# Patient Record
Sex: MALE | Race: WHITE | NOT HISPANIC OR LATINO | Employment: OTHER | URBAN - METROPOLITAN AREA
[De-identification: names, ages, dates, MRNs, and addresses within clinical notes are randomized per-mention and may not be internally consistent; named-entity substitution may affect disease eponyms.]

---

## 2023-03-18 ENCOUNTER — HOSPITAL ENCOUNTER (INPATIENT)
Facility: HOSPITAL | Age: 78
LOS: 9 days | Discharge: HOME HEALTH | DRG: 698 | End: 2023-03-27
Attending: EMERGENCY MEDICINE | Admitting: STUDENT IN AN ORGANIZED HEALTH CARE EDUCATION/TRAINING PROGRAM
Payer: OTHER GOVERNMENT

## 2023-03-18 ENCOUNTER — APPOINTMENT (OUTPATIENT)
Dept: GENERAL RADIOLOGY | Facility: HOSPITAL | Age: 78
DRG: 698 | End: 2023-03-18
Payer: OTHER GOVERNMENT

## 2023-03-18 DIAGNOSIS — Z78.9 DECREASED ACTIVITIES OF DAILY LIVING (ADL): ICD-10-CM

## 2023-03-18 DIAGNOSIS — A41.9 SEPSIS WITH ACUTE RENAL FAILURE AND SEPTIC SHOCK, DUE TO UNSPECIFIED ORGANISM, UNSPECIFIED ACUTE RENAL FAILURE TYPE: Primary | ICD-10-CM

## 2023-03-18 DIAGNOSIS — N39.0 ACUTE URINARY TRACT INFECTION: ICD-10-CM

## 2023-03-18 DIAGNOSIS — N17.9 SEPSIS WITH ACUTE RENAL FAILURE AND SEPTIC SHOCK, DUE TO UNSPECIFIED ORGANISM, UNSPECIFIED ACUTE RENAL FAILURE TYPE: Primary | ICD-10-CM

## 2023-03-18 DIAGNOSIS — R65.21 SEPSIS WITH ACUTE RENAL FAILURE AND SEPTIC SHOCK, DUE TO UNSPECIFIED ORGANISM, UNSPECIFIED ACUTE RENAL FAILURE TYPE: Primary | ICD-10-CM

## 2023-03-18 DIAGNOSIS — R26.2 DIFFICULTY WALKING: ICD-10-CM

## 2023-03-18 LAB
ALBUMIN SERPL-MCNC: 3.2 G/DL (ref 3.5–5.2)
ALBUMIN/GLOB SERPL: 0.7 G/DL
ALP SERPL-CCNC: 183 U/L (ref 39–117)
ALT SERPL W P-5'-P-CCNC: 59 U/L (ref 1–41)
ANION GAP SERPL CALCULATED.3IONS-SCNC: 17.9 MMOL/L (ref 5–15)
APTT PPP: 45.9 SECONDS (ref 24.2–34.2)
ARTERIAL PATENCY WRIST A: ABNORMAL
AST SERPL-CCNC: 99 U/L (ref 1–40)
BACTERIA UR QL AUTO: ABNORMAL /HPF
BASE EXCESS BLDA CALC-SCNC: -7.3 MMOL/L (ref -2–2)
BASOPHILS # BLD AUTO: 0.03 10*3/MM3 (ref 0–0.2)
BASOPHILS NFR BLD AUTO: 0.2 % (ref 0–1.5)
BDY SITE: ABNORMAL
BILIRUB SERPL-MCNC: 0.5 MG/DL (ref 0–1.2)
BILIRUB UR QL STRIP: NEGATIVE
BUN SERPL-MCNC: 55 MG/DL (ref 8–23)
BUN/CREAT SERPL: 16.4 (ref 7–25)
CA-I BLDA-SCNC: 1.05 MMOL/L (ref 1.13–1.32)
CA-I BLDA-SCNC: 1.09 MMOL/L (ref 1.13–1.32)
CALCIUM SPEC-SCNC: 9 MG/DL (ref 8.6–10.5)
CHLORIDE BLDA-SCNC: 102 MMOL/L (ref 98–106)
CHLORIDE SERPL-SCNC: 92 MMOL/L (ref 98–107)
CLARITY UR: ABNORMAL
CO2 SERPL-SCNC: 19.1 MMOL/L (ref 22–29)
COHGB MFR BLD: 0.6 % (ref 0–1.5)
COLOR UR: YELLOW
CREAT SERPL-MCNC: 3.36 MG/DL (ref 0.76–1.27)
CRP SERPL-MCNC: 32.31 MG/DL (ref 0–0.5)
D-LACTATE SERPL-SCNC: 1.8 MMOL/L (ref 0.5–2)
D-LACTATE SERPL-SCNC: 3.8 MMOL/L (ref 0.5–2)
DEPRECATED RDW RBC AUTO: 50 FL (ref 37–54)
EGFRCR SERPLBLD CKD-EPI 2021: 18 ML/MIN/1.73
EOSINOPHIL # BLD AUTO: 0 10*3/MM3 (ref 0–0.4)
EOSINOPHIL NFR BLD AUTO: 0 % (ref 0.3–6.2)
ERYTHROCYTE [DISTWIDTH] IN BLOOD BY AUTOMATED COUNT: 15.6 % (ref 12.3–15.4)
FHHB: 5.9 % (ref 0–5)
GAS FLOW AIRWAY: 2 LPM
GLOBULIN UR ELPH-MCNC: 4.6 GM/DL
GLUCOSE BLDA-MCNC: 186 MG/DL (ref 70–99)
GLUCOSE BLDC GLUCOMTR-MCNC: 185 MG/DL (ref 70–99)
GLUCOSE BLDC GLUCOMTR-MCNC: 211 MG/DL (ref 70–99)
GLUCOSE SERPL-MCNC: 200 MG/DL (ref 65–99)
GLUCOSE UR STRIP-MCNC: ABNORMAL MG/DL
HCO3 BLDA-SCNC: 16.7 MMOL/L (ref 22–26)
HCT VFR BLD AUTO: 32.2 % (ref 37.5–51)
HGB BLD-MCNC: 10.7 G/DL (ref 13–17.7)
HGB BLDA-MCNC: 11 G/DL (ref 13.8–16.4)
HGB UR QL STRIP.AUTO: ABNORMAL
HOLD SPECIMEN: NORMAL
HOLD SPECIMEN: NORMAL
HYALINE CASTS UR QL AUTO: ABNORMAL /LPF
IMM GRANULOCYTES # BLD AUTO: 0.09 10*3/MM3 (ref 0–0.05)
IMM GRANULOCYTES NFR BLD AUTO: 0.7 % (ref 0–0.5)
INHALED O2 CONCENTRATION: 28 %
INR PPP: 1.2 (ref 0.86–1.15)
KETONES UR QL STRIP: NEGATIVE
LACTATE BLDA-SCNC: 2.26 MMOL/L (ref 0.5–2)
LEUKOCYTE ESTERASE UR QL STRIP.AUTO: ABNORMAL
LYMPHOCYTES # BLD AUTO: 0.4 10*3/MM3 (ref 0.7–3.1)
LYMPHOCYTES NFR BLD AUTO: 2.9 % (ref 19.6–45.3)
MAGNESIUM SERPL-MCNC: 1.6 MG/DL (ref 1.6–2.4)
MCH RBC QN AUTO: 29.2 PG (ref 26.6–33)
MCHC RBC AUTO-ENTMCNC: 33.2 G/DL (ref 31.5–35.7)
MCV RBC AUTO: 87.7 FL (ref 79–97)
METHGB BLD QL: 0.3 % (ref 0–1.5)
MODALITY: ABNORMAL
MONOCYTES # BLD AUTO: 1.18 10*3/MM3 (ref 0.1–0.9)
MONOCYTES NFR BLD AUTO: 8.7 % (ref 5–12)
MRSA DNA SPEC QL NAA+PROBE: NORMAL
NEUTROPHILS NFR BLD AUTO: 11.91 10*3/MM3 (ref 1.7–7)
NEUTROPHILS NFR BLD AUTO: 87.5 % (ref 42.7–76)
NITRITE UR QL STRIP: NEGATIVE
NOTE: ABNORMAL
NRBC BLD AUTO-RTO: 0 /100 WBC (ref 0–0.2)
NT-PROBNP SERPL-MCNC: ABNORMAL PG/ML (ref 0–1800)
OXYHGB MFR BLDV: 93.2 % (ref 94–99)
PCO2 BLDA: 29.2 MM HG (ref 35–45)
PH BLDA: 7.38 PH UNITS (ref 7.35–7.45)
PH UR STRIP.AUTO: 5.5 [PH] (ref 5–8)
PHOSPHATE SERPL-MCNC: 3.8 MG/DL (ref 2.5–4.5)
PLATELET # BLD AUTO: 198 10*3/MM3 (ref 140–450)
PMV BLD AUTO: 10.5 FL (ref 6–12)
PO2 BLD: 281 MM[HG] (ref 0–500)
PO2 BLDA: 78.8 MM HG (ref 80–100)
POTASSIUM BLDA-SCNC: 4.51 MMOL/L (ref 3.5–5)
POTASSIUM SERPL-SCNC: 4.9 MMOL/L (ref 3.5–5.2)
PROT SERPL-MCNC: 7.8 G/DL (ref 6–8.5)
PROT UR QL STRIP: ABNORMAL
PROTHROMBIN TIME: 15.2 SECONDS (ref 11.8–14.9)
RBC # BLD AUTO: 3.67 10*6/MM3 (ref 4.14–5.8)
RBC # UR STRIP: ABNORMAL /HPF
REF LAB TEST METHOD: ABNORMAL
SAO2 % BLDCOA: 94 % (ref 95–99)
SODIUM BLDA-SCNC: 127.5 MMOL/L (ref 136–146)
SODIUM SERPL-SCNC: 129 MMOL/L (ref 136–145)
SP GR UR STRIP: 1.01 (ref 1–1.03)
SQUAMOUS #/AREA URNS HPF: ABNORMAL /HPF
UROBILINOGEN UR QL STRIP: ABNORMAL
WBC # UR STRIP: ABNORMAL /HPF
WBC NRBC COR # BLD: 13.61 10*3/MM3 (ref 3.4–10.8)
WHOLE BLOOD HOLD COAG: NORMAL
WHOLE BLOOD HOLD SPECIMEN: NORMAL

## 2023-03-18 PROCEDURE — 87641 MR-STAPH DNA AMP PROBE: CPT | Performed by: STUDENT IN AN ORGANIZED HEALTH CARE EDUCATION/TRAINING PROGRAM

## 2023-03-18 PROCEDURE — 82962 GLUCOSE BLOOD TEST: CPT

## 2023-03-18 PROCEDURE — 99285 EMERGENCY DEPT VISIT HI MDM: CPT

## 2023-03-18 PROCEDURE — 83050 HGB METHEMOGLOBIN QUAN: CPT | Performed by: EMERGENCY MEDICINE

## 2023-03-18 PROCEDURE — 25010000002 VANCOMYCIN 5 G RECONSTITUTED SOLUTION: Performed by: STUDENT IN AN ORGANIZED HEALTH CARE EDUCATION/TRAINING PROGRAM

## 2023-03-18 PROCEDURE — 85730 THROMBOPLASTIN TIME PARTIAL: CPT | Performed by: EMERGENCY MEDICINE

## 2023-03-18 PROCEDURE — 25010000002 CEFTRIAXONE PER 250 MG: Performed by: EMERGENCY MEDICINE

## 2023-03-18 PROCEDURE — 82330 ASSAY OF CALCIUM: CPT | Performed by: EMERGENCY MEDICINE

## 2023-03-18 PROCEDURE — 87150 DNA/RNA AMPLIFIED PROBE: CPT | Performed by: EMERGENCY MEDICINE

## 2023-03-18 PROCEDURE — 82805 BLOOD GASES W/O2 SATURATION: CPT | Performed by: EMERGENCY MEDICINE

## 2023-03-18 PROCEDURE — 84100 ASSAY OF PHOSPHORUS: CPT | Performed by: EMERGENCY MEDICINE

## 2023-03-18 PROCEDURE — 83605 ASSAY OF LACTIC ACID: CPT | Performed by: EMERGENCY MEDICINE

## 2023-03-18 PROCEDURE — 87088 URINE BACTERIA CULTURE: CPT | Performed by: EMERGENCY MEDICINE

## 2023-03-18 PROCEDURE — 82375 ASSAY CARBOXYHB QUANT: CPT | Performed by: EMERGENCY MEDICINE

## 2023-03-18 PROCEDURE — 87086 URINE CULTURE/COLONY COUNT: CPT | Performed by: EMERGENCY MEDICINE

## 2023-03-18 PROCEDURE — 93010 ELECTROCARDIOGRAM REPORT: CPT | Performed by: INTERNAL MEDICINE

## 2023-03-18 PROCEDURE — 93005 ELECTROCARDIOGRAM TRACING: CPT | Performed by: EMERGENCY MEDICINE

## 2023-03-18 PROCEDURE — 87186 SC STD MICRODIL/AGAR DIL: CPT | Performed by: EMERGENCY MEDICINE

## 2023-03-18 PROCEDURE — 80053 COMPREHEN METABOLIC PANEL: CPT | Performed by: EMERGENCY MEDICINE

## 2023-03-18 PROCEDURE — 88312 SPECIAL STAINS GROUP 1: CPT | Performed by: EMERGENCY MEDICINE

## 2023-03-18 PROCEDURE — 99223 1ST HOSP IP/OBS HIGH 75: CPT | Performed by: STUDENT IN AN ORGANIZED HEALTH CARE EDUCATION/TRAINING PROGRAM

## 2023-03-18 PROCEDURE — 85610 PROTHROMBIN TIME: CPT | Performed by: EMERGENCY MEDICINE

## 2023-03-18 PROCEDURE — 83735 ASSAY OF MAGNESIUM: CPT | Performed by: EMERGENCY MEDICINE

## 2023-03-18 PROCEDURE — 94799 UNLISTED PULMONARY SVC/PX: CPT

## 2023-03-18 PROCEDURE — 83880 ASSAY OF NATRIURETIC PEPTIDE: CPT | Performed by: EMERGENCY MEDICINE

## 2023-03-18 PROCEDURE — 71045 X-RAY EXAM CHEST 1 VIEW: CPT

## 2023-03-18 PROCEDURE — 25010000002 HEPARIN (PORCINE) PER 1000 UNITS: Performed by: STUDENT IN AN ORGANIZED HEALTH CARE EDUCATION/TRAINING PROGRAM

## 2023-03-18 PROCEDURE — 36415 COLL VENOUS BLD VENIPUNCTURE: CPT

## 2023-03-18 PROCEDURE — 86140 C-REACTIVE PROTEIN: CPT | Performed by: EMERGENCY MEDICINE

## 2023-03-18 PROCEDURE — 93005 ELECTROCARDIOGRAM TRACING: CPT

## 2023-03-18 PROCEDURE — 63710000001 INSULIN REGULAR HUMAN PER 5 UNITS: Performed by: STUDENT IN AN ORGANIZED HEALTH CARE EDUCATION/TRAINING PROGRAM

## 2023-03-18 PROCEDURE — 85025 COMPLETE CBC W/AUTO DIFF WBC: CPT | Performed by: EMERGENCY MEDICINE

## 2023-03-18 PROCEDURE — 36600 WITHDRAWAL OF ARTERIAL BLOOD: CPT | Performed by: EMERGENCY MEDICINE

## 2023-03-18 PROCEDURE — 81001 URINALYSIS AUTO W/SCOPE: CPT | Performed by: EMERGENCY MEDICINE

## 2023-03-18 PROCEDURE — 51702 INSERT TEMP BLADDER CATH: CPT

## 2023-03-18 RX ORDER — OMEPRAZOLE 20 MG/1
20 CAPSULE, DELAYED RELEASE ORAL DAILY
COMMUNITY

## 2023-03-18 RX ORDER — NOREPINEPHRINE BIT/0.9 % NACL 8 MG/250ML
.02-.3 INFUSION BOTTLE (ML) INTRAVENOUS
Status: DISCONTINUED | OUTPATIENT
Start: 2023-03-18 | End: 2023-03-20

## 2023-03-18 RX ORDER — GLIPIZIDE 10 MG/1
20 TABLET ORAL
COMMUNITY

## 2023-03-18 RX ORDER — NICOTINE POLACRILEX 4 MG
15 LOZENGE BUCCAL
Status: DISCONTINUED | OUTPATIENT
Start: 2023-03-18 | End: 2023-03-27 | Stop reason: HOSPADM

## 2023-03-18 RX ORDER — ACETAMINOPHEN 325 MG/1
650 TABLET ORAL EVERY 6 HOURS PRN
Status: DISCONTINUED | OUTPATIENT
Start: 2023-03-18 | End: 2023-03-27 | Stop reason: HOSPADM

## 2023-03-18 RX ORDER — ALUMINA, MAGNESIA, AND SIMETHICONE 2400; 2400; 240 MG/30ML; MG/30ML; MG/30ML
15 SUSPENSION ORAL EVERY 6 HOURS PRN
Status: DISCONTINUED | OUTPATIENT
Start: 2023-03-18 | End: 2023-03-27 | Stop reason: HOSPADM

## 2023-03-18 RX ORDER — HEPARIN SODIUM 5000 [USP'U]/ML
5000 INJECTION, SOLUTION INTRAVENOUS; SUBCUTANEOUS EVERY 12 HOURS SCHEDULED
Status: DISCONTINUED | OUTPATIENT
Start: 2023-03-18 | End: 2023-03-19

## 2023-03-18 RX ORDER — DOCUSATE SODIUM 250 MG
250 CAPSULE ORAL 2 TIMES DAILY
COMMUNITY

## 2023-03-18 RX ORDER — TAMSULOSIN HYDROCHLORIDE 0.4 MG/1
1 CAPSULE ORAL NIGHTLY
COMMUNITY

## 2023-03-18 RX ORDER — OMEGA-3S/DHA/EPA/FISH OIL/D3 300MG-1000
400 CAPSULE ORAL DAILY
COMMUNITY

## 2023-03-18 RX ORDER — SODIUM CHLORIDE 9 MG/ML
40 INJECTION, SOLUTION INTRAVENOUS AS NEEDED
Status: DISCONTINUED | OUTPATIENT
Start: 2023-03-18 | End: 2023-03-27 | Stop reason: HOSPADM

## 2023-03-18 RX ORDER — ATORVASTATIN CALCIUM 40 MG/1
40 TABLET, FILM COATED ORAL DAILY
COMMUNITY

## 2023-03-18 RX ORDER — SODIUM CHLORIDE, SODIUM LACTATE, POTASSIUM CHLORIDE, CALCIUM CHLORIDE 600; 310; 30; 20 MG/100ML; MG/100ML; MG/100ML; MG/100ML
100 INJECTION, SOLUTION INTRAVENOUS CONTINUOUS
Status: DISCONTINUED | OUTPATIENT
Start: 2023-03-18 | End: 2023-03-21

## 2023-03-18 RX ORDER — SODIUM CHLORIDE 0.9 % (FLUSH) 0.9 %
10 SYRINGE (ML) INJECTION AS NEEDED
Status: DISCONTINUED | OUTPATIENT
Start: 2023-03-18 | End: 2023-03-27 | Stop reason: HOSPADM

## 2023-03-18 RX ORDER — FINASTERIDE 5 MG/1
5 TABLET, FILM COATED ORAL DAILY
COMMUNITY

## 2023-03-18 RX ORDER — GABAPENTIN 800 MG/1
800 TABLET ORAL 3 TIMES DAILY
COMMUNITY
End: 2023-03-27 | Stop reason: HOSPADM

## 2023-03-18 RX ORDER — CEFTRIAXONE SODIUM 1 G/50ML
1 INJECTION, SOLUTION INTRAVENOUS EVERY 24 HOURS
Status: DISCONTINUED | OUTPATIENT
Start: 2023-03-19 | End: 2023-03-18

## 2023-03-18 RX ORDER — CHOLECALCIFEROL (VITAMIN D3) 125 MCG
5 CAPSULE ORAL NIGHTLY PRN
Status: DISCONTINUED | OUTPATIENT
Start: 2023-03-18 | End: 2023-03-27 | Stop reason: HOSPADM

## 2023-03-18 RX ORDER — METOPROLOL TARTRATE 50 MG/1
125 TABLET, FILM COATED ORAL 2 TIMES DAILY
COMMUNITY
End: 2023-03-27 | Stop reason: HOSPADM

## 2023-03-18 RX ORDER — SODIUM CHLORIDE 0.9 % (FLUSH) 0.9 %
10 SYRINGE (ML) INJECTION EVERY 12 HOURS SCHEDULED
Status: DISCONTINUED | OUTPATIENT
Start: 2023-03-18 | End: 2023-03-27 | Stop reason: HOSPADM

## 2023-03-18 RX ORDER — CEFTRIAXONE SODIUM 1 G/50ML
1 INJECTION, SOLUTION INTRAVENOUS EVERY 24 HOURS
Status: DISCONTINUED | OUTPATIENT
Start: 2023-03-19 | End: 2023-03-19

## 2023-03-18 RX ORDER — CEFTRIAXONE SODIUM 1 G/50ML
1 INJECTION, SOLUTION INTRAVENOUS ONCE
Status: COMPLETED | OUTPATIENT
Start: 2023-03-18 | End: 2023-03-18

## 2023-03-18 RX ORDER — ASPIRIN 81 MG/1
81 TABLET, CHEWABLE ORAL DAILY
COMMUNITY

## 2023-03-18 RX ORDER — AMLODIPINE BESYLATE 10 MG/1
10 TABLET ORAL DAILY
COMMUNITY
End: 2023-03-27 | Stop reason: HOSPADM

## 2023-03-18 RX ORDER — DEXTROSE MONOHYDRATE 25 G/50ML
25 INJECTION, SOLUTION INTRAVENOUS
Status: DISCONTINUED | OUTPATIENT
Start: 2023-03-18 | End: 2023-03-27 | Stop reason: HOSPADM

## 2023-03-18 RX ADMIN — VANCOMYCIN HYDROCHLORIDE 1500 MG: 5 INJECTION, POWDER, LYOPHILIZED, FOR SOLUTION INTRAVENOUS at 21:40

## 2023-03-18 RX ADMIN — Medication 10 ML: at 21:56

## 2023-03-18 RX ADMIN — INSULIN HUMAN 3 UNITS: 100 INJECTION, SOLUTION PARENTERAL at 23:03

## 2023-03-18 RX ADMIN — SODIUM CHLORIDE 2136 ML: 9 INJECTION, SOLUTION INTRAVENOUS at 17:31

## 2023-03-18 RX ADMIN — SODIUM CHLORIDE 1000 ML: 9 INJECTION, SOLUTION INTRAVENOUS at 16:46

## 2023-03-18 RX ADMIN — SODIUM CHLORIDE, POTASSIUM CHLORIDE, SODIUM LACTATE AND CALCIUM CHLORIDE 100 ML/HR: 600; 310; 30; 20 INJECTION, SOLUTION INTRAVENOUS at 21:40

## 2023-03-18 RX ADMIN — HEPARIN SODIUM 5000 UNITS: 5000 INJECTION INTRAVENOUS; SUBCUTANEOUS at 21:40

## 2023-03-18 RX ADMIN — Medication 0.02 MCG/KG/MIN: at 18:20

## 2023-03-18 RX ADMIN — CEFTRIAXONE SODIUM 1 G: 1 INJECTION, SOLUTION INTRAVENOUS at 18:20

## 2023-03-18 NOTE — H&P
" [facility]  Date: 3/18/2023   Patient Name: Darnell Geller  : 1945  MRN: [id number]  Primary Care Physician:  Provider, No Known  Date of admission: 3/18/2023    Subjective   Subjective     Chief Complaint: Altered mental status    HPI:    Darnell Geller is a 77 y.o. male past medical history of BPH, type 2 diabetes, peripheral neuropathy, spinal stenosis, CAD status post PCI in , hyperlipidemia, hypertension who presents to the ER due to altered mental status.  Patient is significantly lethargic and confused unable to provide history.  History obtained by discussion with his wife Madison on the phone at [phone].  It appears patient is a relatively independent male who does not have many complaints at baseline but over the last few days has been progressively more weak and intermittently lethargic.  In that time patient also has been straight cathing himself for known urinary retention.  He was just recently diagnosed with urinary retention a month ago when he was told his urine \"backs up into his kidneys\".  They did not mention he had any kidney stones or any history of kidney stones.  He initially had a Muñiz catheter that was removed and most recently in the last week he has been straight catheterizing himself.  In that time he has been slowly progressively having decreasing urine output as well as more weakness and change in mental status.  This morning his wife states that he was barely able to ambulate and was not making sense and also very tired at which point she decided to call the ambulance to bring him in.  Upon arrival here patient was noted to be hypotensive and febrile at 101.2 with a BP of 76/50.  Lab work was revealing of a leukocytosis of 13,000, elevated BNP of 16,000, CRP of 32, lactate of 3.8, INR 1.2, PTT of 45.9, urinalysis showing pyuria, proteinuria and hematuria.  ABG was done and showed adequate compensation with a CO2 of 29.  Chest x-ray " revealed cardiomegaly and possible atypical infection or low-grade pulmonary interstitial edema.  Patient was initially given 2100 mL of fluid in the ER and started on Rocephin for suspected sepsis.  Initially he had improvement in his lethargy but however had a decline in mental status and blood pressure again at which point Levophed was initiated.  While in the ER patient also had 3 very loose bowel movements.  Hospitalist service was then contacted for admission.     Personal History     Past Medical History:  History reviewed. No pertinent past medical history.  History obtained by discussion with wife: Type 2 diabetes with peripheral neuropathy, spinal stenosis, CAD, urinary retention, hyperlipidemia, hypertension, BPH    Past Surgical History:  History reviewed. No pertinent surgical history.  Surgical history obtained by discussion with wife, no surgery, PCI in 2004    Family History:   Reviewed and noncontributory except as mentioned in HPI    Social History:   Social Determinants of Health     Tobacco Use: Not on file   Alcohol Use: Not on file   Financial Resource Strain: Not on file   Food Insecurity: Not on file   Transportation Needs: Not on file   Physical Activity: Not on file   Stress: Not on file   Social Connections: Not on file   Intimate Partner Violence: Not on file   Depression: Not on file   Housing Stability: Not on file         Home Medications:       Allergies:  No Known Allergies    Review of Systems   Unable to obtain due to patient's altered mental status    Objective   Objective     Vitals:   Temp:  [99.8 °F (37.7 °C)-102.1 °F (38.9 °C)] 102.1 °F (38.9 °C)  Heart Rate:  [] 103  Resp:  [18-22] 19  BP: (76-92)/(41-52) 85/50  Flow (L/min):  [2-4] 2    Physical Exam    Constitutional: Awake, alert, no acute distress   Eyes: Pupils equal, sclerae anicteric, no conjunctival injection   HENT: NCAT, mucous membranes dry   Neck: Supple, no thyromegaly, no lymphadenopathy, trachea  midline   Respiratory: Clear to auscultation bilaterally, nonlabored respirations    Cardiovascular: RRR, no murmurs, rubs, or gallops, palpable pedal pulses bilaterally   Gastrointestinal: Positive bowel sounds, soft, nontender, nondistended   Musculoskeletal: No bilateral ankle edema, no clubbing or cyanosis to extremities   Psychiatric: Appropriate affect, cooperative   Neurologic: Oriented x 1 (Person), strength symmetric in all extremities, Cranial Nerves grossly intact to confrontation, speech clear   Skin: No rashes     Result Review    Result Review:  I have personally reviewed the results from the time of this admission to 3/18/2023 19:13 EDT and agree with these findings:  [x]  Laboratory  [x]  Microbiology  [x]  Radiology  [x]  EKG/Telemetry    [x]  Cardiology/Vascular   []  Pathology  [x]  Old records  []  Other:      Assessment & Plan   Assessment / Plan     Assessment/Plan:   • Septic shock likely secondary to UTI versus GI source  • Acute renal failure, possibly obstructive in nature versus hypovolemia due to above  • Acute metabolic encephalopathy likely multifactorial due to causes above  • Chronic urinary retention, intermittent catheterization at home  • Leukocytosis likely due to UTI versus GI source  • Acute diarrhea  • Lactic acidosis likely due to sepsis  • Coagulopathy likely due to sepsis  • Type 2 diabetes with peripheral neuropathy  • Hyperlipidemia  • Coronary artery disease  • Chronic lumbar spinal stenosis    Plan  - Admit to ICU  - Suspect most likely sepsis source is urine given pyuria however we will get a CT scan of the abdomen to rule out alternative pathology as per wife patient has been having issues passing bowel movements over the last few weeks  - We will continue broad-spectrum antibiotics with vancomycin and Rocephin for now, MRSA nares to rule out colonization at which point we could potentially de-escalate.  Follow-up urine and blood cultures  - Check C. difficile given  diarrhea in the ER  - Renal failure potentially related to sepsis however has had retention with what sounds like bilateral hydronephrosis in the past, CT scan of the abdomen to further rule out any nephrolithiasis, Muñiz catheter placed in the ER as well  - Continue vasopressors titrated to keep MAP greater than 65.  Intensivist consulted and recommendations will be appreciated  - Mental status slowly improving with improvement in blood pressure, holding all home antihypertensives for now, continue neurochecks in the ICU  - Continue daily labs  - Coagulopathy seems most likely related to sepsis as per wife he is not on any blood thinners  - Insulin sliding scale and Accu-Cheks ordered for type 2 diabetes, can keep n.p.o. for now given his still lethargic  - Clarify home meds and resume when appropriate        Discussed with ER Physician and Nurse.  Discussed with his wife Madison who is his POA at [phone].      DVT Prophylaxis: Heparin    CODE STATUS:    Code Status (Patient has no pulse and is not breathing): CPR (Attempt to Resuscitate)  Medical Interventions (Patient has pulse or is breathing): Full Support      Admission Status:  I believe this patient meets inpatient status.    Electronically signed by Vincenzo Crandall MD, 03/18/23, 7:05 PM EDT.

## 2023-03-18 NOTE — ED PROVIDER NOTES
Time: 4:53 PM EDT  Date of encounter:  3/18/2023  Independent Historian/Clinical History and Information was obtained by:   Family  Chief Complaint: Altered Mental Status     History is limited by: N/A    History of Present Illness:  Patient is a 77 y.o. year old male who presents to the emergency department for evaluation of Altered Mental status that started this week. Pt recently had his soler catheter removed and has since been self cathetering. PT has had a diminished response and low blood pressure today. Pt describes his current condition as feeling like he was floating. Pt denies any LOC or fevers.     HPI    Patient Care Team  Primary Care Provider: Provider, No Known    Past Medical History:     No Known Allergies  Past Medical History:   Diagnosis Date   • Arthritis    • Benign prostatic hyperplasia    • Coronary artery disease    • Hyperlipidemia    • Hypertension      Past Surgical History:   Procedure Laterality Date   • SINUS SURGERY       History reviewed. No pertinent family history.    Home Medications:  Prior to Admission medications    Not on File        Social History:   Social History     Tobacco Use   • Smoking status: Every Day     Packs/day: 1.50     Years: 40.00     Pack years: 60.00     Types: Cigarettes   • Smokeless tobacco: Never   Vaping Use   • Vaping Use: Never used   Substance Use Topics   • Alcohol use: Not Currently   • Drug use: Never         Review of Systems:  Review of Systems   Constitutional: Negative for chills and fever.   HENT: Negative for sore throat.    Eyes: Negative for photophobia.   Respiratory: Negative for shortness of breath.    Cardiovascular: Negative for chest pain.   Gastrointestinal: Negative for abdominal pain, diarrhea, nausea and vomiting.   Genitourinary: Negative for dysuria.   Musculoskeletal: Negative for neck pain.   Skin: Negative for wound.   Neurological: Negative for headaches.        Mental status change   Hematological:        Low blood  "pressure    All other systems reviewed and are negative.       Physical Exam:  /75   Pulse 111   Temp 98.1 °F (36.7 °C) (Oral)   Resp 18   Ht 182.9 cm (72\")   Wt 75.6 kg (166 lb 10.7 oz)   SpO2 (!) 89%   BMI 22.60 kg/m²     Physical Exam  Vitals and nursing note reviewed.   Constitutional:       General: He is not in acute distress.  HENT:      Head: Normocephalic and atraumatic.   Eyes:      Extraocular Movements: Extraocular movements intact.   Cardiovascular:      Rate and Rhythm: Normal rate and regular rhythm.   Pulmonary:      Effort: Pulmonary effort is normal. No respiratory distress.      Breath sounds: Normal breath sounds.   Abdominal:      General: Abdomen is flat.      Palpations: Abdomen is soft.      Tenderness: There is no abdominal tenderness.   Musculoskeletal:         General: Normal range of motion.      Cervical back: Normal range of motion and neck supple.   Skin:     General: Skin is warm and dry.      Capillary Refill: Capillary refill takes less than 2 seconds.   Neurological:      Mental Status: He is alert and oriented to person, place, and time. Mental status is at baseline.                  Procedures:  Procedures      Medical Decision Making:      Comorbidities that affect care:    Coronary Artery Disease, Hypertension    External Notes reviewed:    None      The following orders were placed and all results were independently analyzed by me:  Orders Placed This Encounter   Procedures   • Blood Culture - Blood,   • Blood Culture - Blood,   • Urine Culture - Urine,   • Clostridioides difficile Toxin - Stool, Per Rectum   • Enteric Bacterial Panel - Stool, Per Rectum   • Clostridioides difficile Toxin, PCR - Stool, Per Rectum   • MRSA Screen, PCR (Inpatient) - Swab, Nares   • Blood Culture ID, PCR - Blood,   • XR Chest 1 View   • CT Abdomen Pelvis Without Contrast   • Sesser Draw   • Comprehensive Metabolic Panel   • Protime-INR   • aPTT   • Magnesium   • Phosphorus   • " Calcium, Ionized   • Lactic Acid, Plasma   • C-reactive Protein   • Urinalysis With Culture If Indicated - Urine, Catheter   • CBC Auto Differential   • ABG with Co-Ox and Electrolytes   • BNP   • STAT Lactic Acid, Reflex   • Urinalysis, Microscopic Only - Urine, Clean Catch   • Basic Metabolic Panel   • Magnesium   • Vancomycin, Random   • CBC Auto Differential   • TSH   • Lactic Acid, Plasma   • Procalcitonin   • Basic Metabolic Panel   • Magnesium   • Phosphorus   • CBC Auto Differential   • Diet: Diabetic Diets; Consistent Carbohydrate; Texture: Regular Texture (IDDSI 7); Fluid Consistency: Thin (IDDSI 0)   • Undress & Gown   • Cardiac Monitoring   • Continuous Pulse Oximetry   • Measure Blood Pressure   • Vital Signs   • Insert Indwelling Urinary Catheter   • Assess Need for Indwelling Urinary Catheter - Follow Removal Protocol   • Urinary Catheter Care   • Vital Signs   • Intake & Output   • Weigh Patient   • Oral Care   • Saline Lock & Maintain IV Access   • Up in Chair   • Code Status and Medical Interventions:   • Inpatient Hospitalist Consult   • Inpatient Pulmonology Consult   • Inpatient Cardiology Consult   • OT Consult: Eval & Treat   • PT Consult: Eval & Treat Functional Mobility Below Baseline   • Oxygen Therapy- Nasal Cannula; Titrate for SPO2: 90% - 95%   • POC Glucose Once   • POC Glucose Once   • POC Glucose Once   • POC Glucose Once   • POC Glucose Once   • POC Glucose Once   • POC Glucose 4x Daily AC & at Bedtime   • POC Glucose Once   • POC Glucose Once   • POC Glucose Once   • POC Glucose Once   • POC Glucose Once   • POC Glucose Once   • ECG 12 Lead Other; Sepsis Evaluation   • ECG 12 Lead Rhythm Change   • Adult Transthoracic Echo Complete W/ Cont if Necessary Per Protocol   • Insert Large Bore Peripheral IV   • Insert 2nd Large Bore Peripheral IV   • Insert Peripheral IV   • Inpatient Admission   • Aspiration Precautions   • CBC & Differential   • Green Top (Gel)   • Lavender Top   • Gold  Top - SST   • Light Blue Top   • CBC & Differential   • CBC & Differential       Medications Given in the Emergency Department:  Medications   sodium chloride 0.9 % flush 10 mL (has no administration in time range)   sodium chloride 0.9 % flush 10 mL (has no administration in time range)   sodium chloride 0.9 % flush 10 mL (10 mL Intravenous Given 3/20/23 0847)   sodium chloride 0.9 % infusion 40 mL (has no administration in time range)   acetaminophen (TYLENOL) tablet 650 mg (650 mg Oral Given 3/20/23 0431)   aluminum-magnesium hydroxide-simethicone (MAALOX MAX) 400-400-40 MG/5ML suspension 15 mL (has no administration in time range)   melatonin tablet 5 mg (has no administration in time range)   lactated ringers infusion (100 mL/hr Intravenous New Bag 3/19/23 1819)   dextrose (GLUTOSE) oral gel 15 g (has no administration in time range)   dextrose (D50W) (25 g/50 mL) IV injection 25 g (has no administration in time range)   glucagon (GLUCAGEN) injection 1 mg (has no administration in time range)   cefTRIAXone (ROCEPHIN) IVPB 2 g (2 g Intravenous New Bag 3/20/23 1200)   insulin regular (humuLIN R,novoLIN R) injection 2-7 Units (3 Units Subcutaneous Given 3/20/23 1159)   pantoprazole (PROTONIX) EC tablet 40 mg (40 mg Oral Given 3/20/23 0501)   atorvastatin (LIPITOR) tablet 40 mg (40 mg Oral Given 3/20/23 0847)   aspirin chewable tablet 81 mg (81 mg Oral Given 3/20/23 0847)   metoprolol tartrate (LOPRESSOR) tablet 100 mg (has no administration in time range)   adenosine (ADENOCARD) injection 12 mg ( Intravenous Return to Cabinet 3/20/23 1104)   apixaban (ELIQUIS) tablet 5 mg (5 mg Oral Given 3/20/23 1200)   sodium chloride 0.9 % bolus 1,000 mL (0 mL Intravenous Stopped 3/18/23 1729)   sodium chloride 0.9 % bolus 2,136 mL (0 mL Intravenous Stopped 3/18/23 1939)   cefTRIAXone (ROCEPHIN) IVPB 1 g (0 g Intravenous Stopped 3/18/23 1939)   vancomycin 1500 mg/250 mL 0.9% NS IVPB ([facility]) (1,500 mg Intravenous Given 3/18/23  2140)   metoprolol tartrate (LOPRESSOR) injection 5 mg (5 mg Intravenous Given 3/19/23 0148)   calcium gluconate 1g/50ml 0.675% NaCl IV SOLN (1 g Intravenous New Bag 3/19/23 0311)   metoprolol tartrate (LOPRESSOR) injection 5 mg (5 mg Intravenous Given 3/19/23 0323)   metoprolol tartrate (LOPRESSOR) 5 MG/5ML injection  - ADS Override Pull (5 mg  Given 3/19/23 0309)   metoprolol tartrate (LOPRESSOR) tablet 25 mg (25 mg Oral Given 3/19/23 0323)   magnesium sulfate 2g/50 mL (PREMIX) infusion (2 g Intravenous New Bag 3/20/23 0846)   metoprolol tartrate (LOPRESSOR) tablet 75 mg (75 mg Oral Given 3/20/23 0939)   adenosine (ADENOCARD) injection 6 mg (6 mg Intravenous Given 3/20/23 1001)        ED Course:         Labs:    Lab Results (last 24 hours)     Procedure Component Value Units Date/Time    POC Glucose Once [[id number]]  (Abnormal) Collected: 03/19/23 1615    Specimen: Blood Updated: 03/19/23 1617     Glucose 202 mg/dL      Comment: Serial Number: [id number]:  [id number]       POC Glucose Once [[id number]]  (Abnormal) Collected: 03/19/23 1950    Specimen: Blood Updated: 03/19/23 1951     Glucose 232 mg/dL      Comment: Serial Number: [id number]:  [id number]       CBC & Differential [[id number]]  (Abnormal) Collected: 03/20/23 0239    Specimen: Blood Updated: 03/20/23 0247    Narrative:      The following orders were created for panel order CBC & Differential.  Procedure                               Abnormality         Status                     ---------                               -----------         ------                     CBC Auto Differential[[id number]]        Abnormal            Final result                 Please view results for these tests on the individual orders.    Basic Metabolic Panel [[id number]]  (Abnormal) Collected: 03/20/23 0239    Specimen: Blood Updated: 03/20/23 0318     Glucose 191 mg/dL      BUN 54 mg/dL      Creatinine 2.73 mg/dL      Sodium 130 mmol/L      Potassium 4.5 mmol/L       Chloride 100 mmol/L      CO2 18.6 mmol/L      Calcium 8.3 mg/dL      BUN/Creatinine Ratio 19.8     Anion Gap 11.4 mmol/L      eGFR 23.1 mL/min/1.73     Narrative:      GFR Normal >60  Chronic Kidney Disease <60  Kidney Failure <15    The GFR formula is only valid for adults with stable renal function between ages 18 and 70.    Magnesium [[id number]]  (Abnormal) Collected: 03/20/23 0239    Specimen: Blood Updated: 03/20/23 0318     Magnesium 1.5 mg/dL     Phosphorus [[id number]]  (Normal) Collected: 03/20/23 0239    Specimen: Blood Updated: 03/20/23 0318     Phosphorus 3.1 mg/dL     CBC Auto Differential [[id number]]  (Abnormal) Collected: 03/20/23 0239    Specimen: Blood Updated: 03/20/23 0247     WBC 11.00 10*3/mm3      RBC 3.25 10*6/mm3      Hemoglobin 9.6 g/dL      Hematocrit 28.0 %      MCV 86.2 fL      MCH 29.5 pg      MCHC 34.3 g/dL      RDW 15.7 %      RDW-SD 49.5 fl      MPV 10.4 fL      Platelets 137 10*3/mm3      Neutrophil % 84.6 %      Lymphocyte % 3.5 %      Monocyte % 10.8 %      Eosinophil % 0.4 %      Basophil % 0.2 %      Immature Grans % 0.5 %      Neutrophils, Absolute 9.31 10*3/mm3      Lymphocytes, Absolute 0.38 10*3/mm3      Monocytes, Absolute 1.19 10*3/mm3      Eosinophils, Absolute 0.04 10*3/mm3      Basophils, Absolute 0.02 10*3/mm3      Immature Grans, Absolute 0.06 10*3/mm3      nRBC 0.0 /100 WBC     POC Glucose Once [[id number]]  (Abnormal) Collected: 03/20/23 0751    Specimen: Blood Updated: 03/20/23 0753     Glucose 143 mg/dL      Comment: Serial Number: [id number]:  [id number]       POC Glucose Once [[id number]]  (Abnormal) Collected: 03/20/23 1156    Specimen: Blood Updated: 03/20/23 1158     Glucose 247 mg/dL      Comment: Serial Number: [id number]:  [id number]              Imaging:    Adult Transthoracic Echo Complete W/ Cont if Necessary Per Protocol    Result Date: 3/20/2023  •  Technically difficult study. •  Left ventricular ejection fraction appears to be 51 - 55%.  •  Left ventricular diastolic function is consistent with (grade I) impaired relaxation and age. •  No significant valvular disease.         Differential Diagnosis and Discussion:    Altered Mental Status: Based on the patient's signs and symptoms, differential diagnosis includes but is not limited to meningitis, stroke, sepsis, subarachnoid hemorrhage, intracranial bleeding, encephalitis, and metabolic encephalopathy.    All labs were reviewed and interpreted by me.    MDM     Critical Care Note: Total Critical Care time of 45 minutes. Total critical care time documented does not include time spent on separately billed procedures for services of nurses or physician assistants. I personally saw and examined the patient. I have reviewed all diagnostic interpretations and treatment plans as written. I was present for the key portions of any procedures performed and the inclusive time noted in any critical care statement. Critical care time includes patient management by me, time spent at the patients bedside,  time to review lab and imaging results, discussing patient care, documentation in the medical record, and time spent with family or caregiver.    Patient Care Considerations:  Sepsis criteria was met in the emergency department and the Sepsis protocol (including antibiotic administration) was initiated.      SIRS criteria considered:   1.  Temperature > 100.4 or <98.6    2.  Heart Rate > 90    3.  Respiratory Rate > 22    4.  WBC > 12K or <4K.             Severe Sepsis:     Respiratory: Mechanical Ventilation or Bipap  Hypotension: SBP > 90 or MAP < 65  Renal: Creatinine > 2  Metabolic: Lactic Acid > 2  Hematologic: Platelets < 100K or INR > 1.5  Hepatic: BILI  >  2  CNS: Sudden AMS     Septic Shock:     Severe Sepsis + Persistent hypotension or Lactic Acid > 4     Normal saline bolus, Antibiotics, and final disposition was based on these definitions.        Sepsis was recognized at 1720    Antibiotics were  ordered.       30 cc/kg bolus was indicated.       Patient's BP improved with Levophed      Consultants/Shared Management Plan:    Hospitalist: I have discussed the case with Dr. Crandall who agrees to accept the patient for admission.    Social Determinants of Health:    Patient is independent, reliable, and has access to care.       Disposition and Care Coordination:    Admit:   Through independent evaluation of the patient's history, physical, and imperical data, the patient meets criteria for observation/admission to the hospital.        Final diagnoses:   Sepsis with acute renal failure and septic shock, due to unspecified organism, unspecified acute renal failure type (HCC)   Acute urinary tract infection        ED Disposition     ED Disposition   Decision to Admit    Condition   --    Comment   Level of Care: Critical Care [6]   Diagnosis: Sepsis with acute renal failure and septic shock, due to unspecified organism, unspecified acute renal failure type (HCC) [[id number]]   Admitting Physician: CHAPARRITA CRANDALL [[id number]]   Attending Physician: CHAPARRITA CRANDALL [[id number]]   Certification: I Certify That Inpatient Hospital Services Are Medically Necessary For Greater Than 2 Midnights               This medical record created using voice recognition software.             Ketan Johnston Jr.  03/18/23 1718       Ernie Moss DO  03/20/23 5121

## 2023-03-19 ENCOUNTER — APPOINTMENT (OUTPATIENT)
Dept: CARDIOLOGY | Facility: HOSPITAL | Age: 78
DRG: 698 | End: 2023-03-19
Payer: OTHER GOVERNMENT

## 2023-03-19 LAB
027 TOXIN: NORMAL
ANION GAP SERPL CALCULATED.3IONS-SCNC: 15.6 MMOL/L (ref 5–15)
BACTERIA BLD CULT: ABNORMAL
BASOPHILS # BLD AUTO: 0.04 10*3/MM3 (ref 0–0.2)
BASOPHILS NFR BLD AUTO: 0.3 % (ref 0–1.5)
BUN SERPL-MCNC: 58 MG/DL (ref 8–23)
BUN/CREAT SERPL: 19.9 (ref 7–25)
C COLI+JEJ+UPSA DNA STL QL NAA+NON-PROBE: NOT DETECTED
C DIFF TOX GENS STL QL NAA+PROBE: NEGATIVE
CALCIUM SPEC-SCNC: 8.8 MG/DL (ref 8.6–10.5)
CHLORIDE SERPL-SCNC: 99 MMOL/L (ref 98–107)
CO2 SERPL-SCNC: 16.4 MMOL/L (ref 22–29)
CREAT SERPL-MCNC: 2.91 MG/DL (ref 0.76–1.27)
D-LACTATE SERPL-SCNC: 1.4 MMOL/L (ref 0.5–2)
DEPRECATED RDW RBC AUTO: 52.3 FL (ref 37–54)
EC STX1+STX2 GENES STL QL NAA+NON-PROBE: NOT DETECTED
EGFRCR SERPLBLD CKD-EPI 2021: 21.4 ML/MIN/1.73
EOSINOPHIL # BLD AUTO: 0.17 10*3/MM3 (ref 0–0.4)
EOSINOPHIL NFR BLD AUTO: 1.1 % (ref 0.3–6.2)
ERYTHROCYTE [DISTWIDTH] IN BLOOD BY AUTOMATED COUNT: 16 % (ref 12.3–15.4)
GLUCOSE BLDC GLUCOMTR-MCNC: 109 MG/DL (ref 70–99)
GLUCOSE BLDC GLUCOMTR-MCNC: 137 MG/DL (ref 70–99)
GLUCOSE BLDC GLUCOMTR-MCNC: 143 MG/DL (ref 70–99)
GLUCOSE BLDC GLUCOMTR-MCNC: 156 MG/DL (ref 70–99)
GLUCOSE BLDC GLUCOMTR-MCNC: 202 MG/DL (ref 70–99)
GLUCOSE BLDC GLUCOMTR-MCNC: 232 MG/DL (ref 70–99)
GLUCOSE BLDC GLUCOMTR-MCNC: 80 MG/DL (ref 70–99)
GLUCOSE BLDC GLUCOMTR-MCNC: 94 MG/DL (ref 70–99)
GLUCOSE SERPL-MCNC: 119 MG/DL (ref 65–99)
HCT VFR BLD AUTO: 34.9 % (ref 37.5–51)
HGB BLD-MCNC: 11.3 G/DL (ref 13–17.7)
IMM GRANULOCYTES # BLD AUTO: 0.09 10*3/MM3 (ref 0–0.05)
IMM GRANULOCYTES NFR BLD AUTO: 0.6 % (ref 0–0.5)
LYMPHOCYTES # BLD AUTO: 0.57 10*3/MM3 (ref 0.7–3.1)
LYMPHOCYTES NFR BLD AUTO: 3.6 % (ref 19.6–45.3)
MAGNESIUM SERPL-MCNC: 1.7 MG/DL (ref 1.6–2.4)
MCH RBC QN AUTO: 28.9 PG (ref 26.6–33)
MCHC RBC AUTO-ENTMCNC: 32.4 G/DL (ref 31.5–35.7)
MCV RBC AUTO: 89.3 FL (ref 79–97)
MONOCYTES # BLD AUTO: 0.92 10*3/MM3 (ref 0.1–0.9)
MONOCYTES NFR BLD AUTO: 5.8 % (ref 5–12)
NEUTROPHILS NFR BLD AUTO: 13.99 10*3/MM3 (ref 1.7–7)
NEUTROPHILS NFR BLD AUTO: 88.6 % (ref 42.7–76)
NRBC BLD AUTO-RTO: 0 /100 WBC (ref 0–0.2)
PLATELET # BLD AUTO: 211 10*3/MM3 (ref 140–450)
PMV BLD AUTO: 10.7 FL (ref 6–12)
POTASSIUM SERPL-SCNC: 4.7 MMOL/L (ref 3.5–5.2)
PROCALCITONIN SERPL-MCNC: 9.48 NG/ML (ref 0–0.25)
QT INTERVAL: 343 MS
RBC # BLD AUTO: 3.91 10*6/MM3 (ref 4.14–5.8)
S ENT+BONG DNA STL QL NAA+NON-PROBE: NOT DETECTED
SHIGELLA SP+EIEC IPAH ST NAA+NON-PROBE: NOT DETECTED
SODIUM SERPL-SCNC: 131 MMOL/L (ref 136–145)
TSH SERPL DL<=0.05 MIU/L-ACNC: 0.94 UIU/ML (ref 0.27–4.2)
VANCOMYCIN SERPL-MCNC: 19.8 MCG/ML (ref 5–40)
WBC NRBC COR # BLD: 15.78 10*3/MM3 (ref 3.4–10.8)

## 2023-03-19 PROCEDURE — 0 CEFTRIAXONE PER 250 MG: Performed by: INTERNAL MEDICINE

## 2023-03-19 PROCEDURE — 94799 UNLISTED PULMONARY SVC/PX: CPT

## 2023-03-19 PROCEDURE — 84443 ASSAY THYROID STIM HORMONE: CPT | Performed by: STUDENT IN AN ORGANIZED HEALTH CARE EDUCATION/TRAINING PROGRAM

## 2023-03-19 PROCEDURE — 84145 PROCALCITONIN (PCT): CPT | Performed by: NURSE PRACTITIONER

## 2023-03-19 PROCEDURE — 83735 ASSAY OF MAGNESIUM: CPT | Performed by: STUDENT IN AN ORGANIZED HEALTH CARE EDUCATION/TRAINING PROGRAM

## 2023-03-19 PROCEDURE — 80048 BASIC METABOLIC PNL TOTAL CA: CPT | Performed by: STUDENT IN AN ORGANIZED HEALTH CARE EDUCATION/TRAINING PROGRAM

## 2023-03-19 PROCEDURE — 25010000002 VANCOMYCIN 5 G RECONSTITUTED SOLUTION: Performed by: INTERNAL MEDICINE

## 2023-03-19 PROCEDURE — 99291 CRITICAL CARE FIRST HOUR: CPT | Performed by: INTERNAL MEDICINE

## 2023-03-19 PROCEDURE — 82962 GLUCOSE BLOOD TEST: CPT

## 2023-03-19 PROCEDURE — 93306 TTE W/DOPPLER COMPLETE: CPT

## 2023-03-19 PROCEDURE — 93306 TTE W/DOPPLER COMPLETE: CPT | Performed by: INTERNAL MEDICINE

## 2023-03-19 PROCEDURE — 25010000002 HEPARIN (PORCINE) PER 1000 UNITS: Performed by: INTERNAL MEDICINE

## 2023-03-19 PROCEDURE — 63710000001 INSULIN REGULAR HUMAN PER 5 UNITS: Performed by: INTERNAL MEDICINE

## 2023-03-19 PROCEDURE — 85025 COMPLETE CBC W/AUTO DIFF WBC: CPT | Performed by: STUDENT IN AN ORGANIZED HEALTH CARE EDUCATION/TRAINING PROGRAM

## 2023-03-19 PROCEDURE — 87493 C DIFF AMPLIFIED PROBE: CPT | Performed by: EMERGENCY MEDICINE

## 2023-03-19 PROCEDURE — 87505 NFCT AGENT DETECTION GI: CPT | Performed by: STUDENT IN AN ORGANIZED HEALTH CARE EDUCATION/TRAINING PROGRAM

## 2023-03-19 PROCEDURE — 25010000002 CALCIUM GLUCONATE-NACL 1-0.675 GM/50ML-% SOLUTION: Performed by: STUDENT IN AN ORGANIZED HEALTH CARE EDUCATION/TRAINING PROGRAM

## 2023-03-19 PROCEDURE — 93010 ELECTROCARDIOGRAM REPORT: CPT | Performed by: INTERNAL MEDICINE

## 2023-03-19 PROCEDURE — 80202 ASSAY OF VANCOMYCIN: CPT | Performed by: STUDENT IN AN ORGANIZED HEALTH CARE EDUCATION/TRAINING PROGRAM

## 2023-03-19 PROCEDURE — 93005 ELECTROCARDIOGRAM TRACING: CPT | Performed by: INTERNAL MEDICINE

## 2023-03-19 PROCEDURE — 83605 ASSAY OF LACTIC ACID: CPT | Performed by: NURSE PRACTITIONER

## 2023-03-19 PROCEDURE — 25010000002 HEPARIN (PORCINE) PER 1000 UNITS: Performed by: STUDENT IN AN ORGANIZED HEALTH CARE EDUCATION/TRAINING PROGRAM

## 2023-03-19 RX ORDER — CEFTRIAXONE SODIUM 2 G/50ML
2 INJECTION, SOLUTION INTRAVENOUS EVERY 24 HOURS
Status: DISCONTINUED | OUTPATIENT
Start: 2023-03-19 | End: 2023-03-22

## 2023-03-19 RX ORDER — CALCIUM GLUCONATE 20 MG/ML
1 INJECTION, SOLUTION INTRAVENOUS ONCE
Status: COMPLETED | OUTPATIENT
Start: 2023-03-19 | End: 2023-03-19

## 2023-03-19 RX ORDER — HEPARIN SODIUM 5000 [USP'U]/ML
5000 INJECTION, SOLUTION INTRAVENOUS; SUBCUTANEOUS EVERY 8 HOURS SCHEDULED
Status: DISCONTINUED | OUTPATIENT
Start: 2023-03-19 | End: 2023-03-20

## 2023-03-19 RX ORDER — ATORVASTATIN CALCIUM 40 MG/1
40 TABLET, FILM COATED ORAL DAILY
Status: DISCONTINUED | OUTPATIENT
Start: 2023-03-19 | End: 2023-03-27 | Stop reason: HOSPADM

## 2023-03-19 RX ORDER — ASPIRIN 81 MG/1
81 TABLET, CHEWABLE ORAL DAILY
Status: DISCONTINUED | OUTPATIENT
Start: 2023-03-20 | End: 2023-03-27 | Stop reason: HOSPADM

## 2023-03-19 RX ORDER — METOPROLOL TARTRATE 5 MG/5ML
INJECTION INTRAVENOUS
Status: COMPLETED
Start: 2023-03-19 | End: 2023-03-19

## 2023-03-19 RX ORDER — PANTOPRAZOLE SODIUM 40 MG/1
40 TABLET, DELAYED RELEASE ORAL
Status: DISCONTINUED | OUTPATIENT
Start: 2023-03-19 | End: 2023-03-27 | Stop reason: HOSPADM

## 2023-03-19 RX ADMIN — Medication 10 ML: at 20:39

## 2023-03-19 RX ADMIN — METOPROLOL TARTRATE 25 MG: 25 TABLET, FILM COATED ORAL at 20:39

## 2023-03-19 RX ADMIN — PANTOPRAZOLE SODIUM 40 MG: 40 TABLET, DELAYED RELEASE ORAL at 10:45

## 2023-03-19 RX ADMIN — METOPROLOL TARTRATE 5 MG: 1 INJECTION, SOLUTION INTRAVENOUS at 03:23

## 2023-03-19 RX ADMIN — METOPROLOL TARTRATE 5 MG: 1 INJECTION, SOLUTION INTRAVENOUS at 01:48

## 2023-03-19 RX ADMIN — HEPARIN SODIUM 5000 UNITS: 5000 INJECTION INTRAVENOUS; SUBCUTANEOUS at 08:49

## 2023-03-19 RX ADMIN — INSULIN HUMAN 3 UNITS: 100 INJECTION, SOLUTION PARENTERAL at 17:27

## 2023-03-19 RX ADMIN — CEFTRIAXONE SODIUM 2 G: 2 INJECTION, SOLUTION INTRAVENOUS at 11:19

## 2023-03-19 RX ADMIN — VANCOMYCIN HYDROCHLORIDE 750 MG: 5 INJECTION, POWDER, LYOPHILIZED, FOR SOLUTION INTRAVENOUS at 08:49

## 2023-03-19 RX ADMIN — SODIUM CHLORIDE, POTASSIUM CHLORIDE, SODIUM LACTATE AND CALCIUM CHLORIDE 100 ML/HR: 600; 310; 30; 20 INJECTION, SOLUTION INTRAVENOUS at 08:49

## 2023-03-19 RX ADMIN — ATORVASTATIN CALCIUM 40 MG: 40 TABLET, FILM COATED ORAL at 10:45

## 2023-03-19 RX ADMIN — METOPROLOL TARTRATE 25 MG: 25 TABLET, FILM COATED ORAL at 13:00

## 2023-03-19 RX ADMIN — METOPROLOL TARTRATE 25 MG: 25 TABLET, FILM COATED ORAL at 03:23

## 2023-03-19 RX ADMIN — SODIUM CHLORIDE, POTASSIUM CHLORIDE, SODIUM LACTATE AND CALCIUM CHLORIDE 100 ML/HR: 600; 310; 30; 20 INJECTION, SOLUTION INTRAVENOUS at 18:19

## 2023-03-19 RX ADMIN — METOPROLOL TARTRATE 5 MG: 1 INJECTION, SOLUTION INTRAVENOUS at 03:09

## 2023-03-19 RX ADMIN — CALCIUM GLUCONATE 1 G: 20 INJECTION, SOLUTION INTRAVENOUS at 03:11

## 2023-03-19 RX ADMIN — HEPARIN SODIUM 5000 UNITS: 5000 INJECTION, SOLUTION INTRAVENOUS; SUBCUTANEOUS at 21:06

## 2023-03-19 RX ADMIN — HEPARIN SODIUM 5000 UNITS: 5000 INJECTION, SOLUTION INTRAVENOUS; SUBCUTANEOUS at 16:16

## 2023-03-19 NOTE — PROGRESS NOTES
"[facility] Clinical Pharmacy Services: Vancomycin Pharmacokinetic Initial Consult Note    Darnell Geller is a 77 y.o. male who is on day 2 of pharmacy to dose vancomycin for Bacteremia and Sepsis.    Consult Information  Consulting Provider: Raz  Planned Duration of Therapy: 7 days  Was Patient Receiving Prior to Admission/Consult?: No  Loading Dose Ordered or Given: 1500 mg on 3/18 at 2100  PK/PD Target: Dose by Levels  Other Antimicrobials: Ceftriaxone    Imaging Reviewed?: No    Microbiology Data  MRSA PCR performed: No; Result: Not ordered due to excluded indication or presence of suspected abscess    Vitals/Labs  Ht: 182.9 cm (72\"); Wt: 75.6 kg (166 lb 10.7 oz)  Temp (24hrs), Av.1 °F (37.8 °C), Min:98.4 °F (36.9 °C), Max:102.1 °F (38.9 °C)   Estimated Creatinine Clearance: 22.4 mL/min (A) (by C-G formula based on SCr of 2.91 mg/dL (H)).       Results from last 7 days   Lab Units 23  0248 23  1642   VANCOMYCIN RM mcg/mL 19.80  --    CREATININE mg/dL 2.91* 3.36*   WBC 10*3/mm3 15.78* 13.61*     Assessment/Plan:  Vancomycin level reported as 19.8 this AM  Crcl~22ml/min.  Will provide vancomycin 750 mg once one now and order follow up vancomycin level and BMP tomorrow AM.    Pharmacy will follow patient's kidney function and will adjust doses and obtain levels as necessary. Thank you for involving pharmacy in this patient's care. Please contact pharmacy with any questions or concerns.                           Estiven James  Clinical Pharmacist  "

## 2023-03-19 NOTE — PLAN OF CARE
Goal Outcome Evaluation:  Plan of Care Reviewed With: patient        Progress: no change     See previous note. No other acute events overnight.    Patient safety maintained, bed in low position, wheels locked call light within reach. Hourly rounding.     SRINIVASAN RN/

## 2023-03-19 NOTE — NURSING NOTE
Patient anxious and restless this morning, tachycardic with Hrs in the 160s, hypertensive with BP 160s-180s systolic, and diastolic greater than 100 mm hg tremors and jitters observed. Provider notified, provider at bedside shortly after. Orders received, see MAR for interventions.    SRINIVASAN RN/

## 2023-03-19 NOTE — CONSULTS
Pulmonary / Critical Care Consult Note      Patient Name: Darnell Geller  : 1945  MRN: [id number]  Primary Care Physician:  Provider, No Known  Referring Physician: Jordan Melara MD  Date of admission: 3/18/2023    Subjective   Subjective     Reason for Consult/ Chief Complaint: Altered mental status    HPI:  Darnell Geller is a 77 y.o. male with a history of diabetes, CAD, hypertension, hyperlipidemia, BPH, urinary retention recently ordered to do home self cath per record.  Patient presented to the ED for increasing weakness and altered mental status.  Upon arrival patient was hypotensive and febrile BP 76/50, temp 102.  Laboratory findings showed urinalysis turbid, TNTC WBC and 4+ bacteria, creatinine 3.36, sodium 129, CO2 19, lactic acid 3.8, WBC 13.6.  He was given IV fluid resuscitation per sepsis guidelines, intermittently required Levophed infusion for hypotension, initiated on broad-spectrum antibiotics and transferred to ICU.  Our service was consulted for critical care management.    This morning during rounds, patient is awake, no acute distress on room air, Levophed is on standby, he does answer questions appropriately but appears to have some intermittent confusion.    Review of Systems  Constitutional symptoms:  Denied complaints   Ear, nose, throat: Denied complaints  Cardiovascular:  Denied complaints  Respiratory: Denied complaints  Gastrointestinal: Denied complaints  Musculoskeletal: Denied complaints  Genitourinary: Denied complaints  Allergy / Immunology: Denied complaints  Hematologic: Denied complaints  Neurologic: Denied complaints  Skin: Denied complaints  Endocrine: Denied complaints  Psychiatric: Denied complaints      Personal History     Past Medical History:   Diagnosis Date   • Arthritis    • Benign prostatic hyperplasia    • Coronary artery disease    • Hyperlipidemia    • Hypertension        Past Surgical History:   Procedure Laterality Date   • SINUS SURGERY         Family  History: family history is not on file. Otherwise pertinent FHx was reviewed and not pertinent to current issue.    Social History:  reports that he has been smoking cigarettes. He has a 60.00 pack-year smoking history. He has never used smokeless tobacco. He reports that he does not currently use alcohol. He reports that he does not use drugs.    Home Medications:  amLODIPine, aspirin, atorvastatin, cholecalciferol, docusate sodium, empagliflozin, finasteride, gabapentin, glipizide, metFORMIN, metoprolol tartrate, omeprazole, and tamsulosin    Allergies:  No Known Allergies    Objective    Objective     Vitals:   Temp:  [98.4 °F (36.9 °C)-102.1 °F (38.9 °C)] 99.4 °F (37.4 °C)  Heart Rate:  [] 99  Resp:  [15-22] 17  BP: ()/() 97/54  Flow (L/min):  [2-4] 2    Physical Exam:    Vital Signs Reviewed   General: Elderly, male alert, no acute distress  HEENT:  PERRL, EOMI.  OP, nares clear  Neck:  Supple, no JVD, no thyromegaly  Chest:  good aeration, clear to auscultation bilaterally, tympanic to percussion bilaterally, no work of breathing noted  CV: RRR, no MGR, pulses 2+, equal.  Abd:  Soft, NT, ND, + BS, no HSM  EXT:  no clubbing, no cyanosis, no edema  Neuro:  A&Ox2, CN grossly intact, no focal deficits.  Skin: No rashes or lesions noted        Result Review    Result Review:  I have personally reviewed the results from the time of this admission to 3/19/2023 10:34 EDT and agree with these findings:  [x]  Laboratory  [x]  Microbiology  [x]  Radiology  [x]  EKG/Telemetry   []  Cardiology/Vascular   []  Pathology  []  Old records  []  Other:  Most notable findings include:       Lab 03/19/23  0248 03/18/23  1754 03/18/23  1642   WBC 15.78*  --  13.61*   HEMOGLOBIN 11.3*  --  10.7*   HEMATOCRIT 34.9*  --  32.2*   PLATELETS 211  --  198   SODIUM 131*  --  129*   SODIUM, ARTERIAL  --  127.5*  --    POTASSIUM 4.7  --  4.9   CHLORIDE 99  --  92*   CO2 16.4*  --  19.1*   BUN 58*  --  55*   CREATININE 2.91*   --  3.36*   GLUCOSE 119*  --  200*   GLUCOSE, ARTERIAL  --  186*  --    CALCIUM 8.8  --  9.0   PHOSPHORUS  --   --  3.8   TOTAL PROTEIN  --   --  7.8   ALBUMIN  --   --  3.2*   GLOBULIN  --   --  4.6         Assessment & Plan   Assessment / Plan     Active Hospital Problems:  Active Hospital Problems    Diagnosis    • **Sepsis with acute renal failure and septic shock, due to unspecified organism, unspecified acute renal failure type (HCC)          Impression:  Septic shock  Klebsiella bacteremia  Urinary tract infection  History of urinary retention/BPH  NIKITA ?  Baseline  Anion gap metabolic acidosis  Lactic acidosis  Leukocytosis  Transaminitis    Plan:  -Monitor blood pressure, use Levophed if needed to maintain MAP greater than 65  -Hold antihypertensives  -Blood cultures show gram-negative bacilli, bio fire with Klebsiella oxytoca, urine culture in process  -Continue Rocephin  -Check procalcitonin  -MRSA PCR negative, can DC vancomycin  -Patient received IV fluid resuscitation per sepsis guidelines  -Repeat lactic acid until clear  -Trend renal function, monitor replace electrolytes as necessary  -Continue lactated Ringer at 100 cc/h  -Check CT abdomen pelvis without contrast to evaluate for kidney stone/obstruction  -May need urology consultation  -SSI to optimize glucose control  -Can advance diet as tolerated    DVT prophylaxis: Subcu heparin  Medical DVT prophylaxis orders are present.     Code Status and Medical Interventions:   Ordered at: 03/18/23 1907     Code Status (Patient has no pulse and is not breathing):    CPR (Attempt to Resuscitate)     Medical Interventions (Patient has pulse or is breathing):    Full Support        Electronically signed by CHARLENE Maher, 03/19/23, 10:34 AM EDT.      The patient is critically ill in the ICU with septic shock, urinary tract infection, possible BPH with obstruction, renal failure, possibly acute, altered mental status. Multidisciplinary bedside  critical care rounds were performed with nursing staff, respiratory therapy, pharmacy, nutritional services, social work. I have personally reviewed the chart, labs and any pertinent imaging available.  I have spent 44 minutes of critical care time, excluding procedures, in the care of this patient. I - Dr Haas, spent 30 mins of critical care time, and Jewels CHANG spent 14 mins of critical care time according to split shared critical care billing guidelines.     Electronically signed by Bennie Haas MD, 03/19/23, 12:03 PM EDT.

## 2023-03-19 NOTE — PROGRESS NOTES
[facility]   Hospitalist Progress Note    Date of admission: 3/18/2023  Patient Name: Darnell Geller  1945  Date: 3/19/2023      Subjective     Chief Complaint   Patient presents with   • Altered Mental Status       Summary: 77 y.o. CAD and history of PCI in 20,004, hypertension, DM 2 and history of BPH recently switched outpatient intermittent urinary catheterizations who presents with worsening mental status and weakness.  Patient found to have sepsis with shock secondary to Klebsiella bacteremia suspected secondary to urinary source.  Hospital course complicated by acute renal failure on admission, and narrow complex SVT to 150s    Interval Followup: Family at bedside, patient mentation starting to improve overnight, still with some confusion but family notes doing much better than when he came in.  Patient's heart rate up to the 150s  Narrow complex SVT, no history of paroxysmal A-fib or cardiac arrhythmias noted although he does take a high dose of metoprolol as an outpatient.  Denies any fever or chills currently.  No chest pain.      Objective     Vitals:   Temp:  [98.4 °F (36.9 °C)-102.1 °F (38.9 °C)] 99.4 °F (37.4 °C)  Heart Rate:  [] 99  Resp:  [15-22] 17  BP: ()/() 97/54  Flow (L/min):  [2-4] 2    Physical Exam  Awake conversant appears older than stated age  Elevated rate regular rhythm, appears narrow complex on the monitor no lower extremity edema  Mild rhonchi otherwise no acute wheezing or crackles appreciated, on room air  Abdomen soft nontender nondistended  Skin warm and dry  Positive Muñiz catheter with yellow urine output    Result Review:  Vital signs, labs and recent relevant imaging reviewed.      [START ON 3/20/2023] aspirin, 81 mg, Oral, Daily  atorvastatin, 40 mg, Oral, Daily  cefTRIAXone, 2 g, Intravenous, Q24H  heparin (porcine), 5,000 Units, Subcutaneous, Q8H  insulin regular, 2-7 Units, Subcutaneous, TID AC  metoprolol tartrate, 5 mg, Intravenous,  Once  metoprolol tartrate, 25 mg, Oral, Q12H  pantoprazole, 40 mg, Oral, Q AM  sodium chloride, 10 mL, Intravenous, Q12H        •  acetaminophen  •  aluminum-magnesium hydroxide-simethicone  •  dextrose  •  dextrose  •  glucagon (human recombinant)  •  melatonin  •  sodium chloride  •  sodium chloride  •  sodium chloride      Assessment / Plan     Assessment/Plan:  Septic shock secondary to Klebsiella oxytoca bacteremia suspect from urinary source from straight catheterizations  Acute renal failure suspect secondary to septic shock with hypovolemia  (no prior baseline creatinine available for comparison)    Acute lactic and metabolic encephalopathy  Paroxysmal narrow complex SVT, no prior history  CAD with prior PCI in 2004  BPH with retention requiring outpatient self-catheterizations  Diarrhea, C. difficile negative  Metabolic acidosis question secondary to diarrhea  DM2 with peripheral neuropathy  Hyperlipidemia    -Increase ceftriaxone to 2 g daily given bacteremia and sepsis with shock.  Vancomycin discontinued after initial dose given results of cultures thus far.  Follow-up sensitivities  -CT abdomen pelvis to evaluate for underlying stone or abnormality although suspect patient developed UTI from recent prolonged indwelling catheter and had only recently switched to intermittent straight cath  - Check echo given elevated BNP and cardiac abnormalities, cardiology consult, had to hold patient's home metoprolol and in setting of sepsis requiring Levophed initially, only recently have weaned off and blood pressure still soft, giving lower dose of metoprolol with holding parameters, continue to hold home amlodipine, further changes based on continued response to treatment and cardiology evaluation  -IV fluids and supportive care, monitor I's and O's, continue indwelling catheter,  placed on admission, hold home Flomax and finasteride given lower blood pressures also appears that these were inadequate in  controlling his symptoms outpatient he still had a straight cath at least 3 times a day.  Follows with urology outpatient.   - Continue aspirin and statin  - SSI, monitor glucose, outpatient is on metformin, Jardiance and glipizide.  Hold gabapentin until mentation improves, was on 800 3 times daily in setting of his renal dysfunction likely contributing  - Continue PPI  - Check a.m. CBC, BMP, magnesium, phosphorus  -Discussed with pulm critical, cards       DVT prophylaxis:  Medical DVT prophylaxis orders are present.    Code Status (Patient has no pulse and is not breathing): CPR (Attempt to Resuscitate)  Medical Interventions (Patient has pulse or is breathing): Full Support        CBC    CBC 3/18/23 3/19/23   WBC 13.61 (A) 15.78 (A)   RBC 3.67 (A) 3.91 (A)   Hemoglobin 10.7 (A) 11.3 (A)   Hematocrit 32.2 (A) 34.9 (A)   MCV 87.7 89.3   MCH 29.2 28.9   MCHC 33.2 32.4   RDW 15.6 (A) 16.0 (A)   Platelets 198 211   (A) Abnormal value              CMP    CMP 3/18/23 3/18/23 3/19/23    1642 1754    Glucose 200 (A) 186 (A) 119 (A)   BUN 55 (A)  58 (A)   Creatinine 3.36 (A)  2.91 (A)   eGFR 18.0 (A)  21.4 (A)   Sodium 129 (A) 127.5 (A) 131 (A)   Potassium 4.9  4.7   Chloride 92 (A)  99   Calcium 9.0  8.8   Total Protein 7.8     Albumin 3.2 (A)     Globulin 4.6     Total Bilirubin 0.5     Alkaline Phosphatase 183 (A)     AST (SGOT) 99 (A)     ALT (SGPT) 59 (A)     Albumin/Globulin Ratio 0.7     BUN/Creatinine Ratio 16.4  19.9   Anion Gap 17.9 (A)  15.6 (A)   (A) Abnormal value            Patient is critically ill due to septic shock, bacteremia, arf, svt.  I have spent 35 minutes of critical care time reviewing documentation, pertinent labs, imaging studies, examining the patient, modifying care plan, and discussing patient's condition and care plan with the patient, nursing and consulting mds and family.

## 2023-03-19 NOTE — PLAN OF CARE
Patient remains off levophed all shift. Intermittent confusion but can easily be reoriented. All needs met at this time.    AE, Rn

## 2023-03-19 NOTE — ED NOTES
Nursing report ED to floor  Darnell Geller  77 y.o.  male    HPI :   Chief Complaint   Patient presents with    Altered Mental Status       Admitting doctor:   Vincenzo Crandall MD    Admitting diagnosis:   The primary encounter diagnosis was Sepsis with acute renal failure and septic shock, due to unspecified organism, unspecified acute renal failure type (HCC). A diagnosis of Acute urinary tract infection was also pertinent to this visit.    Code status:   Current Code Status       Date Active Code Status Order ID Comments User Context       3/18/2023 1904 CPR (Attempt to Resuscitate) [id number]  Vincenzo Crandall MD ED        Question Answer    Code Status (Patient has no pulse and is not breathing) CPR (Attempt to Resuscitate)    Medical Interventions (Patient has pulse or is breathing) Full Support                    Allergies:   Patient has no known allergies.    Isolation:   No active isolations    Intake and Output  No intake or output data in the 24 hours ending 03/18/23 2042    Weight:       03/18/23  1641   Weight: 71.2 kg (156 lb 15.5 oz)       Most recent vitals:   Vitals:    03/18/23 2000 03/18/23 2000 03/18/23 2015 03/18/23 2030   BP: 100/61  92/62    Pulse: 95  91 90   Resp: 18  18    Temp:  100 °F (37.8 °C)     TempSrc:  Rectal     SpO2: 93%  94% 96%   Weight:       Height:           Active LDAs/IV Access:   Lines, Drains & Airways       Active LDAs       Name Placement date Placement time Site Days    Peripheral IV 03/18/23 1640 Left Antecubital 03/18/23  1640  Antecubital  less than 1    Peripheral IV 03/18/23 1641 Right Antecubital 03/18/23  1641  Antecubital  less than 1    Urethral Catheter Straight-tip 16 Fr. 03/18/23  1720  -- less than 1                    Labs (abnormal labs have a star):   Labs Reviewed   COMPREHENSIVE METABOLIC PANEL - Abnormal; Notable for the following components:       Result Value    Glucose 200 (*)     BUN 55 (*)     Creatinine 3.36 (*)     Sodium 129 (*)     Chloride 92 (*)     CO2  19.1 (*)     Albumin 3.2 (*)     ALT (SGPT) 59 (*)     AST (SGOT) 99 (*)     Alkaline Phosphatase 183 (*)     Anion Gap 17.9 (*)     eGFR 18.0 (*)     All other components within normal limits    Narrative:     GFR Normal >60  Chronic Kidney Disease <60  Kidney Failure <15    The GFR formula is only valid for adults with stable renal function between ages 18 and 70.   PROTIME-INR - Abnormal; Notable for the following components:    Protime 15.2 (*)     INR 1.20 (*)     All other components within normal limits    Narrative:     Suggested Therapeutic Ranges For Oral Anticoagulant Therapy:  Level of Therapy                      INR Target Range  Standard Dose                            2.0-3.0  High Dose                                2.5-3.5  Patients not receiving anticoagulant  Therapy Normal Range                     0.86-1.15   APTT - Abnormal; Notable for the following components:    PTT 45.9 (*)     All other components within normal limits   CALCIUM, IONIZED - Abnormal; Notable for the following components:    Ionized Calcium, Arterial 1.09 (*)     All other components within normal limits   LACTIC ACID, PLASMA - Abnormal; Notable for the following components:    Lactate 3.8 (*)     All other components within normal limits   C-REACTIVE PROTEIN - Abnormal; Notable for the following components:    C-Reactive Protein 32.31 (*)     All other components within normal limits   URINALYSIS W/ CULTURE IF INDICATED - Abnormal; Notable for the following components:    Appearance, UA Turbid (*)     Glucose, UA >=1000 mg/dL (3+) (*)     Blood, UA Moderate (2+) (*)     Protein,  mg/dL (2+) (*)     Leuk Esterase, UA Large (3+) (*)     All other components within normal limits    Narrative:     In absence of clinical symptoms, the presence of pyuria, bacteria, and/or nitrites on the urinalysis result does not correlate with infection.   CBC WITH AUTO DIFFERENTIAL - Abnormal; Notable for the following components:    WBC  13.61 (*)     RBC 3.67 (*)     Hemoglobin 10.7 (*)     Hematocrit 32.2 (*)     RDW 15.6 (*)     Neutrophil % 87.5 (*)     Lymphocyte % 2.9 (*)     Eosinophil % 0.0 (*)     Immature Grans % 0.7 (*)     Neutrophils, Absolute 11.91 (*)     Lymphocytes, Absolute 0.40 (*)     Monocytes, Absolute 1.18 (*)     Immature Grans, Absolute 0.09 (*)     All other components within normal limits   ABG WITH COOX AND ELECTROLYTES - Abnormal; Notable for the following components:    pCO2, Arterial 29.2 (*)     pO2, Arterial 78.8 (*)     HCO3, Arterial 16.7 (*)     Base Excess, Arterial -7.3 (*)     O2 Saturation, Arterial 94.0 (*)     Hemoglobin, Blood Gas 11.0 (*)     Oxyhemoglobin 93.2 (*)     FHHB 5.9 (*)     Sodium, Arterial 127.5 (*)     Ionized Calcium, Arterial 1.05 (*)     Glucose, Arterial 186 (*)     Lactate, Arterial 2.26 (*)     All other components within normal limits   BNP (IN-HOUSE) - Abnormal; Notable for the following components:    proBNP 16,480.0 (*)     All other components within normal limits    Narrative:     Among patients with dyspnea, NT-proBNP is highly sensitive for the detection of acute congestive heart failure. In addition NT-proBNP of <300 pg/ml effectively rules out acute congestive heart failure with 99% negative predictive value.    Results may be falsely decreased if patient taking Biotin.     URINALYSIS, MICROSCOPIC ONLY - Abnormal; Notable for the following components:    RBC, UA 6-12 (*)     WBC, UA Too Numerous to Count (*)     Bacteria, UA 4+ (*)     All other components within normal limits   POCT GLUCOSE FINGERSTICK - Abnormal; Notable for the following components:    Glucose 185 (*)     All other components within normal limits   MAGNESIUM - Normal   PHOSPHORUS - Normal   LACTIC ACID, REFLEX - Normal   BLOOD CULTURE   BLOOD CULTURE   URINE CULTURE   CLOSTRIDIOIDES DIFFICILE TOXIN    Narrative:     The following orders were created for panel order Clostridioides difficile Toxin - Stool,  Per Rectum.  Procedure                               Abnormality         Status                     ---------                               -----------         ------                     Clostridioides difficile...[[id number]]                                                   Please view results for these tests on the individual orders.   ENTERIC BACTERIAL PANEL (REA)   CLOSTRIDIOIDES DIFFICILE TOXIN, PCR   RAINBOW DRAW    Narrative:     The following orders were created for panel order [place] Draw.  Procedure                               Abnormality         Status                     ---------                               -----------         ------                     Green Top (Gel)[[id number]]                                  Final result               Lavender Top[[id number]]                                     Final result               Gold Top - SST[[id number]]                                   Final result               Light Blue Top[[id number]]                                   Final result                 Please view results for these tests on the individual orders.   CBC AND DIFFERENTIAL    Narrative:     The following orders were created for panel order CBC & Differential.  Procedure                               Abnormality         Status                     ---------                               -----------         ------                     CBC Auto Differential[[id number]]        Abnormal            Final result                 Please view results for these tests on the individual orders.   GREEN TOP   LAVENDER TOP   GOLD TOP - SST   LIGHT BLUE TOP       EKG:   ECG 12 Lead Other; Sepsis Evaluation   Preliminary Result   HEART RATE= 105  bpm   RR Interval= 568  ms   MT Interval=   ms   P Horizontal Axis=   deg   P Front Axis=   deg   QRSD Interval= 84  ms   QT Interval= 343  ms   QRS Axis= 6  deg   T Wave Axis= 37  deg   - ABNORMAL ECG -   Atrial fibrillation   Low voltage, extremity leads    Electronically Signed By:    Date and Time of Study: 2023-03-18 16:48:32          Meds given in ED:   Medications   sodium chloride 0.9 % flush 10 mL (has no administration in time range)   norepinephrine (LEVOPHED) 8 mg in 250 mL NS infusion (premix) (0.07 mcg/kg/min × 71.2 kg Intravenous Rate/Dose Change 3/18/23 1950)   sodium chloride 0.9 % bolus 1,000 mL (0 mL Intravenous Stopped 3/18/23 1729)   sodium chloride 0.9 % bolus 2,136 mL (0 mL Intravenous Stopped 3/18/23 1939)   cefTRIAXone (ROCEPHIN) IVPB 1 g (0 g Intravenous Stopped 3/18/23 1939)       Imaging results:  XR Chest 1 View    Result Date: 3/18/2023    1. Prominent heart size. 2. Patchy increased interstitial densities which could be due to an atypical infection or low-grade pulmonary interstitial edema.       PRASANNA MEDRANO MD       Electronically Signed and Approved By: PRASANNA MEDRANO MD on 3/18/2023 at 17:08              Ambulatory status:   Assistance required    Social issues:   Social History     Socioeconomic History    Marital status:    Tobacco Use    Smoking status: Every Day     Packs/day: 1.50     Years: 40.00     Pack years: 60.00     Types: Cigarettes    Smokeless tobacco: Never   Vaping Use    Vaping Use: Never used   Substance and Sexual Activity    Alcohol use: Not Currently    Drug use: Never    Sexual activity: Defer       NIH Stroke Scale:         Kathleen Florez RN  03/18/23 20:42 EDT

## 2023-03-20 ENCOUNTER — APPOINTMENT (OUTPATIENT)
Dept: CT IMAGING | Facility: HOSPITAL | Age: 78
DRG: 698 | End: 2023-03-20
Payer: OTHER GOVERNMENT

## 2023-03-20 LAB
ANION GAP SERPL CALCULATED.3IONS-SCNC: 11.4 MMOL/L (ref 5–15)
BASOPHILS # BLD AUTO: 0.02 10*3/MM3 (ref 0–0.2)
BASOPHILS NFR BLD AUTO: 0.2 % (ref 0–1.5)
BH CV ECHO MEAS - AO ROOT DIAM: 3.5 CM
BH CV ECHO MEAS - EDV(MOD-SP2): 46 ML
BH CV ECHO MEAS - EDV(MOD-SP4): 51 ML
BH CV ECHO MEAS - EF(MOD-BP): 51.8 %
BH CV ECHO MEAS - ESV(MOD-SP2): 21 ML
BH CV ECHO MEAS - ESV(MOD-SP4): 25 ML
BH CV ECHO MEAS - IVSD: 1.2 CM
BH CV ECHO MEAS - LA DIMENSION: 3.9 CM
BH CV ECHO MEAS - LAT PEAK E' VEL: 12.5 CM/SEC
BH CV ECHO MEAS - LVIDD: 4.6 CM
BH CV ECHO MEAS - LVIDS: 3.5 CM
BH CV ECHO MEAS - LVOT DIAM: 2 CM
BH CV ECHO MEAS - LVPWD: 1.1 CM
BH CV ECHO MEAS - MED PEAK E' VEL: 3.59 CM/SEC
BH CV ECHO MEAS - MV A MAX VEL: 75 CM/SEC
BH CV ECHO MEAS - MV DEC TIME: 157 MSEC
BH CV ECHO MEAS - MV E MAX VEL: 125 CM/SEC
BH CV ECHO MEAS - MV E/A: 1.7
BH CV ECHO MEAS - RVDD: 2.7 CM
BH CV ECHO MEAS - TAPSE (>1.6): 1.46 CM
BH CV ECHO MEASUREMENTS AVERAGE E/E' RATIO: 15.54
BUN SERPL-MCNC: 54 MG/DL (ref 8–23)
BUN/CREAT SERPL: 19.8 (ref 7–25)
CALCIUM SPEC-SCNC: 8.3 MG/DL (ref 8.6–10.5)
CHLORIDE SERPL-SCNC: 100 MMOL/L (ref 98–107)
CO2 SERPL-SCNC: 18.6 MMOL/L (ref 22–29)
CREAT SERPL-MCNC: 2.73 MG/DL (ref 0.76–1.27)
DEPRECATED RDW RBC AUTO: 49.5 FL (ref 37–54)
EGFRCR SERPLBLD CKD-EPI 2021: 23.1 ML/MIN/1.73
EOSINOPHIL # BLD AUTO: 0.04 10*3/MM3 (ref 0–0.4)
EOSINOPHIL NFR BLD AUTO: 0.4 % (ref 0.3–6.2)
ERYTHROCYTE [DISTWIDTH] IN BLOOD BY AUTOMATED COUNT: 15.7 % (ref 12.3–15.4)
GLUCOSE BLDC GLUCOMTR-MCNC: 143 MG/DL (ref 70–99)
GLUCOSE BLDC GLUCOMTR-MCNC: 218 MG/DL (ref 70–99)
GLUCOSE BLDC GLUCOMTR-MCNC: 247 MG/DL (ref 70–99)
GLUCOSE BLDC GLUCOMTR-MCNC: 269 MG/DL (ref 70–99)
GLUCOSE SERPL-MCNC: 191 MG/DL (ref 65–99)
HCT VFR BLD AUTO: 28 % (ref 37.5–51)
HGB BLD-MCNC: 9.6 G/DL (ref 13–17.7)
IMM GRANULOCYTES # BLD AUTO: 0.06 10*3/MM3 (ref 0–0.05)
IMM GRANULOCYTES NFR BLD AUTO: 0.5 % (ref 0–0.5)
IVRT: 58 MSEC
LEFT ATRIUM VOLUME INDEX: 19.8 ML/M2
LYMPHOCYTES # BLD AUTO: 0.38 10*3/MM3 (ref 0.7–3.1)
LYMPHOCYTES NFR BLD AUTO: 3.5 % (ref 19.6–45.3)
MAGNESIUM SERPL-MCNC: 1.5 MG/DL (ref 1.6–2.4)
MAXIMAL PREDICTED HEART RATE: 142 BPM
MCH RBC QN AUTO: 29.5 PG (ref 26.6–33)
MCHC RBC AUTO-ENTMCNC: 34.3 G/DL (ref 31.5–35.7)
MCV RBC AUTO: 86.2 FL (ref 79–97)
MONOCYTES # BLD AUTO: 1.19 10*3/MM3 (ref 0.1–0.9)
MONOCYTES NFR BLD AUTO: 10.8 % (ref 5–12)
NEUTROPHILS NFR BLD AUTO: 84.6 % (ref 42.7–76)
NEUTROPHILS NFR BLD AUTO: 9.31 10*3/MM3 (ref 1.7–7)
NRBC BLD AUTO-RTO: 0 /100 WBC (ref 0–0.2)
PHOSPHATE SERPL-MCNC: 3.1 MG/DL (ref 2.5–4.5)
PLATELET # BLD AUTO: 137 10*3/MM3 (ref 140–450)
PMV BLD AUTO: 10.4 FL (ref 6–12)
POTASSIUM SERPL-SCNC: 4.5 MMOL/L (ref 3.5–5.2)
RBC # BLD AUTO: 3.25 10*6/MM3 (ref 4.14–5.8)
SODIUM SERPL-SCNC: 130 MMOL/L (ref 136–145)
STRESS TARGET HR: 121 BPM
WBC NRBC COR # BLD: 11 10*3/MM3 (ref 3.4–10.8)

## 2023-03-20 PROCEDURE — 80048 BASIC METABOLIC PNL TOTAL CA: CPT | Performed by: INTERNAL MEDICINE

## 2023-03-20 PROCEDURE — 83735 ASSAY OF MAGNESIUM: CPT | Performed by: INTERNAL MEDICINE

## 2023-03-20 PROCEDURE — 0 CEFTRIAXONE PER 250 MG: Performed by: INTERNAL MEDICINE

## 2023-03-20 PROCEDURE — 93005 ELECTROCARDIOGRAM TRACING: CPT | Performed by: STUDENT IN AN ORGANIZED HEALTH CARE EDUCATION/TRAINING PROGRAM

## 2023-03-20 PROCEDURE — 25010000002 ADENOSINE PER 6 MG

## 2023-03-20 PROCEDURE — 99222 1ST HOSP IP/OBS MODERATE 55: CPT | Performed by: INTERNAL MEDICINE

## 2023-03-20 PROCEDURE — 99291 CRITICAL CARE FIRST HOUR: CPT | Performed by: INTERNAL MEDICINE

## 2023-03-20 PROCEDURE — 63710000001 INSULIN REGULAR HUMAN PER 5 UNITS: Performed by: INTERNAL MEDICINE

## 2023-03-20 PROCEDURE — 85025 COMPLETE CBC W/AUTO DIFF WBC: CPT | Performed by: INTERNAL MEDICINE

## 2023-03-20 PROCEDURE — 82962 GLUCOSE BLOOD TEST: CPT

## 2023-03-20 PROCEDURE — 93010 ELECTROCARDIOGRAM REPORT: CPT | Performed by: INTERNAL MEDICINE

## 2023-03-20 PROCEDURE — 94799 UNLISTED PULMONARY SVC/PX: CPT

## 2023-03-20 PROCEDURE — 74176 CT ABD & PELVIS W/O CONTRAST: CPT

## 2023-03-20 PROCEDURE — 70450 CT HEAD/BRAIN W/O DYE: CPT

## 2023-03-20 PROCEDURE — 84100 ASSAY OF PHOSPHORUS: CPT | Performed by: INTERNAL MEDICINE

## 2023-03-20 PROCEDURE — 94761 N-INVAS EAR/PLS OXIMETRY MLT: CPT

## 2023-03-20 PROCEDURE — 25010000002 HEPARIN (PORCINE) PER 1000 UNITS: Performed by: INTERNAL MEDICINE

## 2023-03-20 PROCEDURE — 25010000002 MAGNESIUM SULFATE 2 GM/50ML SOLUTION: Performed by: INTERNAL MEDICINE

## 2023-03-20 RX ORDER — ADENOSINE 3 MG/ML
6 INJECTION, SOLUTION INTRAVENOUS ONCE
Status: COMPLETED | OUTPATIENT
Start: 2023-03-20 | End: 2023-03-20

## 2023-03-20 RX ORDER — METOPROLOL SUCCINATE 25 MG/1
100 TABLET, EXTENDED RELEASE ORAL EVERY 12 HOURS SCHEDULED
Status: DISCONTINUED | OUTPATIENT
Start: 2023-03-20 | End: 2023-03-20

## 2023-03-20 RX ORDER — METOPROLOL TARTRATE 50 MG/1
100 TABLET, FILM COATED ORAL EVERY 12 HOURS SCHEDULED
Status: DISCONTINUED | OUTPATIENT
Start: 2023-03-20 | End: 2023-03-22

## 2023-03-20 RX ORDER — MAGNESIUM SULFATE HEPTAHYDRATE 40 MG/ML
2 INJECTION, SOLUTION INTRAVENOUS ONCE
Status: COMPLETED | OUTPATIENT
Start: 2023-03-20 | End: 2023-03-20

## 2023-03-20 RX ORDER — ADENOSINE 3 MG/ML
12 INJECTION INTRAVENOUS ONCE
Status: DISCONTINUED | OUTPATIENT
Start: 2023-03-20 | End: 2023-03-27 | Stop reason: HOSPADM

## 2023-03-20 RX ORDER — ADENOSINE 3 MG/ML
INJECTION, SOLUTION INTRAVENOUS
Status: COMPLETED
Start: 2023-03-20 | End: 2023-03-20

## 2023-03-20 RX ORDER — HYDROXYZINE PAMOATE 25 MG/1
25 CAPSULE ORAL 3 TIMES DAILY PRN
Status: DISCONTINUED | OUTPATIENT
Start: 2023-03-20 | End: 2023-03-27 | Stop reason: HOSPADM

## 2023-03-20 RX ORDER — METOPROLOL SUCCINATE 25 MG/1
25 TABLET, EXTENDED RELEASE ORAL EVERY 12 HOURS SCHEDULED
Status: DISCONTINUED | OUTPATIENT
Start: 2023-03-20 | End: 2023-03-20

## 2023-03-20 RX ORDER — NICOTINE 21 MG/24HR
1 PATCH, TRANSDERMAL 24 HOURS TRANSDERMAL
Status: DISCONTINUED | OUTPATIENT
Start: 2023-03-20 | End: 2023-03-27 | Stop reason: HOSPADM

## 2023-03-20 RX ADMIN — HEPARIN SODIUM 5000 UNITS: 5000 INJECTION, SOLUTION INTRAVENOUS; SUBCUTANEOUS at 05:01

## 2023-03-20 RX ADMIN — Medication 5 MG: at 20:20

## 2023-03-20 RX ADMIN — NICOTINE 1 PATCH: 21 PATCH, EXTENDED RELEASE TRANSDERMAL at 17:44

## 2023-03-20 RX ADMIN — ACETAMINOPHEN 650 MG: 325 TABLET ORAL at 20:20

## 2023-03-20 RX ADMIN — ATORVASTATIN CALCIUM 40 MG: 40 TABLET, FILM COATED ORAL at 08:47

## 2023-03-20 RX ADMIN — METOPROLOL TARTRATE 75 MG: 25 TABLET, FILM COATED ORAL at 09:39

## 2023-03-20 RX ADMIN — Medication 10 ML: at 08:47

## 2023-03-20 RX ADMIN — MAGNESIUM SULFATE HEPTAHYDRATE 2 G: 2 INJECTION, SOLUTION INTRAVENOUS at 08:46

## 2023-03-20 RX ADMIN — ADENOSINE 6 MG: 3 INJECTION INTRAVENOUS at 10:01

## 2023-03-20 RX ADMIN — SODIUM CHLORIDE, POTASSIUM CHLORIDE, SODIUM LACTATE AND CALCIUM CHLORIDE 100 ML/HR: 600; 310; 30; 20 INJECTION, SOLUTION INTRAVENOUS at 14:53

## 2023-03-20 RX ADMIN — METOPROLOL SUCCINATE 25 MG: 25 TABLET, EXTENDED RELEASE ORAL at 08:47

## 2023-03-20 RX ADMIN — PANTOPRAZOLE SODIUM 40 MG: 40 TABLET, DELAYED RELEASE ORAL at 05:01

## 2023-03-20 RX ADMIN — APIXABAN 5 MG: 5 TABLET, FILM COATED ORAL at 12:00

## 2023-03-20 RX ADMIN — ACETAMINOPHEN 650 MG: 325 TABLET ORAL at 04:31

## 2023-03-20 RX ADMIN — CEFTRIAXONE SODIUM 2 G: 2 INJECTION, SOLUTION INTRAVENOUS at 12:00

## 2023-03-20 RX ADMIN — ASPIRIN 81 MG 81 MG: 81 TABLET ORAL at 08:47

## 2023-03-20 RX ADMIN — INSULIN HUMAN 3 UNITS: 100 INJECTION, SOLUTION PARENTERAL at 11:59

## 2023-03-20 RX ADMIN — Medication 10 ML: at 20:35

## 2023-03-20 RX ADMIN — INSULIN HUMAN 3 UNITS: 100 INJECTION, SOLUTION PARENTERAL at 17:24

## 2023-03-20 RX ADMIN — METOPROLOL TARTRATE 100 MG: 50 TABLET, FILM COATED ORAL at 20:20

## 2023-03-20 RX ADMIN — APIXABAN 5 MG: 5 TABLET, FILM COATED ORAL at 20:20

## 2023-03-20 RX ADMIN — ADENOSINE 6 MG: 3 INJECTION, SOLUTION INTRAVENOUS at 10:01

## 2023-03-20 RX ADMIN — SODIUM CHLORIDE, POTASSIUM CHLORIDE, SODIUM LACTATE AND CALCIUM CHLORIDE 100 ML/HR: 600; 310; 30; 20 INJECTION, SOLUTION INTRAVENOUS at 23:56

## 2023-03-20 NOTE — PROGRESS NOTES
[facility]   Hospitalist Progress Note    Date of admission: 3/18/2023  Patient Name: Darnell Geller  1945  Date: 3/20/2023      Subjective     Chief Complaint   Patient presents with   • Altered Mental Status       Summary: 77 y.o. CAD and history of PCI in 20,004, hypertension, DM 2 and history of BPH recently switched outpatient intermittent urinary catheterizations who presents with worsening mental status and weakness.  Patient found to have sepsis with shock secondary to Klebsiella bacteremia suspected secondary to urinary source.  Hospital course complicated by acute renal failure on admission, and narrow complex SVT to 150s-cardiology assisted with evaluation, after adenosine appears to be A-fib/a flutter with 2-1 conduction.    Interval Followup: Family cardiology at bedside, discussed plan of care for treatment with adenosine to confirm patient's rhythm.  Patient having some confusion but mentation overall doing much better.  He is more focused on how standing at home.  Denies any acute palpitations or chest pain or pressure.        Objective     Vitals:   Temp:  [98.4 °F (36.9 °C)-100.1 °F (37.8 °C)] 99 °F (37.2 °C)  Heart Rate:  [] 161  Resp:  [17-24] 18  BP: ()/(52-93) 148/93  Flow (L/min):  [2] 2    Physical Exam  Awake conversant appears older than stated age  Elevated to 160s narrow complex on bedside monitor, no lower extremity pitting edema  Mild rhonchi, no acute wheezing, symmetric chest rise on room air.  Very mild conversational dyspnea  Abdomen soft nontender nondistended  Skin warm and dry  AOx3, answering basic questions appropriately, does seem to have some mild confusion but is able to redirect easily  Positive Muñiz catheter    Result Review:  Vital signs, labs and recent relevant imaging reviewed.      adenosine, 12 mg, Intravenous, Once  apixaban, 5 mg, Oral, Q12H  aspirin, 81 mg, Oral, Daily  atorvastatin, 40 mg, Oral, Daily  cefTRIAXone, 2 g, Intravenous,  Q24H  insulin regular, 2-7 Units, Subcutaneous, TID AC  metoprolol tartrate, 100 mg, Oral, Q12H  pantoprazole, 40 mg, Oral, Q AM  sodium chloride, 10 mL, Intravenous, Q12H        •  acetaminophen  •  aluminum-magnesium hydroxide-simethicone  •  dextrose  •  dextrose  •  glucagon (human recombinant)  •  melatonin  •  sodium chloride  •  sodium chloride  •  sodium chloride      Assessment / Plan     Assessment/Plan:  Septic shock secondary to Klebsiella oxytoca bacteremia suspect from urinary source from straight catheterizations  Acute renal failure suspect secondary to septic shock with hypovolemia  (no prior baseline creatinine available for comparison)    Acute lactic and metabolic encephalopathy  New onset A-fib/a flutter with 2 1 conduction  CAD with prior PCI in 2004  BPH with retention requiring outpatient self-catheterizations  Diarrhea, C. difficile negative  Metabolic acidosis question secondary to diarrhea  DM2 with peripheral neuropathy  Hyperlipidemia    -3/18 ceftriaxone for bacteremia suspect secondary to urinary source given some more initial cultures, urine sensitivities essentially pansensitive apart from ampicillin, blood culture sensitivities not yet resulted but suspect will be similar does appear to be responding to this, continue for now  - Adenosine administration for evaluation of rhythm today as per cardiology revealing a flutter with 2-1 conduction    *Have discussed with family, no prior GI bleed history, initiated on Eliquis 5 mg twice daily  - Increasing/titrating metoprolol, had issues initially with septic shock, blood pressure now doing much better monitor closely  - Echo with normal EF no significant valvular disease  - Continue Muñiz catheter, home Flomax and finasteride have been held initially given lower blood pressures and as patient still required at least 3 times daily straight cathing outpatient.  Likely needs chronic catheter at this point versus continuing intermittent  straight catheterizations as outpatient.  - CT abdomen pelvis to evaluate for possible renal stone and sepsis pending at this time  -Stool culture/C. difficile negative, monitor for any further diarrhea doing better currently  - Continue aspirin and statin  - SSI, monitor glucose, outpatient is on metformin, Jardiance and glipizide.  Hold gabapentin until mentation improves, was on 800 3 times daily in setting of his renal dysfunction likely contributing  - Continue PPI  - Check a.m. CBC, BMP, magnesium, phosphorus  -Discussed with pulm critical, cards      If rate converts/becomes stable <110 later with stable blood pressure could consider transferring out this evening but will continue ICU monitoring for now as he is not yet stable to transfer out    DVT prophylaxis:  Medical DVT prophylaxis orders are present.    Code Status (Patient has no pulse and is not breathing): CPR (Attempt to Resuscitate)  Medical Interventions (Patient has pulse or is breathing): Full Support        CBC    CBC 3/18/23 3/19/23 3/20/23   WBC 13.61 (A) 15.78 (A) 11.00 (A)   RBC 3.67 (A) 3.91 (A) 3.25 (A)   Hemoglobin 10.7 (A) 11.3 (A) 9.6 (A)   Hematocrit 32.2 (A) 34.9 (A) 28.0 (A)   MCV 87.7 89.3 86.2   MCH 29.2 28.9 29.5   MCHC 33.2 32.4 34.3   RDW 15.6 (A) 16.0 (A) 15.7 (A)   Platelets 198 211 137 (A)   (A) Abnormal value              CMP    CMP 3/18/23 3/18/23 3/19/23 3/20/23    1642 1754     Glucose 200 (A) 186 (A) 119 (A) 191 (A)   BUN 55 (A)  58 (A) 54 (A)   Creatinine 3.36 (A)  2.91 (A) 2.73 (A)   eGFR 18.0 (A)  21.4 (A) 23.1 (A)   Sodium 129 (A) 127.5 (A) 131 (A) 130 (A)   Potassium 4.9  4.7 4.5   Chloride 92 (A)  99 100   Calcium 9.0  8.8 8.3 (A)   Total Protein 7.8      Albumin 3.2 (A)      Globulin 4.6      Total Bilirubin 0.5      Alkaline Phosphatase 183 (A)      AST (SGOT) 99 (A)      ALT (SGPT) 59 (A)      Albumin/Globulin Ratio 0.7      BUN/Creatinine Ratio 16.4  19.9 19.8   Anion Gap 17.9 (A)  15.6 (A) 11.4   (A) Abnormal  value            Patient is critically ill due to a flutter/A-fib with RVR requiring adenosine, septic shock with bacteremia, acute renal failure.  I have spent 33 minutes of critical care time reviewing documentation, pertinent labs, imaging studies, examining the patient, modifying care plan, and discussing patient's condition and care plan with the patient, nursing and cardiology at bedside and pulm ameenat

## 2023-03-20 NOTE — PLAN OF CARE
Goal Outcome Evaluation:  Plan of Care Reviewed With: patient        Progress: improving   Pt alert VSS CM SR sats 94% on 2L NC

## 2023-03-20 NOTE — SIGNIFICANT NOTE
03/20/23 1015   Coping/Psychosocial   Observed Emotional State calm;cooperative;happy   Verbalized Emotional State hopefulness   Trust Relationship/Rapport empathic listening provided   Family/Support Persons spouse;son   Involvement in Care interacting with patient   Additional Documentation Spiritual Care (Group)   Spiritual Care   Use of Spiritual Resources prayer   Spiritual Care Source  initiative   Spiritual Care Follow-Up follow-up, none required as presently assessed   Response to Spiritual Care engaged in conversation;receptive of support;thanks expressed   Spiritual Care Interventions supportive conversation provided;prayer support provided   Spiritual Care Visit Type initial   Receptivity to Spiritual Care visit welcomed

## 2023-03-20 NOTE — PROGRESS NOTES
Pulmonary / Critical Care Progress Note      Patient Name: Darnell Geller  : 1945  MRN: [id number]  Primary Care Physician:  Provider, No Known  Referring Physician: Jordan Melara MD  Date of admission: 3/18/2023    Subjective   Subjective   Patient critically ill with sepsis shock, Klebsiella UTI, bacteremia BPH    Over the past 24 hours, patient remained in ICU, off Levophed since yesterday morning, did have SVT required IV Lopressor, resume home beta-blocker p.o., did have good response.    No acute events overnight    This morning  Patient awake, alert on 2 L nasal cannula  Tmax 100.1  Still with some tachycardia, BP stable 117/71  Tolerating breakfast  Still with reduced kidney function    Review of Systems  Constitutional symptoms:  Denied complaints   Ear, nose, throat: Denied complaints  Cardiovascular:  Denied complaints  Respiratory: Denied complaints  Gastrointestinal: Denied complaints  Musculoskeletal: Denied complaints  Genitourinary: Denied complaints  Allergy / Immunology: Denied complaints  Hematologic: Denied complaints  Neurologic: Denied complaints  Skin: Denied complaints  Endocrine: Denied complaints  Psychiatric: Denied complaints      Personal History     Past Medical History:   Diagnosis Date   • Arthritis    • Benign prostatic hyperplasia    • Coronary artery disease    • Hyperlipidemia    • Hypertension        Past Surgical History:   Procedure Laterality Date   • SINUS SURGERY         Family History: family history is not on file. Otherwise pertinent FHx was reviewed and not pertinent to current issue.    Social History:  reports that he has been smoking cigarettes. He has a 60.00 pack-year smoking history. He has never used smokeless tobacco. He reports that he does not currently use alcohol. He reports that he does not use drugs.    Home Medications:  amLODIPine, aspirin, atorvastatin, cholecalciferol, docusate sodium, empagliflozin, finasteride, gabapentin, glipizide, metFORMIN,  metoprolol tartrate, omeprazole, and tamsulosin    Allergies:  No Known Allergies    Objective    Objective     Vitals:   Temp:  [98.4 °F (36.9 °C)-100.1 °F (37.8 °C)] 99 °F (37.2 °C)  Heart Rate:  [] 116  Resp:  [17-24] 18  BP: ()/() 117/71  Flow (L/min):  [2] 2    Physical Exam:  Vital Signs Reviewed   General: Elderly, male alert, no acute distress  HEENT:  PERRL, EOMI.  OP, nares clear  Neck:  Supple, no JVD, no thyromegaly  Chest:  good aeration, clear to auscultation bilaterally, tympanic to percussion bilaterally, no work of breathing noted  CV: RRR, no MGR, pulses 2+, equal.  Abd:  Soft, NT, ND, + BS, no HSM  EXT:  no clubbing, no cyanosis, no edema  Neuro:  A&Ox2, CN grossly intact, no focal deficits.  Skin: No rashes or lesions noted          Result Review    Result Review:  I have personally reviewed the results from the time of this admission to 3/20/2023 09:37 EDT and agree with these findings:  [x]  Laboratory  [x]  Microbiology  [x]  Radiology  [x]  EKG/Telemetry   []  Cardiology/Vascular   []  Pathology  []  Old records  []  Other:  Most notable findings include:       Lab 03/20/23  0239 03/19/23  0248 03/18/23  1754 03/18/23  1642   WBC 11.00* 15.78*  --  13.61*   HEMOGLOBIN 9.6* 11.3*  --  10.7*   HEMATOCRIT 28.0* 34.9*  --  32.2*   PLATELETS 137* 211  --  198   SODIUM 130* 131*  --  129*   SODIUM, ARTERIAL  --   --  127.5*  --    POTASSIUM 4.5 4.7  --  4.9   CHLORIDE 100 99  --  92*   CO2 18.6* 16.4*  --  19.1*   BUN 54* 58*  --  55*   CREATININE 2.73* 2.91*  --  3.36*   GLUCOSE 191* 119*  --  200*   GLUCOSE, ARTERIAL  --   --  186*  --    CALCIUM 8.3* 8.8  --  9.0   PHOSPHORUS 3.1  --   --  3.8   TOTAL PROTEIN  --   --   --  7.8   ALBUMIN  --   --   --  3.2*   GLOBULIN  --   --   --  4.6         Assessment & Plan   Assessment / Plan     Active Hospital Problems:  Active Hospital Problems    Diagnosis    • **Sepsis with acute renal failure and septic shock, due to unspecified  organism, unspecified acute renal failure type (HCC)          Impression:  Septic shock  Klebsiella bacteremia  Urinary tract infection  History of urinary retention/BPH  NIKITA ?  Baseline  Anion gap metabolic acidosis  Lactic acidosis  Leukocytosis  Transaminitis    Plan:    -Blood cultures show gram-negative bacilli, bio fire with Klebsiella oxytoca, urine culture in process  -Continue Rocephin  -We will increase metoprolol to near home dose 100 mg twice daily  (Patient takes 125 twice daily)  -Blood pressure improved, weaned off vasopressors now  -Patient received IV fluid resuscitation per sepsis guidelines  -Lactic acid cleared  -Trend renal function, monitor replace electrolytes as necessary  -Replace IV magnesium  -Continue lactated Ringer at 100 cc/h  -Check CT abdomen pelvis without contrast to evaluate for kidney stone/obstruction  -May need urology consultation  -SSI to optimize glucose control  -Can advance diet as tolerated      DVT prophylaxis: Subcu heparin  Medical DVT prophylaxis orders are present.     Code Status and Medical Interventions:   Ordered at: 03/18/23 2240     Code Status (Patient has no pulse and is not breathing):    CPR (Attempt to Resuscitate)     Medical Interventions (Patient has pulse or is breathing):    Full Support        Electronically signed by CHARLENE Maher, 03/19/23, 10:34 AM EDT.      The patient is critically ill in the ICU with septic shock, urinary tract infection, possible BPH with obstruction, renal failure, possibly acute, altered mental status. Multidisciplinary bedside critical care rounds were performed with nursing staff, respiratory therapy, pharmacy, nutritional services, social work. I have personally reviewed the chart, labs and any pertinent imaging available.  I have spent 44 minutes of critical care time, excluding procedures, in the care of this patient. I - Dr Haas, spent 30 mins of critical care time, and Jewels CHANG spent 14 mins of  critical care time according to [facility] shared critical care billing guidelines.     Electronically signed by Bennie Haas MD, 03/20/23, 10:17 AM EDT.

## 2023-03-20 NOTE — CONSULTS
[facility]   Cardiology Consult Note    Patient Name: Darnell Geller  : 1945  MRN: [id number]  Primary Care Physician:  Provider, No Known  Referring Physician: Vincenzo Crandall MD  Date of admission: 3/18/2023    Subjective   Subjective     Reason for Consult/ Chief Complaint: Urosepsis    HPI:  Darnell Geller is a 77 y.o. male with past medical history significant for coronary artery disease, diabetes, hypertension and hyperlipidemia who presented with urosepsis and acute renal failure.  Due to the urosepsis they held his metoprolol while he was on Levophed.  He has had narrow complex tachycardia into the 150 to 160 bpm range.  He notes no real palpitations but does note shortness of breath.    Review of Systems   All systems were reviewed and negative except for: Shortness of breath    Personal History     Past Medical History:   Diagnosis Date   • Arthritis    • Benign prostatic hyperplasia    • Coronary artery disease    • Hyperlipidemia    • Hypertension        Past medical history reviewed  Family History: family history is not on file. Otherwise pertinent FHx was reviewed and not pertinent to current issue.    Social History:  reports that he has been smoking cigarettes. He has a 60.00 pack-year smoking history. He has never used smokeless tobacco. He reports that he does not currently use alcohol. He reports that he does not use drugs.    Home Medications:  amLODIPine, aspirin, atorvastatin, cholecalciferol, docusate sodium, empagliflozin, finasteride, gabapentin, glipizide, metFORMIN, metoprolol tartrate, omeprazole, and tamsulosin    Allergies:  No Known Allergies    Objective    Objective     Vitals:   Temp:  [98.4 °F (36.9 °C)-100.1 °F (37.8 °C)] 99 °F (37.2 °C)  Heart Rate:  [] 161  Resp:  [17-24] 18  BP: ()/() 148/93  Flow (L/min):  [2] 2      Physical Exam:   Constitutional: Awake, alert, No acute distress    Eyes: PERRLA, sclerae anicteric, no conjunctival injection   HENT: NCAT,  mucous membranes moist   Neck: Supple, no thyromegaly, no lymphadenopathy, trachea midline   Respiratory: Clear to auscultation bilaterally, nonlabored respirations    Cardiovascular: RRR, no murmurs, rubs, or gallops, palpable pedal pulses bilaterally   Gastrointestinal: Positive bowel sounds, soft, nontender, nondistended   Musculoskeletal: No bilateral ankle edema, no clubbing or cyanosis to extremities   Psychiatric: Appropriate affect, cooperative   Neurologic: Oriented x 3, strength symmetric in all extremities, Cranial Nerves grossly intact to confrontation, speech clear   Skin: No rashes     Result Review    Result Review:  I have personally reviewed the results from the time of this admission to 3/20/2023 10:11 EDT and agree with these findings:  [x]  Laboratory  []  Microbiology  [x]  Radiology  [x]  EKG/Telemetry   [x]  Cardiology/Vascular   []  Pathology  [x]  Old records  []  Other:  Most notable findings include:     CMP    CMP 3/18/23 3/18/23 3/19/23 3/20/23    1642 1754     Glucose 200 (A) 186 (A) 119 (A) 191 (A)   BUN 55 (A)  58 (A) 54 (A)   Creatinine 3.36 (A)  2.91 (A) 2.73 (A)   eGFR 18.0 (A)  21.4 (A) 23.1 (A)   Sodium 129 (A) 127.5 (A) 131 (A) 130 (A)   Potassium 4.9  4.7 4.5   Chloride 92 (A)  99 100   Calcium 9.0  8.8 8.3 (A)   Total Protein 7.8      Albumin 3.2 (A)      Globulin 4.6      Total Bilirubin 0.5      Alkaline Phosphatase 183 (A)      AST (SGOT) 99 (A)      ALT (SGPT) 59 (A)      Albumin/Globulin Ratio 0.7      BUN/Creatinine Ratio 16.4  19.9 19.8   Anion Gap 17.9 (A)  15.6 (A) 11.4   (A) Abnormal value             CBC    CBC 3/18/23 3/19/23 3/20/23   WBC 13.61 (A) 15.78 (A) 11.00 (A)   RBC 3.67 (A) 3.91 (A) 3.25 (A)   Hemoglobin 10.7 (A) 11.3 (A) 9.6 (A)   Hematocrit 32.2 (A) 34.9 (A) 28.0 (A)   MCV 87.7 89.3 86.2   MCH 29.2 28.9 29.5   MCHC 33.2 32.4 34.3   RDW 15.6 (A) 16.0 (A) 15.7 (A)   Platelets 198 211 137 (A)   (A) Abnormal value             No results found for:  TROPONINT      Assessment & Plan   Assessment / Plan     Brief Patient Summary:  Darnell Geller is a 77 y.o. male who presented with urosepsis.  He required Levophed and they held his metoprolol.  Looking at his telemetry he has been a atrial fibrillation/flutter since he arrived.  He notes no real palpitations but does note shortness of breath.  Adenosine confirmed atrial flutter.    Active Hospital Problems:  Active Hospital Problems    Diagnosis    • **Sepsis with acute renal failure and septic shock, due to unspecified organism, unspecified acute renal failure type (HCC)        Assessment:  1.  Atrial flutter/fib at least since the patient was seen in the emergency department.  2.  Urosepsis  3.  Coronary artery disease  4.  Hypertension  5.  Hyperlipidemia.    Plan:   1.  Gave patient adenosine which confirmed atrial flutter with 2-1 conduction.  He has been in atrial fibrillation/flutter since he came to the emergency department.  He feels no palpitations but does note some possible shortness of breath.  2.  Patient was just restarted on metoprolol.  When he came in his heart rate was under much better control.  We will watch it to see if it improves.  We cannot just cardiovert him at this time and if needed would have to be a KOREY/DC cardioversion.  3.  We will stop patient's heparin and start Eliquis 5 mg p.o. twice daily due to age less than 80 and weight greater than 60 kg.  4.  Patient has acute on chronic renal failure.  5.  Continue the aspirin and Lipitor with history of coronary artery disease  6.  We will try just rate control with the metoprolol and continue to titrate up as needed.  7.  Reviewed patient's echocardiogram which showed what appears to be normal ejection fraction and no significant valvular disease    Electronically signed by Hayden Long MD, 03/20/23, 10:11 AM EDT.

## 2023-03-21 ENCOUNTER — APPOINTMENT (OUTPATIENT)
Dept: GENERAL RADIOLOGY | Facility: HOSPITAL | Age: 78
DRG: 698 | End: 2023-03-21
Payer: OTHER GOVERNMENT

## 2023-03-21 LAB
ANION GAP SERPL CALCULATED.3IONS-SCNC: 11.8 MMOL/L (ref 5–15)
BACTERIA SPEC AEROBE CULT: ABNORMAL
BASOPHILS # BLD AUTO: 0.02 10*3/MM3 (ref 0–0.2)
BASOPHILS NFR BLD AUTO: 0.2 % (ref 0–1.5)
BUN SERPL-MCNC: 50 MG/DL (ref 8–23)
BUN/CREAT SERPL: 18.8 (ref 7–25)
CALCIUM SPEC-SCNC: 8.7 MG/DL (ref 8.6–10.5)
CHLORIDE SERPL-SCNC: 100 MMOL/L (ref 98–107)
CO2 SERPL-SCNC: 21.2 MMOL/L (ref 22–29)
CREAT SERPL-MCNC: 2.66 MG/DL (ref 0.76–1.27)
DEPRECATED RDW RBC AUTO: 51.8 FL (ref 37–54)
EGFRCR SERPLBLD CKD-EPI 2021: 23.8 ML/MIN/1.73
EOSINOPHIL # BLD AUTO: 0.11 10*3/MM3 (ref 0–0.4)
EOSINOPHIL NFR BLD AUTO: 1 % (ref 0.3–6.2)
ERYTHROCYTE [DISTWIDTH] IN BLOOD BY AUTOMATED COUNT: 15.9 % (ref 12.3–15.4)
GLUCOSE BLDC GLUCOMTR-MCNC: 184 MG/DL (ref 70–99)
GLUCOSE BLDC GLUCOMTR-MCNC: 200 MG/DL (ref 70–99)
GLUCOSE BLDC GLUCOMTR-MCNC: 204 MG/DL (ref 70–99)
GLUCOSE BLDC GLUCOMTR-MCNC: 226 MG/DL (ref 70–99)
GLUCOSE SERPL-MCNC: 194 MG/DL (ref 65–99)
GRAM STN SPEC: ABNORMAL
HCT VFR BLD AUTO: 31.6 % (ref 37.5–51)
HGB BLD-MCNC: 10.2 G/DL (ref 13–17.7)
IMM GRANULOCYTES # BLD AUTO: 0.08 10*3/MM3 (ref 0–0.05)
IMM GRANULOCYTES NFR BLD AUTO: 0.7 % (ref 0–0.5)
ISOLATED FROM: ABNORMAL
ISOLATED FROM: ABNORMAL
LYMPHOCYTES # BLD AUTO: 0.62 10*3/MM3 (ref 0.7–3.1)
LYMPHOCYTES NFR BLD AUTO: 5.7 % (ref 19.6–45.3)
MAGNESIUM SERPL-MCNC: 1.7 MG/DL (ref 1.6–2.4)
MCH RBC QN AUTO: 28.4 PG (ref 26.6–33)
MCHC RBC AUTO-ENTMCNC: 32.3 G/DL (ref 31.5–35.7)
MCV RBC AUTO: 88 FL (ref 79–97)
MONOCYTES # BLD AUTO: 1.15 10*3/MM3 (ref 0.1–0.9)
MONOCYTES NFR BLD AUTO: 10.6 % (ref 5–12)
NEUTROPHILS NFR BLD AUTO: 8.83 10*3/MM3 (ref 1.7–7)
NEUTROPHILS NFR BLD AUTO: 81.8 % (ref 42.7–76)
NRBC BLD AUTO-RTO: 0 /100 WBC (ref 0–0.2)
NT-PROBNP SERPL-MCNC: ABNORMAL PG/ML (ref 0–1800)
PHOSPHATE SERPL-MCNC: 3.2 MG/DL (ref 2.5–4.5)
PLATELET # BLD AUTO: 126 10*3/MM3 (ref 140–450)
PMV BLD AUTO: 11 FL (ref 6–12)
POTASSIUM SERPL-SCNC: 4 MMOL/L (ref 3.5–5.2)
QT INTERVAL: 333 MS
RBC # BLD AUTO: 3.59 10*6/MM3 (ref 4.14–5.8)
SODIUM SERPL-SCNC: 133 MMOL/L (ref 136–145)
WBC NRBC COR # BLD: 10.81 10*3/MM3 (ref 3.4–10.8)

## 2023-03-21 PROCEDURE — 94799 UNLISTED PULMONARY SVC/PX: CPT

## 2023-03-21 PROCEDURE — 82962 GLUCOSE BLOOD TEST: CPT

## 2023-03-21 PROCEDURE — 85025 COMPLETE CBC W/AUTO DIFF WBC: CPT | Performed by: INTERNAL MEDICINE

## 2023-03-21 PROCEDURE — 25010000002 FUROSEMIDE PER 20 MG: Performed by: NURSE PRACTITIONER

## 2023-03-21 PROCEDURE — 0 CEFTRIAXONE PER 250 MG: Performed by: INTERNAL MEDICINE

## 2023-03-21 PROCEDURE — 83735 ASSAY OF MAGNESIUM: CPT | Performed by: INTERNAL MEDICINE

## 2023-03-21 PROCEDURE — 94760 N-INVAS EAR/PLS OXIMETRY 1: CPT

## 2023-03-21 PROCEDURE — 71045 X-RAY EXAM CHEST 1 VIEW: CPT

## 2023-03-21 PROCEDURE — 99291 CRITICAL CARE FIRST HOUR: CPT | Performed by: INTERNAL MEDICINE

## 2023-03-21 PROCEDURE — 83880 ASSAY OF NATRIURETIC PEPTIDE: CPT | Performed by: NURSE PRACTITIONER

## 2023-03-21 PROCEDURE — 80048 BASIC METABOLIC PNL TOTAL CA: CPT | Performed by: INTERNAL MEDICINE

## 2023-03-21 PROCEDURE — 84100 ASSAY OF PHOSPHORUS: CPT | Performed by: INTERNAL MEDICINE

## 2023-03-21 PROCEDURE — 63710000001 INSULIN REGULAR HUMAN PER 5 UNITS: Performed by: INTERNAL MEDICINE

## 2023-03-21 PROCEDURE — 25010000002 MAGNESIUM SULFATE 2 GM/50ML SOLUTION: Performed by: NURSE PRACTITIONER

## 2023-03-21 PROCEDURE — 97161 PT EVAL LOW COMPLEX 20 MIN: CPT

## 2023-03-21 RX ORDER — IPRATROPIUM BROMIDE AND ALBUTEROL SULFATE 2.5; .5 MG/3ML; MG/3ML
3 SOLUTION RESPIRATORY (INHALATION) EVERY 4 HOURS PRN
Status: DISCONTINUED | OUTPATIENT
Start: 2023-03-21 | End: 2023-03-27 | Stop reason: HOSPADM

## 2023-03-21 RX ORDER — MAGNESIUM SULFATE HEPTAHYDRATE 40 MG/ML
2 INJECTION, SOLUTION INTRAVENOUS ONCE
Status: COMPLETED | OUTPATIENT
Start: 2023-03-21 | End: 2023-03-21

## 2023-03-21 RX ORDER — FUROSEMIDE 10 MG/ML
40 INJECTION INTRAMUSCULAR; INTRAVENOUS EVERY 12 HOURS
Status: DISCONTINUED | OUTPATIENT
Start: 2023-03-21 | End: 2023-03-27 | Stop reason: HOSPADM

## 2023-03-21 RX ADMIN — ACETAMINOPHEN 650 MG: 325 TABLET ORAL at 23:15

## 2023-03-21 RX ADMIN — METOPROLOL TARTRATE 5 MG: 1 INJECTION, SOLUTION INTRAVENOUS at 03:50

## 2023-03-21 RX ADMIN — IPRATROPIUM BROMIDE AND ALBUTEROL SULFATE 3 ML: 2.5; .5 SOLUTION RESPIRATORY (INHALATION) at 03:52

## 2023-03-21 RX ADMIN — INSULIN HUMAN 3 UNITS: 100 INJECTION, SOLUTION PARENTERAL at 08:02

## 2023-03-21 RX ADMIN — ATORVASTATIN CALCIUM 40 MG: 40 TABLET, FILM COATED ORAL at 08:02

## 2023-03-21 RX ADMIN — METOPROLOL TARTRATE 100 MG: 50 TABLET, FILM COATED ORAL at 21:13

## 2023-03-21 RX ADMIN — METOPROLOL TARTRATE 100 MG: 50 TABLET, FILM COATED ORAL at 08:01

## 2023-03-21 RX ADMIN — CEFTRIAXONE SODIUM 2 G: 2 INJECTION, SOLUTION INTRAVENOUS at 11:48

## 2023-03-21 RX ADMIN — APIXABAN 5 MG: 5 TABLET, FILM COATED ORAL at 21:13

## 2023-03-21 RX ADMIN — Medication 5 MG: at 21:18

## 2023-03-21 RX ADMIN — HYDROXYZINE PAMOATE 25 MG: 25 CAPSULE ORAL at 03:57

## 2023-03-21 RX ADMIN — ASPIRIN 81 MG 81 MG: 81 TABLET ORAL at 08:02

## 2023-03-21 RX ADMIN — Medication 10 ML: at 08:06

## 2023-03-21 RX ADMIN — ACETAMINOPHEN 650 MG: 325 TABLET ORAL at 17:15

## 2023-03-21 RX ADMIN — NICOTINE 1 PATCH: 21 PATCH, EXTENDED RELEASE TRANSDERMAL at 08:02

## 2023-03-21 RX ADMIN — INSULIN HUMAN 3 UNITS: 100 INJECTION, SOLUTION PARENTERAL at 17:08

## 2023-03-21 RX ADMIN — APIXABAN 5 MG: 5 TABLET, FILM COATED ORAL at 08:02

## 2023-03-21 RX ADMIN — INSULIN HUMAN 3 UNITS: 100 INJECTION, SOLUTION PARENTERAL at 11:49

## 2023-03-21 RX ADMIN — PANTOPRAZOLE SODIUM 40 MG: 40 TABLET, DELAYED RELEASE ORAL at 06:32

## 2023-03-21 RX ADMIN — Medication 10 ML: at 21:13

## 2023-03-21 RX ADMIN — ACETAMINOPHEN 650 MG: 325 TABLET ORAL at 03:57

## 2023-03-21 RX ADMIN — MAGNESIUM SULFATE HEPTAHYDRATE 2 G: 40 INJECTION, SOLUTION INTRAVENOUS at 08:01

## 2023-03-21 RX ADMIN — FUROSEMIDE 40 MG: 10 INJECTION, SOLUTION INTRAMUSCULAR; INTRAVENOUS at 21:13

## 2023-03-21 RX ADMIN — FUROSEMIDE 40 MG: 10 INJECTION, SOLUTION INTRAMUSCULAR; INTRAVENOUS at 09:53

## 2023-03-21 NOTE — PROGRESS NOTES
[facility]   Hospitalist Progress Note    Date of admission: 3/18/2023  Patient Name: Darnell Geller  1945  Date: 3/21/2023      Subjective     Chief Complaint   Patient presents with   • Altered Mental Status       Summary: 77 y.o. CAD and history of PCI in 20,004, hypertension, DM 2 and history of BPH recently switched outpatient intermittent urinary catheterizations who presents with worsening mental status and weakness.  Patient found to have sepsis with shock secondary to Klebsiella bacteremia suspected secondary to urinary source.  Hospital course complicated by acute renal failure on admission, and narrow complex SVT to 150s-cardiology assisted with evaluation, after adenosine appears to be A-fib/a flutter with 2-1 conduction.    Interval Followup: No acute events overnight, patient resting comfortably in bed, no family at bedside    Objective     Vitals:   Temp:  [97.9 °F (36.6 °C)-98.9 °F (37.2 °C)] 97.9 °F (36.6 °C)  Heart Rate:  [] 95  Resp:  [18-26] 26  BP: ()/() 123/86  Flow (L/min):  [3-6] 4    Physical Exam  General appearance: NAD, conversant   Eyes: anicteric sclerae, moist conjunctivae; no lid-lag; PERRLA  HENT: Atraumatic; oropharynx clear with moist mucous membranes and no mucosal ulcerations; normal hard and soft palate  Neck: Trachea midline; FROM, supple, no thyromegaly or lymphadenopathy  Lungs: CTA, with normal respiratory effort and no intercostal retractions  CV: Regular Rate and Rhythm, no Murmurs, Rubs, or Gallops   Abdomen: Soft, non-tender; no masses or Heptosplenomegally  Extremities: No peripheral edema or extremity lymphadenopathy  Skin: Normal temperature, turgor and texture; no rash, ulcers or subcutaneous nodules  Psych: Appropriate affect, alert and oriented to person, place and time  Neuro: CN II - XII intact no motor deficits, no sensory defecits      Result Review:  Vital signs, labs and recent relevant imaging reviewed.      adenosine, 12 mg,  Intravenous, Once  apixaban, 5 mg, Oral, Q12H  aspirin, 81 mg, Oral, Daily  atorvastatin, 40 mg, Oral, Daily  cefTRIAXone, 2 g, Intravenous, Q24H  furosemide, 40 mg, Intravenous, Q12H  insulin regular, 2-7 Units, Subcutaneous, TID AC  metoprolol tartrate, 100 mg, Oral, Q12H  nicotine, 1 patch, Transdermal, Q24H  pantoprazole, 40 mg, Oral, Q AM  sodium chloride, 10 mL, Intravenous, Q12H        •  acetaminophen  •  aluminum-magnesium hydroxide-simethicone  •  dextrose  •  dextrose  •  glucagon (human recombinant)  •  hydrOXYzine pamoate  •  ipratropium-albuterol  •  melatonin  •  sodium chloride  •  sodium chloride  •  sodium chloride      Assessment / Plan     Assessment/Plan:  Septic shock secondary to Klebsiella oxytoca bacteremia suspect from urinary source from straight catheterizations  Acute renal failure suspect secondary to septic shock with hypovolemia  (no prior baseline creatinine available for comparison)    Acute lactic and metabolic encephalopathy  New onset A-fib/a flutter with 2 1 conduction  CAD with prior PCI in 2004  BPH with retention requiring outpatient self-catheterizations  Diarrhea, C. difficile negative  Metabolic acidosis question secondary to diarrhea  DM2 with peripheral neuropathy  Hyperlipidemia    -Continue antibiotics, IV secondary to sepsis from UTI  - Adenosine administration for evaluation of rhythm today as per cardiology revealing a flutter with 2-1 conduction    *Have discussed with family, no prior GI bleed history, initiated on Eliquis 5 mg twice daily  - Increasing/titrating metoprolol, had issues initially with septic shock, blood pressure now doing much better monitor closely, seems to be tolerating  - Echo with normal EF no significant valvular disease  - Continue Muñiz catheter, home Flomax and finasteride have been held initially given lower blood pressures and as patient still required at least 3 times daily straight cathing outpatient.  Likely needs chronic catheter at  this point versus continuing intermittent straight catheterizations as outpatient.  - CT scan of the abdomen pelvis personally reviewed, consistent with cystitis, hydronephrosis, possible cirrhosis  -Stool culture/C. difficile negative, monitor for any further diarrhea doing better currently  - Continue aspirin and statin  - SSI, monitor glucose, outpatient is on metformin, Jardiance and glipizide.  Hold gabapentin until mentation improves, was on 800 3 times daily in setting of his renal dysfunction likely contributing  - Continue PPI, tolerating  - CBC, CMP reviewed  - Check CBC, CMP, mag and Phos in a.m.  -Discussed management plan with pulmonology     If rate converts/becomes stable <110 later with stable blood pressure could consider transferring out this evening but will continue ICU monitoring for now as he is not yet stable to transfer out    DVT prophylaxis:  Medical DVT prophylaxis orders are present.    Code Status (Patient has no pulse and is not breathing): CPR (Attempt to Resuscitate)  Medical Interventions (Patient has pulse or is breathing): Full Support        CBC    CBC 3/19/23 3/20/23 3/21/23   WBC 15.78 (A) 11.00 (A) 10.81 (A)   RBC 3.91 (A) 3.25 (A) 3.59 (A)   Hemoglobin 11.3 (A) 9.6 (A) 10.2 (A)   Hematocrit 34.9 (A) 28.0 (A) 31.6 (A)   MCV 89.3 86.2 88.0   MCH 28.9 29.5 28.4   MCHC 32.4 34.3 32.3   RDW 16.0 (A) 15.7 (A) 15.9 (A)   Platelets 211 137 (A) 126 (A)   (A) Abnormal value              CMP    CMP 3/19/23 3/20/23 3/21/23   Glucose 119 (A) 191 (A) 194 (A)   BUN 58 (A) 54 (A) 50 (A)   Creatinine 2.91 (A) 2.73 (A) 2.66 (A)   eGFR 21.4 (A) 23.1 (A) 23.8 (A)   Sodium 131 (A) 130 (A) 133 (A)   Potassium 4.7 4.5 4.0   Chloride 99 100 100   Calcium 8.8 8.3 (A) 8.7   BUN/Creatinine Ratio 19.9 19.8 18.8   Anion Gap 15.6 (A) 11.4 11.8   (A) Abnormal value            Pt is critically ill in ICU with tachyarrhythmia, sepsis.  I have spent 32 minutes of critical care time reviewing previous  documentation, reviewing all pertinent telemetry, labs, and imaging studies, examining the patient, modifying the care plan and discussing the patient´s condition and care plan with the consultants, available family and during rounds. This does not include any procedures performed.

## 2023-03-21 NOTE — PROGRESS NOTES
Pulmonary / Critical Care Progress Note      Patient Name: Darnell Geller  : 1945  MRN: [id number]  Primary Care Physician:  Provider, No Known  Referring Physician: Konrad Morales MD  Date of admission: 3/18/2023    Subjective   Subjective   Patient critically ill with sepsis shock, Klebsiella UTI, bacteremia BPH, A-fib    Over the past 24 hours, patient remained in ICU, off vasopressors, receiving antibiotics for Klebsiella UTI/bacteremia, had atrial flutter with RVR, cardiology consulted received adenosine x1, initiated on Eliquis and home dose beta-blocker.  CT scan abdomen pelvis done    Overnight patient had increasing confusion, CT head done negative for acute abnormality, increasing O2 demand    This morning  Patient is awake, does appear to be delirious  On 6 L nasal cannula, O2 sat 94%  Eating breakfast  Afebrile overnight  Heart rate controlled, A-fib  Muñiz catheter to bedside drainage      Review of Systems  Constitutional symptoms:  Denied complaints   Ear, nose, throat: Denied complaints  Cardiovascular:  Denied complaints  Respiratory: Denied complaints  Gastrointestinal: Denied complaints  Musculoskeletal: Denied complaints  Genitourinary: Denied complaints  Allergy / Immunology: Denied complaints  Hematologic: Denied complaints  Neurologic: Denied complaints  Skin: Denied complaints  Endocrine: Denied complaints  Psychiatric: Denied complaints      Personal History     Past Medical History:   Diagnosis Date   • Arthritis    • Benign prostatic hyperplasia    • Coronary artery disease    • Hyperlipidemia    • Hypertension        Past Surgical History:   Procedure Laterality Date   • SINUS SURGERY         Family History: family history is not on file. Otherwise pertinent FHx was reviewed and not pertinent to current issue.    Social History:  reports that he has been smoking cigarettes. He has a 60.00 pack-year smoking history. He has never used smokeless tobacco. He reports that he does not  currently use alcohol. He reports that he does not use drugs.    Home Medications:  amLODIPine, aspirin, atorvastatin, cholecalciferol, docusate sodium, empagliflozin, finasteride, gabapentin, glipizide, metFORMIN, metoprolol tartrate, omeprazole, and tamsulosin    Allergies:  No Known Allergies    Objective    Objective     Vitals:   Temp:  [97.9 °F (36.6 °C)-98.9 °F (37.2 °C)] 98.9 °F (37.2 °C)  Heart Rate:  [] 98  Resp:  [18-25] 23  BP: ()/() 164/84  Flow (L/min):  [3-6] 6    Physical Exam:    Vital Signs Reviewed   General: Elderly, male alert, no acute distress  HEENT:  PERRL, EOMI.  OP, nares clear  Neck:  Supple, no JVD, no thyromegaly  Chest:  good aeration, clear to auscultation bilaterally, tympanic to percussion bilaterally, no work of breathing noted  CV: RRR, no MGR, pulses 2+, equal.  Abd:  Soft, NT, ND, + BS, no HSM  EXT:  no clubbing, no cyanosis, no edema  Neuro:  A&Ox2, patient is alert to self and place, does appear to be slightly confused about current events.  CN grossly intact, no focal deficits.  Skin: No rashes or lesions noted      Result Review    Result Review:  I have personally reviewed the results from the time of this admission to 3/21/2023 09:24 EDT and agree with these findings:  [x]  Laboratory  [x]  Microbiology  [x]  Radiology  [x]  EKG/Telemetry   []  Cardiology/Vascular   []  Pathology  []  Old records  []  Other:  Most notable findings include:       Lab 03/21/23  0234 03/20/23  0239 03/19/23  0248 03/18/23  1754 03/18/23  1642   WBC 10.81* 11.00* 15.78*  --  13.61*   HEMOGLOBIN 10.2* 9.6* 11.3*  --  10.7*   HEMATOCRIT 31.6* 28.0* 34.9*  --  32.2*   PLATELETS 126* 137* 211  --  198   SODIUM 133* 130* 131*  --  129*   SODIUM, ARTERIAL  --   --   --  127.5*  --    POTASSIUM 4.0 4.5 4.7  --  4.9   CHLORIDE 100 100 99  --  92*   CO2 21.2* 18.6* 16.4*  --  19.1*   BUN 50* 54* 58*  --  55*   CREATININE 2.66* 2.73* 2.91*  --  3.36*   GLUCOSE 194* 191* 119*  --  200*    GLUCOSE, ARTERIAL  --   --   --  186*  --    CALCIUM 8.7 8.3* 8.8  --  9.0   PHOSPHORUS 3.2 3.1  --   --  3.8   TOTAL PROTEIN  --   --   --   --  7.8   ALBUMIN  --   --   --   --  3.2*   GLOBULIN  --   --   --   --  4.6         Assessment & Plan   Assessment / Plan     Active Hospital Problems:  Active Hospital Problems    Diagnosis    • **Sepsis with acute renal failure and septic shock, due to unspecified organism, unspecified acute renal failure type (HCC)          Impression:  Septic shock  Klebsiella bacteremia  Urinary tract infection  History of urinary retention/BPH  Chronic bladder outlet obstruction  NIKITA ?  Baseline, likely CKD  Anion gap metabolic acidosis  Lactic acidosis  Leukocytosis  Transaminitis  Atrial flutter, with RVR        Plan:    -Blood cultures and urine culture with Klebsiella oxytoca,  Continue Rocephin for total 14 days  -CT scan reviewed, patient likely has chronic outlet obstruction, consider urology consultation  -Continue Muñiz catheter for now  -Continue metoprolol, Eliquis, aspirin  -proBNP elevated, increased since admission  -Chest x-ray reviewed increased pulmonary vascular congestion  -Continue supplemental oxygen, nasal cannula, titrate O2  -We will order Lasix 40 mg IV twice daily  -Trend renal function, monitor replace electrolytes as necessary  -Replace IV mag  -Unsure patient's baseline renal function, ?  2.3  -Patient does not recall seeing an nephrology, consider nephrology consultation  -SSI to optimize glucose control  -Can advance diet as tolerated      DVT prophylaxis: Subcu heparin  Medical DVT prophylaxis orders are present.     Code Status and Medical Interventions:   Ordered at: 03/18/23 6460     Code Status (Patient has no pulse and is not breathing):    CPR (Attempt to Resuscitate)     Medical Interventions (Patient has pulse or is breathing):    Full Support        Electronically signed by CHARLENE Maher, 03/19/23, 10:34 AM EDT.      The patient is  critically ill in the ICU with septic shock, urinary tract infection, possible BPH with obstruction, renal failure, possibly acute, altered mental status. Multidisciplinary bedside critical care rounds were performed with nursing staff, respiratory therapy, pharmacy, nutritional services, social work. I have personally reviewed the chart, labs and any pertinent imaging available.  I have spent 31 minutes of critical care time, excluding procedures, in the care of this patient. I - Dr Haas, spent 17 mins of critical care time, and Jewels CHANG spent 14 mins of critical care time according to split shared critical care billing guidelines.     Electronically signed by Bennie Haas MD, 03/21/23, 10:21 AM EDT.

## 2023-03-21 NOTE — THERAPY EVALUATION
Acute Care - Physical Therapy Initial Evaluation   [place]     Patient Name: Darnell Geller  : 1945  MRN: [id number]  Today's Date: 3/21/2023      Visit Dx:     ICD-10-CM ICD-9-CM   1. Sepsis with acute renal failure and septic shock, due to unspecified organism, unspecified acute renal failure type (HCC)  A41.9 038.9    R65.21 995.92    N17.9 785.52     584.9   2. Acute urinary tract infection  N39.0 599.0   3. Difficulty walking  R26.2 719.7     Patient Active Problem List   Diagnosis   • Sepsis with acute renal failure and septic shock, due to unspecified organism, unspecified acute renal failure type (HCC)     Past Medical History:   Diagnosis Date   • Arthritis    • Benign prostatic hyperplasia    • Coronary artery disease    • Hyperlipidemia    • Hypertension      Past Surgical History:   Procedure Laterality Date   • SINUS SURGERY       PT Assessment (last 12 hours)     PT Evaluation and Treatment     Row Name 23 1500          Physical Therapy Time and Intention    Subjective Information complains of;fatigue;pain;weakness  -CS     Document Type evaluation  -CS     Mode of Treatment individual therapy;physical therapy  -CS     Patient Effort fair  -CS     Symptoms Noted During/After Treatment fatigue;increased pain  -CS     Row Name 23 1500          General Information    Patient Profile Reviewed yes  -CS     Patient Observations alert;cooperative;agree to therapy  -CS     Prior Level of Function independent:;gait;transfer;bed mobility;min assist:;ADL's  -CS     Equipment Currently Used at Home walker, rolling;cane, straight;commode, 3-in-1;bath bench  -CS     Existing Precautions/Restrictions fall;oxygen therapy device and L/min  Patient currently on 4 L of supplemental oxygen  -CS     Limitations/Impairments safety/cognitive  -CS     Barriers to Rehab none identified  -CS     Row Name 23 1500          Living Environment    Current Living Arrangements home  -CS     Home Accessibility  stairs to enter home;stairs within home  -CS     People in Home spouse;child(alicia), adult  -CS     Primary Care Provided by self  -CS     Row Name 03/21/23 1500          Home Main Entrance    Number of Stairs, Main Entrance two  entrance from the garage with 2 grab bars  -     Row Name 03/21/23 1500          Stairs Within Home, Primary    Stairs, Within Home, Primary Patient has a stair lift within the home he utilizes.  -     Row Name 03/21/23 1500          Pain    Pain Intervention(s) Nursing Notified  -CS     Additional Documentation Pain Scale: FACES Pre/Post-Treatment (Group)  -     Row Name 03/21/23 1500          Pain Scale: FACES Pre/Post-Treatment    Pain: FACES Scale, Pretreatment 4-->hurts little more  -CS     Posttreatment Pain Rating 6-->hurts even more  -CS     Pain Location generalized  -CS     Pain Location - back  -CS     Pre/Posttreatment Pain Comment Family reports patient has spinal stenosis  -     Row Name 03/21/23 1500          Cognition    Orientation Status (Cognition) oriented to;person;place  -     Row Name 03/21/23 1500          Range of Motion Comprehensive    General Range of Motion bilateral lower extremity ROM WFL  with AAROM only  -     Row Name 03/21/23 1500          Strength Comprehensive (MMT)    General Manual Muscle Testing (MMT) Assessment lower extremity strength deficits identified  -CS     Comment, General Manual Muscle Testing (MMT) Assessment Bilateral lower extremities assessed at 3 -/5  -     Row Name 03/21/23 1500          Bed Mobility    Bed Mobility bed mobility (all) activities  -     All Activities, Maynard (Bed Mobility) verbal cues;minimum assist (75% patient effort);moderate assist (50% patient effort)  -CS     Bed Mobility, Safety Issues decreased use of arms for pushing/pulling;decreased use of legs for bridging/pushing  -CS     Assistive Device (Bed Mobility) bed rails;head of bed elevated;draw sheet  -CS     Comment, (Bed Mobility) Patient  unable to come to a full sitting position at edge of bed due to increased shortness of breath and pain and requesting to lay back in bed.  -     Row Name 03/21/23 1500          Transfers    Comment, (Transfers) No further transfers or ambulation completed this date.  -     Row Name 03/21/23 1500          Safety Issues, Functional Mobility    Impairments Affecting Function (Mobility) balance;cognition;endurance/activity tolerance;strength;shortness of breath;pain  -     Row Name 03/21/23 1500          Balance    Balance Assessment sitting static balance  -CS     Static Sitting Balance standby assist;contact guard  -     Position, Sitting Balance unsupported;long sitting  -CS     Row Name             Wound 03/19/23 2311 Right posterior heel Laceration    Wound - Properties Group Placement Date: 03/19/23  -VM Placement Time: 2311  -VM Present on Hospital Admission: Y  -VM Side: Right  -VM Orientation: posterior  -VM Location: heel  -VM Primary Wound Type: Laceration  -VM    Retired Wound - Properties Group Placement Date: 03/19/23  -VM Placement Time: 2311  -VM Present on Hospital Admission: Y  -VM Side: Right  -VM Orientation: posterior  -VM Location: heel  -VM Primary Wound Type: Laceration  -VM    Retired Wound - Properties Group Date first assessed: 03/19/23  -VM Time first assessed: 2311  -VM Present on Hospital Admission: Y  -VM Side: Right  -VM Location: heel  -VM Primary Wound Type: Laceration  -VM    Row Name 03/21/23 1500          Plan of Care Review    Plan of Care Reviewed With patient  -CS     Progress no change  -     Outcome Evaluation Pt presents with limitations that impede their ability to safely and independently transfer and ambulate. The skills of a therapist will be required to safely and effectively implement the following treatment plan to restore maximal level of function.  -     Row Name 03/21/23 1500          Therapy Assessment/Plan (PT)    Rehab Potential (PT) good, to achieve  stated therapy goals  -CS     Criteria for Skilled Interventions Met (PT) yes;skilled treatment is necessary  -CS     Therapy Frequency (PT) daily  -CS     Predicted Duration of Therapy Intervention (PT) 10 days  -CS     Problem List (PT) problems related to;balance;mobility;strength;cognition;pain;other (see comments)  Endurance  -CS     Activity Limitations Related to Problem List (PT) unable to ambulate safely;unable to transfer safely  -CS     Row Name 03/21/23 1500          PT Evaluation Complexity    History, PT Evaluation Complexity 1-2 personal factors and/or comorbidities  -CS     Examination of Body Systems (PT Eval Complexity) total of 4 or more elements  -CS     Clinical Presentation (PT Evaluation Complexity) stable  -CS     Clinical Decision Making (PT Evaluation Complexity) low complexity  -CS     Overall Complexity (PT Evaluation Complexity) low complexity  -CS     Row Name 03/21/23 1500          Therapy Plan Review/Discharge Plan (PT)    Therapy Plan Review (PT) evaluation/treatment results reviewed;patient  -CS     Row Name 03/21/23 1500          Physical Therapy Goals    Bed Mobility Goal Selection (PT) bed mobility, PT goal 1  -CS     Transfer Goal Selection (PT) transfer, PT goal 1  -CS     Gait Training Goal Selection (PT) gait training, PT goal 1  -CS     Row Name 03/21/23 1500          Bed Mobility Goal 1 (PT)    Activity/Assistive Device (Bed Mobility Goal 1, PT) bed mobility activities, all  -CS     Giles Level/Cues Needed (Bed Mobility Goal 1, PT) standby assist  -CS     Time Frame (Bed Mobility Goal 1, PT) long term goal (LTG);10 days  -CS     Row Name 03/21/23 1500          Transfer Goal 1 (PT)    Activity/Assistive Device (Transfer Goal 1, PT) sit-to-stand/stand-to-sit;bed-to-chair/chair-to-bed;walker, rolling  -CS     Giles Level/Cues Needed (Transfer Goal 1, PT) contact guard required  -CS     Time Frame (Transfer Goal 1, PT) long term goal (LTG);10 days  -     Row  Name 03/21/23 1500          Gait Training Goal 1 (PT)    Activity/Assistive Device (Gait Training Goal 1, PT) gait (walking locomotion);assistive device use;walker, rolling  -CS     Gaines Level (Gait Training Goal 1, PT) contact guard required  -CS     Distance (Gait Training Goal 1, PT) 100  -CS     Time Frame (Gait Training Goal 1, PT) long term goal (LTG);10 days  -CS           User Key  (r) = Recorded By, (t) = Taken By, (c) = Cosigned By    Initials Name Provider Type    VM Gretta Romero, RN Registered Nurse    Luzmaria Cohen PT Physical Therapist                Physical Therapy Education     Title: PT OT SLP Therapies (In Progress)     Topic: Physical Therapy (In Progress)     Point: Mobility training (In Progress)     Learning Progress Summary           Patient Acceptance, E, NR by  at 3/21/2023 1605                   Point: Home exercise program (Not Started)     Learner Progress:  Not documented in this visit.          Point: Body mechanics (Not Started)     Learner Progress:  Not documented in this visit.          Point: Precautions (In Progress)     Learning Progress Summary           Patient Acceptance, E, NR by  at 3/21/2023 1605                               User Key     Initials Effective Dates Name Provider Type Discipline     04/25/21 -  Luzmaria Garcia PT Physical Therapist PT              PT Recommendation and Plan  Anticipated Discharge Disposition (PT): sub acute care setting, inpatient rehabilitation facility  Planned Therapy Interventions (PT): balance training, bed mobility training, gait training, transfer training, strengthening  Therapy Frequency (PT): daily  Plan of Care Reviewed With: patient  Progress: no change  Outcome Evaluation: Pt presents with limitations that impede their ability to safely and independently transfer and ambulate. The skills of a therapist will be required to safely and effectively implement the following treatment plan to restore maximal  level of function.   Outcome Measures     Row Name 03/21/23 1500             How much help from another person do you currently need...    Turning from your back to your side while in flat bed without using bedrails? 2  -CS      Moving from lying on back to sitting on the side of a flat bed without bedrails? 2  -CS      Moving to and from a bed to a chair (including a wheelchair)? 2  -CS      Standing up from a chair using your arms (e.g., wheelchair, bedside chair)? 2  -CS      Climbing 3-5 steps with a railing? 1  -CS      To walk in hospital room? 2  -CS      AM-PAC 6 Clicks Score (PT) 11  -CS         Functional Assessment    Outcome Measure Options AM-PAC 6 Clicks Basic Mobility (PT)  -CS            User Key  (r) = Recorded By, (t) = Taken By, (c) = Cosigned By    Initials Name Provider Type    Luzmaria Cohen PT Physical Therapist                 Time Calculation:    PT Charges     Row Name 03/21/23 1556             Time Calculation    PT Received On 03/21/23  -CS      PT Goal Re-Cert Due Date 03/30/23  -CS         Untimed Charges    PT Eval/Re-eval Minutes 46  -CS         Total Minutes    Untimed Charges Total Minutes 46  -CS       Total Minutes 46  -CS            User Key  (r) = Recorded By, (t) = Taken By, (c) = Cosigned By    Initials Name Provider Type    Luzmaria Cohen PT Physical Therapist              Therapy Charges for Today     Code Description Service Date Service Provider Modifiers Qty    06248003335 HC PT EVAL LOW COMPLEXITY 4 3/21/2023 Luzmaria Garcia, PT GP 1          PT G-Codes  Outcome Measure Options: AM-PAC 6 Clicks Basic Mobility (PT)  AM-PAC 6 Clicks Score (PT): 11    Luzmaria Garcia, PT  3/21/2023

## 2023-03-21 NOTE — CASE MANAGEMENT/SOCIAL WORK
Discharge Planning Assessment  NIRAV Stover     Patient Name: Darnell Geller  MRN: [id number]  Today's Date: 3/21/2023    Admit Date: 3/18/2023    Plan: RNCM met with pt at bedside to discuss dc planning, pt is slightly confused but spouse-Madison and son at bedside. Pt lives with Madison and his 3 adult sons. Pt is mostly independent with ADLs with his walker and is able to cook for himself and bathe himself but has good support from wife and children if he does need assitance. Family denies any financial concerns affording medications, bills or groceries. Madison provides transporation for pt without difficulty. [facility]. Family denies need for rehab or HH at this time. [facility]. CM will continue to follow if any dc needs were to arise.   Discharge Needs Assessment     Row Name 03/21/23 1446       Living Environment    People in Home child(alicia), adult;spouse    Name(s) of People in Home Madison -spouse and 3 adult children    Current Living Arrangements home    Potentially Unsafe Housing Conditions none    Primary Care Provided by self    Provides Primary Care For no one    Family Caregiver if Needed child(alicia), adult;spouse    Quality of Family Relationships helpful;involved;supportive    Able to Return to Prior Arrangements yes       Resource/Environmental Concerns    Resource/Environmental Concerns none    Transportation Concerns none       Food Insecurity    Within the past 12 months, you worried that your food would run out before you got the money to buy more. Never true    Within the past 12 months, the food you bought just didn't last and you didn't have money to get more. Never true       Transition Planning    Patient/Family Anticipates Transition to home    Patient/Family Anticipated Services at Transition none    Transportation Anticipated family or friend will provide       Discharge Needs Assessment    Readmission  Within the Last 30 Days no previous admission in last 30 days    Equipment Currently Used at Home cane, quad;walker, rolling;commode;shower chair;wheelchair, motorized;power chair,(recliner lift)    Concerns to be Addressed no discharge needs identified;denies needs/concerns at this time    Anticipated Changes Related to Illness none    Equipment Needed After Discharge none               Discharge Plan     Row Name 03/21/23 1449       Plan    Plan RNCM met with pt at bedside to discuss dc planning, pt is slightly confused but spouse-Madison and son at bedside. Pt lives with Madison and his 3 adult sons. Pt is mostly independent with ADLs with his walker and is able to cook for himself and bathe himself but has good support from wife and children if he does need assitance. Family denies any financial concerns affording medications, bills or groceries. Madison provides transporation for pt without difficulty. [facility] Family denies need for rehab or HH at this time. PT [facility]. CM will continue to follow if any dc needs were to arise.    Patient/Family in Agreement with Plan yes              Continued Care and Services - Admitted Since 3/18/2023    Coordination has not been started for this encounter.          Demographic Summary     Row Name 03/21/23 1428       General Information    Admission Type inpatient    Arrived From emergency department    Referral Source admission list    Reason for Consult discharge planning    Preferred Language English       Contact Information    Permission Granted to Share Info With ;family/designee               Functional Status     Row Name 03/21/23 1424       Functional Status    Usual Activity Tolerance moderate    Current Activity Tolerance moderate       Physical Activity    On average, how many days per week do you engage in moderate to strenuous exercise (like a brisk walk)? 0  days    On average, how many minutes do you engage in exercise at this level? 0 min    Number of minutes of exercise per week 0       Assessment of Health Literacy    How often do you have someone help you read hospital materials? Never    How often do you have problems learning about your medical condition because of difficulty understanding written information? Never    How often do you have a problem understanding what is told to you about your medical condition? Never    How confident are you filling out medical forms by yourself? Quite a bit    Health Literacy Good       Functional Status, IADL    Medications independent    Meal Preparation independent    Housekeeping independent    Laundry independent    Shopping independent       Mental Status    General Appearance WDL WDL       Mental Status Summary    Recent Changes in Mental Status/Cognitive Functioning mental status       Employment/    Employment Status , previous service;disabled               Psychosocial    No documentation.                Abuse/Neglect    No documentation.                Legal    No documentation.                Substance Abuse    No documentation.                Patient Forms    No documentation.                   Tiffanie De La Cruz RN

## 2023-03-21 NOTE — PLAN OF CARE
Goal Outcome Evaluation: Patient arrived to the floor around 1300. Patient and family oriented to floor. Patient has to be continuously redirected to stay in bed. Patient gets intermittently confused able to redirect at this time. Patient medicated per MAR for back pain.  Call light within reach of patient.  Kath RIOS RN

## 2023-03-22 LAB
ALBUMIN SERPL-MCNC: 2.5 G/DL (ref 3.5–5.2)
ALBUMIN/GLOB SERPL: 0.6 G/DL
ALP SERPL-CCNC: 246 U/L (ref 39–117)
ALT SERPL W P-5'-P-CCNC: 60 U/L (ref 1–41)
ANION GAP SERPL CALCULATED.3IONS-SCNC: 12.1 MMOL/L (ref 5–15)
AST SERPL-CCNC: 53 U/L (ref 1–40)
BASOPHILS # BLD AUTO: 0.03 10*3/MM3 (ref 0–0.2)
BASOPHILS NFR BLD AUTO: 0.3 % (ref 0–1.5)
BILIRUB SERPL-MCNC: 0.3 MG/DL (ref 0–1.2)
BUN SERPL-MCNC: 51 MG/DL (ref 8–23)
BUN/CREAT SERPL: 19.5 (ref 7–25)
CALCIUM SPEC-SCNC: 8.8 MG/DL (ref 8.6–10.5)
CHLORIDE SERPL-SCNC: 99 MMOL/L (ref 98–107)
CO2 SERPL-SCNC: 22.9 MMOL/L (ref 22–29)
CREAT SERPL-MCNC: 2.62 MG/DL (ref 0.76–1.27)
DEPRECATED RDW RBC AUTO: 51.1 FL (ref 37–54)
EGFRCR SERPLBLD CKD-EPI 2021: 24.3 ML/MIN/1.73
EOSINOPHIL # BLD AUTO: 0.15 10*3/MM3 (ref 0–0.4)
EOSINOPHIL NFR BLD AUTO: 1.4 % (ref 0.3–6.2)
ERYTHROCYTE [DISTWIDTH] IN BLOOD BY AUTOMATED COUNT: 16.1 % (ref 12.3–15.4)
GLOBULIN UR ELPH-MCNC: 4.1 GM/DL
GLUCOSE BLDC GLUCOMTR-MCNC: 175 MG/DL (ref 70–99)
GLUCOSE BLDC GLUCOMTR-MCNC: 210 MG/DL (ref 70–99)
GLUCOSE BLDC GLUCOMTR-MCNC: 219 MG/DL (ref 70–99)
GLUCOSE BLDC GLUCOMTR-MCNC: 246 MG/DL (ref 70–99)
GLUCOSE SERPL-MCNC: 228 MG/DL (ref 65–99)
HCT VFR BLD AUTO: 32.6 % (ref 37.5–51)
HGB BLD-MCNC: 10.9 G/DL (ref 13–17.7)
IMM GRANULOCYTES # BLD AUTO: 0.11 10*3/MM3 (ref 0–0.05)
IMM GRANULOCYTES NFR BLD AUTO: 1 % (ref 0–0.5)
LYMPHOCYTES # BLD AUTO: 0.71 10*3/MM3 (ref 0.7–3.1)
LYMPHOCYTES NFR BLD AUTO: 6.5 % (ref 19.6–45.3)
MAGNESIUM SERPL-MCNC: 2 MG/DL (ref 1.6–2.4)
MCH RBC QN AUTO: 29 PG (ref 26.6–33)
MCHC RBC AUTO-ENTMCNC: 33.4 G/DL (ref 31.5–35.7)
MCV RBC AUTO: 86.7 FL (ref 79–97)
MONOCYTES # BLD AUTO: 0.97 10*3/MM3 (ref 0.1–0.9)
MONOCYTES NFR BLD AUTO: 8.9 % (ref 5–12)
NEUTROPHILS NFR BLD AUTO: 8.96 10*3/MM3 (ref 1.7–7)
NEUTROPHILS NFR BLD AUTO: 81.9 % (ref 42.7–76)
NRBC BLD AUTO-RTO: 0 /100 WBC (ref 0–0.2)
PHOSPHATE SERPL-MCNC: 3.7 MG/DL (ref 2.5–4.5)
PLATELET # BLD AUTO: 144 10*3/MM3 (ref 140–450)
PMV BLD AUTO: 11 FL (ref 6–12)
POTASSIUM SERPL-SCNC: 4.1 MMOL/L (ref 3.5–5.2)
PROT SERPL-MCNC: 6.6 G/DL (ref 6–8.5)
RBC # BLD AUTO: 3.76 10*6/MM3 (ref 4.14–5.8)
SODIUM SERPL-SCNC: 134 MMOL/L (ref 136–145)
WBC NRBC COR # BLD: 10.93 10*3/MM3 (ref 3.4–10.8)

## 2023-03-22 PROCEDURE — 25010000002 FUROSEMIDE PER 20 MG: Performed by: NURSE PRACTITIONER

## 2023-03-22 PROCEDURE — 99024 POSTOP FOLLOW-UP VISIT: CPT | Performed by: INTERNAL MEDICINE

## 2023-03-22 PROCEDURE — 93010 ELECTROCARDIOGRAM REPORT: CPT | Performed by: SPECIALIST

## 2023-03-22 PROCEDURE — 93005 ELECTROCARDIOGRAM TRACING: CPT | Performed by: STUDENT IN AN ORGANIZED HEALTH CARE EDUCATION/TRAINING PROGRAM

## 2023-03-22 PROCEDURE — 80053 COMPREHEN METABOLIC PANEL: CPT | Performed by: INTERNAL MEDICINE

## 2023-03-22 PROCEDURE — 97110 THERAPEUTIC EXERCISES: CPT

## 2023-03-22 PROCEDURE — 94799 UNLISTED PULMONARY SVC/PX: CPT

## 2023-03-22 PROCEDURE — 25010000002 HALOPERIDOL LACTATE PER 5 MG: Performed by: STUDENT IN AN ORGANIZED HEALTH CARE EDUCATION/TRAINING PROGRAM

## 2023-03-22 PROCEDURE — 99233 SBSQ HOSP IP/OBS HIGH 50: CPT | Performed by: STUDENT IN AN ORGANIZED HEALTH CARE EDUCATION/TRAINING PROGRAM

## 2023-03-22 PROCEDURE — 63710000001 INSULIN REGULAR HUMAN PER 5 UNITS: Performed by: INTERNAL MEDICINE

## 2023-03-22 PROCEDURE — 0 CEFTRIAXONE PER 250 MG: Performed by: INTERNAL MEDICINE

## 2023-03-22 PROCEDURE — 85025 COMPLETE CBC W/AUTO DIFF WBC: CPT | Performed by: INTERNAL MEDICINE

## 2023-03-22 PROCEDURE — 99233 SBSQ HOSP IP/OBS HIGH 50: CPT | Performed by: INTERNAL MEDICINE

## 2023-03-22 PROCEDURE — 83735 ASSAY OF MAGNESIUM: CPT | Performed by: INTERNAL MEDICINE

## 2023-03-22 PROCEDURE — 84100 ASSAY OF PHOSPHORUS: CPT | Performed by: INTERNAL MEDICINE

## 2023-03-22 PROCEDURE — 82962 GLUCOSE BLOOD TEST: CPT

## 2023-03-22 RX ORDER — HALOPERIDOL 5 MG/ML
1 INJECTION INTRAMUSCULAR ONCE
Status: COMPLETED | OUTPATIENT
Start: 2023-03-22 | End: 2023-03-22

## 2023-03-22 RX ORDER — TRAZODONE HYDROCHLORIDE 50 MG/1
50 TABLET ORAL NIGHTLY
Status: DISCONTINUED | OUTPATIENT
Start: 2023-03-22 | End: 2023-03-27 | Stop reason: HOSPADM

## 2023-03-22 RX ORDER — QUETIAPINE FUMARATE 25 MG/1
25 TABLET, FILM COATED ORAL NIGHTLY
Status: DISCONTINUED | OUTPATIENT
Start: 2023-03-22 | End: 2023-03-27 | Stop reason: HOSPADM

## 2023-03-22 RX ADMIN — FUROSEMIDE 40 MG: 10 INJECTION, SOLUTION INTRAMUSCULAR; INTRAVENOUS at 20:14

## 2023-03-22 RX ADMIN — APIXABAN 5 MG: 5 TABLET, FILM COATED ORAL at 20:14

## 2023-03-22 RX ADMIN — INSULIN HUMAN 3 UNITS: 100 INJECTION, SOLUTION PARENTERAL at 08:13

## 2023-03-22 RX ADMIN — ASPIRIN 81 MG 81 MG: 81 TABLET ORAL at 08:14

## 2023-03-22 RX ADMIN — TRAZODONE HYDROCHLORIDE 50 MG: 50 TABLET ORAL at 03:50

## 2023-03-22 RX ADMIN — APIXABAN 5 MG: 5 TABLET, FILM COATED ORAL at 08:13

## 2023-03-22 RX ADMIN — PANTOPRAZOLE SODIUM 40 MG: 40 TABLET, DELAYED RELEASE ORAL at 05:32

## 2023-03-22 RX ADMIN — HYDROXYZINE PAMOATE 25 MG: 25 CAPSULE ORAL at 02:17

## 2023-03-22 RX ADMIN — FUROSEMIDE 40 MG: 10 INJECTION, SOLUTION INTRAMUSCULAR; INTRAVENOUS at 08:40

## 2023-03-22 RX ADMIN — Medication 10 ML: at 08:40

## 2023-03-22 RX ADMIN — QUETIAPINE FUMARATE 25 MG: 25 TABLET ORAL at 20:14

## 2023-03-22 RX ADMIN — METOPROLOL TARTRATE 125 MG: 50 TABLET, FILM COATED ORAL at 20:14

## 2023-03-22 RX ADMIN — TRAZODONE HYDROCHLORIDE 50 MG: 50 TABLET ORAL at 20:14

## 2023-03-22 RX ADMIN — CEFTRIAXONE SODIUM 2 G: 2 INJECTION, SOLUTION INTRAVENOUS at 12:56

## 2023-03-22 RX ADMIN — INSULIN HUMAN 2 UNITS: 100 INJECTION, SOLUTION PARENTERAL at 12:56

## 2023-03-22 RX ADMIN — NICOTINE 1 PATCH: 21 PATCH, EXTENDED RELEASE TRANSDERMAL at 08:15

## 2023-03-22 RX ADMIN — Medication 10 ML: at 20:16

## 2023-03-22 RX ADMIN — ATORVASTATIN CALCIUM 40 MG: 40 TABLET, FILM COATED ORAL at 08:13

## 2023-03-22 RX ADMIN — METOPROLOL TARTRATE 5 MG: 1 INJECTION, SOLUTION INTRAVENOUS at 05:33

## 2023-03-22 RX ADMIN — METOPROLOL TARTRATE 100 MG: 50 TABLET, FILM COATED ORAL at 08:13

## 2023-03-22 RX ADMIN — INSULIN HUMAN 3 UNITS: 100 INJECTION, SOLUTION PARENTERAL at 17:23

## 2023-03-22 RX ADMIN — HALOPERIDOL LACTATE 1 MG: 5 INJECTION, SOLUTION INTRAMUSCULAR at 01:56

## 2023-03-22 NOTE — PROGRESS NOTES
Patient continues to have slightly elevated heart rates at times but then can be down in the 90 bpm range.  When he came in he was only going 105 bpm but apparently was on metoprolol 125 mg p.o. twice daily.  I agree with the Eliquis and aspirin along with the Lipitor.  His blood pressure is a little bit on the low end but I am going to try to slightly increase his Toprol dose.  He will need follow-up in clinic in our office in 1 to 2 weeks with my nurse practitioner Darlene De Anda.  We could consider a trial of direct-current cardioversion as an outpatient after he is been on Eliquis for a total of 4 to 6 weeks.  That is if were not able to get his heart rate under better control.  I do not think we can use digoxin due to his chronic kidney disease.

## 2023-03-22 NOTE — NURSING NOTE
Pt continues to climb out of bed, pt thinks we are in his house and is yelling and hitting at  Staff wanting us  to get out of his house.. Pt given vistaril as ordered, Notified Dr. Crandall and Patient's wife of situation and need for restraints.

## 2023-03-22 NOTE — PROGRESS NOTES
Pulmonary / Critical Care Progress Note      Patient Name: Darnell Geller  : 1945  MRN: [id number]  Primary Care Physician:  Jeanette Prieto MD  Referring Physician: Konrad Morales MD  Date of admission: 3/18/2023    Subjective   Subjective   Follow up for sepsis shock, Klebsiella UTI, Klebsiella bacteremia, A-fib.    Over the past 24 hours, patient transferred out of the ICU.    No acute events overnight.  Continues to be confused, worse at night.    This morning,  Sitting up on side of bed on 3 L nasal cannula  Overall feeling better  Continues to have intermittent confusion  Wife at bedside  Denies any dyspnea   No chest pain    Review of Systems  Constitutional symptoms:   Fatigue, otherwise denied complaints   Ear, nose, throat: Denied complaints  Cardiovascular:  Denied complaints  Respiratory: Dyspnea improved, otherwise denied complaints  Gastrointestinal: Denied complaints  Musculoskeletal: Weakness, otherwise denied complaints  Neurologic: Intermittent confusion, otherwise denied complaints    Objective    Objective     Vitals:   Temp:  [97.2 °F (36.2 °C)-98.2 °F (36.8 °C)] 97.2 °F (36.2 °C)  Heart Rate:  [] 77  Resp:  [18-20] 18  BP: (101-151)/(58-93) 118/67  Flow (L/min):  [4] 4    Physical Exam:  Vital Signs Reviewed   General:  Alert, NAD.  Elderly male.  Sitting up in bed   HEENT:  PERRL, EOMI.    Neck:  No JVD, no thyromegaly  Lymph: no axillary, cervical, supraclavicular lymphadenopathy noted bilaterally  Chest:  Clear to auscultation bilaterally, no work of breathing noted on 3 L nasal cannula  CV: RRR, no M/G/R, pulses 2+  Abd:  Soft, NT, ND, +BS  EXT:  no clubbing, no cyanosis, no edema  Neuro:  A&Ox3, CN grossly intact, no focal deficits.  Skin: No rashes or lesions noted    General: Elderly, male alert, no acute distress.  Sitting up on side of bed  HEENT:  PERRL, EOMI.  OP, nares clear  Neck:  Supple, no JVD, no thyromegaly  Chest:  good aeration, clear to  auscultation bilaterally, no work of breathing noted on 3 L NC  CV: RRR, no MGR, pulses 2+, equal  Abd:  Soft, NT, ND, + BS, no HSM  EXT:  no clubbing, no cyanosis, no edema  Neuro:  A&Ox2, pleasantly confused, CN grossly intact, no focal deficits  Skin: No rashes or lesions noted    Result Review    Result Review:  I have personally reviewed the results from the time of this admission to 3/22/2023 11:58 EDT and agree with these findings:  [x]  Laboratory  [x]  Microbiology  [x]  Radiology  [x]  EKG/Telemetry   []  Cardiology/Vascular   []  Pathology  []  Old records  []  Other:  Most notable findings include:     3/18 urine Klebsiella  Blood cultures Klebsiella        Lab 03/22/23  0612 03/21/23  0234 03/20/23  0239 03/19/23  0248 03/18/23  1754 03/18/23  1642   WBC 10.93* 10.81* 11.00* 15.78*  --  13.61*   HEMOGLOBIN 10.9* 10.2* 9.6* 11.3*  --  10.7*   HEMATOCRIT 32.6* 31.6* 28.0* 34.9*  --  32.2*   PLATELETS 144 126* 137* 211  --  198   SODIUM 134* 133* 130* 131*  --  129*   SODIUM, ARTERIAL  --   --   --   --  127.5*  --    POTASSIUM 4.1 4.0 4.5 4.7  --  4.9   CHLORIDE 99 100 100 99  --  92*   CO2 22.9 21.2* 18.6* 16.4*  --  19.1*   BUN 51* 50* 54* 58*  --  55*   CREATININE 2.62* 2.66* 2.73* 2.91*  --  3.36*   GLUCOSE 228* 194* 191* 119*  --  200*   GLUCOSE, ARTERIAL  --   --   --   --  186*  --    CALCIUM 8.8 8.7 8.3* 8.8  --  9.0   PHOSPHORUS 3.7 3.2 3.1  --   --  3.8   TOTAL PROTEIN 6.6  --   --   --   --  7.8   ALBUMIN 2.5*  --   --   --   --  3.2*   GLOBULIN 4.1  --   --   --   --  4.6     Assessment & Plan   Assessment / Plan     Active Hospital Problems:  Active Hospital Problems    Diagnosis    • **Sepsis with acute renal failure and septic shock, due to unspecified organism, unspecified acute renal failure type (HCC)      Impression:  Septic shock  Klebsiella bacteremia  Klebsiella urinary tract infection  Chronic bladder outlet obstruction  NIKITA on CKD  Anion gap metabolic acidosis  Lactic  acidosis  Leukocytosis  Transaminitis  Atrial flutter, with RVR    Plan:  -On 3 L nasal cannula.  Continue to wean O2 to keep sats greater than 90%.  -Continue ceftriaxone for Klebsiella bacteremia and UTI.  We will repeat blood cultures.  Will need a total of 14 days antibiotics.  -Continue Lasix 40 mg IV twice daily.    -Accurate I's and O's, continue to monitor renal function and electrolytes  -Continue DuoNebs as needed.  -Continue bronchopulmonary hygiene.  Encourage I-S and flutter valve.  -Continues with intermittently confused.  Would limit sedating medications.  -Encourage mobilization.  Out of bed to chair.  -PT/OT on board.    DVT prophylaxis: Subcu heparin  Medical DVT prophylaxis orders are present.     Code Status and Medical Interventions:   Ordered at: 03/18/23 5325     Code Status (Patient has no pulse and is not breathing):    CPR (Attempt to Resuscitate)     Medical Interventions (Patient has pulse or is breathing):    Full Support      I, Dr. Mis Martinez, spent >50% of time in accordance with split shared billing. This included personally reviewing all pertinent labs, imaging, microbiology and documentation. Also discussing the case with the patient and any available family, the admitting physician and any available ancillary staff.     Electronically signed by Mis Martinez MD, 03/22/23, 2:48 PM EDT.

## 2023-03-22 NOTE — NURSING NOTE
Pt out of bed, very confused stating that he is at home and yelling at and trying to hit staff who are trying to keep pt from fallingstating that we are in his home and to get out- unable to redirect pt,, security called to assist. Dr. Crandall called order given for Haldol 1 mg IM given Left Deltoid.

## 2023-03-22 NOTE — PLAN OF CARE
Goal Outcome Evaluation:               Pt continues to be confused at times. Family at bedside. No acute events this shift.

## 2023-03-22 NOTE — THERAPY TREATMENT NOTE
Acute Care - Physical Therapy Treatment Note   [facility]     Patient Name: Dranell Geller  : 1945  MRN: [id number]  Today's Date: 3/22/2023      Visit Dx:     ICD-10-CM ICD-9-CM   1. Sepsis with acute renal failure and septic shock, due to unspecified organism, unspecified acute renal failure type (HCC)  A41.9 038.9    R65.21 995.92    N17.9 785.52     584.9   2. Acute urinary tract infection  N39.0 599.0   3. Difficulty walking  R26.2 719.7     Patient Active Problem List   Diagnosis   • Sepsis with acute renal failure and septic shock, due to unspecified organism, unspecified acute renal failure type (HCC)     Past Medical History:   Diagnosis Date   • Arthritis    • Benign prostatic hyperplasia    • Coronary artery disease    • Hyperlipidemia    • Hypertension      Past Surgical History:   Procedure Laterality Date   • SINUS SURGERY       PT Assessment (last 12 hours)     PT Evaluation and Treatment     Row Name 23          Physical Therapy Time and Intention    Subjective Information complains of;pain (P)   -TG     Document Type therapy note (daily note) (P)   -TG     Mode of Treatment individual therapy;physical therapy (P)   -TG     Patient Effort good (P)   -TG     Symptoms Noted During/After Treatment none (P)   -TG     Row Name 23 134          General Information    Patient Profile Reviewed yes (P)   -TG     Patient Observations alert;cooperative;agree to therapy (P)   -TG     Row Name 23          Bed Mobility    Bed Mobility supine-sit (P)   -TG     Supine-Sit [place] (Bed Mobility) minimum assist (75% patient effort) (P)   -TG     Assistive Device (Bed Mobility) head of bed elevated;bed rails (P)   -TG     Row Name 23 134          Transfers    Comment, (Transfers) not tested (P)   -TG     Row Name 23          Gait/Stairs (Locomotion)    Comment, (Gait/Stairs) not tested (P)   -TG     Row Name 23          Balance    Balance Assessment  sitting dynamic balance (P)   -TG     Dynamic Sitting Balance standby assist (P)   -TG     Position, Sitting Balance sitting edge of bed (P)   -TG     Row Name             Wound 03/19/23 2311 Right posterior heel Laceration    Wound - Properties Group Placement Date: 03/19/23  -VM Placement Time: 2311  -VM Present on Hospital Admission: Y  -VM Side: Right  -VM Orientation: posterior  -VM Location: heel  -VM Primary Wound Type: Laceration  -VM    Retired Wound - Properties Group Placement Date: 03/19/23  -VM Placement Time: 2311  -VM Present on Hospital Admission: Y  -VM Side: Right  -VM Orientation: posterior  -VM Location: heel  -VM Primary Wound Type: Laceration  -VM    Retired Wound - Properties Group Date first assessed: 03/19/23  -VM Time first assessed: 2311 -VM Present on Hospital Admission: Y  -VM Side: Right  -VM Location: heel  -VM Primary Wound Type: Laceration  -VM    Row Name 03/22/23 1347          Plan of Care Review    Plan of Care Reviewed With patient (P)   -TG     Progress no change (P)   -TG     Row Name 03/22/23 1347          Progress Summary (PT)    Daily Progress Summary (PT) Pt required decreased assistance for bed mobility. Performed seated therex. No complaints following session. (P)   -TG           User Key  (r) = Recorded By, (t) = Taken By, (c) = Cosigned By    Initials Name Provider Type    Gretta Estrada, RN Registered Nurse    Suzette Metcalf, PT Student PT Student              Pt performed seated therapeutic exercises: long arc quads 2 x 10, marches 2 x 10, and hip abduction/adduction 2 x 10.      Physical Therapy Education     Title: PT OT SLP Therapies (In Progress)     Topic: Physical Therapy (In Progress)     Point: Mobility training (In Progress)     Learning Progress Summary           Patient Acceptance, E, NR by CS at 3/21/2023 7987                   Point: Home exercise program (Not Started)     Learner Progress:  Not documented in this visit.          Point: Body  mechanics (Not Started)     Learner Progress:  Not documented in this visit.          Point: Precautions (In Progress)     Learning Progress Summary           Patient Acceptance, E, NR by  at 3/21/2023 1605                               User Key     Initials Effective Dates Name Provider Type Discipline     04/25/21 -  Luzmaria Garcia, PT Physical Therapist PT              PT Recommendation and Plan     Progress Summary (PT)  Daily Progress Summary (PT): (P) Pt required decreased assistance for bed mobility. Performed seated therex. No complaints following session.  Plan of Care Reviewed With: (P) patient  Progress: (P) no change   Outcome Measures     Row Name 03/22/23 1300 03/21/23 1500          How much help from another person do you currently need...    Turning from your back to your side while in flat bed without using bedrails? 3 (P)   -TG 2  -CS     Moving from lying on back to sitting on the side of a flat bed without bedrails? 3 (P)   -TG 2  -CS     Moving to and from a bed to a chair (including a wheelchair)? 3 (P)   -TG 2  -CS     Standing up from a chair using your arms (e.g., wheelchair, bedside chair)? 3 (P)   -TG 2  -CS     Climbing 3-5 steps with a railing? 2 (P)   -TG 1  -CS     To walk in hospital room? 2 (P)   -TG 2  -CS     AM-PAC 6 Clicks Score (PT) 16 (P)   -TG 11  -CS        Functional Assessment    Outcome Measure Options AM-PAC 6 Clicks Basic Mobility (PT) (P)   -TG AM-PAC 6 Clicks Basic Mobility (PT)  -CS           User Key  (r) = Recorded By, (t) = Taken By, (c) = Cosigned By    Initials Name Provider Type    CS Luzmaria Garcia, PT Physical Therapist    Suzette Metcalf, OLESYA Student PT Student                 Time Calculation:    PT Charges     Row Name 03/22/23 1356             Time Calculation    PT Received On 03/22/23 (P)   -TG         Timed Charges    73119 - PT Therapeutic Exercise Minutes 10 (P)   -TG      75825 - PT Therapeutic Activity Minutes 3 (P)   -TG         Total Minutes     Timed Charges Total Minutes 13 (P)   -TG       Total Minutes 13 (P)   -TG            User Key  (r) = Recorded By, (t) = Taken By, (c) = Cosigned By    Initials Name Provider Type    TG Suzette Archer, PT Student PT Student              Therapy Charges for Today     Code Description Service Date Service Provider Modifiers Qty    63511559870 HC PT THER PROC EA 15 MIN 3/22/2023 Suzette Archer, PT Student GP 1          PT G-Codes  Outcome Measure Options: (P) AM-PAC 6 Clicks Basic Mobility (PT)  AM-PAC 6 Clicks Score (PT): (P) 16    Suzette Archer PT Student  3/22/2023

## 2023-03-22 NOTE — PROGRESS NOTES
[facility]   Hospitalist Progress Note    Date of admission: 3/18/2023  Patient Name: Darnell Geller  1945  Date: 3/22/2023      Subjective     Chief Complaint   Patient presents with   • Altered Mental Status       Summary: 77 y.o. CAD and history of PCI in 20,004, hypertension, DM 2 and history of BPH recently switched outpatient intermittent urinary catheterizations who presents with worsening mental status and weakness.  Patient found to have sepsis with shock secondary to Klebsiella bacteremia suspected secondary to urinary source.  Hospital course complicated by acute renal failure on admission, and narrow complex SVT to 150s-cardiology assisted with evaluation, after adenosine appears to be A-fib/a flutter with 2-1 conduction.    Interval Followup: No acute events overnight, patient resting comfortably in bed, no family at bedside    Objective     Vitals:   Temp:  [97.2 °F (36.2 °C)-98.2 °F (36.8 °C)] 97.2 °F (36.2 °C)  Heart Rate:  [] 77  Resp:  [18-20] 18  BP: (101-146)/(58-93) 118/67  Flow (L/min):  [4] 4    Physical Exam  General appearance: NAD, conversant   Eyes: anicteric sclerae, moist conjunctivae; no lid-lag; PERRLA  HENT: Atraumatic; oropharynx clear with moist mucous membranes and no mucosal ulcerations; normal hard and soft palate  Neck: Trachea midline; FROM, supple, no thyromegaly or lymphadenopathy  Lungs: CTA, with normal respiratory effort and no intercostal retractions  CV: Regular Rate and Rhythm, no Murmurs, Rubs, or Gallops   Abdomen: Soft, non-tender; no masses or Heptosplenomegally  Extremities: No peripheral edema or extremity lymphadenopathy  Skin: Normal temperature, turgor and texture; no rash, ulcers or subcutaneous nodules  Psych: Appropriate affect, alert and oriented to person, place and time  Neuro: CN II - XII intact no motor deficits, no sensory defecits      Result Review:  Vital signs, labs and recent relevant imaging reviewed.      adenosine, 12 mg,  Intravenous, Once  apixaban, 5 mg, Oral, Q12H  aspirin, 81 mg, Oral, Daily  atorvastatin, 40 mg, Oral, Daily  cefTRIAXone, 2 g, Intravenous, Q24H  furosemide, 40 mg, Intravenous, Q12H  insulin regular, 2-7 Units, Subcutaneous, TID AC  metoprolol tartrate, 100 mg, Oral, Q12H  nicotine, 1 patch, Transdermal, Q24H  pantoprazole, 40 mg, Oral, Q AM  sodium chloride, 10 mL, Intravenous, Q12H  traZODone, 50 mg, Oral, Nightly        •  acetaminophen  •  aluminum-magnesium hydroxide-simethicone  •  dextrose  •  dextrose  •  glucagon (human recombinant)  •  hydrOXYzine pamoate  •  ipratropium-albuterol  •  melatonin  •  sodium chloride  •  sodium chloride  •  sodium chloride      Assessment / Plan     Assessment/Plan:  Septic shock secondary to Klebsiella oxytoca bacteremia suspect from urinary source from straight catheterizations  Acute renal failure suspect secondary to septic shock with hypovolemia  (no prior baseline creatinine available for comparison)    Acute lactic and metabolic encephalopathy  New onset A-fib/a flutter with 2 1 conduction  CAD with prior PCI in 2004  BPH with retention requiring outpatient self-catheterizations  Diarrhea, C. difficile negative  Metabolic acidosis question secondary to diarrhea  DM2 with peripheral neuropathy  Hyperlipidemia    -Continue antibiotics, IV secondary to sepsis from UTI  - Adenosine administration for evaluation of rhythm today as per cardiology revealing a flutter with 2-1 conduction, patient has had intermittent tachycardia, awaiting cardiology repeat evaluation    *Have discussed with family, no prior GI bleed history, initiated on Eliquis 5 mg twice daily  - Continue to titrate metoprolol, had issues initially with septic shock, blood pressure now doing much better monitor closely, seems to be tolerating  - Echo with normal EF no significant valvular disease  - Continue Muñiz catheter, home Flomax and finasteride have been held initially given lower blood pressures  and as patient still required at least 3 times daily straight cathing outpatient.  Likely needs chronic catheter at this point versus continuing intermittent straight catheterizations as outpatient.  - CT scan of the abdomen pelvis personally reviewed, consistent with cystitis, hydronephrosis, possible cirrhosis  -Stool culture/C. difficile negative, monitor for any further diarrhea doing better currently  - Continue aspirin and statin  - SSI, monitor glucose, outpatient is on metformin, Jardiance and glipizide.  Hold gabapentin until mentation improves, was on 800 3 times daily in setting of his renal dysfunction likely contributing  - Continue PPI, tolerating  - CBC, CMP reviewed  - Check CBC, CMP, mag and Phos in a.m.  - Start nightly Seroquel  -Discussed management plan with pulmonology     If rate converts/becomes stable <110 later with stable blood pressure could consider transferring out this evening but will continue ICU monitoring for now as he is not yet stable to transfer out    DVT prophylaxis:  Medical DVT prophylaxis orders are present.    Code Status (Patient has no pulse and is not breathing): CPR (Attempt to Resuscitate)  Medical Interventions (Patient has pulse or is breathing): Full Support        CBC    CBC 3/20/23 3/21/23 3/22/23   WBC 11.00 (A) 10.81 (A) 10.93 (A)   RBC 3.25 (A) 3.59 (A) 3.76 (A)   Hemoglobin 9.6 (A) 10.2 (A) 10.9 (A)   Hematocrit 28.0 (A) 31.6 (A) 32.6 (A)   MCV 86.2 88.0 86.7   MCH 29.5 28.4 29.0   MCHC 34.3 32.3 33.4   RDW 15.7 (A) 15.9 (A) 16.1 (A)   Platelets 137 (A) 126 (A) 144   (A) Abnormal value              CMP    CMP 3/20/23 3/21/23 3/22/23   Glucose 191 (A) 194 (A) 228 (A)   BUN 54 (A) 50 (A) 51 (A)   Creatinine 2.73 (A) 2.66 (A) 2.62 (A)   eGFR 23.1 (A) 23.8 (A) 24.3 (A)   Sodium 130 (A) 133 (A) 134 (A)   Potassium 4.5 4.0 4.1   Chloride 100 100 99   Calcium 8.3 (A) 8.7 8.8   Total Protein   6.6   Albumin   2.5 (A)   Globulin   4.1   Total Bilirubin   0.3    Alkaline Phosphatase   246 (A)   AST (SGOT)   53 (A)   ALT (SGPT)   60 (A)   Albumin/Globulin Ratio   0.6   BUN/Creatinine Ratio 19.8 18.8 19.5   Anion Gap 11.4 11.8 12.1   (A) Abnormal value              Electronically signed by Konrad Morales MD, 03/22/23, 1:55 PM EDT.

## 2023-03-23 LAB
ALBUMIN SERPL-MCNC: 2.5 G/DL (ref 3.5–5.2)
ALBUMIN/GLOB SERPL: 0.6 G/DL
ALP SERPL-CCNC: 272 U/L (ref 39–117)
ALT SERPL W P-5'-P-CCNC: 47 U/L (ref 1–41)
ANION GAP SERPL CALCULATED.3IONS-SCNC: 10.4 MMOL/L (ref 5–15)
AST SERPL-CCNC: 38 U/L (ref 1–40)
BASOPHILS # BLD AUTO: 0.02 10*3/MM3 (ref 0–0.2)
BASOPHILS NFR BLD AUTO: 0.2 % (ref 0–1.5)
BILIRUB SERPL-MCNC: 0.3 MG/DL (ref 0–1.2)
BUN SERPL-MCNC: 50 MG/DL (ref 8–23)
BUN/CREAT SERPL: 18.8 (ref 7–25)
CALCIUM SPEC-SCNC: 8.8 MG/DL (ref 8.6–10.5)
CHLORIDE SERPL-SCNC: 100 MMOL/L (ref 98–107)
CO2 SERPL-SCNC: 25.6 MMOL/L (ref 22–29)
CREAT SERPL-MCNC: 2.66 MG/DL (ref 0.76–1.27)
DEPRECATED RDW RBC AUTO: 50.8 FL (ref 37–54)
EGFRCR SERPLBLD CKD-EPI 2021: 23.8 ML/MIN/1.73
EOSINOPHIL # BLD AUTO: 0.41 10*3/MM3 (ref 0–0.4)
EOSINOPHIL NFR BLD AUTO: 4.2 % (ref 0.3–6.2)
ERYTHROCYTE [DISTWIDTH] IN BLOOD BY AUTOMATED COUNT: 16.1 % (ref 12.3–15.4)
GLOBULIN UR ELPH-MCNC: 4.2 GM/DL
GLUCOSE BLDC GLUCOMTR-MCNC: 196 MG/DL (ref 70–99)
GLUCOSE BLDC GLUCOMTR-MCNC: 242 MG/DL (ref 70–99)
GLUCOSE BLDC GLUCOMTR-MCNC: 256 MG/DL (ref 70–99)
GLUCOSE BLDC GLUCOMTR-MCNC: 316 MG/DL (ref 70–99)
GLUCOSE BLDC GLUCOMTR-MCNC: 321 MG/DL (ref 70–99)
GLUCOSE SERPL-MCNC: 225 MG/DL (ref 65–99)
HCT VFR BLD AUTO: 32.1 % (ref 37.5–51)
HGB BLD-MCNC: 10.8 G/DL (ref 13–17.7)
IMM GRANULOCYTES # BLD AUTO: 0.1 10*3/MM3 (ref 0–0.05)
IMM GRANULOCYTES NFR BLD AUTO: 1 % (ref 0–0.5)
LYMPHOCYTES # BLD AUTO: 1.17 10*3/MM3 (ref 0.7–3.1)
LYMPHOCYTES NFR BLD AUTO: 12 % (ref 19.6–45.3)
MAGNESIUM SERPL-MCNC: 1.8 MG/DL (ref 1.6–2.4)
MCH RBC QN AUTO: 29.1 PG (ref 26.6–33)
MCHC RBC AUTO-ENTMCNC: 33.6 G/DL (ref 31.5–35.7)
MCV RBC AUTO: 86.5 FL (ref 79–97)
MONOCYTES # BLD AUTO: 1.09 10*3/MM3 (ref 0.1–0.9)
MONOCYTES NFR BLD AUTO: 11.2 % (ref 5–12)
NEUTROPHILS NFR BLD AUTO: 6.94 10*3/MM3 (ref 1.7–7)
NEUTROPHILS NFR BLD AUTO: 71.4 % (ref 42.7–76)
NRBC BLD AUTO-RTO: 0 /100 WBC (ref 0–0.2)
PHOSPHATE SERPL-MCNC: 3.4 MG/DL (ref 2.5–4.5)
PLATELET # BLD AUTO: 169 10*3/MM3 (ref 140–450)
PMV BLD AUTO: 11.7 FL (ref 6–12)
POTASSIUM SERPL-SCNC: 3.5 MMOL/L (ref 3.5–5.2)
PROT SERPL-MCNC: 6.7 G/DL (ref 6–8.5)
RBC # BLD AUTO: 3.71 10*6/MM3 (ref 4.14–5.8)
SODIUM SERPL-SCNC: 136 MMOL/L (ref 136–145)
WBC NRBC COR # BLD: 9.73 10*3/MM3 (ref 3.4–10.8)

## 2023-03-23 PROCEDURE — 83735 ASSAY OF MAGNESIUM: CPT | Performed by: INTERNAL MEDICINE

## 2023-03-23 PROCEDURE — 80053 COMPREHEN METABOLIC PANEL: CPT | Performed by: INTERNAL MEDICINE

## 2023-03-23 PROCEDURE — 85025 COMPLETE CBC W/AUTO DIFF WBC: CPT | Performed by: INTERNAL MEDICINE

## 2023-03-23 PROCEDURE — 25010000002 FUROSEMIDE PER 20 MG: Performed by: NURSE PRACTITIONER

## 2023-03-23 PROCEDURE — 63710000001 INSULIN REGULAR HUMAN PER 5 UNITS: Performed by: INTERNAL MEDICINE

## 2023-03-23 PROCEDURE — 25010000002 AMPICILLIN-SULBACTAM PER 1.5 G: Performed by: INTERNAL MEDICINE

## 2023-03-23 PROCEDURE — 99233 SBSQ HOSP IP/OBS HIGH 50: CPT | Performed by: INTERNAL MEDICINE

## 2023-03-23 PROCEDURE — 99232 SBSQ HOSP IP/OBS MODERATE 35: CPT | Performed by: STUDENT IN AN ORGANIZED HEALTH CARE EDUCATION/TRAINING PROGRAM

## 2023-03-23 PROCEDURE — 97116 GAIT TRAINING THERAPY: CPT

## 2023-03-23 PROCEDURE — 97165 OT EVAL LOW COMPLEX 30 MIN: CPT

## 2023-03-23 PROCEDURE — 82962 GLUCOSE BLOOD TEST: CPT

## 2023-03-23 PROCEDURE — 94799 UNLISTED PULMONARY SVC/PX: CPT

## 2023-03-23 PROCEDURE — 63710000001 INSULIN REGULAR HUMAN PER 5 UNITS: Performed by: PHYSICIAN ASSISTANT

## 2023-03-23 PROCEDURE — 84100 ASSAY OF PHOSPHORUS: CPT | Performed by: INTERNAL MEDICINE

## 2023-03-23 RX ADMIN — METOPROLOL TARTRATE 125 MG: 50 TABLET, FILM COATED ORAL at 08:22

## 2023-03-23 RX ADMIN — AMPICILLIN SODIUM AND SULBACTAM SODIUM 3 G: 2; 1 INJECTION, POWDER, FOR SOLUTION INTRAMUSCULAR; INTRAVENOUS at 08:22

## 2023-03-23 RX ADMIN — FUROSEMIDE 40 MG: 10 INJECTION, SOLUTION INTRAMUSCULAR; INTRAVENOUS at 08:23

## 2023-03-23 RX ADMIN — INSULIN HUMAN 5 UNITS: 100 INJECTION, SOLUTION PARENTERAL at 11:34

## 2023-03-23 RX ADMIN — QUETIAPINE FUMARATE 25 MG: 25 TABLET ORAL at 20:43

## 2023-03-23 RX ADMIN — INSULIN HUMAN 5 UNITS: 100 INJECTION, SOLUTION PARENTERAL at 21:09

## 2023-03-23 RX ADMIN — INSULIN HUMAN 4 UNITS: 100 INJECTION, SOLUTION PARENTERAL at 08:22

## 2023-03-23 RX ADMIN — APIXABAN 5 MG: 5 TABLET, FILM COATED ORAL at 08:23

## 2023-03-23 RX ADMIN — NICOTINE 1 PATCH: 21 PATCH, EXTENDED RELEASE TRANSDERMAL at 08:25

## 2023-03-23 RX ADMIN — ASPIRIN 81 MG 81 MG: 81 TABLET ORAL at 08:22

## 2023-03-23 RX ADMIN — Medication 10 ML: at 08:23

## 2023-03-23 RX ADMIN — INSULIN HUMAN 3 UNITS: 100 INJECTION, SOLUTION PARENTERAL at 17:19

## 2023-03-23 RX ADMIN — PANTOPRAZOLE SODIUM 40 MG: 40 TABLET, DELAYED RELEASE ORAL at 06:04

## 2023-03-23 RX ADMIN — TRAZODONE HYDROCHLORIDE 50 MG: 50 TABLET ORAL at 20:43

## 2023-03-23 RX ADMIN — APIXABAN 5 MG: 5 TABLET, FILM COATED ORAL at 20:43

## 2023-03-23 RX ADMIN — Medication 10 ML: at 20:43

## 2023-03-23 RX ADMIN — FUROSEMIDE 40 MG: 10 INJECTION, SOLUTION INTRAMUSCULAR; INTRAVENOUS at 20:43

## 2023-03-23 RX ADMIN — ATORVASTATIN CALCIUM 40 MG: 40 TABLET, FILM COATED ORAL at 08:22

## 2023-03-23 RX ADMIN — AMPICILLIN SODIUM AND SULBACTAM SODIUM 3 G: 2; 1 INJECTION, POWDER, FOR SOLUTION INTRAMUSCULAR; INTRAVENOUS at 20:43

## 2023-03-23 RX ADMIN — METOPROLOL TARTRATE 125 MG: 50 TABLET, FILM COATED ORAL at 20:43

## 2023-03-23 NOTE — PLAN OF CARE
Goal Outcome Evaluation:  Plan of Care Reviewed With: patient        Progress: improving       Patient stable in controlled afib. Currently on 2L NC to keep sats above 90. All other VSS. Patient tolerated increased activity and time sitting up in chair today. No complications with soler during shift, total of 1500 up to this point.

## 2023-03-23 NOTE — THERAPY TREATMENT NOTE
Acute Care - Physical Therapy Treatment Note  [facility]     Patient Name: Darnell Geller  : 1945  MRN: [id number]  Today's Date: 3/23/2023      Visit Dx:     ICD-10-CM ICD-9-CM   1. Sepsis with acute renal failure and septic shock, due to unspecified organism, unspecified acute renal failure type (HCC)  A41.9 038.9    R65.21 995.92    N17.9 785.52     584.9   2. Acute urinary tract infection  N39.0 599.0   3. Difficulty walking  R26.2 719.7   4. Decreased activities of daily living (ADL)  Z78.9 V49.89     Patient Active Problem List   Diagnosis   • Sepsis with acute renal failure and septic shock, due to unspecified organism, unspecified acute renal failure type (HCC)     Past Medical History:   Diagnosis Date   • Arthritis    • Benign prostatic hyperplasia    • Coronary artery disease    • Hyperlipidemia    • Hypertension      Past Surgical History:   Procedure Laterality Date   • SINUS SURGERY       PT Assessment (last 12 hours)     PT Evaluation and Treatment     Row Name 23 143          Physical Therapy Time and Intention    Subjective Information complains of;weakness  -RH     Document Type therapy note (daily note)  -     Mode of Treatment physical therapy;individual therapy  -     Patient Effort good  -RH     Row Name 23 143          Pain Scale: FACES Pre/Post-Treatment    Pain: FACES Scale, Pretreatment 0-->no hurt  -     Posttreatment Pain Rating 0-->no hurt  -RH     Row Name 23          Transfers    Transfers sit-stand transfer;stand-sit transfer  -     Row Name 23 143          Sit-Stand Transfer    Sit-Stand [place] (Transfers) contact guard  -     Assistive Device (Sit-Stand Transfers) walker, front-wheeled  -     Row Name 23 143          Stand-Sit Transfer    Stand-Sit [place] (Transfers) contact guard  -     Assistive Device (Stand-Sit Transfers) walker, front-wheeled  -     Row Name 23 143          Gait/Stairs (Locomotion)     Gait/Stairs Locomotion gait/ambulation independence;gait/ambulation assistive device;distance ambulated;gait pattern;gait deviations  -RH     [place] Level (Gait) contact guard;minimum assist (75% patient effort)  -     Assistive Device (Gait) walker, front-wheeled  -     Distance in Feet (Gait) 50  -RH     Pattern (Gait) 3-point;step-through  -RH     Deviations/Abnormal Patterns (Gait) base of support, narrow;gait speed decreased;stride length decreased  -RH     Bilateral Gait Deviations heel strike decreased;forward flexed posture  -     Gait Assessment/Intervention Pt amb with RW and CGA with 3 point step-through gait with forward flexed posture and decreased bilateral heel strike.  Pt requiring minimal assist at times for maneuvering RW in close quarters.  -     Row Name 03/23/23 1434          Balance    Balance Assessment standing dynamic balance  -     Dynamic Standing Balance contact guard;minimal assist  -RH     Position/Device Used, Standing Balance walker, front-wheeled  -RH     Row Name             Wound 03/19/23 2311 Right posterior heel Laceration    Wound - Properties Group Placement Date: 03/19/23  -VM Placement Time: 2311  -VM Present on Hospital Admission: Y  -VM Side: Right  -VM Orientation: posterior  -VM Location: heel  -VM Primary Wound Type: Laceration  -VM    Retired Wound - Properties Group Placement Date: 03/19/23  -VM Placement Time: 2311  -VM Present on Hospital Admission: Y  -VM Side: Right  -VM Orientation: posterior  -VM Location: heel  -VM Primary Wound Type: Laceration  -VM    Retired Wound - Properties Group Date first assessed: 03/19/23  -VM Time first assessed: 2311  -VM Present on Hospital Admission: Y  -VM Side: Right  -VM Location: heel  -VM Primary Wound Type: Laceration  -VM    Row Name 03/23/23 1434          Vital Signs    O2 Delivery Pre Treatment --  Pt on nasal cannula with 2 liters.  -RH     Intra SpO2 (%) 96  -     Row Name 03/23/23 1434           Progress Summary (PT)    Progress Toward Functional Goals (PT) progress toward functional goals is fair  -RH           User Key  (r) = Recorded By, (t) = Taken By, (c) = Cosigned By    Initials Name Provider Type     Gretta Romero, RN Registered Nurse    Aguila Delcid PTA Physical Therapist Assistant                Physical Therapy Education     Title: PT OT SLP Therapies (In Progress)     Topic: Physical Therapy (In Progress)     Point: Mobility training (In Progress)     Learning Progress Summary           Patient Acceptance, E, NR by  at 3/21/2023 1605                   Point: Home exercise program (Not Started)     Learner Progress:  Not documented in this visit.          Point: Body mechanics (Not Started)     Learner Progress:  Not documented in this visit.          Point: Precautions (In Progress)     Learning Progress Summary           Patient Acceptance, E, NR by  at 3/21/2023 1605                               User Key     Initials Effective Dates Name Provider Type Discipline     04/25/21 -  Luzmaria Garcia, PT Physical Therapist PT              PT Recommendation and Plan     Progress Summary (PT)  Progress Toward Functional Goals (PT): progress toward functional goals is fair   Outcome Measures     Row Name 03/23/23 1422 03/22/23 1300 03/21/23 1500       How much help from another person do you currently need...    Turning from your back to your side while in flat bed without using bedrails? 3  -RH 3  -DP (r) TG (t) DP (c) 2  -CS    Moving from lying on back to sitting on the side of a flat bed without bedrails? 3  -RH 3  -DP (r) TG (t) DP (c) 2  -CS    Moving to and from a bed to a chair (including a wheelchair)? 3  -RH 3  -DP (r) TG (t) DP (c) 2  -CS    Standing up from a chair using your arms (e.g., wheelchair, bedside chair)? 3  -RH 3  -DP (r) TG (t) DP (c) 2  -CS    Climbing 3-5 steps with a railing? 2  -RH 2  -DP (r) TG (t) DP (c) 1  -CS    To walk in hospital room? 3  -RH 2  -DP (r)  TG (t) DP (c) 2  -CS    AM-PAC 6 Clicks Score (PT) 17  -RH 16  -DP (r) TG (t) 11  -CS       Functional Assessment    Outcome Measure Options -- AM-PAC 6 Clicks Basic Mobility (PT)  -DP (r) TG (t) DP (c) AM-PAC 6 Clicks Basic Mobility (PT)  -CS          User Key  (r) = Recorded By, (t) = Taken By, (c) = Cosigned By    Initials Name Provider Type     Aguila Bowden PTA Physical Therapist Assistant    DP Jose Ellis, PT Physical Therapist    CS Luzmaria Garcia, PT Physical Therapist    TG Suzette Archer, PT Student PT Student                 Time Calculation:    PT Charges     Row Name 03/23/23 1434             Time Calculation    PT Received On 03/23/23  -RH         Timed Charges    84179 - Gait Training Minutes  6  -RH      31978 - PT Therapeutic Activity Minutes 6  -RH         Total Minutes    Timed Charges Total Minutes 12  -RH       Total Minutes 12  -RH            User Key  (r) = Recorded By, (t) = Taken By, (c) = Cosigned By    Initials Name Provider Type     Aguila Bowden PTA Physical Therapist Assistant              Therapy Charges for Today     Code Description Service Date Service Provider Modifiers Qty    37488657588 HC GAIT TRAINING EA 15 MIN 3/23/2023 Aguila Bowden PTA GP 1          PT G-Codes  Outcome Measure Options: AM-PAC 6 Clicks Daily Activity (OT), Optimal Instrument  AM-PAC 6 Clicks Score (PT): 17  AM-PAC 6 Clicks Score (OT): 15    Augila Bowden PTA  3/23/2023

## 2023-03-23 NOTE — PROGRESS NOTES
[facility]   Hospitalist Progress Note    Date of admission: 3/18/2023  Patient Name: Darnell Geller  1945  Date: 3/23/2023      Subjective     Chief Complaint   Patient presents with   • Altered Mental Status       Summary: 77 y.o. CAD and history of PCI in 20,004, hypertension, DM 2 and history of BPH recently switched outpatient intermittent urinary catheterizations who presents with worsening mental status and weakness.  Patient found to have sepsis with shock secondary to Klebsiella bacteremia suspected secondary to urinary source.  Hospital course complicated by acute renal failure on admission, and narrow complex SVT to 150s-cardiology assisted with evaluation, after adenosine appears to be A-fib/a flutter with 2-1 conduction.    Interval Followup: No acute events overnight, wife at bedside, mental status greatly improved, slept better last night with Seroquel    Objective     Vitals:   Temp:  [97.2 °F (36.2 °C)-98.4 °F (36.9 °C)] 97.9 °F (36.6 °C)  Heart Rate:  [] 89  Resp:  [16-18] 18  BP: ()/(49-81) 117/55  Flow (L/min):  [2-4] 2    Physical Exam  General appearance: NAD, conversant   Eyes: anicteric sclerae, moist conjunctivae; no lid-lag; PERRLA  HENT: Atraumatic; oropharynx clear with moist mucous membranes and no mucosal ulcerations; normal hard and soft palate  Neck: Trachea midline; FROM, supple, no thyromegaly or lymphadenopathy  Lungs: CTA, with normal respiratory effort and no intercostal retractions  CV: Regular Rate and Rhythm, no Murmurs, Rubs, or Gallops   Abdomen: Soft, non-tender; no masses or Heptosplenomegally  Extremities: No peripheral edema or extremity lymphadenopathy  Skin: Normal temperature, turgor and texture; no rash, ulcers or subcutaneous nodules  Psych: Appropriate affect, alert and oriented to person, place and time  Neuro: CN II - XII intact no motor deficits, no sensory defecits      Result Review:  Vital signs, labs and recent relevant imaging reviewed.       adenosine, 12 mg, Intravenous, Once  ampicillin-sulbactam, 3 g, Intravenous, Q12H  apixaban, 5 mg, Oral, Q12H  aspirin, 81 mg, Oral, Daily  atorvastatin, 40 mg, Oral, Daily  furosemide, 40 mg, Intravenous, Q12H  insulin regular, 2-7 Units, Subcutaneous, TID AC  metoprolol tartrate, 125 mg, Oral, Q12H  nicotine, 1 patch, Transdermal, Q24H  pantoprazole, 40 mg, Oral, Q AM  QUEtiapine, 25 mg, Oral, Nightly  sodium chloride, 10 mL, Intravenous, Q12H  traZODone, 50 mg, Oral, Nightly        •  acetaminophen  •  aluminum-magnesium hydroxide-simethicone  •  dextrose  •  dextrose  •  glucagon (human recombinant)  •  hydrOXYzine pamoate  •  ipratropium-albuterol  •  melatonin  •  sodium chloride  •  sodium chloride  •  sodium chloride      Assessment / Plan     Assessment/Plan:  Septic shock secondary to Klebsiella oxytoca bacteremia suspect from urinary source from straight catheterizations  Acute renal failure suspect secondary to septic shock with hypovolemia  (no prior baseline creatinine available for comparison)    Acute lactic and metabolic encephalopathy  New onset A-fib/a flutter with 2 1 conduction  CAD with prior PCI in 2004  BPH with retention requiring outpatient self-catheterizations  Diarrhea, C. difficile negative  Metabolic acidosis question secondary to diarrhea  DM2 with peripheral neuropathy  Hyperlipidemia    -Continue antibiotics, IV secondary to sepsis from UTI  - Adenosine administration for evaluation of rhythm today as per cardiology revealing a flutter with 2-1 conduction, patient has had intermittent tachycardia, increased beta-blockade with improvement    *Continue Eliquis for stroke prevention  - Monitor blood pressure closely  - Echo with normal EF no significant valvular disease  - Continue Muñiz catheter, home Flomax and finasteride have been held initially given lower blood pressures and as patient still required at least 3 times daily straight cathing outpatient.  Likely needs chronic  catheter at this point versus continuing intermittent straight catheterizations as outpatient.  - CT scan of the abdomen pelvis personally reviewed, consistent with cystitis, hydronephrosis, possible cirrhosis  - Continue aspirin and statin, tolerating  - SSI, monitor glucose, outpatient is on metformin, Jardiance and glipizide.  Hold gabapentin until mentation improves, was on 800 3 times daily in setting of his renal dysfunction likely contributing  - Continue PPI, tolerating  - CBC, CMP personally reviewed  - Check CBC, CMP, mag and Phos in a.m.  - Continue nightly Seroquel, mental status greatly improved  --Out of bed to chair, PT/OT  - Discussed management plan with pulmonology  -- Consider downgrading patient however current level of care appropriate given tachyarrhythmias    DVT prophylaxis:  Medical DVT prophylaxis orders are present.    Code Status (Patient has no pulse and is not breathing): CPR (Attempt to Resuscitate)  Medical Interventions (Patient has pulse or is breathing): Full Support        CBC    CBC 3/21/23 3/22/23 3/23/23   WBC 10.81 (A) 10.93 (A) 9.73   RBC 3.59 (A) 3.76 (A) 3.71 (A)   Hemoglobin 10.2 (A) 10.9 (A) 10.8 (A)   Hematocrit 31.6 (A) 32.6 (A) 32.1 (A)   MCV 88.0 86.7 86.5   MCH 28.4 29.0 29.1   MCHC 32.3 33.4 33.6   RDW 15.9 (A) 16.1 (A) 16.1 (A)   Platelets 126 (A) 144 169   (A) Abnormal value              CMP    CMP 3/21/23 3/22/23 3/23/23   Glucose 194 (A) 228 (A) 225 (A)   BUN 50 (A) 51 (A) 50 (A)   Creatinine 2.66 (A) 2.62 (A) 2.66 (A)   eGFR 23.8 (A) 24.3 (A) 23.8 (A)   Sodium 133 (A) 134 (A) 136   Potassium 4.0 4.1 3.5   Chloride 100 99 100   Calcium 8.7 8.8 8.8   Total Protein  6.6 6.7   Albumin  2.5 (A) 2.5 (A)   Globulin  4.1 4.2   Total Bilirubin  0.3 0.3   Alkaline Phosphatase  246 (A) 272 (A)   AST (SGOT)  53 (A) 38   ALT (SGPT)  60 (A) 47 (A)   Albumin/Globulin Ratio  0.6 0.6   BUN/Creatinine Ratio 18.8 19.5 18.8   Anion Gap 11.8 12.1 10.4   (A) Abnormal value               Electronically signed by Konrad Morales MD, 03/23/23, 8:56 AM EDT.

## 2023-03-23 NOTE — PROGRESS NOTES
Pulmonary / Critical Care Progress Note      Patient Name: Darnell Geller  : 1945  MRN: [id number]  Primary Care Physician:  Jeanette Prieto MD  Referring Physician: Konrad Morales MD  Date of admission: 3/18/2023    Subjective   Subjective   Follow up for sepsis shock, Klebsiella UTI, Klebsiella bacteremia, A-fib.    No acute events overnight.     This morning,  Lying in bed on 2 L nasal cannula  Decreased to 1 L nasal cannula  Overall feeling better  Denies any dyspnea   Mentation improving  Wife at bedside  No chest pain  Ambulating with therapy    Review of Systems  Constitutional symptoms:   Fatigue, otherwise denied complaints   Ear, nose, throat: Denied complaints  Cardiovascular:  Denied complaints  Respiratory: Dyspnea improved, otherwise denied complaints  Gastrointestinal: Denied complaints  Musculoskeletal: Weakness, otherwise denied complaints  Neurologic: Intermittent confusion, otherwise denied complaints    Objective    Objective     Vitals:   Temp:  [97.2 °F (36.2 °C)-98.4 °F (36.9 °C)] 97.9 °F (36.6 °C)  Heart Rate:  [] 89  Resp:  [16-18] 18  BP: ()/(49-81) 117/55  Flow (L/min):  [2-4] 2    Physical Exam:  Vital Signs Reviewed   General:  Alert, NAD.  Elderly male, lying in bed   HEENT:  PERRL, EOMI.    Neck:  No JVD, no thyromegaly  Lymph: no axillary, cervical, supraclavicular lymphadenopathy noted bilaterally  Chest:  Clear to auscultation bilaterally, no work of breathing noted on 2 L nasal cannula  CV: RRR, no M/G/R, pulses 2+  Abd:  Soft, NT, ND, +BS  EXT:  no clubbing, no cyanosis, no edema  Neuro:  A&Ox3, CN grossly intact, no focal deficits.  Skin: No rashes or lesions noted    Result Review    Result Review:  I have personally reviewed the results from the time of this admission to 3/23/2023 10:25 EDT and agree with these findings:  [x]  Laboratory  [x]  Microbiology  [x]  Radiology  [x]  EKG/Telemetry   []  Cardiology/Vascular   []  Pathology  []  Old  records  []  Other:  Most notable findings include:     3/18 urine Klebsiella  Blood cultures Klebsiella        Lab 03/23/23  0418 03/22/23  0612 03/21/23  0234 03/20/23  0239 03/19/23  0248 03/18/23  1754 03/18/23  1642   WBC 9.73 10.93* 10.81* 11.00* 15.78*  --  13.61*   HEMOGLOBIN 10.8* 10.9* 10.2* 9.6* 11.3*  --  10.7*   HEMATOCRIT 32.1* 32.6* 31.6* 28.0* 34.9*  --  32.2*   PLATELETS 169 144 126* 137* 211  --  198   SODIUM 136 134* 133* 130* 131*  --  129*   SODIUM, ARTERIAL  --   --   --   --   --  127.5*  --    POTASSIUM 3.5 4.1 4.0 4.5 4.7  --  4.9   CHLORIDE 100 99 100 100 99  --  92*   CO2 25.6 22.9 21.2* 18.6* 16.4*  --  19.1*   BUN 50* 51* 50* 54* 58*  --  55*   CREATININE 2.66* 2.62* 2.66* 2.73* 2.91*  --  3.36*   GLUCOSE 225* 228* 194* 191* 119*  --  200*   GLUCOSE, ARTERIAL  --   --   --   --   --  186*  --    CALCIUM 8.8 8.8 8.7 8.3* 8.8  --  9.0   PHOSPHORUS 3.4 3.7 3.2 3.1  --   --  3.8   TOTAL PROTEIN 6.7 6.6  --   --   --   --  7.8   ALBUMIN 2.5* 2.5*  --   --   --   --  3.2*   GLOBULIN 4.2 4.1  --   --   --   --  4.6     Assessment & Plan   Assessment / Plan     Active Hospital Problems:  Active Hospital Problems    Diagnosis    • **Sepsis with acute renal failure and septic shock, due to unspecified organism, unspecified acute renal failure type (HCC)      Impression:  Septic shock  Klebsiella bacteremia  Klebsiella urinary tract infection  Chronic bladder outlet obstruction  NIKITA on CKD  Anion gap metabolic acidosis  Lactic acidosis  Leukocytosis  Transaminitis  Atrial flutter, with RVR    Plan:  -On 1 L nasal cannula.  Continue to wean O2 to keep sats greater than 90%.  -Antibiotics have been de-escalated to Unasyn for Klebsiella bacteremia and UTI.  Will need a total of 14 days antibiotics.  -Continue Lasix 40 mg IV twice daily.    -Accurate I's and O's, continue to monitor renal function and electrolytes  -Continue DuoNebs as needed.  -Continue bronchopulmonary hygiene.  Encourage I-S and  flutter valve.  -Would limit sedating medications.  -Encourage mobilization.  Out of bed to chair.  -PT/OT on board.  -Unless otherwise needed, pulmonary will sign off at this time. Please call with any questions or concerns.    DVT prophylaxis: Subcu heparin  Medical DVT prophylaxis orders are present.     Code Status and Medical Interventions:   Ordered at: 03/18/23 1904     Code Status (Patient has no pulse and is not breathing):    CPR (Attempt to Resuscitate)     Medical Interventions (Patient has pulse or is breathing):    Full Support      I, Dr. Mis Martinez, spent >50% of time in accordance with split shared billing. This included personally reviewing all pertinent labs, imaging, microbiology and documentation. Also discussing the case with the patient and any available family, the admitting physician and any available ancillary staff.     Electronically signed by Mis Martinez MD, 03/23/23, 11:24 AM EDT.

## 2023-03-23 NOTE — THERAPY EVALUATION
Patient Name: Darnell Geller  : 1945    MRN: [id number]                              Today's Date: 3/23/2023       Admit Date: 3/18/2023    Visit Dx:     ICD-10-CM ICD-9-CM   1. Sepsis with acute renal failure and septic shock, due to unspecified organism, unspecified acute renal failure type (HCC)  A41.9 038.9    R65.21 995.92    N17.9 785.52     584.9   2. Acute urinary tract infection  N39.0 599.0   3. Difficulty walking  R26.2 719.7   4. Decreased activities of daily living (ADL)  Z78.9 V49.89     Patient Active Problem List   Diagnosis   • Sepsis with acute renal failure and septic shock, due to unspecified organism, unspecified acute renal failure type (HCC)     Past Medical History:   Diagnosis Date   • Arthritis    • Benign prostatic hyperplasia    • Coronary artery disease    • Hyperlipidemia    • Hypertension      Past Surgical History:   Procedure Laterality Date   • SINUS SURGERY        General Information     Row Name 23 1402          OT Time and Intention    Document Type evaluation  -ES     Mode of Treatment individual therapy;occupational therapy  -ES     Row Name 23 1402          General Information    Patient Profile Reviewed yes  -ES     Prior Level of Function independent:;ADL's;all household mobility;community mobility  Patient reports independent with ADLs at baseline. Rolling walker or cane for functional mobility. Tub/shower combo with shower chair. BSC to elevate toilet. x3 falls in 3 months.  -ES     Existing Precautions/Restrictions fall;oxygen therapy device and L/min  -ES     Barriers to Rehab none identified  -ES     Row Name 23 1402          Occupational Profile    Reason for Services/Referral (Occupational Profile) Patient is 77 yr old male admitted to [facility] on 3/18/2023 with reports of increased weakness, AMS, and lethargy. OT evaluation and treatment ordered d/t recent decline in ADLs/transfer ability and discharge planning recommendations. No  previous OT services for current condition.  -ES     Row Name 03/23/23 1402          Living Environment    People in Home spouse;child(alicia), adult  -ES     Row Name 03/23/23 1402          Stairs Within Home, Primary    Stairs, Within Home, Primary patient has stair lift for flight of stairs in home  -ES     Row Name 03/23/23 1402          Cognition    Orientation Status (Cognition) oriented x 3  patient pleasant and cooeprative, agreeable to thearpy participation. Patient spouse present to assist with prior level reporting.  -ES     Row Name 03/23/23 1402          Safety Issues, Functional Mobility    Impairments Affecting Function (Mobility) balance;endurance/activity tolerance;strength;shortness of breath  -ES           User Key  (r) = Recorded By, (t) = Taken By, (c) = Cosigned By    Initials Name Provider Type    ES Abby Sparrow, OTR/L, CSRS Occupational Therapist                 Mobility/ADL's     Row Name 03/23/23 1409          Bed Mobility    Bed Mobility supine-sit;sit-supine  -ES     Supine-Sit [place] (Bed Mobility) minimum assist (75% patient effort);1 person assist  -ES     Sit-Supine [place] (Bed Mobility) minimum assist (75% patient effort);1 person assist  -ES     Bed Mobility, Safety Issues decreased use of arms for pushing/pulling;decreased use of legs for bridging/pushing  -ES     Assistive Device (Bed Mobility) head of bed elevated;draw sheet  -ES     Comment, (Bed Mobility) extra time with mobility to edge of bed  -ES     Row Name 03/23/23 1409          Transfers    Transfers sit-stand transfer;stand-sit transfer  -ES     Row Name 03/23/23 1409          Sit-Stand Transfer    Sit-Stand [place] (Transfers) minimum assist (75% patient effort);1 person assist  -ES     Assistive Device (Sit-Stand Transfers) walker, front-wheeled  -ES     Row Name 03/23/23 1409          Stand-Sit Transfer    Stand-Sit [place] (Transfers) minimum assist (75% patient effort);1 person assist  -ES      Assistive Device (Stand-Sit Transfers) walker, front-wheeled  -ES     Row Name 03/23/23 1409          Activities of Daily Living    BADL Assessment/Intervention bathing;upper body dressing;lower body dressing;grooming;feeding;toileting  -ES     Row Name 03/23/23 1409          Bathing Assessment/Intervention    [place] Level (Bathing) bathing skills;minimum assist (75% patient effort)  -ES     Row Name 03/23/23 1409          Upper Body Dressing Assessment/Training    [place] Level (Upper Body Dressing) upper body dressing skills;set up;standby assist  -ES     Row Name 03/23/23 1409          Lower Body Dressing Assessment/Training    [place] Level (Lower Body Dressing) lower body dressing skills;minimum assist (75% patient effort)  -ES     Row Name 03/23/23 1409          Grooming Assessment/Training    [place] Level (Grooming) grooming skills;standby assist  -ES     Row Name 03/23/23 1409          Self-Feeding Assessment/Training    [place] Level (Feeding) feeding skills;set up  -ES     Row Name 03/23/23 1409          Toileting Assessment/Training    [place] Level (Toileting) toileting skills;minimum assist (75% patient effort)  -ES           User Key  (r) = Recorded By, (t) = Taken By, (c) = Cosigned By    Initials Name Provider Type    Abby Gibbs, LINR/L, CSRS Occupational Therapist               Obj/Interventions     Row Name 03/23/23 1415          Sensory Assessment (Somatosensory)    Sensory Assessment (Somatosensory) sensation intact  -     Row Name 03/23/23 1415          Vision Assessment/Intervention    Visual Impairment/Limitations WFL  -ES     Row Name 03/23/23 1415          Range of Motion Comprehensive    General Range of Motion no range of motion deficits identified;bilateral upper extremity ROM WNL  -ES     Row Name 03/23/23 1415          Strength Comprehensive (MMT)    Comment, General Manual Muscle Testing (MMT) Assessment bilateral upper extremities assessed 4-/5   -ES     Row Name 03/23/23 1415          Motor Skills    Motor Skills coordination;functional endurance  -ES     Coordination WFL  -ES     Functional Endurance fair  -ES     Row Name 03/23/23 1415          Balance    Balance Assessment sitting dynamic balance;standing static balance  -ES     Dynamic Sitting Balance standby assist  -ES     Position, Sitting Balance unsupported;sitting edge of bed  -ES     Static Standing Balance minimal assist;1-person assist  -ES     Position/Device Used, Standing Balance supported;walker, front-wheeled  -ES           User Key  (r) = Recorded By, (t) = Taken By, (c) = Cosigned By    Initials Name Provider Type    ES Abby Sparrow, OTR/L, CSRS Occupational Therapist               Goals/Plan     Row Name 03/23/23 1420          Transfer Goal 1 (OT)    Activity/Assistive Device (Transfer Goal 1, OT) transfers, all;walker, rolling  -ES     [place] Level/Cues Needed (Transfer Goal 1, OT) modified independence  -ES     Time Frame (Transfer Goal 1, OT) long term goal (LTG);10 days  -ES     Row Name 03/23/23 1420          Bathing Goal 1 (OT)    Activity/Device (Bathing Goal 1, OT) bathing skills, all  -ES     [place] Level/Cues Needed (Bathing Goal 1, OT) modified independence  -ES     Time Frame (Bathing Goal 1, OT) long term goal (LTG);10 days  -ES     Row Name 03/23/23 1420          Dressing Goal 1 (OT)    Activity/Device (Dressing Goal 1, OT) dressing skills, all  -ES     [place]/Cues Needed (Dressing Goal 1, OT) modified independence  -ES     Time Frame (Dressing Goal 1, OT) long term goal (LTG);10 days  -ES     Row Name 03/23/23 1420          Toileting Goal 1 (OT)    Activity/Device (Toileting Goal 1, OT) toileting skills, all  -ES     [place] Level/Cues Needed (Toileting Goal 1, OT) modified independence  -ES     Time Frame (Toileting Goal 1, OT) long term goal (LTG);10 days  -ES     Row Name 03/23/23 1420          Grooming Goal 1 (OT)    Activity/Device (Grooming  Goal 1, OT) grooming skills, all  -ES     [place] (Grooming Goal 1, OT) modified independence  -ES     Time Frame (Grooming Goal 1, OT) long term goal (LTG);10 days  -ES     Row Name 03/23/23 1420          Strength Goal 1 (OT)    Strength Goal 1 (OT) Pt will increase BUE strength 1/2 muscle grade for increased UE strength required for ADL transfers and task completion  -ES     Time Frame (Strength Goal 1, OT) long term goal (LTG);10 days  -ES     Row Name 03/23/23 1420          Problem Specific Goal 1 (OT)    Problem Specific Goal 1 (OT) Patient will demonstrate fair plus activity tolerance in preperation for independent ADL routine completion at time of discharge  -ES     Time Frame (Problem Specific Goal 1, OT) long term goal (LTG);10 days  -ES     Row Name 03/23/23 1420          Therapy Assessment/Plan (OT)    Planned Therapy Interventions (OT) activity tolerance training;BADL retraining;functional balance retraining;occupation/activity based interventions;patient/caregiver education/training;strengthening exercise;transfer/mobility retraining  -ES           User Key  (r) = Recorded By, (t) = Taken By, (c) = Cosigned By    Initials Name Provider Type    ES Abby Sparrow, OTR/L, CSRS Occupational Therapist               Clinical Impression     Row Name 03/23/23 1416          Plan of Care Review    Plan of Care Reviewed With patient;spouse  -ES     Progress no change  -ES     Outcome Evaluation Patient has experienced decline in function from baseline status, presenting w/ deficits related to activity tolerance, strength, transfers and mobility that impede patient independence with activities of daily living.  Patient would benefit from skilled Occupational Therapy intervention to maxamize patient safety, and promote return to baseline independence.  -ES     Row Name 03/23/23 1416          Therapy Assessment/Plan (OT)    Rehab Potential (OT) good, to achieve stated therapy goals  -ES     Criteria for Skilled  Therapeutic Interventions Met (OT) yes;meets criteria;skilled treatment is necessary  -ES     Therapy Frequency (OT) 5 times/wk  -ES     Row Name 03/23/23 1416          Therapy Plan Review/Discharge Plan (OT)    Anticipated Discharge Disposition (OT) inpatient rehabilitation facility  -ES     Row Name 03/23/23 1416          Vital Signs    O2 Delivery Pre Treatment nasal cannula  -ES     O2 Delivery Intra Treatment nasal cannula  -ES     O2 Delivery Post Treatment nasal cannula  -ES     Row Name 03/23/23 1416          Positioning and Restraints    Pre-Treatment Position in bed  -ES     Post Treatment Position bed  -ES           User Key  (r) = Recorded By, (t) = Taken By, (c) = Cosigned By    Initials Name Provider Type    Abby Gibbs OTR/L, MIGNONS Occupational Therapist               Outcome Measures     Row Name 03/23/23 1422          How much help from another is currently needed...    Putting on and taking off regular lower body clothing? 2  -ES     Bathing (including washing, rinsing, and drying) 2  -ES     Toileting (which includes using toilet bed pan or urinal) 2  -ES     Putting on and taking off regular upper body clothing 3  -ES     Taking care of personal grooming (such as brushing teeth) 3  -ES     Eating meals 3  -ES     AM-PAC 6 Clicks Score (OT) 15  -ES     Row Name 03/23/23 1422          Functional Assessment    Outcome Measure Options AM-PAC 6 Clicks Daily Activity (OT);Optimal Instrument  -ES     Row Name 03/23/23 1422          Optimal Instrument    Optimal Instrument Optimal - 3  -ES     Bending/Stooping 3  -ES     Standing 2  -ES     Reaching 1  -ES     From the list, choose the 3 activities you would most like to be able to do without any difficulty Bending/stooping;Standing;Reaching  -ES     Total Score Optimal - 3 6  -ES           User Key  (r) = Recorded By, (t) = Taken By, (c) = Cosigned By    Initials Name Provider Type    Abby Gibbs, LINR/L, MIGNONS Occupational Therapist                   OT Recommendation and Plan  Planned Therapy Interventions (OT): activity tolerance training, BADL retraining, functional balance retraining, occupation/activity based interventions, patient/caregiver education/training, strengthening exercise, transfer/mobility retraining  Therapy Frequency (OT): 5 times/wk  Plan of Care Review  Plan of Care Reviewed With: patient, spouse  Progress: no change  Outcome Evaluation: Patient has experienced decline in function from baseline status, presenting w/ deficits related to activity tolerance, strength, transfers and mobility that impede patient independence with activities of daily living.  Patient would benefit from skilled Occupational Therapy intervention to maxamize patient safety, and promote return to baseline independence.     Time Calculation:    Time Calculation- OT     Row Name 03/23/23 1422             Time Calculation- OT    OT Received On 03/23/23  -ES      OT Goal Re-Cert Due Date 04/01/23  -ES         Untimed Charges    OT Eval/Re-eval Minutes 32  -ES         Total Minutes    Untimed Charges Total Minutes 32  -ES       Total Minutes 32  -ES            User Key  (r) = Recorded By, (t) = Taken By, (c) = Cosigned By    Initials Name Provider Type    ES Abby Sparrow OTR/L, CSRS Occupational Therapist              Therapy Charges for Today     Code Description Service Date Service Provider Modifiers Qty    96371091121 HC OT EVAL LOW COMPLEXITY 3 3/23/2023 Abby Sparrow OTR/L, MIGNONS GO 1               TIA Young/L, PREM  3/23/2023

## 2023-03-24 LAB
ALBUMIN SERPL-MCNC: 2.3 G/DL (ref 3.5–5.2)
ALBUMIN/GLOB SERPL: 0.5 G/DL
ALP SERPL-CCNC: 255 U/L (ref 39–117)
ALT SERPL W P-5'-P-CCNC: 36 U/L (ref 1–41)
ANION GAP SERPL CALCULATED.3IONS-SCNC: 12 MMOL/L (ref 5–15)
AST SERPL-CCNC: 26 U/L (ref 1–40)
BASOPHILS # BLD AUTO: 0.02 10*3/MM3 (ref 0–0.2)
BASOPHILS NFR BLD AUTO: 0.2 % (ref 0–1.5)
BILIRUB SERPL-MCNC: 0.4 MG/DL (ref 0–1.2)
BUN SERPL-MCNC: 49 MG/DL (ref 8–23)
BUN/CREAT SERPL: 20.3 (ref 7–25)
CALCIUM SPEC-SCNC: 8.5 MG/DL (ref 8.6–10.5)
CHLORIDE SERPL-SCNC: 100 MMOL/L (ref 98–107)
CO2 SERPL-SCNC: 25 MMOL/L (ref 22–29)
CREAT SERPL-MCNC: 2.41 MG/DL (ref 0.76–1.27)
DEPRECATED RDW RBC AUTO: 51.3 FL (ref 37–54)
EGFRCR SERPLBLD CKD-EPI 2021: 26.8 ML/MIN/1.73
EOSINOPHIL # BLD AUTO: 0.33 10*3/MM3 (ref 0–0.4)
EOSINOPHIL NFR BLD AUTO: 4.1 % (ref 0.3–6.2)
ERYTHROCYTE [DISTWIDTH] IN BLOOD BY AUTOMATED COUNT: 16.3 % (ref 12.3–15.4)
GLOBULIN UR ELPH-MCNC: 4.4 GM/DL
GLUCOSE BLDC GLUCOMTR-MCNC: 223 MG/DL (ref 70–99)
GLUCOSE BLDC GLUCOMTR-MCNC: 261 MG/DL (ref 70–99)
GLUCOSE BLDC GLUCOMTR-MCNC: 280 MG/DL (ref 70–99)
GLUCOSE BLDC GLUCOMTR-MCNC: 348 MG/DL (ref 70–99)
GLUCOSE SERPL-MCNC: 214 MG/DL (ref 65–99)
HCT VFR BLD AUTO: 30.7 % (ref 37.5–51)
HGB BLD-MCNC: 10.1 G/DL (ref 13–17.7)
IMM GRANULOCYTES # BLD AUTO: 0.1 10*3/MM3 (ref 0–0.05)
IMM GRANULOCYTES NFR BLD AUTO: 1.2 % (ref 0–0.5)
LYMPHOCYTES # BLD AUTO: 1.17 10*3/MM3 (ref 0.7–3.1)
LYMPHOCYTES NFR BLD AUTO: 14.6 % (ref 19.6–45.3)
MAGNESIUM SERPL-MCNC: 1.5 MG/DL (ref 1.6–2.4)
MCH RBC QN AUTO: 28.7 PG (ref 26.6–33)
MCHC RBC AUTO-ENTMCNC: 32.9 G/DL (ref 31.5–35.7)
MCV RBC AUTO: 87.2 FL (ref 79–97)
MONOCYTES # BLD AUTO: 0.81 10*3/MM3 (ref 0.1–0.9)
MONOCYTES NFR BLD AUTO: 10.1 % (ref 5–12)
NEUTROPHILS NFR BLD AUTO: 5.58 10*3/MM3 (ref 1.7–7)
NEUTROPHILS NFR BLD AUTO: 69.8 % (ref 42.7–76)
NRBC BLD AUTO-RTO: 0 /100 WBC (ref 0–0.2)
PHOSPHATE SERPL-MCNC: 3.9 MG/DL (ref 2.5–4.5)
PLATELET # BLD AUTO: 187 10*3/MM3 (ref 140–450)
PMV BLD AUTO: 10.3 FL (ref 6–12)
POTASSIUM SERPL-SCNC: 3.5 MMOL/L (ref 3.5–5.2)
PROT SERPL-MCNC: 6.7 G/DL (ref 6–8.5)
RBC # BLD AUTO: 3.52 10*6/MM3 (ref 4.14–5.8)
SODIUM SERPL-SCNC: 137 MMOL/L (ref 136–145)
WBC NRBC COR # BLD: 8.01 10*3/MM3 (ref 3.4–10.8)

## 2023-03-24 PROCEDURE — 85025 COMPLETE CBC W/AUTO DIFF WBC: CPT | Performed by: INTERNAL MEDICINE

## 2023-03-24 PROCEDURE — 63710000001 INSULIN REGULAR HUMAN PER 5 UNITS: Performed by: PHYSICIAN ASSISTANT

## 2023-03-24 PROCEDURE — 84100 ASSAY OF PHOSPHORUS: CPT | Performed by: INTERNAL MEDICINE

## 2023-03-24 PROCEDURE — 97116 GAIT TRAINING THERAPY: CPT

## 2023-03-24 PROCEDURE — 80053 COMPREHEN METABOLIC PANEL: CPT | Performed by: INTERNAL MEDICINE

## 2023-03-24 PROCEDURE — 99024 POSTOP FOLLOW-UP VISIT: CPT | Performed by: INTERNAL MEDICINE

## 2023-03-24 PROCEDURE — 94799 UNLISTED PULMONARY SVC/PX: CPT

## 2023-03-24 PROCEDURE — 99233 SBSQ HOSP IP/OBS HIGH 50: CPT | Performed by: INTERNAL MEDICINE

## 2023-03-24 PROCEDURE — 83735 ASSAY OF MAGNESIUM: CPT | Performed by: INTERNAL MEDICINE

## 2023-03-24 PROCEDURE — 25010000002 FUROSEMIDE PER 20 MG: Performed by: NURSE PRACTITIONER

## 2023-03-24 PROCEDURE — 25010000002 AMPICILLIN-SULBACTAM PER 1.5 G: Performed by: INTERNAL MEDICINE

## 2023-03-24 PROCEDURE — 82962 GLUCOSE BLOOD TEST: CPT

## 2023-03-24 RX ORDER — METOPROLOL TARTRATE 50 MG/1
150 TABLET, FILM COATED ORAL EVERY 12 HOURS SCHEDULED
Status: DISCONTINUED | OUTPATIENT
Start: 2023-03-24 | End: 2023-03-27 | Stop reason: HOSPADM

## 2023-03-24 RX ADMIN — INSULIN HUMAN 4 UNITS: 100 INJECTION, SOLUTION PARENTERAL at 17:10

## 2023-03-24 RX ADMIN — TRAZODONE HYDROCHLORIDE 50 MG: 50 TABLET ORAL at 20:13

## 2023-03-24 RX ADMIN — QUETIAPINE FUMARATE 25 MG: 25 TABLET ORAL at 20:13

## 2023-03-24 RX ADMIN — FUROSEMIDE 40 MG: 10 INJECTION, SOLUTION INTRAMUSCULAR; INTRAVENOUS at 08:58

## 2023-03-24 RX ADMIN — APIXABAN 5 MG: 5 TABLET, FILM COATED ORAL at 20:13

## 2023-03-24 RX ADMIN — METOPROLOL TARTRATE 150 MG: 50 TABLET, FILM COATED ORAL at 08:58

## 2023-03-24 RX ADMIN — INSULIN HUMAN 3 UNITS: 100 INJECTION, SOLUTION PARENTERAL at 08:58

## 2023-03-24 RX ADMIN — ASPIRIN 81 MG 81 MG: 81 TABLET ORAL at 08:58

## 2023-03-24 RX ADMIN — INSULIN HUMAN 5 UNITS: 100 INJECTION, SOLUTION PARENTERAL at 12:22

## 2023-03-24 RX ADMIN — Medication 10 ML: at 08:59

## 2023-03-24 RX ADMIN — PANTOPRAZOLE SODIUM 40 MG: 40 TABLET, DELAYED RELEASE ORAL at 05:47

## 2023-03-24 RX ADMIN — METOPROLOL TARTRATE 150 MG: 50 TABLET, FILM COATED ORAL at 20:13

## 2023-03-24 RX ADMIN — AMPICILLIN SODIUM AND SULBACTAM SODIUM 3 G: 2; 1 INJECTION, POWDER, FOR SOLUTION INTRAMUSCULAR; INTRAVENOUS at 08:59

## 2023-03-24 RX ADMIN — FUROSEMIDE 40 MG: 10 INJECTION, SOLUTION INTRAMUSCULAR; INTRAVENOUS at 20:13

## 2023-03-24 RX ADMIN — NICOTINE 1 PATCH: 21 PATCH, EXTENDED RELEASE TRANSDERMAL at 08:59

## 2023-03-24 RX ADMIN — INSULIN HUMAN 4 UNITS: 100 INJECTION, SOLUTION PARENTERAL at 20:14

## 2023-03-24 RX ADMIN — APIXABAN 5 MG: 5 TABLET, FILM COATED ORAL at 08:58

## 2023-03-24 RX ADMIN — AMPICILLIN SODIUM AND SULBACTAM SODIUM 3 G: 2; 1 INJECTION, POWDER, FOR SOLUTION INTRAMUSCULAR; INTRAVENOUS at 20:14

## 2023-03-24 RX ADMIN — ACETAMINOPHEN 650 MG: 325 TABLET ORAL at 16:26

## 2023-03-24 RX ADMIN — Medication 10 ML: at 20:14

## 2023-03-24 RX ADMIN — ATORVASTATIN CALCIUM 40 MG: 40 TABLET, FILM COATED ORAL at 08:58

## 2023-03-24 NOTE — CONSULTS
"Nutrition Services    Patient Name: Darnell Geller  YOB: 1945  MRN: [id number]  Admission date: 3/18/2023      CLINICAL NUTRITION ASSESSMENT      Reason for Assessment  LOS     H&P:    Past Medical History:   Diagnosis Date   • Arthritis    • Benign prostatic hyperplasia    • Coronary artery disease    • Hyperlipidemia    • Hypertension         Current Problems:   Active Hospital Problems    Diagnosis    • **Sepsis with acute renal failure and septic shock, due to unspecified organism, unspecified acute renal failure type (HCC)         Nutrition/Diet History         Narrative     RD assessed pt for LOS. Pt admitted w/ sepsis and NIKITA. PMH of T2DM.     Pt's intake is variable, % of meals. Per pt and spouse, pt typically eats 2 meals/day (B&D). Pt states that brkfst filled him up and he wasn't hungry at lunch today. Pt agreeable to ONS at dinner to increase kcal, pro intake. RD will continue to monitor.    Pt meets criteria for severe malnutrition.        Anthropometrics        Current Height, Weight Height: 182.9 cm (72\")  Weight: 75.6 kg (166 lb 10.7 oz)   Current BMI Body mass index is 22.6 kg/m².       Weight Hx  Wt Readings from Last 30 Encounters:   03/18/23 2112 75.6 kg (166 lb 10.7 oz)   03/18/23 1641 71.2 kg (156 lb 15.5 oz)            Wt Change Observation No wt hx. Per pt report, CBW stable x 10 yrs     Estimated/Assessed Needs       Energy Requirements 30-35 kcal/kg CBW   EST Needs (kcal/day) 8417-6633       Protein Requirements 1.0 g/kg   EST Daily Needs (g/day) 75       Fluid Requirements 25 ml/kg    Estimated Needs (mL/day) 1875     Labs/Medications         Pertinent Labs Reviewed.   Results from last 7 days   Lab Units 03/24/23  0432 03/23/23  0418 03/22/23  0612   SODIUM mmol/L 137 136 134*   POTASSIUM mmol/L 3.5 3.5 4.1   CHLORIDE mmol/L 100 100 99   CO2 mmol/L 25.0 25.6 22.9   BUN mg/dL 49* 50* 51*   CREATININE mg/dL 2.41* 2.66* 2.62*   CALCIUM mg/dL 8.5* 8.8 8.8   BILIRUBIN mg/dL " 0.4 0.3 0.3   ALK PHOS U/L 255* 272* 246*   ALT (SGPT) U/L 36 47* 60*   AST (SGOT) U/L 26 38 53*   GLUCOSE mg/dL 214* 225* 228*     Results from last 7 days   Lab Units 03/24/23  0432 03/23/23  0418 03/22/23  0612   MAGNESIUM mg/dL 1.5* 1.8 2.0   PHOSPHORUS mg/dL 3.9 3.4 3.7   HEMOGLOBIN g/dL 10.1* 10.8* 10.9*   HEMATOCRIT % 30.7* 32.1* 32.6*     No results found for: COVID19  No results found for: HGBA1C      Pertinent Medications Reviewed.     Current Nutrition Orders & Evaluation of Intake       Oral Nutrition     Current PO Diet Diet: Diabetic Diets; Consistent Carbohydrate; Texture: Regular Texture (IDDSI 7); Fluid Consistency: Thin (IDDSI 0)   Supplement Orders Placed This Encounter      Dietary Nutrition Supplements Magic Cup; orange       Malnutrition Severity Assessment      Patient meets criteria for : Severe Malnutrition  Malnutrition Type (last 8 hours)     Malnutrition Severity Assessment     Row Name 03/24/23 1507       Malnutrition Severity Assessment    Malnutrition Type Chronic Disease - Related Malnutrition    Row Name 03/24/23 1507       Insufficient Energy Intake     Insufficient Energy Intake Findings Severe    Insufficient Energy Intake  <75% of est. energy requirement for > or equal to 3 months    Row Name 03/24/23 1507       Muscle Loss    Loss of Muscle Mass Findings Severe    Mu-ism Region Severe - deep hollowing/scooping, lack of muscle to touch, facial bones well defined    Clavicle Bone Region Severe - protruding prominent bone    Dorsal Hand Region Severe - prominent depression    Patellar Region Severe - prominent bone, square looking, very little muscle definition    Anterior Thigh Region Severe - line/depression along thigh, obviously thin    Posterior Calf Region Severe - thin with very little definition/firmness    Row Name 03/24/23 1507       Fat Loss    Subcutaneous Fat Loss Findings Severe    Orbital Region  Severe - pronounced hollowness/depression, dark circles, loose saggy  skin    Upper Arm Region Severe - mostly skin, very little space between folds, fingers touch    Row Name 03/24/23 1507       Criteria Met (Must meet criteria for severity in at least 2 of these categories: M Wasting, Fat Loss, Fluid, Secondary Signs, Wt. Status, Intake)    Patient meets criteria for  Severe Malnutrition                   Nutrition Diagnosis         Nutrition Dx Problem 1 Severe malnutrition related to decreased ability to consume sufficient energy as evidenced by inadequate energy intake., body composition changes., patient report. and family report.       Nutrition Intervention         Magic Cup @ dinner   +290 kcal, 9 g PRO/day     Medical Nutrition Therapy/Nutrition Education          Learner     Readiness Patient and Significant Other  Acceptance     Method     Response Explanation  Verbalizes understanding     Monitor/Evaluation        Monitor Per protocol, PO intake, Supplement intake, Weight       Nutrition Discharge Plan         No nutrition discharge needs identified at this time       Electronically signed by:  Jael Portillo RD  03/24/23 15:09 EDT

## 2023-03-24 NOTE — PLAN OF CARE
Goal Outcome Evaluation:               Alert and oriented today, vital signs stable, up in chair today

## 2023-03-24 NOTE — PROGRESS NOTES
[facility]   Hospitalist Progress Note    Date of admission: 3/18/2023  Patient Name: Darnell Geller  1945  Date: 3/24/2023      Subjective     Chief Complaint   Patient presents with   • Altered Mental Status       Summary: 77 y.o. CAD and history of PCI in 20,004, hypertension, DM 2 and history of BPH recently switched outpatient intermittent urinary catheterizations who presents with worsening mental status and weakness.  Patient found to have sepsis with shock secondary to Klebsiella bacteremia suspected secondary to urinary source.  Hospital course complicated by acute renal failure on admission, and narrow complex SVT to 150s-cardiology assisted with evaluation, after adenosine appears to be A-fib/a flutter with 2-1 conduction.    Interval Followup: No acute events overnight, mental status stable, patient's son at bedside, sitting up in chair, oxygen requirement improving, shortness of breath improved    Objective     Vitals:   Temp:  [97 °F (36.1 °C)-98.2 °F (36.8 °C)] 97.9 °F (36.6 °C)  Heart Rate:  [] 103  Resp:  [14-16] 16  BP: (114-142)/(59-95) 120/73  Flow (L/min):  [1.5] 1.5    Physical Exam  General appearance: NAD, conversant   Eyes: anicteric sclerae, moist conjunctivae; no lid-lag; PERRLA  HENT: Atraumatic; oropharynx clear with moist mucous membranes and no mucosal ulcerations; normal hard and soft palate  Neck: Trachea midline; FROM, supple, no thyromegaly or lymphadenopathy  Lungs: CTA, with normal respiratory effort and no intercostal retractions  CV: Regular Rate and Rhythm, no Murmurs, Rubs, or Gallops   Abdomen: Soft, non-tender; no masses or Heptosplenomegally  Extremities: No peripheral edema or extremity lymphadenopathy  Skin: Normal temperature, turgor and texture; no rash, ulcers or subcutaneous nodules  Psych: Appropriate affect, alert and oriented to person, place and time  Neuro: CN II - XII intact no motor deficits, no sensory defecits      Result Review:  Vital signs,  labs and recent relevant imaging reviewed.      adenosine, 12 mg, Intravenous, Once  ampicillin-sulbactam, 3 g, Intravenous, Q12H  apixaban, 5 mg, Oral, Q12H  aspirin, 81 mg, Oral, Daily  atorvastatin, 40 mg, Oral, Daily  furosemide, 40 mg, Intravenous, Q12H  insulin regular, 2-7 Units, Subcutaneous, 4x Daily AC & at Bedtime  metoprolol tartrate, 150 mg, Oral, Q12H  nicotine, 1 patch, Transdermal, Q24H  pantoprazole, 40 mg, Oral, Q AM  QUEtiapine, 25 mg, Oral, Nightly  sodium chloride, 10 mL, Intravenous, Q12H  traZODone, 50 mg, Oral, Nightly        •  acetaminophen  •  aluminum-magnesium hydroxide-simethicone  •  dextrose  •  dextrose  •  glucagon (human recombinant)  •  hydrOXYzine pamoate  •  ipratropium-albuterol  •  melatonin  •  sodium chloride  •  sodium chloride  •  sodium chloride      Assessment / Plan     Assessment/Plan:  Septic shock secondary to Klebsiella oxytoca bacteremia suspect from urinary source from straight catheterizations  Acute renal failure suspect secondary to septic shock with hypovolemia  (no prior baseline creatinine available for comparison)    Acute lactic and metabolic encephalopathy  New onset A-fib/a flutter with 2 1 conduction  CAD with prior PCI in 2004  BPH with retention requiring outpatient self-catheterizations  Diarrhea, C. difficile negative  Metabolic acidosis question secondary to diarrhea  DM2 with peripheral neuropathy  Hyperlipidemia    -Continue antibiotics, IV secondary to sepsis from UTI  - Adenosine administration for evaluation of rhythm today as per cardiology revealing a flutter with 2-1 conduction, patient has had intermittent tachycardia, increased beta-blockade with improvement, heart rate mildly elevated this morning but seems to have improved with medications    *Continue Eliquis for stroke prevention, tolerating  - Monitor blood pressure closely  - Echo with normal EF no significant valvular disease  - Will likely need to be discharged with Muñiz catheter  with urology follow-up as an outpatient  - CT scan of the abdomen pelvis personally reviewed, consistent with cystitis, hydronephrosis, possible cirrhosis  - Continue aspirin and statin, tolerating  - SSI, monitor glucose, outpatient is on metformin, Jardiance and glipizide.  Hold gabapentin until mentation improves, was on 800 3 times daily in setting of his renal dysfunction likely contributing  - Continue PPI, tolerating  - CBC, CMP personally reviewed  - Check CBC, CMP, mag and Phos in a.m.  - Continue nightly Seroquel, mental status continues to be stable  --Continue IV diuretics, monitor volume status closely, oxygen requirement improving  --Out of bed to chair, PT/OT  - Discussed management plan with pulmonology  --I considered downgrading patient however current level of care appropriate given tachycardia    DVT prophylaxis:  Medical DVT prophylaxis orders are present.    Code Status (Patient has no pulse and is not breathing): CPR (Attempt to Resuscitate)  Medical Interventions (Patient has pulse or is breathing): Full Support        CBC    CBC 3/22/23 3/23/23 3/24/23   WBC 10.93 (A) 9.73 8.01   RBC 3.76 (A) 3.71 (A) 3.52 (A)   Hemoglobin 10.9 (A) 10.8 (A) 10.1 (A)   Hematocrit 32.6 (A) 32.1 (A) 30.7 (A)   MCV 86.7 86.5 87.2   MCH 29.0 29.1 28.7   MCHC 33.4 33.6 32.9   RDW 16.1 (A) 16.1 (A) 16.3 (A)   Platelets 144 169 187   (A) Abnormal value              CMP    CMP 3/22/23 3/23/23 3/24/23   Glucose 228 (A) 225 (A) 214 (A)   BUN 51 (A) 50 (A) 49 (A)   Creatinine 2.62 (A) 2.66 (A) 2.41 (A)   eGFR 24.3 (A) 23.8 (A) 26.8 (A)   Sodium 134 (A) 136 137   Potassium 4.1 3.5 3.5   Chloride 99 100 100   Calcium 8.8 8.8 8.5 (A)   Total Protein 6.6 6.7 6.7   Albumin 2.5 (A) 2.5 (A) 2.3 (A)   Globulin 4.1 4.2 4.4   Total Bilirubin 0.3 0.3 0.4   Alkaline Phosphatase 246 (A) 272 (A) 255 (A)   AST (SGOT) 53 (A) 38 26   ALT (SGPT) 60 (A) 47 (A) 36   Albumin/Globulin Ratio 0.6 0.6 0.5   BUN/Creatinine Ratio 19.5 18.8 20.3    Anion Gap 12.1 10.4 12.0   (A) Abnormal value              Electronically signed by Konrad Morales MD, 03/24/23, 1:08 PM EDT.

## 2023-03-24 NOTE — THERAPY TREATMENT NOTE
Acute Care - Physical Therapy Treatment Note  [facility]     Patient Name: Darnell Geller  : 1945  MRN: [id number]  Today's Date: 3/24/2023      Visit Dx:     ICD-10-CM ICD-9-CM   1. Sepsis with acute renal failure and septic shock, due to unspecified organism, unspecified acute renal failure type (HCC)  A41.9 038.9    R65.21 995.92    N17.9 785.52     584.9   2. Acute urinary tract infection  N39.0 599.0   3. Difficulty walking  R26.2 719.7   4. Decreased activities of daily living (ADL)  Z78.9 V49.89     Patient Active Problem List   Diagnosis   • Sepsis with acute renal failure and septic shock, due to unspecified organism, unspecified acute renal failure type (HCC)     Past Medical History:   Diagnosis Date   • Arthritis    • Benign prostatic hyperplasia    • Coronary artery disease    • Hyperlipidemia    • Hypertension      Past Surgical History:   Procedure Laterality Date   • SINUS SURGERY       PT Assessment (last 12 hours)     PT Evaluation and Treatment     Row Name 23 09          Physical Therapy Time and Intention    Subjective Information no complaints (P)   -JL     Document Type therapy note (daily note) (P)   -JL     Mode of Treatment physical therapy;individual therapy (P)   -     Patient Effort good (P)   -     Row Name 23 09          General Information    Patient Profile Reviewed yes (P)   -JL     Patient Observations alert;cooperative;agree to therapy (P)   -JL     Existing Precautions/Restrictions fall;oxygen therapy device and L/min (P)   -JL     Row Name 23 09          Transfers    Transfers sit-stand transfer;stand-sit transfer;bed-chair transfer (P)   -JL     Row Name 23 0908          Bed-Chair Transfer    Bed-Chair [place] (Transfers) contact guard;1 person assist (P)   -JL     Assistive Device (Bed-Chair Transfers) walker, front-wheeled (P)   -     Row Name 23 09          Sit-Stand Transfer    Sit-Stand [place] (Transfers) contact  guard;1 person assist (P)   -JL     Assistive Device (Sit-Stand Transfers) walker, front-wheeled (P)   -JL     Row Name 03/24/23 0908          Stand-Sit Transfer    Stand-Sit [place] (Transfers) contact guard;1 person assist (P)   -JL     Assistive Device (Stand-Sit Transfers) walker, front-wheeled (P)   -JL     Row Name 03/24/23 0908          Gait/Stairs (Locomotion)    Gait/Stairs Locomotion gait/ambulation assistive device (P)   -JL     [place] Level (Gait) contact guard (P)   -JL     Assistive Device (Gait) walker, front-wheeled (P)   -JL     Distance in Feet (Gait) 30 (P)   -JL     Pattern (Gait) step-through (P)   -JL     Deviations/Abnormal Patterns (Gait) base of support, narrow;gait speed decreased;stride length decreased;malia decreased (P)   -JL     Bilateral Gait Deviations -- (P)   kyphotic, able to respond to verbal cuing.  -JL     Row Name 03/24/23 0908          Balance    Dynamic Standing Balance contact guard;1-person assist (P)   -JL     Position/Device Used, Standing Balance walker, front-wheeled (P)   -JL     Row Name             Wound 03/19/23 2311 Right posterior heel Laceration    Wound - Properties Group Placement Date: 03/19/23  -VM Placement Time: 2311  -VM Present on Hospital Admission: Y  -VM Side: Right  -VM Orientation: posterior  -VM Location: heel  -VM Primary Wound Type: Laceration  -VM    Retired Wound - Properties Group Placement Date: 03/19/23  -VM Placement Time: 2311  -VM Present on Hospital Admission: Y  -VM Side: Right  -VM Orientation: posterior  -VM Location: heel  -VM Primary Wound Type: Laceration  -VM    Retired Wound - Properties Group Date first assessed: 03/19/23  -VM Time first assessed: 2311  -VM Present on Hospital Admission: Y  -VM Side: Right  -VM Location: heel  -VM Primary Wound Type: Laceration  -VM    Row Name 03/24/23 0908          Positioning and Restraints    Pre-Treatment Position in bed (P)   -JL     Post Treatment Position chair (P)   -JL      In Chair sitting;call light within reach;with family/caregiver (P)   eating breakfast  -JL     Row Name 03/24/23 0908          Progress Summary (PT)    Daily Progress Summary (PT) Pt. performed sit to stand with RW and CGA, ambulated 30 ft in room with decreased stride length and kyphotic posture. Pt. able to respond to verbal cuing to achieve upright stance. Pt. left in recliner with breakfast tray, son in room. No abnormal responses to treatment. (P)   -JL           User Key  (r) = Recorded By, (t) = Taken By, (c) = Cosigned By    Initials Name Provider Type     Gretta Romero, RN Registered Nurse    Russell Du, PT Student PT Student                Physical Therapy Education     Title: PT OT SLP Therapies (In Progress)     Topic: Physical Therapy (In Progress)     Point: Mobility training (In Progress)     Learning Progress Summary           Patient Acceptance, E, NR by  at 3/21/2023 1605                   Point: Home exercise program (Not Started)     Learner Progress:  Not documented in this visit.          Point: Body mechanics (Not Started)     Learner Progress:  Not documented in this visit.          Point: Precautions (In Progress)     Learning Progress Summary           Patient Acceptance, E, NR by  at 3/21/2023 1605                               User Key     Initials Effective Dates Name Provider Type Discipline     04/25/21 -  Luzmaria Garcia, OLESYA Physical Therapist PT              PT Recommendation and Plan     Progress Summary (PT)  Daily Progress Summary (PT): (P) Pt. performed sit to stand with RW and CGA, ambulated 30 ft in room with decreased stride length and kyphotic posture. Pt. able to respond to verbal cuing to achieve upright stance. Pt. left in recliner with breakfast tray, son in room. No abnormal responses to treatment.   Outcome Measures     Row Name 03/24/23 0912 03/23/23 1422 03/22/23 1300       How much help from another person do you currently need...    Turning from  your back to your side while in flat bed without using bedrails? 3 (P)   - 3  - 3  -DP (r) TG (t) DP (c)    Moving from lying on back to sitting on the side of a flat bed without bedrails? 3 (P)   - 3  -RH 3  -DP (r) TG (t) DP (c)    Moving to and from a bed to a chair (including a wheelchair)? 3 (P)   - 3  -RH 3  -DP (r) TG (t) DP (c)    Standing up from a chair using your arms (e.g., wheelchair, bedside chair)? 3 (P)   - 3  - 3  -DP (r) TG (t) DP (c)    Climbing 3-5 steps with a railing? 2 (P)   - 2  -RH 2  -DP (r) TG (t) DP (c)    To walk in hospital room? 3 (P)   - 3  - 2  -DP (r) TG (t) DP (c)    AM-PAC 6 Clicks Score (PT) 17 (P)   - 17  - 16  -DP (r) TG (t)       Functional Assessment    Outcome Measure Options AM-PAC 6 Clicks Basic Mobility (PT) (P)   -JL -- AM-PAC 6 Clicks Basic Mobility (PT)  -DP (r) TG (t) DP (c)    Row Name 03/21/23 1500             How much help from another person do you currently need...    Turning from your back to your side while in flat bed without using bedrails? 2  -CS      Moving from lying on back to sitting on the side of a flat bed without bedrails? 2  -CS      Moving to and from a bed to a chair (including a wheelchair)? 2  -CS      Standing up from a chair using your arms (e.g., wheelchair, bedside chair)? 2  -CS      Climbing 3-5 steps with a railing? 1  -CS      To walk in hospital room? 2  -CS      AM-PAC 6 Clicks Score (PT) 11  -CS         Functional Assessment    Outcome Measure Options AM-PAC 6 Clicks Basic Mobility (PT)  -CS            User Key  (r) = Recorded By, (t) = Taken By, (c) = Cosigned By    Initials Name Provider Type    Aguila Delcid, PTA Physical Therapist Assistant    Jose Allen, PT Physical Therapist    CS Luzmaria Garcia, PT Physical Therapist    TG Suzette Archer, PT Student PT Student    Russell Du, PT Student PT Student                 Time Calculation:    PT Charges     Row Name 03/24/23 0907             Time  Calculation    PT Received On 03/24/23 (P)   -JL         Timed Charges    33581 - Gait Training Minutes  12 (P)   -JL         Total Minutes    Timed Charges Total Minutes 12 (P)   -JL       Total Minutes 12 (P)   -JL            User Key  (r) = Recorded By, (t) = Taken By, (c) = Cosigned By    Initials Name Provider Type    Russell Du, PT Student PT Student              Therapy Charges for Today     Code Description Service Date Service Provider Modifiers Qty    [id number] HC GAIT TRAINING EA 15 MIN 3/24/2023 Russell Scherer, PT Student GP 1          PT G-Codes  Outcome Measure Options: (P) AM-PAC 6 Clicks Basic Mobility (PT)  AM-PAC 6 Clicks Score (PT): (P) 17  AM-PAC 6 Clicks Score (OT): 15    Russell Scherer PT Student  3/24/2023

## 2023-03-24 NOTE — PROGRESS NOTES
Will increase metoprolol to 150 mg twice daily.  His blood pressure I think will tolerate it.  Would just continue to slowly go up on this to keep his heart rate mainly less than 100 bpm at rest.  Continue the Eliquis, aspirin, and Lipitor.  We will follow from distance.  Call with any questions.

## 2023-03-25 LAB
ALBUMIN SERPL-MCNC: 2.5 G/DL (ref 3.5–5.2)
ALBUMIN/GLOB SERPL: 0.5 G/DL
ALP SERPL-CCNC: 312 U/L (ref 39–117)
ALT SERPL W P-5'-P-CCNC: 35 U/L (ref 1–41)
ANION GAP SERPL CALCULATED.3IONS-SCNC: 13.3 MMOL/L (ref 5–15)
AST SERPL-CCNC: 28 U/L (ref 1–40)
BASOPHILS # BLD AUTO: 0.03 10*3/MM3 (ref 0–0.2)
BASOPHILS NFR BLD AUTO: 0.3 % (ref 0–1.5)
BILIRUB SERPL-MCNC: 0.4 MG/DL (ref 0–1.2)
BUN SERPL-MCNC: 47 MG/DL (ref 8–23)
BUN/CREAT SERPL: 21.4 (ref 7–25)
CALCIUM SPEC-SCNC: 8.5 MG/DL (ref 8.6–10.5)
CHLORIDE SERPL-SCNC: 97 MMOL/L (ref 98–107)
CO2 SERPL-SCNC: 26.7 MMOL/L (ref 22–29)
CREAT SERPL-MCNC: 2.2 MG/DL (ref 0.76–1.27)
DEPRECATED RDW RBC AUTO: 51.3 FL (ref 37–54)
EGFRCR SERPLBLD CKD-EPI 2021: 29.9 ML/MIN/1.73
EOSINOPHIL # BLD AUTO: 0.27 10*3/MM3 (ref 0–0.4)
EOSINOPHIL NFR BLD AUTO: 2.9 % (ref 0.3–6.2)
ERYTHROCYTE [DISTWIDTH] IN BLOOD BY AUTOMATED COUNT: 16 % (ref 12.3–15.4)
GLOBULIN UR ELPH-MCNC: 4.8 GM/DL
GLUCOSE BLDC GLUCOMTR-MCNC: 254 MG/DL (ref 70–99)
GLUCOSE BLDC GLUCOMTR-MCNC: 326 MG/DL (ref 70–99)
GLUCOSE BLDC GLUCOMTR-MCNC: 362 MG/DL (ref 70–99)
GLUCOSE BLDC GLUCOMTR-MCNC: 372 MG/DL (ref 70–99)
GLUCOSE SERPL-MCNC: 259 MG/DL (ref 65–99)
HCT VFR BLD AUTO: 35.1 % (ref 37.5–51)
HGB BLD-MCNC: 11.6 G/DL (ref 13–17.7)
IMM GRANULOCYTES # BLD AUTO: 0.16 10*3/MM3 (ref 0–0.05)
IMM GRANULOCYTES NFR BLD AUTO: 1.7 % (ref 0–0.5)
LYMPHOCYTES # BLD AUTO: 1.3 10*3/MM3 (ref 0.7–3.1)
LYMPHOCYTES NFR BLD AUTO: 13.8 % (ref 19.6–45.3)
MAGNESIUM SERPL-MCNC: 1.4 MG/DL (ref 1.6–2.4)
MCH RBC QN AUTO: 29.1 PG (ref 26.6–33)
MCHC RBC AUTO-ENTMCNC: 33 G/DL (ref 31.5–35.7)
MCV RBC AUTO: 88.2 FL (ref 79–97)
MONOCYTES # BLD AUTO: 0.66 10*3/MM3 (ref 0.1–0.9)
MONOCYTES NFR BLD AUTO: 7 % (ref 5–12)
NEUTROPHILS NFR BLD AUTO: 7.02 10*3/MM3 (ref 1.7–7)
NEUTROPHILS NFR BLD AUTO: 74.3 % (ref 42.7–76)
NRBC BLD AUTO-RTO: 0 /100 WBC (ref 0–0.2)
PHOSPHATE SERPL-MCNC: 4.1 MG/DL (ref 2.5–4.5)
PLATELET # BLD AUTO: 218 10*3/MM3 (ref 140–450)
PMV BLD AUTO: 10.6 FL (ref 6–12)
POTASSIUM SERPL-SCNC: 3.2 MMOL/L (ref 3.5–5.2)
PROT SERPL-MCNC: 7.3 G/DL (ref 6–8.5)
RBC # BLD AUTO: 3.98 10*6/MM3 (ref 4.14–5.8)
SODIUM SERPL-SCNC: 137 MMOL/L (ref 136–145)
WBC NRBC COR # BLD: 9.44 10*3/MM3 (ref 3.4–10.8)

## 2023-03-25 PROCEDURE — 94799 UNLISTED PULMONARY SVC/PX: CPT

## 2023-03-25 PROCEDURE — 82962 GLUCOSE BLOOD TEST: CPT

## 2023-03-25 PROCEDURE — 99233 SBSQ HOSP IP/OBS HIGH 50: CPT | Performed by: INTERNAL MEDICINE

## 2023-03-25 PROCEDURE — 63710000001 INSULIN REGULAR HUMAN PER 5 UNITS: Performed by: PHYSICIAN ASSISTANT

## 2023-03-25 PROCEDURE — 80053 COMPREHEN METABOLIC PANEL: CPT | Performed by: INTERNAL MEDICINE

## 2023-03-25 PROCEDURE — 94618 PULMONARY STRESS TESTING: CPT

## 2023-03-25 PROCEDURE — 83735 ASSAY OF MAGNESIUM: CPT | Performed by: INTERNAL MEDICINE

## 2023-03-25 PROCEDURE — 25010000002 AMPICILLIN-SULBACTAM PER 1.5 G: Performed by: INTERNAL MEDICINE

## 2023-03-25 PROCEDURE — 25010000002 FUROSEMIDE PER 20 MG: Performed by: NURSE PRACTITIONER

## 2023-03-25 PROCEDURE — 85025 COMPLETE CBC W/AUTO DIFF WBC: CPT | Performed by: INTERNAL MEDICINE

## 2023-03-25 PROCEDURE — 84100 ASSAY OF PHOSPHORUS: CPT | Performed by: INTERNAL MEDICINE

## 2023-03-25 PROCEDURE — 25010000002 MAGNESIUM SULFATE 2 GM/50ML SOLUTION: Performed by: INTERNAL MEDICINE

## 2023-03-25 RX ORDER — MAGNESIUM SULFATE HEPTAHYDRATE 40 MG/ML
2 INJECTION, SOLUTION INTRAVENOUS ONCE
Status: COMPLETED | OUTPATIENT
Start: 2023-03-25 | End: 2023-03-25

## 2023-03-25 RX ORDER — POTASSIUM CHLORIDE 750 MG/1
40 CAPSULE, EXTENDED RELEASE ORAL EVERY 4 HOURS
Status: COMPLETED | OUTPATIENT
Start: 2023-03-25 | End: 2023-03-25

## 2023-03-25 RX ADMIN — Medication 10 ML: at 08:42

## 2023-03-25 RX ADMIN — INSULIN HUMAN 5 UNITS: 100 INJECTION, SOLUTION PARENTERAL at 13:01

## 2023-03-25 RX ADMIN — FUROSEMIDE 40 MG: 10 INJECTION, SOLUTION INTRAMUSCULAR; INTRAVENOUS at 08:40

## 2023-03-25 RX ADMIN — AMPICILLIN SODIUM AND SULBACTAM SODIUM 3 G: 2; 1 INJECTION, POWDER, FOR SOLUTION INTRAMUSCULAR; INTRAVENOUS at 08:41

## 2023-03-25 RX ADMIN — APIXABAN 5 MG: 5 TABLET, FILM COATED ORAL at 08:41

## 2023-03-25 RX ADMIN — PANTOPRAZOLE SODIUM 40 MG: 40 TABLET, DELAYED RELEASE ORAL at 05:49

## 2023-03-25 RX ADMIN — METOPROLOL TARTRATE 150 MG: 50 TABLET, FILM COATED ORAL at 20:05

## 2023-03-25 RX ADMIN — POTASSIUM CHLORIDE 40 MEQ: 10 CAPSULE, COATED, EXTENDED RELEASE ORAL at 13:01

## 2023-03-25 RX ADMIN — INSULIN HUMAN 6 UNITS: 100 INJECTION, SOLUTION PARENTERAL at 20:16

## 2023-03-25 RX ADMIN — MAGNESIUM SULFATE HEPTAHYDRATE 2 G: 2 INJECTION, SOLUTION INTRAVENOUS at 08:41

## 2023-03-25 RX ADMIN — Medication 10 ML: at 20:05

## 2023-03-25 RX ADMIN — QUETIAPINE FUMARATE 25 MG: 25 TABLET ORAL at 20:05

## 2023-03-25 RX ADMIN — INSULIN HUMAN 4 UNITS: 100 INJECTION, SOLUTION PARENTERAL at 08:40

## 2023-03-25 RX ADMIN — ATORVASTATIN CALCIUM 40 MG: 40 TABLET, FILM COATED ORAL at 08:41

## 2023-03-25 RX ADMIN — NICOTINE 1 PATCH: 21 PATCH, EXTENDED RELEASE TRANSDERMAL at 08:41

## 2023-03-25 RX ADMIN — POTASSIUM CHLORIDE 40 MEQ: 10 CAPSULE, COATED, EXTENDED RELEASE ORAL at 08:40

## 2023-03-25 RX ADMIN — AMPICILLIN SODIUM AND SULBACTAM SODIUM 3 G: 2; 1 INJECTION, POWDER, FOR SOLUTION INTRAMUSCULAR; INTRAVENOUS at 20:04

## 2023-03-25 RX ADMIN — INSULIN HUMAN 6 UNITS: 100 INJECTION, SOLUTION PARENTERAL at 16:52

## 2023-03-25 RX ADMIN — ASPIRIN 81 MG 81 MG: 81 TABLET ORAL at 08:41

## 2023-03-25 RX ADMIN — TRAZODONE HYDROCHLORIDE 50 MG: 50 TABLET ORAL at 20:05

## 2023-03-25 RX ADMIN — METOPROLOL TARTRATE 150 MG: 50 TABLET, FILM COATED ORAL at 08:41

## 2023-03-25 RX ADMIN — FUROSEMIDE 40 MG: 10 INJECTION, SOLUTION INTRAMUSCULAR; INTRAVENOUS at 20:58

## 2023-03-25 RX ADMIN — APIXABAN 5 MG: 5 TABLET, FILM COATED ORAL at 20:05

## 2023-03-25 NOTE — PROGRESS NOTES
[facility]   Hospitalist Progress Note    Date of admission: 3/18/2023  Patient Name: Darnell Geller  1945  Date: 3/25/2023      Subjective     Chief Complaint   Patient presents with   • Altered Mental Status       Summary: 77 y.o. CAD and history of PCI in 20,004, hypertension, DM 2 and history of BPH recently switched outpatient intermittent urinary catheterizations who presents with worsening mental status and weakness.  Patient found to have sepsis with shock secondary to Klebsiella bacteremia suspected secondary to urinary source.  Hospital course complicated by acute renal failure on admission, and narrow complex SVT to 150s-cardiology assisted with evaluation, after adenosine appears to be A-fib/a flutter with 2-1 conduction.    Interval Followup: No acute events overnight, patient sitting up in chair, breathing improving    Objective     Vitals:   Temp:  [97.3 °F (36.3 °C)-98.2 °F (36.8 °C)] 97.3 °F (36.3 °C)  Heart Rate:  [] 82  Resp:  [18-20] 18  BP: (109-148)/(66-90) 131/75  Flow (L/min):  [1.5] 1.5    Physical Exam  General appearance: NAD, conversant   Eyes: anicteric sclerae, moist conjunctivae; no lid-lag; PERRLA  HENT: Atraumatic; oropharynx clear with moist mucous membranes and no mucosal ulcerations; normal hard and soft palate  Neck: Trachea midline; FROM, supple, no thyromegaly or lymphadenopathy  Lungs: CTA, with normal respiratory effort and no intercostal retractions  CV: Regular Rate and Rhythm, no Murmurs, Rubs, or Gallops   Abdomen: Soft, non-tender; no masses or Heptosplenomegally  Extremities: No peripheral edema or extremity lymphadenopathy  Skin: Normal temperature, turgor and texture; no rash, ulcers or subcutaneous nodules  Psych: Appropriate affect, alert and oriented to person, place and time  Neuro: CN II - XII intact no motor deficits, no sensory defecits      Result Review:  Vital signs, labs and recent relevant imaging reviewed.      adenosine, 12 mg, Intravenous,  Once  ampicillin-sulbactam, 3 g, Intravenous, Q12H  apixaban, 5 mg, Oral, Q12H  aspirin, 81 mg, Oral, Daily  atorvastatin, 40 mg, Oral, Daily  furosemide, 40 mg, Intravenous, Q12H  insulin regular, 2-7 Units, Subcutaneous, 4x Daily AC & at Bedtime  metoprolol tartrate, 150 mg, Oral, Q12H  nicotine, 1 patch, Transdermal, Q24H  pantoprazole, 40 mg, Oral, Q AM  potassium chloride, 40 mEq, Oral, Q4H  QUEtiapine, 25 mg, Oral, Nightly  sodium chloride, 10 mL, Intravenous, Q12H  traZODone, 50 mg, Oral, Nightly        •  acetaminophen  •  aluminum-magnesium hydroxide-simethicone  •  dextrose  •  dextrose  •  glucagon (human recombinant)  •  hydrOXYzine pamoate  •  ipratropium-albuterol  •  melatonin  •  sodium chloride  •  sodium chloride  •  sodium chloride      Assessment / Plan     Assessment/Plan:  Septic shock secondary to Klebsiella oxytoca bacteremia suspect from urinary source from straight catheterizations  Acute renal failure suspect secondary to septic shock with hypovolemia  (no prior baseline creatinine available for comparison)    Acute lactic and metabolic encephalopathy  New onset A-fib/a flutter with 2 1 conduction  CAD with prior PCI in 2004  BPH with retention requiring outpatient self-catheterizations  Diarrhea, C. difficile negative  Metabolic acidosis question secondary to diarrhea  DM2 with peripheral neuropathy  Hyperlipidemia    -Continue antibiotics, IV secondary to sepsis from UTI  - Adenosine administration for evaluation of rhythm today as per cardiology revealing a flutter with 2-1 conduction, patient has had intermittent tachycardia, increased beta-blockade with improvement, heart rate mildly elevated this morning but seems to have improved with medications    *Continue Eliquis for stroke prevention, tolerating  - Monitor blood pressure closely  - Echo with normal EF no significant valvular disease  - Will likely need to be discharged with Muñiz catheter with urology follow-up as an  outpatient  - CT scan of the abdomen pelvis personally reviewed, consistent with cystitis, hydronephrosis, possible cirrhosis  - Continue aspirin and statin, tolerating  - SSI, monitor glucose, outpatient is on metformin, Jardiance and glipizide.  Hold gabapentin until mentation improves, was on 800 3 times daily in setting of his renal dysfunction likely contributing  - Continue PPI, tolerating  - CBC, CMP personally reviewed  - Check CBC, CMP, mag and Phos in a.m.  - Continue nightly Seroquel, mental status continues to be stable, mental status seems to be at baseline now  --Continue IV diuretics, monitor volume status closely, oxygen requirement improving  --Out of bed to chair, PT/OT  - Discussed management plan with pulmonology  --Do 6-minute walk test today  --I considered downgrading patient however current level of care appropriate given tachycardia    DVT prophylaxis:  Medical DVT prophylaxis orders are present.    Code Status (Patient has no pulse and is not breathing): CPR (Attempt to Resuscitate)  Medical Interventions (Patient has pulse or is breathing): Full Support        CBC    CBC 3/23/23 3/24/23 3/25/23   WBC 9.73 8.01 9.44   RBC 3.71 (A) 3.52 (A) 3.98 (A)   Hemoglobin 10.8 (A) 10.1 (A) 11.6 (A)   Hematocrit 32.1 (A) 30.7 (A) 35.1 (A)   MCV 86.5 87.2 88.2   MCH 29.1 28.7 29.1   MCHC 33.6 32.9 33.0   RDW 16.1 (A) 16.3 (A) 16.0 (A)   Platelets 169 187 218   (A) Abnormal value              CMP    CMP 3/23/23 3/24/23 3/25/23   Glucose 225 (A) 214 (A) 259 (A)   BUN 50 (A) 49 (A) 47 (A)   Creatinine 2.66 (A) 2.41 (A) 2.20 (A)   eGFR 23.8 (A) 26.8 (A) 29.9 (A)   Sodium 136 137 137   Potassium 3.5 3.5 3.2 (A)   Chloride 100 100 97 (A)   Calcium 8.8 8.5 (A) 8.5 (A)   Total Protein 6.7 6.7 7.3   Albumin 2.5 (A) 2.3 (A) 2.5 (A)   Globulin 4.2 4.4 4.8   Total Bilirubin 0.3 0.4 0.4   Alkaline Phosphatase 272 (A) 255 (A) 312 (A)   AST (SGOT) 38 26 28   ALT (SGPT) 47 (A) 36 35   Albumin/Globulin Ratio 0.6 0.5 0.5    BUN/Creatinine Ratio 18.8 20.3 21.4   Anion Gap 10.4 12.0 13.3   (A) Abnormal value              Electronically signed by Konrad Morales MD, 03/25/23, 12:57 PM EDT.

## 2023-03-26 LAB
ALBUMIN SERPL-MCNC: 2.7 G/DL (ref 3.5–5.2)
ALBUMIN/GLOB SERPL: 0.5 G/DL
ALP SERPL-CCNC: 332 U/L (ref 39–117)
ALT SERPL W P-5'-P-CCNC: 32 U/L (ref 1–41)
ANION GAP SERPL CALCULATED.3IONS-SCNC: 12 MMOL/L (ref 5–15)
AST SERPL-CCNC: 27 U/L (ref 1–40)
BASOPHILS # BLD AUTO: 0.04 10*3/MM3 (ref 0–0.2)
BASOPHILS NFR BLD AUTO: 0.4 % (ref 0–1.5)
BILIRUB SERPL-MCNC: 0.4 MG/DL (ref 0–1.2)
BUN SERPL-MCNC: 41 MG/DL (ref 8–23)
BUN/CREAT SERPL: 20 (ref 7–25)
CALCIUM SPEC-SCNC: 8.5 MG/DL (ref 8.6–10.5)
CHLORIDE SERPL-SCNC: 98 MMOL/L (ref 98–107)
CO2 SERPL-SCNC: 28 MMOL/L (ref 22–29)
CREAT SERPL-MCNC: 2.05 MG/DL (ref 0.76–1.27)
DEPRECATED RDW RBC AUTO: 50.9 FL (ref 37–54)
EGFRCR SERPLBLD CKD-EPI 2021: 32.6 ML/MIN/1.73
EOSINOPHIL # BLD AUTO: 0.27 10*3/MM3 (ref 0–0.4)
EOSINOPHIL NFR BLD AUTO: 2.4 % (ref 0.3–6.2)
ERYTHROCYTE [DISTWIDTH] IN BLOOD BY AUTOMATED COUNT: 16.1 % (ref 12.3–15.4)
GLOBULIN UR ELPH-MCNC: 5.1 GM/DL
GLUCOSE BLDC GLUCOMTR-MCNC: 213 MG/DL (ref 70–99)
GLUCOSE BLDC GLUCOMTR-MCNC: 325 MG/DL (ref 70–99)
GLUCOSE BLDC GLUCOMTR-MCNC: 349 MG/DL (ref 70–99)
GLUCOSE BLDC GLUCOMTR-MCNC: 381 MG/DL (ref 70–99)
GLUCOSE SERPL-MCNC: 236 MG/DL (ref 65–99)
HCT VFR BLD AUTO: 34 % (ref 37.5–51)
HGB BLD-MCNC: 11.2 G/DL (ref 13–17.7)
IMM GRANULOCYTES # BLD AUTO: 0.1 10*3/MM3 (ref 0–0.05)
IMM GRANULOCYTES NFR BLD AUTO: 0.9 % (ref 0–0.5)
LYMPHOCYTES # BLD AUTO: 1.67 10*3/MM3 (ref 0.7–3.1)
LYMPHOCYTES NFR BLD AUTO: 15 % (ref 19.6–45.3)
MAGNESIUM SERPL-MCNC: 1.6 MG/DL (ref 1.6–2.4)
MCH RBC QN AUTO: 28.9 PG (ref 26.6–33)
MCHC RBC AUTO-ENTMCNC: 32.9 G/DL (ref 31.5–35.7)
MCV RBC AUTO: 87.9 FL (ref 79–97)
MONOCYTES # BLD AUTO: 0.77 10*3/MM3 (ref 0.1–0.9)
MONOCYTES NFR BLD AUTO: 6.9 % (ref 5–12)
NEUTROPHILS NFR BLD AUTO: 74.4 % (ref 42.7–76)
NEUTROPHILS NFR BLD AUTO: 8.31 10*3/MM3 (ref 1.7–7)
NRBC BLD AUTO-RTO: 0 /100 WBC (ref 0–0.2)
PHOSPHATE SERPL-MCNC: 3.9 MG/DL (ref 2.5–4.5)
PLATELET # BLD AUTO: 240 10*3/MM3 (ref 140–450)
PMV BLD AUTO: 10.3 FL (ref 6–12)
POTASSIUM SERPL-SCNC: 3.8 MMOL/L (ref 3.5–5.2)
PROT SERPL-MCNC: 7.8 G/DL (ref 6–8.5)
RBC # BLD AUTO: 3.87 10*6/MM3 (ref 4.14–5.8)
SODIUM SERPL-SCNC: 138 MMOL/L (ref 136–145)
WBC NRBC COR # BLD: 11.16 10*3/MM3 (ref 3.4–10.8)

## 2023-03-26 PROCEDURE — 84100 ASSAY OF PHOSPHORUS: CPT | Performed by: INTERNAL MEDICINE

## 2023-03-26 PROCEDURE — 25010000002 AMPICILLIN-SULBACTAM PER 1.5 G: Performed by: INTERNAL MEDICINE

## 2023-03-26 PROCEDURE — 94799 UNLISTED PULMONARY SVC/PX: CPT

## 2023-03-26 PROCEDURE — 85025 COMPLETE CBC W/AUTO DIFF WBC: CPT | Performed by: INTERNAL MEDICINE

## 2023-03-26 PROCEDURE — 80053 COMPREHEN METABOLIC PANEL: CPT | Performed by: INTERNAL MEDICINE

## 2023-03-26 PROCEDURE — 25010000002 FUROSEMIDE PER 20 MG: Performed by: NURSE PRACTITIONER

## 2023-03-26 PROCEDURE — 63710000001 INSULIN REGULAR HUMAN PER 5 UNITS: Performed by: PHYSICIAN ASSISTANT

## 2023-03-26 PROCEDURE — 83735 ASSAY OF MAGNESIUM: CPT | Performed by: INTERNAL MEDICINE

## 2023-03-26 PROCEDURE — 82962 GLUCOSE BLOOD TEST: CPT

## 2023-03-26 PROCEDURE — 99233 SBSQ HOSP IP/OBS HIGH 50: CPT | Performed by: INTERNAL MEDICINE

## 2023-03-26 RX ADMIN — INSULIN HUMAN 3 UNITS: 100 INJECTION, SOLUTION PARENTERAL at 08:38

## 2023-03-26 RX ADMIN — AMPICILLIN SODIUM AND SULBACTAM SODIUM 3 G: 2; 1 INJECTION, POWDER, FOR SOLUTION INTRAMUSCULAR; INTRAVENOUS at 21:05

## 2023-03-26 RX ADMIN — TRAZODONE HYDROCHLORIDE 50 MG: 50 TABLET ORAL at 21:06

## 2023-03-26 RX ADMIN — FUROSEMIDE 40 MG: 10 INJECTION, SOLUTION INTRAMUSCULAR; INTRAVENOUS at 08:38

## 2023-03-26 RX ADMIN — INSULIN HUMAN 6 UNITS: 100 INJECTION, SOLUTION PARENTERAL at 17:04

## 2023-03-26 RX ADMIN — NICOTINE 1 PATCH: 21 PATCH, EXTENDED RELEASE TRANSDERMAL at 08:38

## 2023-03-26 RX ADMIN — INSULIN HUMAN 5 UNITS: 100 INJECTION, SOLUTION PARENTERAL at 11:29

## 2023-03-26 RX ADMIN — METOPROLOL TARTRATE 150 MG: 50 TABLET, FILM COATED ORAL at 08:38

## 2023-03-26 RX ADMIN — ALUMINUM HYDROXIDE, MAGNESIUM HYDROXIDE, AND DIMETHICONE 15 ML: 400; 400; 40 SUSPENSION ORAL at 22:16

## 2023-03-26 RX ADMIN — INSULIN HUMAN 5 UNITS: 100 INJECTION, SOLUTION PARENTERAL at 22:16

## 2023-03-26 RX ADMIN — METOPROLOL TARTRATE 150 MG: 50 TABLET, FILM COATED ORAL at 21:06

## 2023-03-26 RX ADMIN — AMPICILLIN SODIUM AND SULBACTAM SODIUM 3 G: 2; 1 INJECTION, POWDER, FOR SOLUTION INTRAMUSCULAR; INTRAVENOUS at 08:37

## 2023-03-26 RX ADMIN — Medication 5 MG: at 21:06

## 2023-03-26 RX ADMIN — ASPIRIN 81 MG 81 MG: 81 TABLET ORAL at 08:38

## 2023-03-26 RX ADMIN — APIXABAN 5 MG: 5 TABLET, FILM COATED ORAL at 21:06

## 2023-03-26 RX ADMIN — ATORVASTATIN CALCIUM 40 MG: 40 TABLET, FILM COATED ORAL at 08:38

## 2023-03-26 RX ADMIN — Medication 10 ML: at 08:39

## 2023-03-26 RX ADMIN — APIXABAN 5 MG: 5 TABLET, FILM COATED ORAL at 08:38

## 2023-03-26 RX ADMIN — Medication 10 ML: at 21:06

## 2023-03-26 RX ADMIN — PANTOPRAZOLE SODIUM 40 MG: 40 TABLET, DELAYED RELEASE ORAL at 05:39

## 2023-03-26 RX ADMIN — QUETIAPINE FUMARATE 25 MG: 25 TABLET ORAL at 21:06

## 2023-03-26 RX ADMIN — FUROSEMIDE 40 MG: 10 INJECTION, SOLUTION INTRAMUSCULAR; INTRAVENOUS at 21:06

## 2023-03-26 NOTE — PROGRESS NOTES
[facility]   Hospitalist Progress Note    Date of admission: 3/18/2023  Patient Name: Darnell Geller  1945  Date: 3/26/2023      Subjective     Chief Complaint   Patient presents with   • Altered Mental Status       Summary: 77 y.o. CAD and history of PCI in 20,004, hypertension, DM 2 and history of BPH recently switched outpatient intermittent urinary catheterizations who presents with worsening mental status and weakness.  Patient found to have sepsis with shock secondary to Klebsiella bacteremia suspected secondary to urinary source.  Hospital course complicated by acute renal failure on admission, and narrow complex SVT to 150s-cardiology assisted with evaluation, after adenosine appears to be A-fib/a flutter with 2-1 conduction.    Interval Followup: No acute events overnight, patient sitting up in chair, no family at bedside currently, still on supplemental oxygen    Objective     Vitals:   Temp:  [97.3 °F (36.3 °C)-98.4 °F (36.9 °C)] 98.1 °F (36.7 °C)  Heart Rate:  [] 104  Resp:  [18-20] 18  BP: (106-136)/(56-85) 126/85  Flow (L/min):  [1.5-2] 2    Physical Exam  General appearance: NAD, conversant   Eyes: anicteric sclerae, moist conjunctivae; no lid-lag; PERRLA  HENT: Atraumatic; oropharynx clear with moist mucous membranes and no mucosal ulcerations; normal hard and soft palate  Neck: Trachea midline; FROM, supple, no thyromegaly or lymphadenopathy  Lungs: CTA, with normal respiratory effort and no intercostal retractions  CV: Regular Rate and Rhythm, no Murmurs, Rubs, or Gallops   Abdomen: Soft, non-tender; no masses or Heptosplenomegally  Extremities: No peripheral edema or extremity lymphadenopathy  Skin: Normal temperature, turgor and texture; no rash, ulcers or subcutaneous nodules  Psych: Appropriate affect, alert and oriented to person, place and time  Neuro: CN II - XII intact no motor deficits, no sensory defecits      Result Review:  Vital signs, labs and recent relevant imaging  reviewed.      adenosine, 12 mg, Intravenous, Once  ampicillin-sulbactam, 3 g, Intravenous, Q12H  apixaban, 5 mg, Oral, Q12H  aspirin, 81 mg, Oral, Daily  atorvastatin, 40 mg, Oral, Daily  furosemide, 40 mg, Intravenous, Q12H  insulin regular, 2-7 Units, Subcutaneous, 4x Daily AC & at Bedtime  metoprolol tartrate, 150 mg, Oral, Q12H  nicotine, 1 patch, Transdermal, Q24H  pantoprazole, 40 mg, Oral, Q AM  QUEtiapine, 25 mg, Oral, Nightly  sodium chloride, 10 mL, Intravenous, Q12H  traZODone, 50 mg, Oral, Nightly        •  acetaminophen  •  aluminum-magnesium hydroxide-simethicone  •  dextrose  •  dextrose  •  glucagon (human recombinant)  •  hydrOXYzine pamoate  •  ipratropium-albuterol  •  melatonin  •  sodium chloride  •  sodium chloride  •  sodium chloride      Assessment / Plan     Assessment/Plan:  Septic shock secondary to Klebsiella oxytoca bacteremia suspect from urinary source from straight catheterizations  Acute renal failure suspect secondary to septic shock with hypovolemia  (no prior baseline creatinine available for comparison)    Acute lactic and metabolic encephalopathy  New onset A-fib/a flutter with 2 1 conduction  CAD with prior PCI in 2004  BPH with retention requiring outpatient self-catheterizations  Diarrhea, C. difficile negative  Metabolic acidosis question secondary to diarrhea  DM2 with peripheral neuropathy  Hyperlipidemia    -Continue antibiotics, IV secondary to sepsis from UTI  - Adenosine administration for evaluation of rhythm today as per cardiology revealing a flutter with 2-1 conduction, patient has had intermittent tachycardia, increased beta-blockade with improvement, heart rate mildly elevated this morning but seems to have improved with medications, we are continuing to monitor    *Continue Eliquis for stroke prevention, tolerating  - Monitor blood pressure closely, improved  - Echo with normal EF no significant valvular disease  - Will likely need to be discharged with Muñiz  catheter with urology follow-up as an outpatient  - CT scan of the abdomen pelvis personally reviewed, consistent with cystitis, hydronephrosis, possible cirrhosis  - Continue aspirin and statin, tolerating  - SSI, monitor glucose, outpatient is on metformin, Jardiance and glipizide.  Hold gabapentin until mentation improves, was on 800 3 times daily in setting of his renal dysfunction likely contributing  - Continue PPI, tolerating  - CBC, CMP personally reviewed  - Check CBC, CMP, mag and Phos in a.m.  - Continue nightly Seroquel, mental status continues to be stable, mental status seems to be at baseline now  --Continue IV diuretics, monitor volume status closely, oxygen requirement improving, renal function remained stable, breathing is improving  --Out of bed to chair, PT/OT  - Discussed management plan with pulmonology  --Need 6-minute walk test prior to discharge  --I considered downgrading patient however current level of care appropriate given tachycardia    DVT prophylaxis:  Medical DVT prophylaxis orders are present.    Code Status (Patient has no pulse and is not breathing): CPR (Attempt to Resuscitate)  Medical Interventions (Patient has pulse or is breathing): Full Support        CBC    CBC 3/24/23 3/25/23 3/26/23   WBC 8.01 9.44 11.16 (A)   RBC 3.52 (A) 3.98 (A) 3.87 (A)   Hemoglobin 10.1 (A) 11.6 (A) 11.2 (A)   Hematocrit 30.7 (A) 35.1 (A) 34.0 (A)   MCV 87.2 88.2 87.9   MCH 28.7 29.1 28.9   MCHC 32.9 33.0 32.9   RDW 16.3 (A) 16.0 (A) 16.1 (A)   Platelets 187 218 240   (A) Abnormal value              CMP    CMP 3/24/23 3/25/23 3/26/23   Glucose 214 (A) 259 (A) 236 (A)   BUN 49 (A) 47 (A) 41 (A)   Creatinine 2.41 (A) 2.20 (A) 2.05 (A)   eGFR 26.8 (A) 29.9 (A) 32.6 (A)   Sodium 137 137 138   Potassium 3.5 3.2 (A) 3.8   Chloride 100 97 (A) 98   Calcium 8.5 (A) 8.5 (A) 8.5 (A)   Total Protein 6.7 7.3 7.8   Albumin 2.3 (A) 2.5 (A) 2.7 (A)   Globulin 4.4 4.8 5.1   Total Bilirubin 0.4 0.4 0.4   Alkaline  Phosphatase 255 (A) 312 (A) 332 (A)   AST (SGOT) 26 28 27   ALT (SGPT) 36 35 32   Albumin/Globulin Ratio 0.5 0.5 0.5   BUN/Creatinine Ratio 20.3 21.4 20.0   Anion Gap 12.0 13.3 12.0   (A) Abnormal value              Electronically signed by Konrad Morales MD, 03/26/23, 9:26 AM EDT.

## 2023-03-26 NOTE — NURSING NOTE
Exercise Oximetry    Patient Name:Darnell Geller   MRN: [id number]   Date: 03/26/23             ROOM AIR BASELINE   SpO2% 98   Heart Rate 96   Blood Pressure      EXERCISE ON ROOM AIR SpO2% EXERCISE ON O2 @ 2 LPM SpO2%   1 MINUTE 96 1 MINUTE    2 MINUTES 90 2 MINUTES    3 MINUTES 86 3 MINUTES    4 MINUTES  4 MINUTES 89   5 MINUTES  5 MINUTES 90   6 MINUTES  6 MINUTES 95              Distance Walked  50 ft Distance Walked   Dyspnea (Aneesh Scale)   Dyspnea (Aneesh Scale)   Fatigue (Aneesh Scale)   Fatigue (Aneesh Scale)   SpO2% Post Exercise  96 SpO2% Post Exercise   HR Post Exercise  106 HR Post Exercise   Time to Recovery   Time to Recovery     Comments:

## 2023-03-26 NOTE — PLAN OF CARE
No acute event during this shift    Problem: Adult Inpatient Plan of Care  Goal: Plan of Care Review  Outcome: Ongoing, Progressing  Goal: Patient-Specific Goal (Individualized)  Outcome: Ongoing, Progressing  Goal: Absence of Hospital-Acquired Illness or Injury  Outcome: Ongoing, Progressing  Intervention: Identify and Manage Fall Risk  Recent Flowsheet Documentation  Taken 3/26/2023 0400 by Andreina Allison RN  Safety Promotion/Fall Prevention: safety round/check completed  Taken 3/26/2023 0200 by Andreina Allison RN  Safety Promotion/Fall Prevention: safety round/check completed  Taken 3/26/2023 0000 by Andreina Allison RN  Safety Promotion/Fall Prevention: safety round/check completed  Taken 3/25/2023 2200 by Andreina Allison RN  Safety Promotion/Fall Prevention: safety round/check completed  Taken 3/25/2023 2000 by Andreina Allison RN  Safety Promotion/Fall Prevention: safety round/check completed  Taken 3/25/2023 1930 by Andreina Allison RN  Safety Promotion/Fall Prevention: safety round/check completed  Intervention: Prevent and Manage VTE (Venous Thromboembolism) Risk  Recent Flowsheet Documentation  Taken 3/25/2023 1930 by Andreina Allison RN  VTE Prevention/Management:   sequential compression devices off   bilateral  Range of Motion: active ROM (range of motion) encouraged  Goal: Optimal Comfort and Wellbeing  Outcome: Ongoing, Progressing  Intervention: Provide Person-Centered Care  Recent Flowsheet Documentation  Taken 3/25/2023 1930 by Andreina Allison RN  Trust Relationship/Rapport:   care explained   reassurance provided   thoughts/feelings acknowledged  Goal: Readiness for Transition of Care  Outcome: Ongoing, Progressing     Problem: Fall Injury Risk  Goal: Absence of Fall and Fall-Related Injury  Outcome: Ongoing, Progressing  Intervention: Identify and Manage Contributors  Recent Flowsheet Documentation  Taken 3/25/2023 1930 by Andreina Allison RN  Medication Review/Management:  medications reviewed  Intervention: Promote Injury-Free Environment  Recent Flowsheet Documentation  Taken 3/26/2023 0400 by Andreina Allison RN  Safety Promotion/Fall Prevention: safety round/check completed  Taken 3/26/2023 0200 by Andreina Allison RN  Safety Promotion/Fall Prevention: safety round/check completed  Taken 3/26/2023 0000 by Andreina Allison RN  Safety Promotion/Fall Prevention: safety round/check completed  Taken 3/25/2023 2200 by Andreina Allison RN  Safety Promotion/Fall Prevention: safety round/check completed  Taken 3/25/2023 2000 by Andreina Allison RN  Safety Promotion/Fall Prevention: safety round/check completed  Taken 3/25/2023 1930 by Andreina Allison RN  Safety Promotion/Fall Prevention: safety round/check completed  Goal: Absence of Fall and Fall-Related Injury  Outcome: Ongoing, Progressing  Intervention: Identify and Manage Contributors  Recent Flowsheet Documentation  Taken 3/25/2023 1930 by Andreina Allison RN  Medication Review/Management: medications reviewed  Intervention: Promote Injury-Free Environment  Recent Flowsheet Documentation  Taken 3/26/2023 0400 by Andreina Allison RN  Safety Promotion/Fall Prevention: safety round/check completed  Taken 3/26/2023 0200 by Andreina Allison RN  Safety Promotion/Fall Prevention: safety round/check completed  Taken 3/26/2023 0000 by Andreina Allison RN  Safety Promotion/Fall Prevention: safety round/check completed  Taken 3/25/2023 2200 by Andreina Allison RN  Safety Promotion/Fall Prevention: safety round/check completed  Taken 3/25/2023 2000 by Andreina Allison RN  Safety Promotion/Fall Prevention: safety round/check completed  Taken 3/25/2023 1930 by Andreina Allison RN  Safety Promotion/Fall Prevention: safety round/check completed     Problem: Skin Injury Risk Increased  Goal: Skin Health and Integrity  Outcome: Ongoing, Progressing     Problem: Asthma Comorbidity  Goal: Maintenance of Asthma Control  Outcome: Ongoing,  Progressing  Intervention: Maintain Asthma Symptom Control  Recent Flowsheet Documentation  Taken 3/25/2023 1930 by Andreina Allison RN  Medication Review/Management: medications reviewed     Problem: Behavioral Health Comorbidity  Goal: Maintenance of Behavioral Health Symptom Control  Outcome: Ongoing, Progressing  Intervention: Maintain Behavioral Health Symptom Control  Recent Flowsheet Documentation  Taken 3/25/2023 1930 by Andreina Allison RN  Medication Review/Management: medications reviewed     Problem: COPD (Chronic Obstructive Pulmonary Disease) Comorbidity  Goal: Maintenance of COPD Symptom Control  Outcome: Ongoing, Progressing  Intervention: Maintain COPD-Symptom Control  Recent Flowsheet Documentation  Taken 3/25/2023 1930 by Andreina Allison RN  Supportive Measures: active listening utilized  Medication Review/Management: medications reviewed     Problem: Diabetes Comorbidity  Goal: Blood Glucose Level Within Targeted Range  Outcome: Ongoing, Progressing     Problem: Heart Failure Comorbidity  Goal: Maintenance of Heart Failure Symptom Control  Outcome: Ongoing, Progressing  Intervention: Maintain Heart Failure-Management  Recent Flowsheet Documentation  Taken 3/25/2023 1930 by Andreina Allison RN  Medication Review/Management: medications reviewed     Problem: Hypertension Comorbidity  Goal: Blood Pressure in Desired Range  Outcome: Ongoing, Progressing  Intervention: Maintain Blood Pressure Management  Recent Flowsheet Documentation  Taken 3/25/2023 1930 by Andreina Allison RN  Medication Review/Management: medications reviewed     Problem: Obstructive Sleep Apnea Risk or Actual Comorbidity Management  Goal: Unobstructed Breathing During Sleep  Outcome: Ongoing, Progressing     Problem: Osteoarthritis Comorbidity  Goal: Maintenance of Osteoarthritis Symptom Control  Outcome: Ongoing, Progressing  Intervention: Maintain Osteoarthritis Symptom Control  Recent Flowsheet Documentation  Taken  3/25/2023 1930 by Andreina Allison RN  Medication Review/Management: medications reviewed     Problem: Pain Chronic (Persistent) (Comorbidity Management)  Goal: Acceptable Pain Control and Functional Ability  Outcome: Ongoing, Progressing  Intervention: Manage Persistent Pain  Recent Flowsheet Documentation  Taken 3/25/2023 1930 by Andreina Allison RN  Medication Review/Management: medications reviewed  Intervention: Optimize Psychosocial Wellbeing  Recent Flowsheet Documentation  Taken 3/25/2023 1930 by Andreina Allison RN  Supportive Measures: active listening utilized  Diversional Activities: television  Family/Support System Care: support provided     Problem: Seizure Disorder Comorbidity  Goal: Maintenance of Seizure Control  Outcome: Ongoing, Progressing     Problem: Restraint, Nonviolent  Goal: Absence of Harm or Injury  Outcome: Ongoing, Progressing  Intervention: Implement Least Restrictive Safety Strategies  Recent Flowsheet Documentation  Taken 3/25/2023 1930 by Andreina Allison RN  Medical Device Protection: tubing secured  Diversional Activities: television  Intervention: Protect Dignity, Rights, and Personal Wellbeing  Recent Flowsheet Documentation  Taken 3/25/2023 1930 by Andreina Allison RN  Trust Relationship/Rapport:   care explained   reassurance provided   thoughts/feelings acknowledged  Intervention: Protect Skin and Joint Integrity  Recent Flowsheet Documentation  Taken 3/25/2023 1930 by Andreina Allison RN  Range of Motion: active ROM (range of motion) encouraged   Goal Outcome Evaluation:

## 2023-03-27 ENCOUNTER — READMISSION MANAGEMENT (OUTPATIENT)
Dept: CALL CENTER | Facility: HOSPITAL | Age: 78
End: 2023-03-27
Payer: OTHER GOVERNMENT

## 2023-03-27 VITALS
DIASTOLIC BLOOD PRESSURE: 73 MMHG | TEMPERATURE: 97.3 F | RESPIRATION RATE: 16 BRPM | BODY MASS INDEX: 22.57 KG/M2 | HEIGHT: 72 IN | OXYGEN SATURATION: 99 % | WEIGHT: 166.67 LBS | SYSTOLIC BLOOD PRESSURE: 133 MMHG | HEART RATE: 87 BPM

## 2023-03-27 LAB
ALBUMIN SERPL-MCNC: 2.3 G/DL (ref 3.5–5.2)
ALBUMIN/GLOB SERPL: 0.5 G/DL
ALP SERPL-CCNC: 267 U/L (ref 39–117)
ALT SERPL W P-5'-P-CCNC: 25 U/L (ref 1–41)
ANION GAP SERPL CALCULATED.3IONS-SCNC: 12.8 MMOL/L (ref 5–15)
AST SERPL-CCNC: 22 U/L (ref 1–40)
BASOPHILS # BLD AUTO: 0.04 10*3/MM3 (ref 0–0.2)
BASOPHILS NFR BLD AUTO: 0.4 % (ref 0–1.5)
BILIRUB SERPL-MCNC: 0.4 MG/DL (ref 0–1.2)
BUN SERPL-MCNC: 36 MG/DL (ref 8–23)
BUN/CREAT SERPL: 20 (ref 7–25)
CALCIUM SPEC-SCNC: 8.3 MG/DL (ref 8.6–10.5)
CHLORIDE SERPL-SCNC: 95 MMOL/L (ref 98–107)
CO2 SERPL-SCNC: 25.2 MMOL/L (ref 22–29)
CREAT SERPL-MCNC: 1.8 MG/DL (ref 0.76–1.27)
DEPRECATED RDW RBC AUTO: 51.8 FL (ref 37–54)
EGFRCR SERPLBLD CKD-EPI 2021: 38.1 ML/MIN/1.73
EOSINOPHIL # BLD AUTO: 0.18 10*3/MM3 (ref 0–0.4)
EOSINOPHIL NFR BLD AUTO: 1.9 % (ref 0.3–6.2)
ERYTHROCYTE [DISTWIDTH] IN BLOOD BY AUTOMATED COUNT: 16.4 % (ref 12.3–15.4)
GLOBULIN UR ELPH-MCNC: 4.6 GM/DL
GLUCOSE BLDC GLUCOMTR-MCNC: 245 MG/DL (ref 70–99)
GLUCOSE BLDC GLUCOMTR-MCNC: 302 MG/DL (ref 70–99)
GLUCOSE SERPL-MCNC: 206 MG/DL (ref 65–99)
HCT VFR BLD AUTO: 30.4 % (ref 37.5–51)
HGB BLD-MCNC: 9.8 G/DL (ref 13–17.7)
IMM GRANULOCYTES # BLD AUTO: 0.09 10*3/MM3 (ref 0–0.05)
IMM GRANULOCYTES NFR BLD AUTO: 1 % (ref 0–0.5)
LYMPHOCYTES # BLD AUTO: 1.55 10*3/MM3 (ref 0.7–3.1)
LYMPHOCYTES NFR BLD AUTO: 16.4 % (ref 19.6–45.3)
MAGNESIUM SERPL-MCNC: 1.5 MG/DL (ref 1.6–2.4)
MCH RBC QN AUTO: 28.7 PG (ref 26.6–33)
MCHC RBC AUTO-ENTMCNC: 32.2 G/DL (ref 31.5–35.7)
MCV RBC AUTO: 88.9 FL (ref 79–97)
MONOCYTES # BLD AUTO: 0.78 10*3/MM3 (ref 0.1–0.9)
MONOCYTES NFR BLD AUTO: 8.3 % (ref 5–12)
NEUTROPHILS NFR BLD AUTO: 6.81 10*3/MM3 (ref 1.7–7)
NEUTROPHILS NFR BLD AUTO: 72 % (ref 42.7–76)
NRBC BLD AUTO-RTO: 0 /100 WBC (ref 0–0.2)
PHOSPHATE SERPL-MCNC: 4.1 MG/DL (ref 2.5–4.5)
PLATELET # BLD AUTO: 210 10*3/MM3 (ref 140–450)
PMV BLD AUTO: 10.5 FL (ref 6–12)
POTASSIUM SERPL-SCNC: 3.4 MMOL/L (ref 3.5–5.2)
PROT SERPL-MCNC: 6.9 G/DL (ref 6–8.5)
RBC # BLD AUTO: 3.42 10*6/MM3 (ref 4.14–5.8)
SODIUM SERPL-SCNC: 133 MMOL/L (ref 136–145)
WBC NRBC COR # BLD: 9.45 10*3/MM3 (ref 3.4–10.8)

## 2023-03-27 PROCEDURE — 83735 ASSAY OF MAGNESIUM: CPT | Performed by: INTERNAL MEDICINE

## 2023-03-27 PROCEDURE — 63710000001 INSULIN REGULAR HUMAN PER 5 UNITS: Performed by: PHYSICIAN ASSISTANT

## 2023-03-27 PROCEDURE — 94799 UNLISTED PULMONARY SVC/PX: CPT

## 2023-03-27 PROCEDURE — 80053 COMPREHEN METABOLIC PANEL: CPT | Performed by: INTERNAL MEDICINE

## 2023-03-27 PROCEDURE — 25010000002 FUROSEMIDE PER 20 MG: Performed by: NURSE PRACTITIONER

## 2023-03-27 PROCEDURE — 99239 HOSP IP/OBS DSCHRG MGMT >30: CPT | Performed by: INTERNAL MEDICINE

## 2023-03-27 PROCEDURE — 85025 COMPLETE CBC W/AUTO DIFF WBC: CPT | Performed by: INTERNAL MEDICINE

## 2023-03-27 PROCEDURE — 25010000002 AMPICILLIN-SULBACTAM PER 1.5 G: Performed by: INTERNAL MEDICINE

## 2023-03-27 PROCEDURE — 84100 ASSAY OF PHOSPHORUS: CPT | Performed by: INTERNAL MEDICINE

## 2023-03-27 PROCEDURE — 82962 GLUCOSE BLOOD TEST: CPT

## 2023-03-27 RX ORDER — AMOXICILLIN AND CLAVULANATE POTASSIUM 875; 125 MG/1; MG/1
1 TABLET, FILM COATED ORAL 2 TIMES DAILY
Qty: 14 TABLET | Refills: 0 | Status: SHIPPED | OUTPATIENT
Start: 2023-03-27 | End: 2023-04-03

## 2023-03-27 RX ORDER — FUROSEMIDE 40 MG/1
40 TABLET ORAL 2 TIMES DAILY
Qty: 60 TABLET | Refills: 0 | Status: SHIPPED | OUTPATIENT
Start: 2023-03-27 | End: 2023-04-26

## 2023-03-27 RX ORDER — TRAZODONE HYDROCHLORIDE 50 MG/1
50 TABLET ORAL NIGHTLY
Qty: 30 TABLET | Refills: 0 | Status: SHIPPED | OUTPATIENT
Start: 2023-03-27 | End: 2023-04-26

## 2023-03-27 RX ORDER — METOPROLOL TARTRATE 75 MG/1
150 TABLET, FILM COATED ORAL EVERY 12 HOURS SCHEDULED
Qty: 60 TABLET | Refills: 0 | Status: SHIPPED | OUTPATIENT
Start: 2023-03-27 | End: 2023-04-26

## 2023-03-27 RX ADMIN — INSULIN HUMAN 3 UNITS: 100 INJECTION, SOLUTION PARENTERAL at 08:34

## 2023-03-27 RX ADMIN — INSULIN HUMAN 5 UNITS: 100 INJECTION, SOLUTION PARENTERAL at 12:00

## 2023-03-27 RX ADMIN — Medication 10 ML: at 08:35

## 2023-03-27 RX ADMIN — ATORVASTATIN CALCIUM 40 MG: 40 TABLET, FILM COATED ORAL at 08:33

## 2023-03-27 RX ADMIN — FUROSEMIDE 40 MG: 10 INJECTION, SOLUTION INTRAMUSCULAR; INTRAVENOUS at 08:36

## 2023-03-27 RX ADMIN — METOPROLOL TARTRATE 150 MG: 50 TABLET, FILM COATED ORAL at 08:33

## 2023-03-27 RX ADMIN — AMPICILLIN SODIUM AND SULBACTAM SODIUM 3 G: 2; 1 INJECTION, POWDER, FOR SOLUTION INTRAMUSCULAR; INTRAVENOUS at 08:34

## 2023-03-27 RX ADMIN — ASPIRIN 81 MG 81 MG: 81 TABLET ORAL at 08:34

## 2023-03-27 RX ADMIN — PANTOPRAZOLE SODIUM 40 MG: 40 TABLET, DELAYED RELEASE ORAL at 05:41

## 2023-03-27 RX ADMIN — NICOTINE 1 PATCH: 21 PATCH, EXTENDED RELEASE TRANSDERMAL at 08:35

## 2023-03-27 RX ADMIN — APIXABAN 5 MG: 5 TABLET, FILM COATED ORAL at 08:34

## 2023-03-27 NOTE — PROGRESS NOTES
[facility]   Hospitalist Progress Note    Date of admission: 3/18/2023  Patient Name: Darnell Geller  1945  Date: 3/27/2023      Subjective     Chief Complaint   Patient presents with   • Altered Mental Status       Summary: 77 y.o. CAD and history of PCI in 20,004, hypertension, DM 2 and history of BPH recently switched outpatient intermittent urinary catheterizations who presents with worsening mental status and weakness.  Patient found to have sepsis with shock secondary to Klebsiella bacteremia suspected secondary to urinary source.  Hospital course complicated by acute renal failure on admission, and narrow complex SVT to 150s-cardiology assisted with evaluation, after adenosine appears to be A-fib/a flutter with 2-1 conduction.    Interval Followup: No acute events overnight, patient sitting up in chair, feeling better    Objective     Vitals:   Temp:  [97.3 °F (36.3 °C)-98.2 °F (36.8 °C)] 97.3 °F (36.3 °C)  Heart Rate:  [] 87  Resp:  [16-20] 16  BP: (118-135)/(62-91) 133/73  Flow (L/min):  [2] 2    Physical Exam  General appearance: NAD, conversant   Eyes: anicteric sclerae, moist conjunctivae; no lid-lag; PERRLA  HENT: Atraumatic; oropharynx clear with moist mucous membranes and no mucosal ulcerations; normal hard and soft palate  Neck: Trachea midline; FROM, supple, no thyromegaly or lymphadenopathy  Lungs: CTA, with normal respiratory effort and no intercostal retractions  CV: Regular Rate and Rhythm, no Murmurs, Rubs, or Gallops   Abdomen: Soft, non-tender; no masses or Heptosplenomegally  Extremities: No peripheral edema or extremity lymphadenopathy  Skin: Normal temperature, turgor and texture; no rash, ulcers or subcutaneous nodules  Psych: Appropriate affect, alert and oriented to person, place and time  Neuro: CN II - XII intact no motor deficits, no sensory defecits      Result Review:  Vital signs, labs and recent relevant imaging reviewed.      adenosine, 12 mg, Intravenous,  Once  ampicillin-sulbactam, 3 g, Intravenous, Q12H  apixaban, 5 mg, Oral, Q12H  aspirin, 81 mg, Oral, Daily  atorvastatin, 40 mg, Oral, Daily  furosemide, 40 mg, Intravenous, Q12H  insulin regular, 2-7 Units, Subcutaneous, 4x Daily AC & at Bedtime  metoprolol tartrate, 150 mg, Oral, Q12H  nicotine, 1 patch, Transdermal, Q24H  pantoprazole, 40 mg, Oral, Q AM  QUEtiapine, 25 mg, Oral, Nightly  sodium chloride, 10 mL, Intravenous, Q12H  traZODone, 50 mg, Oral, Nightly        •  acetaminophen  •  aluminum-magnesium hydroxide-simethicone  •  dextrose  •  dextrose  •  glucagon (human recombinant)  •  hydrOXYzine pamoate  •  ipratropium-albuterol  •  melatonin  •  sodium chloride  •  sodium chloride  •  sodium chloride      Assessment / Plan     Assessment/Plan:  Septic shock secondary to Klebsiella oxytoca bacteremia suspect from urinary source from straight catheterizations  Acute renal failure suspect secondary to septic shock with hypovolemia  (no prior baseline creatinine available for comparison)    Acute lactic and metabolic encephalopathy  New onset A-fib/a flutter with 2 1 conduction  CAD with prior PCI in 2004  BPH with retention requiring outpatient self-catheterizations  Diarrhea, C. difficile negative  Metabolic acidosis question secondary to diarrhea  DM2 with peripheral neuropathy  Hyperlipidemia    -Continue antibiotics, IV secondary to sepsis from UTI  - Adenosine administration for evaluation of rhythm today as per cardiology revealing a flutter with 2-1 conduction, patient has had intermittent tachycardia, increased beta-blockade with improvement, heart rate mildly elevated this morning but seems to have improved with medications, we are continuing to monitor    *Continue Eliquis for stroke prevention, tolerating  - Monitor blood pressure closely, improved  - Echo with normal EF no significant valvular disease  - Will be discharged with Muñiz catheter  - CT scan of the abdomen pelvis personally reviewed,  consistent with cystitis, hydronephrosis, possible cirrhosis  - Continue aspirin and statin  - SSI, monitor glucose, outpatient is on metformin, Jardiance and glipizide.  Hold gabapentin until mentation improves, was on 800 3 times daily in setting of his renal dysfunction likely contributing  - Continue PPI, tolerating  - CBC, CMP personally reviewed  - Check CBC, CMP, mag and Phos in a.m. if patient remains in the hospital  - Can DC Seroquel at discharge  --Continue IV diuretics while in the hospital, transition to oral diuretics at discharge  --Out of bed to chair, PT/OT  - Discussed management plan with pulmonology  --Patient failed 6-minute walk test, will require home O2  --I considered downgrading patient however current level of care appropriate given tachycardia    DVT prophylaxis:  Medical DVT prophylaxis orders are present.    Code Status (Patient has no pulse and is not breathing): CPR (Attempt to Resuscitate)  Medical Interventions (Patient has pulse or is breathing): Full Support        CBC    CBC 3/25/23 3/26/23 3/27/23   WBC 9.44 11.16 (A) 9.45   RBC 3.98 (A) 3.87 (A) 3.42 (A)   Hemoglobin 11.6 (A) 11.2 (A) 9.8 (A)   Hematocrit 35.1 (A) 34.0 (A) 30.4 (A)   MCV 88.2 87.9 88.9   MCH 29.1 28.9 28.7   MCHC 33.0 32.9 32.2   RDW 16.0 (A) 16.1 (A) 16.4 (A)   Platelets 218 240 210   (A) Abnormal value              CMP    CMP 3/25/23 3/26/23 3/27/23   Glucose 259 (A) 236 (A) 206 (A)   BUN 47 (A) 41 (A) 36 (A)   Creatinine 2.20 (A) 2.05 (A) 1.80 (A)   eGFR 29.9 (A) 32.6 (A) 38.1 (A)   Sodium 137 138 133 (A)   Potassium 3.2 (A) 3.8 3.4 (A)   Chloride 97 (A) 98 95 (A)   Calcium 8.5 (A) 8.5 (A) 8.3 (A)   Total Protein 7.3 7.8 6.9   Albumin 2.5 (A) 2.7 (A) 2.3 (A)   Globulin 4.8 5.1 4.6   Total Bilirubin 0.4 0.4 0.4   Alkaline Phosphatase 312 (A) 332 (A) 267 (A)   AST (SGOT) 28 27 22   ALT (SGPT) 35 32 25   Albumin/Globulin Ratio 0.5 0.5 0.5   BUN/Creatinine Ratio 21.4 20.0 20.0   Anion Gap 13.3 12.0 12.8   (A)  Abnormal value       Comments are available for some flowsheets but are not being displayed.             Electronically signed by Konrad Morales MD, 03/27/23, 12:19 PM EDT.

## 2023-03-27 NOTE — DISCHARGE INSTR - APPOINTMENTS
Patient needs to  make a follow up with Jeanette Prieto(PCP) in 1week. [phone]  Patient needs to follow up with (Cardiology) on 4/10/23 @11:15am [phone]  [facility]   Patient needs to [facility] up with [facility] at [facility] in a week.

## 2023-03-27 NOTE — PLAN OF CARE
No acute event this shift, patient states he is ready to go home, willing to use oxygen at home and attempt to quit smoking  FC intact, care completed, draining adequate amount of clear yellow urine  Call light with in reach, hourly rounds continued    Problem: Adult Inpatient Plan of Care  Goal: Plan of Care Review  Outcome: Ongoing, Progressing  Goal: Patient-Specific Goal (Individualized)  Outcome: Ongoing, Progressing  Goal: Absence of Hospital-Acquired Illness or Injury  Outcome: Ongoing, Progressing  Intervention: Identify and Manage Fall Risk  Recent Flowsheet Documentation  Taken 3/27/2023 0400 by Andreina Allison RN  Safety Promotion/Fall Prevention: safety round/check completed  Taken 3/27/2023 0200 by Andreina Allison RN  Safety Promotion/Fall Prevention: safety round/check completed  Taken 3/27/2023 0000 by Andreina Allison RN  Safety Promotion/Fall Prevention: safety round/check completed  Taken 3/26/2023 2000 by Andreina Allison RN  Safety Promotion/Fall Prevention:   safety round/check completed   lighting adjusted   fall prevention program maintained   clutter free environment maintained   assistive device/personal items within reach  Taken 3/26/2023 1930 by Andreina Allison RN  Safety Promotion/Fall Prevention: safety round/check completed  Goal: Optimal Comfort and Wellbeing  Outcome: Ongoing, Progressing  Intervention: Provide Person-Centered Care  Recent Flowsheet Documentation  Taken 3/26/2023 1930 by Andreina Allison RN  Trust Relationship/Rapport:   care explained   reassurance provided   thoughts/feelings acknowledged  Goal: Readiness for Transition of Care  Outcome: Ongoing, Progressing     Problem: Fall Injury Risk  Goal: Absence of Fall and Fall-Related Injury  Outcome: Ongoing, Progressing  Intervention: Identify and Manage Contributors  Recent Flowsheet Documentation  Taken 3/26/2023 2000 by Andreina Allison, RN  Medication Review/Management: medications reviewed  Taken 3/26/2023  1930 by Andreina Allison RN  Medication Review/Management: medications reviewed  Intervention: Promote Injury-Free Environment  Recent Flowsheet Documentation  Taken 3/27/2023 0400 by Andreina Allison RN  Safety Promotion/Fall Prevention: safety round/check completed  Taken 3/27/2023 0200 by Andreina Allison RN  Safety Promotion/Fall Prevention: safety round/check completed  Taken 3/27/2023 0000 by Andreina Allison RN  Safety Promotion/Fall Prevention: safety round/check completed  Taken 3/26/2023 2000 by Andreina Allison RN  Safety Promotion/Fall Prevention:   safety round/check completed   lighting adjusted   fall prevention program maintained   clutter free environment maintained   assistive device/personal items within reach  Taken 3/26/2023 1930 by Andreina Allison RN  Safety Promotion/Fall Prevention: safety round/check completed  Goal: Absence of Fall and Fall-Related Injury  Outcome: Ongoing, Progressing  Intervention: Identify and Manage Contributors  Recent Flowsheet Documentation  Taken 3/26/2023 2000 by Andreina Allison RN  Medication Review/Management: medications reviewed  Taken 3/26/2023 1930 by Andreina Allison RN  Medication Review/Management: medications reviewed  Intervention: Promote Injury-Free Environment  Recent Flowsheet Documentation  Taken 3/27/2023 0400 by Andreina Allison RN  Safety Promotion/Fall Prevention: safety round/check completed  Taken 3/27/2023 0200 by Andreina Allison RN  Safety Promotion/Fall Prevention: safety round/check completed  Taken 3/27/2023 0000 by Andreina Allison RN  Safety Promotion/Fall Prevention: safety round/check completed  Taken 3/26/2023 2000 by Andreina Allison RN  Safety Promotion/Fall Prevention:   safety round/check completed   lighting adjusted   fall prevention program maintained   clutter free environment maintained   assistive device/personal items within reach  Taken 3/26/2023 1930 by Andreina Allison RN  Safety Promotion/Fall  Prevention: safety round/check completed     Problem: Skin Injury Risk Increased  Goal: Skin Health and Integrity  Outcome: Ongoing, Progressing     Problem: Asthma Comorbidity  Goal: Maintenance of Asthma Control  Outcome: Ongoing, Progressing  Intervention: Maintain Asthma Symptom Control  Recent Flowsheet Documentation  Taken 3/26/2023 2000 by Andreina Allison RN  Medication Review/Management: medications reviewed  Taken 3/26/2023 1930 by Andreina Allison RN  Medication Review/Management: medications reviewed     Problem: Behavioral Health Comorbidity  Goal: Maintenance of Behavioral Health Symptom Control  Outcome: Ongoing, Progressing  Intervention: Maintain Behavioral Health Symptom Control  Recent Flowsheet Documentation  Taken 3/26/2023 2000 by Andreina Allison RN  Medication Review/Management: medications reviewed  Taken 3/26/2023 1930 by Andreina Allison RN  Medication Review/Management: medications reviewed     Problem: COPD (Chronic Obstructive Pulmonary Disease) Comorbidity  Goal: Maintenance of COPD Symptom Control  Outcome: Ongoing, Progressing  Intervention: Maintain COPD-Symptom Control  Recent Flowsheet Documentation  Taken 3/26/2023 2000 by Andreina Allison RN  Medication Review/Management: medications reviewed  Taken 3/26/2023 1930 by Andreina Allison RN  Medication Review/Management: medications reviewed     Problem: Diabetes Comorbidity  Goal: Blood Glucose Level Within Targeted Range  Outcome: Ongoing, Progressing     Problem: Heart Failure Comorbidity  Goal: Maintenance of Heart Failure Symptom Control  Outcome: Ongoing, Progressing  Intervention: Maintain Heart Failure-Management  Recent Flowsheet Documentation  Taken 3/26/2023 2000 by Andreina Allison RN  Medication Review/Management: medications reviewed  Taken 3/26/2023 1930 by Andreina Allison RN  Medication Review/Management: medications reviewed     Problem: Hypertension Comorbidity  Goal: Blood Pressure in Desired  Range  Outcome: Ongoing, Progressing  Intervention: Maintain Blood Pressure Management  Recent Flowsheet Documentation  Taken 3/26/2023 2000 by Andreina Allison RN  Medication Review/Management: medications reviewed  Taken 3/26/2023 1930 by Andreina Allison RN  Medication Review/Management: medications reviewed     Problem: Obstructive Sleep Apnea Risk or Actual Comorbidity Management  Goal: Unobstructed Breathing During Sleep  Outcome: Ongoing, Progressing     Problem: Osteoarthritis Comorbidity  Goal: Maintenance of Osteoarthritis Symptom Control  Outcome: Ongoing, Progressing  Intervention: Maintain Osteoarthritis Symptom Control  Recent Flowsheet Documentation  Taken 3/26/2023 2000 by Andreina Allison RN  Medication Review/Management: medications reviewed  Taken 3/26/2023 1930 by Andreina Allison RN  Medication Review/Management: medications reviewed     Problem: Pain Chronic (Persistent) (Comorbidity Management)  Goal: Acceptable Pain Control and Functional Ability  Outcome: Ongoing, Progressing  Intervention: Manage Persistent Pain  Recent Flowsheet Documentation  Taken 3/26/2023 2000 by Andreina Allison RN  Medication Review/Management: medications reviewed  Taken 3/26/2023 1930 by Andreina Allison RN  Medication Review/Management: medications reviewed  Intervention: Optimize Psychosocial Wellbeing  Recent Flowsheet Documentation  Taken 3/26/2023 1930 by Andreina Allison RN  Diversional Activities: television  Family/Support System Care: support provided     Problem: Seizure Disorder Comorbidity  Goal: Maintenance of Seizure Control  Outcome: Ongoing, Progressing     Problem: Restraint, Nonviolent  Goal: Absence of Harm or Injury  Outcome: Ongoing, Progressing  Intervention: Implement Least Restrictive Safety Strategies  Recent Flowsheet Documentation  Taken 3/26/2023 1930 by Andreina Allison RN  Diversional Activities: television  Intervention: Protect Dignity, Rights, and Personal Wellbeing  Recent  Flowsheet Documentation  Taken 3/26/2023 1930 by Andreina Allison, RN  Trust Relationship/Rapport:   care explained   reassurance provided   thoughts/feelings acknowledged   Goal Outcome Evaluation:

## 2023-03-27 NOTE — DISCHARGE SUMMARY
[facility]         HOSPITALIST  DISCHARGE SUMMARY    Patient Name: Darnell Geller  : 1945  MRN: [id number]    Date of Admission: 3/18/2023  Date of Discharge:  3/27/2023  Primary Care Physician: Jeanette Prieto MD    Consults     Date and Time Order Name Status Description    3/19/2023  2:13 PM Inpatient Cardiology Consult      3/18/2023  9:09 PM Inpatient Pulmonology Consult      3/18/2023  6:04 PM Inpatient Hospitalist Consult            Active and Resolved Hospital Problems:  Septic shock secondary to Klebsiella oxytoca bacteremia suspect from urinary source from straight catheterizations  Acute renal failure suspect secondary to septic shock with hypovolemia  (no prior baseline creatinine available for comparison)    Acute lactic and metabolic encephalopathy  New onset A-fib/a flutter with 2 1 conduction  CAD with prior PCI in   BPH with retention requiring outpatient self-catheterizations  Diarrhea, C. difficile negative  Metabolic acidosis question secondary to diarrhea  DM2 with peripheral neuropathy  Hyperlipidemia    Hospital Course     Hospital Course:  77 y.o. CAD and history of PCI in , hypertension, DM 2 and history of BPH recently switched outpatient intermittent urinary catheterizations who presents with worsening mental status and weakness.  Patient found to have sepsis with shock secondary to Klebsiella bacteremia suspected secondary to urinary source.  Hospital course complicated by acute renal failure on admission, and narrow complex SVT to 150s-cardiology assisted with evaluation, after adenosine appears to be A-fib/a flutter with 2-1 conduction.  Patient states complicated by acute urinary retention requiring Muñiz catheter placement.  Patient will be discharged home with Muñiz catheter, he should follow-up with urology in 1 to 2 weeks following discharge for voiding trial when acute issues resolved.  Patient is being discharged with oral  diuretics.  Patient should follow-up with cardiology in 1 week, patient underwent 6-minute walk test which was significant for hypoxemia, patient will be discharged on oxygen, hopefully this will be able to be weaned off as patient continues to improve.  Patient will be discharged with an additional 7 days of antibiotic therapy.  Patient is discharged home today in stable condition with the above follow-ups.    Day of Discharge     Vital Signs:  Temp:  [97.3 °F (36.3 °C)-98.2 °F (36.8 °C)] 97.3 °F (36.3 °C)  Heart Rate:  [] 87  Resp:  [16-20] 16  BP: (118-135)/(62-91) 133/73  Flow (L/min):  [2] 2    Physical Exam:   General appearance: NAD, conversant   Eyes: anicteric sclerae, moist conjunctivae; no lid-lag; PERRLA  HENT: Atraumatic; oropharynx clear with moist mucous membranes and no mucosal ulcerations; normal hard and soft palate  Neck: Trachea midline; FROM, supple, no thyromegaly or lymphadenopathy  Lungs: CTA, with normal respiratory effort and no intercostal retractions  CV: Regular Rate and Rhythm, no Murmurs, Rubs, or Gallops   Abdomen: Soft, non-tender; no masses or Heptosplenomegally  Extremities: No peripheral edema or extremity lymphadenopathy  Skin: Normal temperature, turgor and texture; no rash, ulcers or subcutaneous nodules  Psych: Appropriate affect, alert and oriented to person, place and time  Neuro: CN II - XII intact no motor deficits, no sensory defecits    Discharge Details        Discharge Medications      New Medications      Instructions Start Date   amoxicillin-clavulanate 875-125 MG per tablet  Commonly known as: Augmentin   1 tablet, Oral, 2 Times Daily      apixaban 5 MG tablet tablet  Commonly known as: ELIQUIS   5 mg, Oral, Every 12 Hours Scheduled      furosemide 40 MG tablet  Commonly known as: Lasix   40 mg, Oral, 2 Times Daily      traZODone 50 MG tablet  Commonly known as: DESYREL   50 mg, Oral, Nightly         Changes to Medications      Instructions Start Date    Metoprolol Tartrate 75 MG tablet  What changed:   · medication strength  · how much to take  · when to take this   150 mg, Oral, Every 12 Hours Scheduled         Continue These Medications      Instructions Start Date   aspirin 81 MG chewable tablet   81 mg, Oral, Daily      atorvastatin 40 MG tablet  Commonly known as: LIPITOR   40 mg, Oral, Daily      cholecalciferol 10 MCG (400 UNIT) tablet  Commonly known as: VITAMIN D3   400 Units, Oral, Daily      docusate sodium 250 MG capsule  Commonly known as: COLACE   250 mg, Oral, 2 Times Daily      empagliflozin 25 MG tablet tablet  Commonly known as: JARDIANCE   12.5 mg, Oral, Daily      finasteride 5 MG tablet  Commonly known as: PROSCAR   5 mg, Oral, Daily      glipizide 10 MG tablet  Commonly known as: GLUCOTROL   20 mg, Oral, 2 Times Daily Before Meals      omeprazole 20 MG capsule  Commonly known as: priLOSEC   20 mg, Oral, Daily      tamsulosin 0.4 MG capsule 24 hr capsule  Commonly known as: FLOMAX   1 capsule, Oral, Nightly         Stop These Medications    amLODIPine 10 MG tablet  Commonly known as: NORVASC     gabapentin 800 MG tablet  Commonly known as: NEURONTIN     metFORMIN 1000 MG tablet  Commonly known as: GLUCOPHAGE            No Known Allergies    Discharge Disposition:  Home-Health Care Svc    Diet:  Hospital:  Diet Order   Procedures   • Diet: Diabetic Diets; Consistent Carbohydrate; Texture: Regular Texture (IDDSI 7); Fluid Consistency: Thin (IDDSI 0)       Discharge Activity:       CODE STATUS:  Code Status and Medical Interventions:   Ordered at: 03/18/23 2375     Code Status (Patient has no pulse and is not breathing):    CPR (Attempt to Resuscitate)     Medical Interventions (Patient has pulse or is breathing):    Full Support       No future appointments.    Additional Instructions for the Follow-ups that You Need to Schedule     Discharge Follow-up with PCP   As directed       Currently Documented PCP:    Jeanette Prieto MD     PCP Phone Number:    [phone]     Follow Up Details: 3 to 7 days         Discharge Follow-up with Specified Provider: cardiology; 1 Week   As directed      To: cardiology    Follow Up: 1 Week         Discharge Follow-up with Specified Provider: pulmonology; 1 Week   As directed      To: pulmonology    Follow Up: 1 Week         Discharge Follow-up with Specified Provider: urology; 2 Weeks   As directed      To: urology    Follow Up: 2 Weeks               Pertinent  and/or Most Recent Results     IMAGING:  Adult Transthoracic Echo Complete W/ Cont if Necessary Per Protocol    Result Date: 3/20/2023  •  Technically difficult study. •  Left ventricular ejection fraction appears to be 51 - 55%. •  Left ventricular diastolic function is consistent with (grade I) impaired relaxation and age. •  No significant valvular disease.     CT Abdomen Pelvis Without Contrast    Result Date: 3/20/2023  PROCEDURE: CT ABDOMEN PELVIS WO CONTRAST  COMPARISON: None  INDICATIONS: Sepsis,renal failure,pt. self cath's  TECHNIQUE: CT images were created without intravenous contrast.   PROTOCOL:   Standard imaging protocol performed    RADIATION:      Automated exposure control was utilized to minimize radiation dose.  FINDINGS:  Lower chest:  Small bilateral pleural effusions with secondary atelectasis in the lower lobes.  Coronary artery calcification  Organs:  Fine nodular surface contour of the liver.  Mild splenomegaly, 14 cm length.  No urolithiasis.  Moderate bilateral hydroureteronephrosis to the bladder.  Pancreas, adrenal glands, gallbladder have an unremarkable noncontrast appearance  GI tract:  No acute abnormality demonstrated.  Colonic diverticula with no associated inflammation.  Normal appendix  Pelvis:  Muñiz catheter in the urinary bladder which is decompressed.  Bladder wall appears thickened even when accounting for lack of distension, with perivesical fat stranding.  Free fluid in the pelvis.  Presacral edema   Peritoneum/retroperitoneum:  No pneumoperitoneum.  Small ascites in the upper abdomen and pericolic gutters.  Normal caliber atherosclerotic aorta  Bones/soft tissues:  No acute bony abnormality.  Old appearing T11 wedge compression fracture.  Degenerative disease in the lower thoracic and lumbar spine         1. Wall thickening of the urinary bladder with perivesical fat stranding which could be due to cystitis and/or chronic bladder outlet obstruction.  The urinary bladder is currently decompressed by catheter  2. Bilateral hydroureteronephrosis to the urinary bladder presumably caused by back pressure from functional bladder abnormality  3. Probable cirrhosis with splenomegaly and ascites.   4. Small bilateral pleural effusions.   5. Colonic diverticulosis  6. Calcific coronary atherosclerosis       MAE BUCIO MD       Electronically Signed and Approved By: MAE BUCIO MD on 3/20/2023 at 14:10             CT Head Without Contrast    Result Date: 3/20/2023  PROCEDURE: CT HEAD WO CONTRAST  COMPARISON: None.  INDICATIONS: ALTERED MENTAL STATUS.  PROTOCOL:   Standard CT imaging protocol performed.    RADIATION:   Total DLP: 1,144.2 mGy*cm.   MA and/or KV were/was adjusted to minimize radiation dose.    TECHNIQUE:  After obtaining the patient's consent, 146 CT images were obtained without non-ionic intravenous contrast material.  There is some degree of asymmetry in the positioning of the patient the scan field of view which limits assessment.  DISCUSSION:  A routine nonenhanced head CT was performed. No acute brain abnormality is identified.  No acute intracranial hemorrhage.  No acute infarction.  No acute skull fracture.  No midline shift or acute intracranial mass effect is seen.  Moderate chronic small vessel ischemia/infarction is suspected.  There are arterial calcifications.  The extra-axial spaces and the ventricular system are prominent, suggesting central atrophy.  Postoperative changes involve the  imaged paranasal sinuses with chronic mucoperiosteal thickening.  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  There is focal calcification or ossification measuring about 2 cm in anteroposterior extent involving the [id number] left ethmoidal/nasal cavity postoperative defect.  It may be related to partially calcified or ossified polyp.  Osteoma is possible.  Heterotopic bone formation cannot be excluded.  The finding is thought less likely to represent a malignant process.  Please correlate clinically.  Correlation with any available relevant outside imaging studies may be helpful.  There is possible superimposed age-indeterminate sinus disease, especially involving the left, greater than right, maxillary antra and the right sphenoid locule.  Degenerative changes involve the temporomandibular joints (TMJs) and the partially imaged cervical spine.  The imaged middle ear clefts (that is, the bilateral mastoid air cell complexes, bilateral middle ear cavities, and bilateral external auditory canals) are well aerated.  No definite acute orbital findings.  Normal fat attenuation is seen in the bilateral pterygopalatine fossae (PPF).          1. No acute brain abnormality is seen.  No definite acute infarct.  No acute intracranial hemorrhage.  No midline shift or acute intracranial herniation syndrome.   2. Moderate chronic small vessel ischemia/infarction is suggested.   3. There is suspected central atrophy.   4. Chronic sinus disease is suggested.  There is possible sinonasal polyposis.  Postoperative changes involve the paranasal sinuses.   5. Please correlate clinically.   Please note that portions of this note were completed with a voice recognition program.  SENAIT PAINTING JR, MD     Electronically Signed and Approved By: SENAIT PAINTING JR, MD on 3/20/2023 at 23:01         XR Chest 1 View    Result Date: 3/21/2023  PROCEDURE: XR CHEST 1 VW  COMPARISON: 3/18/2023, 1657 hours.  INDICATIONS: 77-year-old male w/ h/o  increased oxygen needs; shortness of breath; imaging follow-up.  FINDINGS: A single AP upright portable chest radiograph is provided for review.  Increased infiltrates are seen bilaterally since 3/18/2023.  The findings may represent pulmonary edema.  Infectious multifocal pneumonia is possible.  There may be borderline cardiac enlargement.  External artifacts obscure detail.  No pneumothorax is seen.  A tiny right pleural effusion may be present.  Minimal, if any, left pleural effusion is suggested.  There are degenerative changes of the imaged spine.       Worsening progression is suggested radiographically with increased infiltrates bilaterally since 3/18/2023 at 1657 hours.      Please note that portions of this note were completed with a voice recognition program.  SENAIT PAINTING JR, MD       Electronically Signed and Approved By: SENAIT PAINTING JR, MD on 3/21/2023 at 5:24              XR Chest 1 View    Result Date: 3/18/2023  PROCEDURE: XR CHEST 1 VW  COMPARISON: None  INDICATIONS: Severe Sepsis triage protocol  FINDINGS:  The heart size is prominent.  There are patchy interstitial densities in the lungs.  This could be due to an atypical infection or low-grade pulmonary interstitial edema.  There is no pleural effusion or pneumothorax.  There are chronic age-related changes involving the bony thorax and the thoracic aorta.        1. Prominent heart size. 2. Patchy increased interstitial densities which could be due to an atypical infection or low-grade pulmonary interstitial edema.       PRASANNA MEDRANO MD       Electronically Signed and Approved By: PRASANNA MEDRANO MD on 3/18/2023 at 17:08               LAB RESULTS:      Lab 03/27/23  0414 03/26/23  0423 03/25/23  0431 03/24/23  0432 03/23/23  0418   WBC 9.45 11.16* 9.44 8.01 9.73   HEMOGLOBIN 9.8* 11.2* 11.6* 10.1* 10.8*   HEMATOCRIT 30.4* 34.0* 35.1* 30.7* 32.1*   PLATELETS 210 240 218 187 169   NEUTROS ABS 6.81 8.31* 7.02* 5.58 6.94   IMMATURE GRANS (ABS) 0.09*  0.10* 0.16* 0.10* 0.10*   LYMPHS ABS 1.55 1.67 1.30 1.17 1.17   MONOS ABS 0.78 0.77 0.66 0.81 1.09*   EOS ABS 0.18 0.27 0.27 0.33 0.41*   MCV 88.9 87.9 88.2 87.2 86.5         Lab 03/27/23  0500 03/26/23  0423 03/25/23  0431 03/24/23  0432 03/23/23  0418   SODIUM 133* 138 137 137 136   POTASSIUM 3.4* 3.8 3.2* 3.5 3.5   CHLORIDE 95* 98 97* 100 100   CO2 25.2 28.0 26.7 25.0 25.6   ANION GAP 12.8 12.0 13.3 12.0 10.4   BUN 36* 41* 47* 49* 50*   CREATININE 1.80* 2.05* 2.20* 2.41* 2.66*   EGFR 38.1* 32.6* 29.9* 26.8* 23.8*   GLUCOSE 206* 236* 259* 214* 225*   CALCIUM 8.3* 8.5* 8.5* 8.5* 8.8   MAGNESIUM 1.5* 1.6 1.4* 1.5* 1.8   PHOSPHORUS 4.1 3.9 4.1 3.9 3.4         Lab 03/27/23  0500 03/26/23  0423 03/25/23  0431 03/24/23  0432 03/23/23  0418   TOTAL PROTEIN 6.9 7.8 7.3 6.7 6.7   ALBUMIN 2.3* 2.7* 2.5* 2.3* 2.5*   GLOBULIN 4.6 5.1 4.8 4.4 4.2   ALT (SGPT) 25 32 35 36 47*   AST (SGOT) 22 27 28 26 38   BILIRUBIN 0.4 0.4 0.4 0.4 0.3   ALK PHOS 267* 332* 312* 255* 272*         Lab 03/21/23  0234   PROBNP 19,749.0*                 Brief Urine Lab Results  (Last result in the past 365 days)      Color   Clarity   Blood   Leuk Est   Nitrite   Protein   CREAT   Urine HCG        03/18/23 1726 Yellow   Turbid   Moderate (2+)   Large (3+)   Negative   100 mg/dL (2+)               Microbiology Results (last 10 days)     Procedure Component Value - Date/Time    Clostridioides difficile Toxin - Stool, Per Rectum [[id number]] Collected: 03/19/23 0623    Lab Status: Final result Specimen: Stool from Per Rectum Updated: 03/19/23 0719    Narrative:      The following orders were created for panel order Clostridioides difficile Toxin - Stool, Per Rectum.  Procedure                               Abnormality         Status                     ---------                               -----------         ------                     Clostridioides difficile...[[id number]]                      Final result                 Please view results for these  tests on the individual orders.    Enteric Bacterial Panel - Stool, Per Rectum [[id number]]  (Normal) Collected: 03/19/23 0623    Lab Status: Final result Specimen: Stool from Per Rectum Updated: 03/19/23 1551     Salmonella Not Detected     Campylobacter Not Detected     Shigella/Enteroinvasive E. coli (EIEC) Not Detected     Shiga-like toxin-producing E. coli (STEC) stx1/stx2 Not Detected    Clostridioides difficile Toxin, PCR - Stool, Per Rectum [[id number]] Collected: 03/19/23 0623    Lab Status: Final result Specimen: Stool from Per Rectum Updated: 03/19/23 0719     C. Difficile Toxins by PCR Negative     027 Toxin Presumptive Negative    MRSA Screen, PCR (Inpatient) - Swab, Nares [[id number]]  (Normal) Collected: 03/18/23 2155    Lab Status: Final result Specimen: Swab from Nares Updated: 03/18/23 2344     MRSA PCR No MRSA Detected    Narrative:      The negative predictive value of this diagnostic test is high and should only be used to consider de-escalating anti-MRSA therapy. A positive result may indicate colonization with MRSA and must be correlated clinically.    Urine Culture - Urine, Urine, Catheter [[id number]]  (Abnormal)  (Susceptibility) Collected: 03/18/23 1726    Lab Status: Edited Result - FINAL Specimen: Urine, Catheter Updated: 03/21/23 1007     Urine Culture >100,000 CFU/mL Klebsiella oxytoca    Narrative:      Colonization of the urinary tract without infection is common. Treatment is discouraged unless the patient is symptomatic, pregnant, or undergoing an invasive urologic procedure.    Susceptibility      Klebsiella oxytoca      DEBRA      Amoxicillin + Clavulanate Susceptible (C)  [1]       Ampicillin Resistant     Ampicillin + Sulbactam Susceptible      Cefazolin Susceptible      Cefepime Susceptible      Ceftazidime Susceptible      Ceftriaxone Susceptible      Gentamicin Susceptible      Levofloxacin Susceptible      Nitrofurantoin Susceptible      Piperacillin + Tazobactam Susceptible       Trimethoprim + Sulfamethoxazole Susceptible                   [1]  Appended report. These results have been appended to a previously final verified report.             Susceptibility Comments     Klebsiella oxytoca    Amox/Clav requested by Waqas Corbett 3/21             Blood Culture - Blood, Arm, Right [[id number]]  (Abnormal) Collected: 03/18/23 1642    Lab Status: Final result Specimen: Blood from Arm, Right Updated: 03/21/23 0630     Blood Culture Klebsiella oxytoca     Isolated from Anaerobic Bottle     Gram Stain Anaerobic Bottle Gram negative bacilli    Narrative:      Refer to previous blood culture collected on 3/18       Blood Culture - Blood, Arm, Left [[id number]]  (Abnormal)  (Susceptibility) Collected: 03/18/23 1642    Lab Status: Edited Result - FINAL Specimen: Blood from Arm, Left Updated: 03/21/23 1006     Blood Culture Klebsiella oxytoca     Isolated from Aerobic and Anaerobic Bottles     Gram Stain Aerobic Bottle Gram negative bacilli     Comment: Modified report. Previous result was Moderate (3+) Gram negative bacilli on 3/19/2023 at 0807 EDT.         Anaerobic Bottle Gram negative bacilli    Susceptibility      Klebsiella oxytoca      DEBRA      Amoxicillin + Clavulanate Susceptible (C)  [1]       Ampicillin Resistant     Ampicillin + Sulbactam Susceptible      Cefepime Susceptible      Ceftazidime Susceptible      Ceftriaxone Susceptible      Gentamicin Susceptible      Levofloxacin Susceptible      Piperacillin + Tazobactam Susceptible      Trimethoprim + Sulfamethoxazole Susceptible                   [1]  Appended report. These results have been appended to a previously final verified report.             Susceptibility Comments     Klebsiella oxytoca    Amox/Clav requested by Waqas Corbett 3/21  Cefazolin sensitivity will not be reported for Enterobacteriaceae in non-urine isolates. If cefazolin is preferred, please call the microbiology lab to request an E-test.  With the exception of  urinary-sourced infections, aminoglycosides should not be used as monotherapy.             Blood Culture ID, PCR - Blood, Arm, Left [[id number]]  (Abnormal) Collected: 03/18/23 1642    Lab Status: Final result Specimen: Blood from Arm, Left Updated: 03/19/23 0927     BCID, PCR Klebsiella oxytoca. Identification by BCID2 PCR    Narrative:      No resistance genes detected.            Results for orders placed during the hospital encounter of 03/18/23    Adult Transthoracic Echo Complete W/ Cont if Necessary Per Protocol    Interpretation Summary  •  Technically difficult study.  •  Left ventricular ejection fraction appears to be 51 - 55%.  •  Left ventricular diastolic function is consistent with (grade I) impaired relaxation and age.  •  No significant valvular disease.      Time spent on Discharge including face to face service: Greater than 30 minutes      Electronically signed by Konrad Morales MD, 03/27/23, 11:51 AM EDT.

## 2023-03-28 NOTE — OUTREACH NOTE
Prep Survey    Flowsheet Row Responses   [facility] facility patient discharged from? [facility]   Is LACE score < 7 ? No   Eligibility Readm Mgmt   Discharge diagnosis sepsis   Does the patient have one of the following disease processes/diagnoses(primary or secondary)? Sepsis   Does the patient have Home health ordered? No   Is there a DME ordered? No   Prep survey completed? Yes          Becky SMITH - Registered Nurse

## 2023-03-28 NOTE — PROGRESS NOTES
Enter Query Response Below      Query Response: Severe Malnutrition    Electronically signed by Konrad Morales MD, 23, 12:49 PM EDT.             If applicable, please update the problem list.   Patient: Darnell Geller        : 1945  Account: [id number]           Admit Date: 3/18/2023        How to Respond to this query:       a. Click New Note     b. Answer query within the yellow box.                c. Update the Problem List, if applicable.      If you have any questions about this query contact me at:  [email or web address]    ,     The patient was evaluated per protocol by the Registered Dietician 3/24/23 triggered by length of stay noting severe malnutrition evidenced by:     -Severe insufficent energy intake -<75% of established energy requirement for > or equal to 3 months.  -Severe Loss of muscle mass findings to the temple, clavicle bone, dorsal hand, patellar, anterior thigh, and posterior calf regions.  -Severe subcutaneous fat loss findings to the orbital and upper arm regions.     -Nutrition Intervention:  Magic Cup @ dinner.  -Monitor:  Per protocol, PO intake, Supplement intake, Weight    Please clarify if the patient was treated monitored for:  Severe Malnutrition  Other ____________________  Unable to further clarify    By submitting this query, we are merely seeking further clarification of documentation to accurately reflect all conditions that you are monitoring, evaluating, treating or that extend the hospitalization or utilize additional resources of care. Please utilize your independent clinical judgment when addressing the question(s) above.     This query and your response, once completed, will be entered into the legal medical record.    Sincerely,  Betty TORRES, RN, CDIS  Clinical Documentation Integrity Program   [email or web address]

## 2023-03-29 ENCOUNTER — READMISSION MANAGEMENT (OUTPATIENT)
Dept: CALL CENTER | Facility: HOSPITAL | Age: 78
End: 2023-03-29
Payer: OTHER GOVERNMENT

## 2023-03-29 LAB — QT INTERVAL: 299 MS

## 2023-03-29 NOTE — OUTREACH NOTE
Sepsis Week 1 Survey    Flowsheet Row Responses   [facility] patient discharged from? [facility]   Does the patient have one of the following disease processes/diagnoses(primary or secondary)? Sepsis   Week 1 attempt successful? Yes   Call start time 1541   Call end time 1546   Discharge diagnosis sepsis   Person spoke with today (if not patient) and relationship spouse   Meds reviewed with patient/caregiver? Yes   Is the patient having any side effects they believe may be caused by any medication additions or changes? No   Does the patient have all medications related to this admission filled (includes all antibiotics, inhalers, nebulizers,steroids,etc.) Yes   Is the patient taking all medications as directed (includes completed medication regime)? Yes   Comments regarding appointments Cardiology on 04/10   Does the patient have a primary care provider?  Yes   Comments regarding PCP PCP is [facility].  Pt will see on 04/18   Does the patient have an appointment with their PCP within 7 days of discharge? Yes   Has the patient kept scheduled appointments due by today? Yes   What is the Home health agency?  [facility]   Has home health visited the patient within 72 hours of discharge? Yes   Home health comments HH   Did the patient receive a copy of their discharge instructions? Yes   Nursing interventions Reviewed instructions with patient   What is the patient's perception of their health status since discharge? Improving   Is patient/caregiver able to teach back steps to recovery at home? Set small, achievable goals for return to baseline health, Rest and regain strength   Is the patient/caregiver able to teach back the hierarchy of who to call/visit for symptoms/problems? PCP, Specialist, Home health nurse, Urgent Care, ED, 911 Yes   Additional teach back comments spouse is checking BS,BP and O2 saturations daily.  BS has been elevated HH nurse is communicating with PCP   Week 1 call completed? Yes   Wrap up  additional comments spouse reports patient is doing well other than elevated BS.   nurse is communicating with PCP now.  advised if continues to go up, may need to return to ED.  Spouse verbalized understanding.           [place] CHEN - Registered Nurse

## 2023-04-04 LAB — QT INTERVAL: 332 MS

## 2023-04-10 ENCOUNTER — OFFICE VISIT (OUTPATIENT)
Dept: CARDIOLOGY | Facility: CLINIC | Age: 78
End: 2023-04-10
Payer: MEDICARE

## 2023-04-10 ENCOUNTER — LAB REQUISITION (OUTPATIENT)
Dept: LAB | Facility: HOSPITAL | Age: 78
End: 2023-04-10
Payer: MEDICARE

## 2023-04-10 VITALS
HEART RATE: 78 BPM | SYSTOLIC BLOOD PRESSURE: 125 MMHG | WEIGHT: 160.4 LBS | BODY MASS INDEX: 21.73 KG/M2 | HEIGHT: 72 IN | DIASTOLIC BLOOD PRESSURE: 74 MMHG

## 2023-04-10 DIAGNOSIS — I25.10 CORONARY ARTERY DISEASE INVOLVING NATIVE CORONARY ARTERY OF NATIVE HEART WITHOUT ANGINA PECTORIS: ICD-10-CM

## 2023-04-10 DIAGNOSIS — I10 PRIMARY HYPERTENSION: ICD-10-CM

## 2023-04-10 DIAGNOSIS — I48.19 PERSISTENT ATRIAL FIBRILLATION: Primary | ICD-10-CM

## 2023-04-10 DIAGNOSIS — E78.2 MIXED HYPERLIPIDEMIA: ICD-10-CM

## 2023-04-10 DIAGNOSIS — R82.998 OTHER ABNORMAL FINDINGS IN URINE: ICD-10-CM

## 2023-04-10 LAB
AMORPH URATE CRY URNS QL MICRO: ABNORMAL /HPF
BACTERIA UR QL AUTO: ABNORMAL /HPF
BILIRUB UR QL STRIP: NEGATIVE
CLARITY UR: ABNORMAL
COLOR UR: YELLOW
GLUCOSE UR STRIP-MCNC: ABNORMAL MG/DL
HGB UR QL STRIP.AUTO: ABNORMAL
HYALINE CASTS UR QL AUTO: ABNORMAL /LPF
KETONES UR QL STRIP: NEGATIVE
LEUKOCYTE ESTERASE UR QL STRIP.AUTO: ABNORMAL
NITRITE UR QL STRIP: NEGATIVE
PH UR STRIP.AUTO: 6.5 [PH] (ref 5–8)
PROT UR QL STRIP: ABNORMAL
RBC # UR STRIP: ABNORMAL /HPF
REF LAB TEST METHOD: ABNORMAL
SP GR UR STRIP: 1.01 (ref 1–1.03)
SQUAMOUS #/AREA URNS HPF: ABNORMAL /HPF
UROBILINOGEN UR QL STRIP: ABNORMAL
WBC # UR STRIP: ABNORMAL /HPF
WBC CLUMPS # UR AUTO: ABNORMAL /HPF

## 2023-04-10 PROCEDURE — 87086 URINE CULTURE/COLONY COUNT: CPT | Performed by: INTERNAL MEDICINE

## 2023-04-10 PROCEDURE — 81001 URINALYSIS AUTO W/SCOPE: CPT | Performed by: INTERNAL MEDICINE

## 2023-04-10 PROCEDURE — 87077 CULTURE AEROBIC IDENTIFY: CPT | Performed by: INTERNAL MEDICINE

## 2023-04-10 PROCEDURE — 87186 SC STD MICRODIL/AGAR DIL: CPT | Performed by: INTERNAL MEDICINE

## 2023-04-10 RX ORDER — LISINOPRIL 40 MG/1
40 TABLET ORAL DAILY
COMMUNITY

## 2023-04-10 NOTE — PROGRESS NOTES
Chief Complaint  Atrial Fibrillation    Subjective        Darnell Geller presents to [facility] CARDIOLOGY  History of present illness:    Patient states since getting out of the hospital he has been overall doing pretty well.  He has not had any cigarettes.  He is drinking more water and limiting his caffeine.  He notes some slight hemorrhoidal bleeding but with treatment this has improved.  He denies any palpitations.      Past Medical History:   Diagnosis Date   • Arthritis    • Benign prostatic hyperplasia    • Coronary artery disease    • Hyperlipidemia    • Hypertension          Past Surgical History:   Procedure Laterality Date   • SINUS SURGERY            Social History     Socioeconomic History   • Marital status:    Tobacco Use   • Smoking status: Former     Packs/day: 1.50     Years: 40.00     Pack years: 60.00     Types: Cigarettes   • Smokeless tobacco: Never   Vaping Use   • Vaping Use: Never used   Substance and Sexual Activity   • Alcohol use: Not Currently   • Drug use: Never   • Sexual activity: Defer         History reviewed. No pertinent family history.       No Known Allergies         Current Outpatient Medications:   •  apixaban (ELIQUIS) 5 MG tablet tablet, Take 1 tablet by mouth Every 12 (Twelve) Hours for 30 days. Indications: Atrial Fibrillation, Disp: 60 tablet, Rfl: 0  •  aspirin 81 MG chewable tablet, Chew 1 tablet Daily., Disp: , Rfl:   •  atorvastatin (LIPITOR) 40 MG tablet, Take 1 tablet by mouth Daily., Disp: , Rfl:   •  cholecalciferol (VITAMIN D3) 10 MCG (400 UNIT) tablet, Take 1 tablet by mouth Daily., Disp: , Rfl:   •  docusate sodium (COLACE) 250 MG capsule, Take 1 capsule by mouth 2 (Two) Times a Day., Disp: , Rfl:   •  finasteride (PROSCAR) 5 MG tablet, Take 1 tablet by mouth Daily., Disp: , Rfl:   •  furosemide (Lasix) 40 MG tablet, Take 1 tablet by mouth 2 (Two) Times a Day for 30 days., Disp: 60 tablet, Rfl: 0  •  glipizide (GLUCOTROL) 10 MG tablet,  "Take 2 tablets by mouth 2 (Two) Times a Day Before Meals., Disp: , Rfl:   •  lisinopril (PRINIVIL,ZESTRIL) 40 MG tablet, Take 1 tablet by mouth Daily., Disp: , Rfl:   •  metoprolol tartrate 75 MG tablet, Take 150 mg by mouth Every 12 (Twelve) Hours for 30 days., Disp: 60 tablet, Rfl: 0  •  omeprazole (priLOSEC) 20 MG capsule, Take 1 capsule by mouth Daily., Disp: , Rfl:   •  tamsulosin (FLOMAX) 0.4 MG capsule 24 hr capsule, Take 1 capsule by mouth Every Night., Disp: , Rfl:   •  traZODone (DESYREL) 50 MG tablet, Take 1 tablet by mouth Every Night for 30 days., Disp: 30 tablet, Rfl: 0      ROS:  Cardiac review of systems negative.    Objective     /74   Pulse 78   Ht 182.9 cm (72\")   Wt 72.8 kg (160 lb 6.4 oz)   BMI 21.75 kg/m²       General Appearance:   · well developed  · well nourished  HENT:   · oropharynx moist  · lips not cyanotic  Respiratory:  · no respiratory distress  · normal breath sounds  · no rales  Cardiovascular:  · no jugular venous distention  · regular rhythm  · S1 normal, S2 normal  · no S3, no S4   · no murmur  · no rub, no thrill  · No carotid bruit  · pedal pulses normal  · lower extremity edema: none    Musculoskeletal:  · no clubbing of fingers.   · normocephalic, head atraumatic  Skin:   · warm, dry  Psychiatric:  · judgement and insight appropriate  · normal mood and affect    ECHO:  Results for orders placed during the hospital encounter of 03/18/23    Adult Transthoracic Echo Complete W/ Cont if Necessary Per Protocol    Interpretation Summary  •  Technically difficult study.  •  Left ventricular ejection fraction appears to be 51 - 55%.  •  Left ventricular diastolic function is consistent with (grade I) impaired relaxation and age.  •  No significant valvular disease.    STRESS:    CATH:  No results found for this or any previous visit.    BMP:   Glucose   Date Value Ref Range Status   03/27/2023 206 (H) 65 - 99 mg/dL Final     BUN   Date Value Ref Range Status   03/27/2023 " 36 (H) 8 - 23 mg/dL Final     Creatinine   Date Value Ref Range Status   03/27/2023 1.80 (H) 0.76 - 1.27 mg/dL Final     Sodium   Date Value Ref Range Status   03/27/2023 133 (L) 136 - 145 mmol/L Final     Potassium   Date Value Ref Range Status   03/27/2023 3.4 (L) 3.5 - 5.2 mmol/L Final     Comment:     Slight hemolysis detected by analyzer. Results may be affected.     Chloride   Date Value Ref Range Status   03/27/2023 95 (L) 98 - 107 mmol/L Final     CO2   Date Value Ref Range Status   03/27/2023 25.2 22.0 - 29.0 mmol/L Final     Calcium   Date Value Ref Range Status   03/27/2023 8.3 (L) 8.6 - 10.5 mg/dL Final     BUN/Creatinine Ratio   Date Value Ref Range Status   03/27/2023 20.0 7.0 - 25.0 Final     Anion Gap   Date Value Ref Range Status   03/27/2023 12.8 5.0 - 15.0 mmol/L Final     eGFR   Date Value Ref Range Status   03/27/2023 38.1 (L) >60.0 mL/min/1.73 Final     LIPIDS:  No results found for: CHOL, TRIG, HDL, LDL, VLDL, LDLHDL      Procedures             ASSESSMENT:  Diagnoses and all orders for this visit:    1. Persistent atrial fibrillation (Primary)    2. Coronary artery disease involving native coronary artery of native heart without angina pectoris    3. Mixed hyperlipidemia    4. Primary hypertension         PLAN:    1.  Continue the Eliquis.  He is also on the aspirin for the history of heart attack back in 2004 with stent placement.  2.  Continue the Lipitor.  3.  Blood pressure is under good control.  4.  Patient's heart rate is under good control and appears to continue to be in atrial fibrillation at this time.  He is on the metoprolol 150 mg twice daily.  He notes no palpitations.  We will just plan on rate control.  5.  Patient had an echocardiogram when he was recently in the hospital for urosepsis and acute renal failure that showed normal ejection fraction and no significant valvular disease.  6.  Patient is going to call his primary care as they have noted the urine in his Muñiz  catheter to be more cloudy.  7.  Overall from a cardiac standpoint I think the patient is doing well and his modifiable risk factors are under excellent control.      Return in about 3 months (around 7/10/2023).     Patient was given instructions and counseling regarding his condition or for health maintenance advice. Please see specific information pulled into the AVS if appropriate.         Hayden Long MD   4/10/2023  12:11 EDT

## 2023-04-12 LAB — BACTERIA SPEC AEROBE CULT: ABNORMAL

## 2023-08-05 ENCOUNTER — APPOINTMENT (OUTPATIENT)
Dept: CT IMAGING | Facility: HOSPITAL | Age: 78
DRG: 698 | End: 2023-08-05
Payer: OTHER GOVERNMENT

## 2023-08-05 ENCOUNTER — APPOINTMENT (OUTPATIENT)
Dept: GENERAL RADIOLOGY | Facility: HOSPITAL | Age: 78
DRG: 698 | End: 2023-08-05
Payer: OTHER GOVERNMENT

## 2023-08-05 ENCOUNTER — HOSPITAL ENCOUNTER (INPATIENT)
Facility: HOSPITAL | Age: 78
LOS: 3 days | Discharge: HOME HEALTH | DRG: 698 | End: 2023-08-08
Attending: EMERGENCY MEDICINE | Admitting: INTERNAL MEDICINE
Payer: OTHER GOVERNMENT

## 2023-08-05 DIAGNOSIS — R13.12 DYSPHAGIA, OROPHARYNGEAL: ICD-10-CM

## 2023-08-05 DIAGNOSIS — A41.9 SEPSIS, DUE TO UNSPECIFIED ORGANISM, UNSPECIFIED WHETHER ACUTE ORGAN DYSFUNCTION PRESENT: ICD-10-CM

## 2023-08-05 DIAGNOSIS — N39.0 URINARY TRACT INFECTION ASSOCIATED WITH INDWELLING URETHRAL CATHETER, INITIAL ENCOUNTER: Primary | ICD-10-CM

## 2023-08-05 DIAGNOSIS — R26.2 DIFFICULTY IN WALKING: ICD-10-CM

## 2023-08-05 DIAGNOSIS — T83.511A URINARY TRACT INFECTION ASSOCIATED WITH INDWELLING URETHRAL CATHETER, INITIAL ENCOUNTER: Primary | ICD-10-CM

## 2023-08-05 PROBLEM — G93.41 ACUTE METABOLIC ENCEPHALOPATHY: Status: ACTIVE | Noted: 2023-08-05

## 2023-08-05 LAB
ALBUMIN SERPL-MCNC: 3 G/DL (ref 3.5–5.2)
ALBUMIN/GLOB SERPL: 0.8 G/DL
ALP SERPL-CCNC: 210 U/L (ref 39–117)
ALT SERPL W P-5'-P-CCNC: 32 U/L (ref 1–41)
AMMONIA BLD-SCNC: 20 UMOL/L (ref 16–60)
ANION GAP SERPL CALCULATED.3IONS-SCNC: 12 MMOL/L (ref 5–15)
AST SERPL-CCNC: 30 U/L (ref 1–40)
BACTERIA UR QL AUTO: ABNORMAL /HPF
BASOPHILS # BLD AUTO: 0.03 10*3/MM3 (ref 0–0.2)
BASOPHILS NFR BLD AUTO: 0.2 % (ref 0–1.5)
BILIRUB SERPL-MCNC: 0.5 MG/DL (ref 0–1.2)
BILIRUB UR QL STRIP: NEGATIVE
BUN SERPL-MCNC: 28 MG/DL (ref 8–23)
BUN/CREAT SERPL: 13.4 (ref 7–25)
CALCIUM SPEC-SCNC: 8.2 MG/DL (ref 8.6–10.5)
CHLORIDE SERPL-SCNC: 98 MMOL/L (ref 98–107)
CLARITY UR: ABNORMAL
CO2 SERPL-SCNC: 23 MMOL/L (ref 22–29)
COLOR UR: YELLOW
CREAT SERPL-MCNC: 2.09 MG/DL (ref 0.76–1.27)
D-LACTATE SERPL-SCNC: 0.9 MMOL/L (ref 0.5–2)
DEPRECATED RDW RBC AUTO: 57.9 FL (ref 37–54)
EGFRCR SERPLBLD CKD-EPI 2021: 31.6 ML/MIN/1.73
EOSINOPHIL # BLD AUTO: 0.18 10*3/MM3 (ref 0–0.4)
EOSINOPHIL NFR BLD AUTO: 1.4 % (ref 0.3–6.2)
ERYTHROCYTE [DISTWIDTH] IN BLOOD BY AUTOMATED COUNT: 18.3 % (ref 12.3–15.4)
GLOBULIN UR ELPH-MCNC: 4 GM/DL
GLUCOSE BLDC GLUCOMTR-MCNC: 194 MG/DL (ref 70–99)
GLUCOSE BLDC GLUCOMTR-MCNC: 64 MG/DL (ref 70–99)
GLUCOSE BLDC GLUCOMTR-MCNC: 89 MG/DL (ref 70–99)
GLUCOSE SERPL-MCNC: 86 MG/DL (ref 65–99)
GLUCOSE UR STRIP-MCNC: ABNORMAL MG/DL
HCT VFR BLD AUTO: 26.4 % (ref 37.5–51)
HGB BLD-MCNC: 8.8 G/DL (ref 13–17.7)
HGB UR QL STRIP.AUTO: ABNORMAL
HOLD SPECIMEN: NORMAL
HOLD SPECIMEN: NORMAL
HYALINE CASTS UR QL AUTO: ABNORMAL /LPF
IMM GRANULOCYTES # BLD AUTO: 0.05 10*3/MM3 (ref 0–0.05)
IMM GRANULOCYTES NFR BLD AUTO: 0.4 % (ref 0–0.5)
KETONES UR QL STRIP: NEGATIVE
LEUKOCYTE ESTERASE UR QL STRIP.AUTO: ABNORMAL
LYMPHOCYTES # BLD AUTO: 1.36 10*3/MM3 (ref 0.7–3.1)
LYMPHOCYTES NFR BLD AUTO: 10.7 % (ref 19.6–45.3)
MAGNESIUM SERPL-MCNC: 1.8 MG/DL (ref 1.6–2.4)
MCH RBC QN AUTO: 29.1 PG (ref 26.6–33)
MCHC RBC AUTO-ENTMCNC: 33.3 G/DL (ref 31.5–35.7)
MCV RBC AUTO: 87.4 FL (ref 79–97)
MONOCYTES # BLD AUTO: 1.22 10*3/MM3 (ref 0.1–0.9)
MONOCYTES NFR BLD AUTO: 9.6 % (ref 5–12)
NEUTROPHILS NFR BLD AUTO: 77.7 % (ref 42.7–76)
NEUTROPHILS NFR BLD AUTO: 9.88 10*3/MM3 (ref 1.7–7)
NITRITE UR QL STRIP: NEGATIVE
NRBC BLD AUTO-RTO: 0 /100 WBC (ref 0–0.2)
PH UR STRIP.AUTO: 7 [PH] (ref 5–8)
PLATELET # BLD AUTO: 166 10*3/MM3 (ref 140–450)
PMV BLD AUTO: 9.5 FL (ref 6–12)
POTASSIUM SERPL-SCNC: 3.1 MMOL/L (ref 3.5–5.2)
PROCALCITONIN SERPL-MCNC: 0.86 NG/ML (ref 0–0.25)
PROT SERPL-MCNC: 7 G/DL (ref 6–8.5)
PROT UR QL STRIP: ABNORMAL
RBC # BLD AUTO: 3.02 10*6/MM3 (ref 4.14–5.8)
RBC # UR STRIP: ABNORMAL /HPF
REF LAB TEST METHOD: ABNORMAL
SODIUM SERPL-SCNC: 133 MMOL/L (ref 136–145)
SP GR UR STRIP: 1.01 (ref 1–1.03)
SQUAMOUS #/AREA URNS HPF: ABNORMAL /HPF
TROPONIN T SERPL HS-MCNC: 44 NG/L
TSH SERPL DL<=0.05 MIU/L-ACNC: 1.08 UIU/ML (ref 0.27–4.2)
UROBILINOGEN UR QL STRIP: ABNORMAL
WBC # UR STRIP: ABNORMAL /HPF
WBC NRBC COR # BLD: 12.72 10*3/MM3 (ref 3.4–10.8)
WHOLE BLOOD HOLD COAG: NORMAL
WHOLE BLOOD HOLD SPECIMEN: NORMAL

## 2023-08-05 PROCEDURE — 87077 CULTURE AEROBIC IDENTIFY: CPT | Performed by: INTERNAL MEDICINE

## 2023-08-05 PROCEDURE — 87086 URINE CULTURE/COLONY COUNT: CPT | Performed by: INTERNAL MEDICINE

## 2023-08-05 PROCEDURE — 93010 ELECTROCARDIOGRAM REPORT: CPT | Performed by: INTERNAL MEDICINE

## 2023-08-05 PROCEDURE — 99285 EMERGENCY DEPT VISIT HI MDM: CPT

## 2023-08-05 PROCEDURE — 82948 REAGENT STRIP/BLOOD GLUCOSE: CPT

## 2023-08-05 PROCEDURE — 80053 COMPREHEN METABOLIC PANEL: CPT | Performed by: EMERGENCY MEDICINE

## 2023-08-05 PROCEDURE — 93005 ELECTROCARDIOGRAM TRACING: CPT

## 2023-08-05 PROCEDURE — 93005 ELECTROCARDIOGRAM TRACING: CPT | Performed by: EMERGENCY MEDICINE

## 2023-08-05 PROCEDURE — 83735 ASSAY OF MAGNESIUM: CPT | Performed by: EMERGENCY MEDICINE

## 2023-08-05 PROCEDURE — 63710000001 INSULIN LISPRO (HUMAN) PER 5 UNITS: Performed by: INTERNAL MEDICINE

## 2023-08-05 PROCEDURE — 81001 URINALYSIS AUTO W/SCOPE: CPT | Performed by: EMERGENCY MEDICINE

## 2023-08-05 PROCEDURE — 82746 ASSAY OF FOLIC ACID SERUM: CPT | Performed by: INTERNAL MEDICINE

## 2023-08-05 PROCEDURE — 87040 BLOOD CULTURE FOR BACTERIA: CPT | Performed by: EMERGENCY MEDICINE

## 2023-08-05 PROCEDURE — 82140 ASSAY OF AMMONIA: CPT | Performed by: INTERNAL MEDICINE

## 2023-08-05 PROCEDURE — 0 CEFEPIME PER 500 MG: Performed by: EMERGENCY MEDICINE

## 2023-08-05 PROCEDURE — 36415 COLL VENOUS BLD VENIPUNCTURE: CPT

## 2023-08-05 PROCEDURE — 83605 ASSAY OF LACTIC ACID: CPT | Performed by: EMERGENCY MEDICINE

## 2023-08-05 PROCEDURE — 84145 PROCALCITONIN (PCT): CPT | Performed by: INTERNAL MEDICINE

## 2023-08-05 PROCEDURE — 84484 ASSAY OF TROPONIN QUANT: CPT | Performed by: EMERGENCY MEDICINE

## 2023-08-05 PROCEDURE — 70450 CT HEAD/BRAIN W/O DYE: CPT

## 2023-08-05 PROCEDURE — 85025 COMPLETE CBC W/AUTO DIFF WBC: CPT | Performed by: EMERGENCY MEDICINE

## 2023-08-05 PROCEDURE — 71045 X-RAY EXAM CHEST 1 VIEW: CPT

## 2023-08-05 PROCEDURE — 84443 ASSAY THYROID STIM HORMONE: CPT | Performed by: INTERNAL MEDICINE

## 2023-08-05 PROCEDURE — 93010 ELECTROCARDIOGRAM REPORT: CPT | Performed by: SPECIALIST

## 2023-08-05 PROCEDURE — 87186 SC STD MICRODIL/AGAR DIL: CPT | Performed by: INTERNAL MEDICINE

## 2023-08-05 PROCEDURE — P9612 CATHETERIZE FOR URINE SPEC: HCPCS

## 2023-08-05 RX ORDER — FUROSEMIDE 40 MG/1
40 TABLET ORAL DAILY
COMMUNITY

## 2023-08-05 RX ORDER — ONDANSETRON 2 MG/ML
4 INJECTION INTRAMUSCULAR; INTRAVENOUS EVERY 6 HOURS PRN
Status: DISCONTINUED | OUTPATIENT
Start: 2023-08-05 | End: 2023-08-08 | Stop reason: HOSPADM

## 2023-08-05 RX ORDER — SODIUM CHLORIDE 9 MG/ML
40 INJECTION, SOLUTION INTRAVENOUS AS NEEDED
Status: DISCONTINUED | OUTPATIENT
Start: 2023-08-05 | End: 2023-08-08 | Stop reason: HOSPADM

## 2023-08-05 RX ORDER — NICOTINE POLACRILEX 4 MG
15 LOZENGE BUCCAL
Status: DISCONTINUED | OUTPATIENT
Start: 2023-08-05 | End: 2023-08-08 | Stop reason: HOSPADM

## 2023-08-05 RX ORDER — AMOXICILLIN 250 MG
2 CAPSULE ORAL 2 TIMES DAILY
Status: DISCONTINUED | OUTPATIENT
Start: 2023-08-05 | End: 2023-08-08 | Stop reason: HOSPADM

## 2023-08-05 RX ORDER — INSULIN ASPART 100 [IU]/ML
5 INJECTION, SOLUTION INTRAVENOUS; SUBCUTANEOUS 2 TIMES DAILY WITH MEALS
COMMUNITY

## 2023-08-05 RX ORDER — INSULIN ASPART 100 [IU]/ML
6 INJECTION, SOLUTION INTRAVENOUS; SUBCUTANEOUS
COMMUNITY

## 2023-08-05 RX ORDER — NITROGLYCERIN 0.4 MG/1
0.4 TABLET SUBLINGUAL
Status: DISCONTINUED | OUTPATIENT
Start: 2023-08-05 | End: 2023-08-07

## 2023-08-05 RX ORDER — INSULIN GLARGINE 100 [IU]/ML
25 INJECTION, SOLUTION SUBCUTANEOUS DAILY
COMMUNITY

## 2023-08-05 RX ORDER — INSULIN LISPRO 100 [IU]/ML
2-7 INJECTION, SOLUTION INTRAVENOUS; SUBCUTANEOUS
Status: DISCONTINUED | OUTPATIENT
Start: 2023-08-05 | End: 2023-08-08 | Stop reason: HOSPADM

## 2023-08-05 RX ORDER — BISACODYL 5 MG/1
5 TABLET, DELAYED RELEASE ORAL DAILY PRN
Status: DISCONTINUED | OUTPATIENT
Start: 2023-08-05 | End: 2023-08-08 | Stop reason: HOSPADM

## 2023-08-05 RX ORDER — GABAPENTIN 100 MG/1
200 CAPSULE ORAL 3 TIMES DAILY
COMMUNITY

## 2023-08-05 RX ORDER — BISACODYL 10 MG
10 SUPPOSITORY, RECTAL RECTAL DAILY PRN
Status: DISCONTINUED | OUTPATIENT
Start: 2023-08-05 | End: 2023-08-08 | Stop reason: HOSPADM

## 2023-08-05 RX ORDER — ACETAMINOPHEN 160 MG/5ML
650 SOLUTION ORAL EVERY 4 HOURS PRN
Status: DISCONTINUED | OUTPATIENT
Start: 2023-08-05 | End: 2023-08-08 | Stop reason: HOSPADM

## 2023-08-05 RX ORDER — DEXTROSE MONOHYDRATE 25 G/50ML
25 INJECTION, SOLUTION INTRAVENOUS
Status: DISCONTINUED | OUTPATIENT
Start: 2023-08-05 | End: 2023-08-08 | Stop reason: HOSPADM

## 2023-08-05 RX ORDER — TRAZODONE HYDROCHLORIDE 50 MG/1
50 TABLET ORAL NIGHTLY
COMMUNITY

## 2023-08-05 RX ORDER — SODIUM CHLORIDE 0.9 % (FLUSH) 0.9 %
10 SYRINGE (ML) INJECTION AS NEEDED
Status: DISCONTINUED | OUTPATIENT
Start: 2023-08-05 | End: 2023-08-08 | Stop reason: HOSPADM

## 2023-08-05 RX ORDER — ACETAMINOPHEN 325 MG/1
650 TABLET ORAL EVERY 4 HOURS PRN
Status: DISCONTINUED | OUTPATIENT
Start: 2023-08-05 | End: 2023-08-08 | Stop reason: HOSPADM

## 2023-08-05 RX ORDER — METOPROLOL TARTRATE 50 MG/1
100 TABLET, FILM COATED ORAL 2 TIMES DAILY
COMMUNITY

## 2023-08-05 RX ORDER — SODIUM CHLORIDE 0.9 % (FLUSH) 0.9 %
10 SYRINGE (ML) INJECTION EVERY 12 HOURS SCHEDULED
Status: DISCONTINUED | OUTPATIENT
Start: 2023-08-05 | End: 2023-08-08 | Stop reason: HOSPADM

## 2023-08-05 RX ORDER — POLYETHYLENE GLYCOL 3350 17 G/17G
17 POWDER, FOR SOLUTION ORAL DAILY PRN
Status: DISCONTINUED | OUTPATIENT
Start: 2023-08-05 | End: 2023-08-08 | Stop reason: HOSPADM

## 2023-08-05 RX ORDER — ACETAMINOPHEN 650 MG/1
650 SUPPOSITORY RECTAL EVERY 4 HOURS PRN
Status: DISCONTINUED | OUTPATIENT
Start: 2023-08-05 | End: 2023-08-08 | Stop reason: HOSPADM

## 2023-08-05 RX ADMIN — Medication 10 ML: at 20:24

## 2023-08-05 RX ADMIN — SODIUM CHLORIDE 1000 ML: 9 INJECTION, SOLUTION INTRAVENOUS at 13:44

## 2023-08-05 RX ADMIN — METOPROLOL TARTRATE 2.5 MG: 1 INJECTION, SOLUTION INTRAVENOUS at 20:24

## 2023-08-05 RX ADMIN — SODIUM CHLORIDE 500 ML: 9 INJECTION, SOLUTION INTRAVENOUS at 21:54

## 2023-08-05 RX ADMIN — INSULIN LISPRO 2 UNITS: 100 INJECTION, SOLUTION INTRAVENOUS; SUBCUTANEOUS at 22:02

## 2023-08-05 RX ADMIN — CEFEPIME 2000 MG: 2 INJECTION, POWDER, FOR SOLUTION INTRAVENOUS at 13:48

## 2023-08-05 RX ADMIN — SODIUM CHLORIDE 1000 ML: 9 INJECTION, SOLUTION INTRAVENOUS at 16:23

## 2023-08-05 NOTE — H&P
[facility] HISTORY AND PHYSICAL  Date: 2023   Patient Name: Darnell Geller  : 1945  MRN: [id number]  Primary Care Physician:  Jeanette Prieto MD  Date of admission: 2023    Subjective   Subjective     Chief Complaint: Syncope, altered mental status    HPI:    Darnell Geller is a 78 y.o. male past medical history of BPH, CAD, hypertension abdomens to the emergency department for evaluation of altered mental status.  Patient had a syncopal episode earlier in the day while in the restroom down lasted a few seconds and he was able to regain orientation immediately thereafter no reported seizure-like activity.  Later in the day family noticed that he was disoriented which is not his baseline.  Patient himself currently oriented x3 denies any fevers, chills, sweats, nausea, vomiting, chest pain shortness of breath, palpitations, abdominal pain diarrhea constipation, dysuria, weakness, rash.  He does have a chronic Muñiz in place.  In the emergency department borderline hypotension and has been receiving IV fluid which it appears he has responded to.  He has been started on cefepime as urinalysis is consistent with UTI and will be admitted for ongoing monitoring and management.      Personal History     Past Medical History:  Past Medical History:   Diagnosis Date    Arthritis     Benign prostatic hyperplasia     Coronary artery disease     Hyperlipidemia     Hypertension          Past Surgical History:  Past Surgical History:   Procedure Laterality Date    SINUS SURGERY           Family History:   History reviewed. No pertinent family history.      Social History:   Social History     Tobacco Use    Smoking status: Former     Packs/day: 1.50     Years: 40.00     Pack years: 60.00     Types: Cigarettes    Smokeless tobacco: Never   Vaping Use    Vaping Use: Never used   Substance Use Topics    Alcohol use: Not Currently    Drug use: Never         Home Medications:  Metoprolol  Tartrate, apixaban, aspirin, atorvastatin, cholecalciferol, docusate sodium, finasteride, furosemide, glipizide, insulin glargine, lisinopril, omeprazole, tamsulosin, and traZODone    Allergies:  No Known Allergies    Review of Systems   All systems were reviewed and negative except for: Confusion, syncope    Objective   Objective     Vitals:   Temp:  [99.2 øF (37.3 øC)] 99.2 øF (37.3 øC)  Heart Rate:  [] 121  Resp:  [12] 12  BP: ()/(53-74) 124/71    Physical Exam    Constitutional: Awake, alert, no acute distress   Eyes: Pupils equal, sclerae anicteric, no conjunctival injection   HENT: NCAT, mucous membranes moist   Neck: Supple, no thyromegaly, no lymphadenopathy, trachea midline   Respiratory: Clear to auscultation bilaterally, nonlabored respirations    Cardiovascular: RRR, no murmurs, rubs, or gallops, palpable pedal pulses bilaterally   Gastrointestinal: Positive bowel sounds, soft, nontender, nondistended   Musculoskeletal: No bilateral ankle edema, no clubbing or cyanosis to extremities   Psychiatric: Appropriate affect, cooperative   Neurologic: Oriented x 3, strength symmetric in all extremities, Cranial Nerves grossly intact to confrontation, speech clear   Skin: No rashes     Result Review    Result Review:  I have personally reviewed the results from the time of this admission to 8/5/2023 17:06 EDT and agree with these findings:  [x]  Laboratory  []  Microbiology  [x]  Radiology  []  EKG/Telemetry   []  Cardiology/Vascular   []  Pathology  []  Old records  []  Other:      Assessment & Plan   Assessment / Plan     Assessment/Plan:   Acute metabolic encephalopathy: Secondary to UTI.  Continue cefepime follow urine culture.  Supportive care.  Serial labs.  Neurochecks.  We will rule out other causes with B12, TSH, ammonia, folate.  Monitor on telemetry.  Syncope: Suspect orthostatic as patient appears volume down.  Continue IV hydration overnight.  Neurochecks and telemetry as  above.  Hypertension: Hold home medications at this time due to borderline hypotension  Urinary retention: Chronic Muñiz in place.  Outpatient follow-up      DVT prophylaxis:  Resume home Eliquis once verified    CODE STATUS:    Code Status (Patient has no pulse and is not breathing): CPR (Attempt to Resuscitate)  Medical Interventions (Patient has pulse or is breathing): Full Support  Release to patient: Routine Release      Admission Status:  I believe this patient meets observation status.    Electronically signed by Carlos Sam Jr, MD, 08/05/23, 5:06 PM EDT.

## 2023-08-05 NOTE — PLAN OF CARE
Goal Outcome Evaluation:   Patient arrived from the ED. Patient is alert and oriented at this time. Call light in reach. Family at bedside.

## 2023-08-05 NOTE — NURSING NOTE
Patient's blood sugar was 64 on arrival to the unit. Patient was given two orange juices, blood sugar increased to 89.

## 2023-08-05 NOTE — ED PROVIDER NOTES
Time: 2:08 PM EDT  Date of encounter:  8/5/2023  Independent Historian/Clinical History and Information was obtained by:   Patient and Family    History is limited by: Altered Mental Status    Chief Complaint: Altered mental status      History of Present Illness:  Patient is a 78 y.o. year old male who presents to the emergency department for evaluation of altered mental status.  Patient has a history of UTIs and family at bedside reports this is how he acts when he has a UTI.  He reports being very tired today with no energy and generally fatigued.  Denies fever, nausea, vomiting.  He does have an indwelling Muñiz catheter.    HPI    Patient Care Team  Primary Care Provider: Jeanette Prieto MD    Past Medical History:     No Known Allergies  Past Medical History:   Diagnosis Date    Arthritis     Benign prostatic hyperplasia     Coronary artery disease     Hyperlipidemia     Hypertension      Past Surgical History:   Procedure Laterality Date    SINUS SURGERY       History reviewed. No pertinent family history.    Home Medications:  Prior to Admission medications    Medication Sig Start Date End Date Taking? Authorizing Provider   apixaban (ELIQUIS) 5 MG tablet tablet Take 1 tablet by mouth 2 (Two) Times a Day.    Rico Tony MD   aspirin 81 MG chewable tablet Chew 1 tablet Daily.    Rico Tony MD   atorvastatin (LIPITOR) 40 MG tablet Take 1 tablet by mouth Daily.    Rico Tony MD   cholecalciferol (VITAMIN D3) 10 MCG (400 UNIT) tablet Take 1 tablet by mouth Daily.    Rico Tony MD   docusate sodium (COLACE) 250 MG capsule Take 1 capsule by mouth 2 (Two) Times a Day.    Rico Tony MD   finasteride (PROSCAR) 5 MG tablet Take 1 tablet by mouth Daily.    Rico Tony MD   furosemide (Lasix) 40 MG tablet Take 1 tablet by mouth 2 (Two) Times a Day for 30 days. 3/27/23 4/26/23  Konrad Morales MD   glipizide (GLUCOTROL) 10 MG tablet  "Take 2 tablets by mouth 2 (Two) Times a Day Before Meals.    Rico Tony MD   insulin glargine (LANTUS, SEMGLEE) 100 UNIT/ML injection Inject 25 Units under the skin into the appropriate area as directed Daily.    Rico Tony MD   lisinopril (PRINIVIL,ZESTRIL) 40 MG tablet Take 1 tablet by mouth Daily.    Rico Tony MD   metoprolol tartrate 75 MG tablet Take 150 mg by mouth Every 12 (Twelve) Hours for 30 days. 3/27/23 4/26/23  Konrad Morales MD   omeprazole (priLOSEC) 20 MG capsule Take 1 capsule by mouth Daily.    Rico Tony MD   tamsulosin (FLOMAX) 0.4 MG capsule 24 hr capsule Take 1 capsule by mouth Every Night.    Rico Tony MD   traZODone (DESYREL) 50 MG tablet Take 1 tablet by mouth Every Night for 30 days. 3/27/23 4/26/23  Konrad Morales MD        Social History:   Social History     Tobacco Use    Smoking status: Former     Packs/day: 1.50     Years: 40.00     Pack years: 60.00     Types: Cigarettes    Smokeless tobacco: Never   Vaping Use    Vaping Use: Never used   Substance Use Topics    Alcohol use: Not Currently    Drug use: Never         Review of Systems:  Review of Systems   Unable to perform ROS: Mental status change      Physical Exam:  /71   Pulse (!) 121   Temp 99.2 øF (37.3 øC) (Oral)   Resp 12   Ht 177.8 cm (70\")   Wt 75.1 kg (165 lb 9.1 oz)   SpO2 93%   BMI 23.76 kg/mý     Physical Exam  Vitals and nursing note reviewed.   Constitutional:       General: He is not in acute distress.     Appearance: Normal appearance. He is not toxic-appearing or diaphoretic.   HENT:      Head: Normocephalic and atraumatic.      Jaw: There is normal jaw occlusion.   Eyes:      General: Lids are normal.      Extraocular Movements: Extraocular movements intact.      Conjunctiva/sclera: Conjunctivae normal.      Pupils: Pupils are equal, round, and reactive to light.   Cardiovascular:      Rate and Rhythm: Normal rate and regular " rhythm.      Pulses: Normal pulses.      Heart sounds: Normal heart sounds.   Pulmonary:      Effort: Pulmonary effort is normal. No respiratory distress.      Breath sounds: Normal breath sounds. No wheezing or rhonchi.   Abdominal:      General: Abdomen is flat.      Palpations: Abdomen is soft.      Tenderness: There is no abdominal tenderness. There is no guarding or rebound.   Musculoskeletal:         General: Normal range of motion.      Cervical back: Normal range of motion and neck supple.      Right lower leg: No edema.      Left lower leg: No edema.   Skin:     General: Skin is warm and dry.   Neurological:      Mental Status: He is confused.      GCS: GCS eye subscore is 3. GCS verbal subscore is 5. GCS motor subscore is 6.      Cranial Nerves: No cranial nerve deficit or facial asymmetry.   Psychiatric:         Mood and Affect: Mood normal.                Procedures:  Procedures      Medical Decision Making:      Comorbidities that affect care:    Atrial Fibrillation, Coronary Artery Disease, Diabetes    External Notes reviewed:    Previous Clinic Note: Cardiology office visit for general cardiac, CAD, A-fib management.      The following orders were placed and all results were independently analyzed by me:  Orders Placed This Encounter   Procedures    Blood Culture - Blood,    Blood Culture - Blood,    XR Chest 1 View    CT Head Without Contrast    [place] Draw    Comprehensive Metabolic Panel    Single High Sensitivity Troponin T    Magnesium    Urinalysis With Microscopic If Indicated (No Culture) - Urine, Clean Catch    Lactic Acid, Plasma    CBC Auto Differential    Urinalysis, Microscopic Only - Urine, Clean Catch    NPO Diet NPO Type: Strict NPO    Undress & Gown    Continuous Pulse Oximetry    Vital Signs    Orthostatic Blood Pressure    Code Status and Medical Interventions:    Inpatient Hospitalist Consult    Oxygen Therapy- Nasal Cannula; Titrate 1-6 LPM Per SpO2; 90 - 95%    POC Glucose Once     ECG 12 Lead ED Triage Standing Order; Weak / Dizzy / AMS    Insert Peripheral IV    Inpatient Admission    Fall Precautions    CBC & Differential    Green Top (Gel)    Lavender Top    Gold Top - SST    Light Blue Top       Medications Given in the Emergency Department:  Medications   sodium chloride 0.9 % flush 10 mL (has no administration in time range)   cefepime (MAXIPIME) 2000 mg/100 mL 0.9% NS (mbp) (0 mg Intravenous Stopped 8/5/23 1418)   sodium chloride 0.9 % bolus 1,000 mL (0 mL Intravenous Stopped 8/5/23 1414)   sodium chloride 0.9 % bolus 1,000 mL (1,000 mL Intravenous New Bag 8/5/23 1623)        ED Course:         Labs:    Lab Results (last 24 hours)       Procedure Component Value Units Date/Time    CBC & Differential [[id number]]  (Abnormal) Collected: 08/05/23 1336    Specimen: Blood Updated: 08/05/23 1346    Narrative:      The following orders were created for panel order CBC & Differential.  Procedure                               Abnormality         Status                     ---------                               -----------         ------                     CBC Auto Differential[[id number]]        Abnormal            Final result                 Please view results for these tests on the individual orders.    Comprehensive Metabolic Panel [[id number]]  (Abnormal) Collected: 08/05/23 1336    Specimen: Blood Updated: 08/05/23 1407     Glucose 86 mg/dL      BUN 28 mg/dL      Creatinine 2.09 mg/dL      Sodium 133 mmol/L      Potassium 3.1 mmol/L      Chloride 98 mmol/L      CO2 23.0 mmol/L      Calcium 8.2 mg/dL      Total Protein 7.0 g/dL      Albumin 3.0 g/dL      ALT (SGPT) 32 U/L      AST (SGOT) 30 U/L      Alkaline Phosphatase 210 U/L      Total Bilirubin 0.5 mg/dL      Globulin 4.0 gm/dL      A/G Ratio 0.8 g/dL      BUN/Creatinine Ratio 13.4     Anion Gap 12.0 mmol/L      eGFR 31.6 mL/min/1.73     Narrative:      GFR Normal >60  Chronic Kidney Disease <60  Kidney Failure <15    The GFR  formula is only valid for adults with stable renal function between ages 18 and 70.    Single High Sensitivity Troponin T [[id number]]  (Abnormal) Collected: 08/05/23 1336    Specimen: Blood Updated: 08/05/23 1407     HS Troponin T 44 ng/L     Narrative:      High Sensitive Troponin T Reference Range:  <10.0 ng/L- Negative Female for AMI  <15.0 ng/L- Negative Male for AMI  >=10 - Abnormal Female indicating possible myocardial injury.  >=15 - Abnormal Male indicating possible myocardial injury.   Clinicians would have to utilize clinical acumen, EKG, Troponin, and serial changes to determine if it is an Acute Myocardial Infarction or myocardial injury due to an underlying chronic condition.         Magnesium [[id number]]  (Normal) Collected: 08/05/23 1336    Specimen: Blood Updated: 08/05/23 1407     Magnesium 1.8 mg/dL     Blood Culture - Blood, Arm, Right [[id number]] Collected: 08/05/23 1336    Specimen: Blood from Arm, Right Updated: 08/05/23 1342    Blood Culture - Blood, Arm, Left [[id number]] Collected: 08/05/23 1336    Specimen: Blood from Arm, Left Updated: 08/05/23 1342    Lactic Acid, Plasma [[id number]]  (Normal) Collected: 08/05/23 1336    Specimen: Blood Updated: 08/05/23 1405     Lactate 0.9 mmol/L     CBC Auto Differential [[id number]]  (Abnormal) Collected: 08/05/23 1336    Specimen: Blood Updated: 08/05/23 1346     WBC 12.72 10*3/mm3      RBC 3.02 10*6/mm3      Hemoglobin 8.8 g/dL      Hematocrit 26.4 %      MCV 87.4 fL      MCH 29.1 pg      MCHC 33.3 g/dL      RDW 18.3 %      RDW-SD 57.9 fl      MPV 9.5 fL      Platelets 166 10*3/mm3      Neutrophil % 77.7 %      Lymphocyte % 10.7 %      Monocyte % 9.6 %      Eosinophil % 1.4 %      Basophil % 0.2 %      Immature Grans % 0.4 %      Neutrophils, Absolute 9.88 10*3/mm3      Lymphocytes, Absolute 1.36 10*3/mm3      Monocytes, Absolute 1.22 10*3/mm3      Eosinophils, Absolute 0.18 10*3/mm3      Basophils, Absolute 0.03 10*3/mm3      Immature Grans, Absolute  0.05 10*3/mm3      nRBC 0.0 /100 WBC     Urinalysis With Microscopic If Indicated (No Culture) - Indwelling Urethral Catheter [[id number]]  (Abnormal) Collected: 08/05/23 1428    Specimen: Urine from Indwelling Urethral Catheter Updated: 08/05/23 1440     Color, UA Yellow     Appearance, UA Turbid     pH, UA 7.0     Specific Gravity, UA 1.012     Glucose,  mg/dL (2+)     Ketones, UA Negative     Bilirubin, UA Negative     Blood, UA Moderate (2+)     Protein, UA >=300 mg/dL (3+)     Leuk Esterase, UA Large (3+)     Nitrite, UA Negative     Urobilinogen, UA 0.2 E.U./dL    Urinalysis, Microscopic Only - Indwelling Urethral Catheter [[id number]]  (Abnormal) Collected: 08/05/23 1428    Specimen: Urine from Indwelling Urethral Catheter Updated: 08/05/23 1457     RBC, UA 3-5 /HPF      WBC, UA Too Numerous to Count /HPF      Bacteria, UA 2+ /HPF      Squamous Epithelial Cells, UA None Seen /HPF      Hyaline Casts, UA None Seen /LPF      Methodology Manual Light Microscopy             Imaging:    CT Head Without Contrast    Result Date: 8/5/2023  PROCEDURE: CT HEAD WO CONTRAST  COMPARISON:  [facility], CT, CT HEAD WO CONTRAST, 3/20/2023, 22:36. INDICATIONS: Altered mental status  PROTOCOL:   Standard imaging protocol performed    RADIATION:   DLP: 1082.2mGy*cm   MA and/or KV was adjusted to minimize radiation dose.     TECHNIQUE: After obtaining the patient's consent, CT images were obtained without non-ionic intravenous contrast material.  FINDINGS:  There is moderate low density in the cerebral white matter bilaterally consistent with small vessel ischemic disease.  A chronic lacunar infarct is noted in the right caudate nucleus body.  Diffuse age-appropriate cerebral and cerebellar atrophy is noted.  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  There appear to be postoperative changes of the paranasal sinuses as well.  No focal calvarial abnormality is seen.        1. Moderate small vessel ischemic changes  in the white matter 2. No specific CT evidence of acute infarction 3. Chronic lacunar infarct in the right caudate nucleus body 4. Extensive sinonasal polyposis with postoperative changes     Darwin Baig M.D.       Electronically Signed and Approved By: Darwin Baig M.D. on 8/05/2023 at 16:32             XR Chest 1 View    Result Date: 8/5/2023  PROCEDURE: XR CHEST 1 VW  COMPARISON: [facility], CR, XR CHEST 1 VW, 3/21/2023, 4:35.  INDICATIONS: Weak/Dizzy/AMS triage protocol  FINDINGS:  The lungs are clear bilaterally.  The cardiac and mediastinal silhouettes appear normal.  A small left pleural effusion is noted.        1. Small left pleural effusion       Darwin Baig M.D.       Electronically Signed and Approved By: Darwin Baig M.D. on 8/05/2023 at 14:15                Differential Diagnosis and Discussion:    Altered Mental Status: Based on the patient's signs and symptoms, differential diagnosis includes but is not limited to meningitis, stroke, sepsis, subarachnoid hemorrhage, intracranial bleeding, encephalitis, and metabolic encephalopathy.    All labs were reviewed and interpreted by me.  All X-rays impressions were independently interpreted by me.  CT scan radiology impression was interpreted by me.    MDM  Number of Diagnoses or Management Options  Sepsis, due to unspecified organism, unspecified whether acute organ dysfunction present  Urinary tract infection associated with indwelling urethral catheter, initial encounter  Diagnosis management comments: In summary this is a 78-year-old male patient presents to the emergency department for evaluation of altered mental status.  He has a history of UTIs that presents with his wife.  CBC independently reviewed by me and shows no critical abnormalities except for leukocytosis.  CMP independently reviewed by me and shows no critical abnormalities.  Urinalysis positive for UTI.  Chest x-ray and CT brain unremarkable.  Patient is given  antibiotics in the emergency department along with IV fluids.  Patient case has been discussed with the hospitalist team who will admit to the hospital for further evaluation and continuation of treatment.    Sepsis criteria was met in the emergency department and the Sepsis protocol (including antibiotic administration) was initiated.      SIRS criteria considered:   1.  Temperature > 100.4 or <98.6    2.  Heart Rate > 90    3.  Respiratory Rate > 22    4.  WBC > 12K or <4K.             Severe Sepsis:     Respiratory: Mechanical Ventilation or Bipap  Hypotension: SBP > 90 or MAP < 65  Renal: Creatinine > 2  Metabolic: Lactic Acid > 2  Hematologic: Platelets < 100K or INR > 1.5  Hepatic: BILI  >  2  CNS: Sudden AMS     Septic Shock:     Severe Sepsis + Persistent hypotension or Lactic Acid > 4     Normal saline bolus, Antibiotics, and final disposition was based on these definitions.        Sepsis was recognized at 1340    Antibiotics were ordered.     30 cc/kg bolus was not indicated.       Patient did not receive the recommended 30ml/kg fluid bolus for sepsis because it would be harmful or detrimental to the patient.    The patient has patient's blood pressure responded to fluid given.   The patient was ordered 2 L of fluids.    Total Critical Care time of 40 minutes. Total critical care time documented does not include time spent on separately billed procedures for services of nurses or physician assistants. I personally saw and examined the patient. I have reviewed all diagnostic interpretations and treatment plans as written. I was present for the key portions of any procedures performed and the inclusive time noted in any critical care statement. Critical care time includes patient management by me, time spent at the patients bedside,  time to review lab and imaging results, discussing patient care, documentation in the medical record, and time spent with family or caregiver.          Patient Care  Considerations:    PSYCH: I considered ordering anxiolytic and or antipsychotic medications, however patient was able to facilitate the medical screening exam and disposition without further medications.      Consultants/Shared Management Plan:    Hospitalist: I have discussed the case with Dr. Sam who agrees to accept the patient for admission.    Social Determinants of Health:    Patient has presented with family members who are responsible, reliable and will ensure follow up care.      Disposition and Care Coordination:    Admit:   Through independent evaluation of the patient's history, physical, and imperical data, the patient meets criteria for observation/admission to the hospital.        Final diagnoses:   Urinary tract infection associated with indwelling urethral catheter, initial encounter   Sepsis, due to unspecified organism, unspecified whether acute organ dysfunction present        ED Disposition       ED Disposition   Decision to Admit    Condition   --    Comment   Level of Care: Telemetry [5]   Diagnosis: Acute metabolic encephalopathy [[id number]]   Certification: I Certify That Inpatient Hospital Services Are Medically Necessary For Greater Than 2 Midnights                 This medical record created using voice recognition software.             Tigre Moran MD  08/05/23 1091

## 2023-08-06 LAB
ALBUMIN SERPL-MCNC: 2.5 G/DL (ref 3.5–5.2)
ALBUMIN/GLOB SERPL: 0.6 G/DL
ALP SERPL-CCNC: 191 U/L (ref 39–117)
ALT SERPL W P-5'-P-CCNC: 27 U/L (ref 1–41)
ANION GAP SERPL CALCULATED.3IONS-SCNC: 11.7 MMOL/L (ref 5–15)
AST SERPL-CCNC: 25 U/L (ref 1–40)
BASOPHILS # BLD AUTO: 0.01 10*3/MM3 (ref 0–0.2)
BASOPHILS NFR BLD AUTO: 0.1 % (ref 0–1.5)
BILIRUB SERPL-MCNC: 0.3 MG/DL (ref 0–1.2)
BUN SERPL-MCNC: 36 MG/DL (ref 8–23)
BUN/CREAT SERPL: 16.5 (ref 7–25)
CALCIUM SPEC-SCNC: 8.2 MG/DL (ref 8.6–10.5)
CHLORIDE SERPL-SCNC: 104 MMOL/L (ref 98–107)
CO2 SERPL-SCNC: 21.3 MMOL/L (ref 22–29)
CREAT SERPL-MCNC: 2.18 MG/DL (ref 0.76–1.27)
DEPRECATED RDW RBC AUTO: 59.9 FL (ref 37–54)
EGFRCR SERPLBLD CKD-EPI 2021: 30.1 ML/MIN/1.73
EOSINOPHIL # BLD AUTO: 0.2 10*3/MM3 (ref 0–0.4)
EOSINOPHIL NFR BLD AUTO: 2.1 % (ref 0.3–6.2)
ERYTHROCYTE [DISTWIDTH] IN BLOOD BY AUTOMATED COUNT: 18.5 % (ref 12.3–15.4)
FOLATE SERPL-MCNC: >20 NG/ML (ref 4.78–24.2)
GLOBULIN UR ELPH-MCNC: 4 GM/DL
GLUCOSE BLDC GLUCOMTR-MCNC: 110 MG/DL (ref 70–99)
GLUCOSE BLDC GLUCOMTR-MCNC: 230 MG/DL (ref 70–99)
GLUCOSE BLDC GLUCOMTR-MCNC: 254 MG/DL (ref 70–99)
GLUCOSE SERPL-MCNC: 127 MG/DL (ref 65–99)
HCT VFR BLD AUTO: 27.8 % (ref 37.5–51)
HGB BLD-MCNC: 8.5 G/DL (ref 13–17.7)
IMM GRANULOCYTES # BLD AUTO: 0.03 10*3/MM3 (ref 0–0.05)
IMM GRANULOCYTES NFR BLD AUTO: 0.3 % (ref 0–0.5)
LYMPHOCYTES # BLD AUTO: 0.95 10*3/MM3 (ref 0.7–3.1)
LYMPHOCYTES NFR BLD AUTO: 10.2 % (ref 19.6–45.3)
MCH RBC QN AUTO: 27.4 PG (ref 26.6–33)
MCHC RBC AUTO-ENTMCNC: 30.6 G/DL (ref 31.5–35.7)
MCV RBC AUTO: 89.7 FL (ref 79–97)
MONOCYTES # BLD AUTO: 0.88 10*3/MM3 (ref 0.1–0.9)
MONOCYTES NFR BLD AUTO: 9.4 % (ref 5–12)
NEUTROPHILS NFR BLD AUTO: 7.26 10*3/MM3 (ref 1.7–7)
NEUTROPHILS NFR BLD AUTO: 77.9 % (ref 42.7–76)
NRBC BLD AUTO-RTO: 0 /100 WBC (ref 0–0.2)
PLATELET # BLD AUTO: 154 10*3/MM3 (ref 140–450)
PMV BLD AUTO: 10.3 FL (ref 6–12)
POTASSIUM SERPL-SCNC: 2.9 MMOL/L (ref 3.5–5.2)
PROT SERPL-MCNC: 6.5 G/DL (ref 6–8.5)
RBC # BLD AUTO: 3.1 10*6/MM3 (ref 4.14–5.8)
SODIUM SERPL-SCNC: 137 MMOL/L (ref 136–145)
VIT B12 BLD-MCNC: 231 PG/ML (ref 211–946)
WBC NRBC COR # BLD: 9.33 10*3/MM3 (ref 3.4–10.8)

## 2023-08-06 PROCEDURE — 63710000001 INSULIN LISPRO (HUMAN) PER 5 UNITS: Performed by: INTERNAL MEDICINE

## 2023-08-06 PROCEDURE — 82607 VITAMIN B-12: CPT | Performed by: INTERNAL MEDICINE

## 2023-08-06 PROCEDURE — 85025 COMPLETE CBC W/AUTO DIFF WBC: CPT | Performed by: INTERNAL MEDICINE

## 2023-08-06 PROCEDURE — 25810000003 SODIUM CHLORIDE 0.9 % WITH KCL 40 MEQ/L 40-0.9 MEQ/L-% SOLUTION: Performed by: INTERNAL MEDICINE

## 2023-08-06 PROCEDURE — 25010000002 LORAZEPAM PER 2 MG: Performed by: INTERNAL MEDICINE

## 2023-08-06 PROCEDURE — 80053 COMPREHEN METABOLIC PANEL: CPT | Performed by: INTERNAL MEDICINE

## 2023-08-06 PROCEDURE — 25010000002 CEFEPIME PER 500 MG: Performed by: INTERNAL MEDICINE

## 2023-08-06 PROCEDURE — 82948 REAGENT STRIP/BLOOD GLUCOSE: CPT

## 2023-08-06 RX ORDER — ATORVASTATIN CALCIUM 40 MG/1
40 TABLET, FILM COATED ORAL DAILY
Status: DISCONTINUED | OUTPATIENT
Start: 2023-08-06 | End: 2023-08-08 | Stop reason: HOSPADM

## 2023-08-06 RX ORDER — LORAZEPAM 2 MG/ML
0.25 INJECTION INTRAMUSCULAR EVERY 6 HOURS PRN
Status: DISCONTINUED | OUTPATIENT
Start: 2023-08-06 | End: 2023-08-08 | Stop reason: HOSPADM

## 2023-08-06 RX ORDER — TAMSULOSIN HYDROCHLORIDE 0.4 MG/1
0.4 CAPSULE ORAL NIGHTLY
Status: DISCONTINUED | OUTPATIENT
Start: 2023-08-06 | End: 2023-08-08 | Stop reason: HOSPADM

## 2023-08-06 RX ORDER — OMEGA-3S/DHA/EPA/FISH OIL/D3 300MG-1000
400 CAPSULE ORAL DAILY
Status: DISCONTINUED | OUTPATIENT
Start: 2023-08-06 | End: 2023-08-08 | Stop reason: HOSPADM

## 2023-08-06 RX ORDER — FINASTERIDE 5 MG/1
5 TABLET, FILM COATED ORAL DAILY
Status: DISCONTINUED | OUTPATIENT
Start: 2023-08-06 | End: 2023-08-08 | Stop reason: HOSPADM

## 2023-08-06 RX ORDER — PANTOPRAZOLE SODIUM 40 MG/1
40 TABLET, DELAYED RELEASE ORAL
Status: DISCONTINUED | OUTPATIENT
Start: 2023-08-06 | End: 2023-08-08 | Stop reason: HOSPADM

## 2023-08-06 RX ORDER — POTASSIUM CHLORIDE 750 MG/1
40 CAPSULE, EXTENDED RELEASE ORAL 2 TIMES DAILY WITH MEALS
Status: COMPLETED | OUTPATIENT
Start: 2023-08-06 | End: 2023-08-07

## 2023-08-06 RX ORDER — TRAZODONE HYDROCHLORIDE 50 MG/1
50 TABLET ORAL NIGHTLY
Status: DISCONTINUED | OUTPATIENT
Start: 2023-08-06 | End: 2023-08-08 | Stop reason: HOSPADM

## 2023-08-06 RX ORDER — SODIUM CHLORIDE AND POTASSIUM CHLORIDE 300; 900 MG/100ML; MG/100ML
75 INJECTION, SOLUTION INTRAVENOUS CONTINUOUS
Status: DISCONTINUED | OUTPATIENT
Start: 2023-08-06 | End: 2023-08-07

## 2023-08-06 RX ORDER — HALOPERIDOL 5 MG/ML
1 INJECTION INTRAMUSCULAR EVERY 6 HOURS PRN
Status: DISCONTINUED | OUTPATIENT
Start: 2023-08-06 | End: 2023-08-08 | Stop reason: HOSPADM

## 2023-08-06 RX ADMIN — SENNOSIDES AND DOCUSATE SODIUM 2 TABLET: 50; 8.6 TABLET ORAL at 21:33

## 2023-08-06 RX ADMIN — METOPROLOL TARTRATE 100 MG: 25 TABLET, FILM COATED ORAL at 21:33

## 2023-08-06 RX ADMIN — TRAZODONE HYDROCHLORIDE 50 MG: 50 TABLET ORAL at 21:35

## 2023-08-06 RX ADMIN — METOPROLOL TARTRATE 100 MG: 25 TABLET, FILM COATED ORAL at 12:14

## 2023-08-06 RX ADMIN — APIXABAN 5 MG: 5 TABLET, FILM COATED ORAL at 12:14

## 2023-08-06 RX ADMIN — TAMSULOSIN HYDROCHLORIDE 0.4 MG: 0.4 CAPSULE ORAL at 21:33

## 2023-08-06 RX ADMIN — CEFEPIME HYDROCHLORIDE 1000 MG: 1 INJECTION, POWDER, FOR SOLUTION INTRAMUSCULAR; INTRAVENOUS at 17:10

## 2023-08-06 RX ADMIN — Medication 10 ML: at 08:18

## 2023-08-06 RX ADMIN — Medication 10 ML: at 21:30

## 2023-08-06 RX ADMIN — INSULIN LISPRO 3 UNITS: 100 INJECTION, SOLUTION INTRAVENOUS; SUBCUTANEOUS at 12:15

## 2023-08-06 RX ADMIN — LORAZEPAM 0.25 MG: 2 INJECTION INTRAMUSCULAR at 17:12

## 2023-08-06 RX ADMIN — FINASTERIDE 5 MG: 5 TABLET, FILM COATED ORAL at 12:14

## 2023-08-06 RX ADMIN — PANTOPRAZOLE SODIUM 40 MG: 40 TABLET, DELAYED RELEASE ORAL at 12:14

## 2023-08-06 RX ADMIN — POTASSIUM CHLORIDE 40 MEQ: 10 CAPSULE, COATED, EXTENDED RELEASE ORAL at 12:14

## 2023-08-06 RX ADMIN — APIXABAN 5 MG: 5 TABLET, FILM COATED ORAL at 21:34

## 2023-08-06 RX ADMIN — INSULIN LISPRO 4 UNITS: 100 INJECTION, SOLUTION INTRAVENOUS; SUBCUTANEOUS at 17:08

## 2023-08-06 RX ADMIN — POTASSIUM CHLORIDE AND SODIUM CHLORIDE 75 ML/HR: 900; 300 INJECTION, SOLUTION INTRAVENOUS at 12:15

## 2023-08-06 RX ADMIN — ATORVASTATIN CALCIUM 40 MG: 40 TABLET, FILM COATED ORAL at 12:14

## 2023-08-06 RX ADMIN — CHOLECALCIFEROL (VITAMIN D3) 10 MCG (400 UNIT) TABLET 400 UNITS: at 12:14

## 2023-08-06 NOTE — PLAN OF CARE
Goal Outcome Evaluation: Replaced soler. Tolerated well. Patient heart rate jumped from 140's to 180's. Treated per MAR. Resting with call light in reach.

## 2023-08-06 NOTE — PLAN OF CARE
Goal Outcome Evaluation:   Patient became increasingly confused and states being anxious toward the end of shift, MD notified. New orders placed. No complaints at this time. Call light in reach. Family involvement promoted. Patient reoriented at each encounter.

## 2023-08-06 NOTE — PROGRESS NOTES
[facility]   Hospitalist Progress Note  Date: 2023  Patient Name: Darnell Geller  : 1945  MRN: [id number]  Date of admission: 2023      Subjective   Subjective     Chief Complaint: Episode of confusion and syncopal episode    Summary:   Darnell Geller is a 78 y.o. male past medical history of BPH, CAD, hypertension abdomens to the emergency department for evaluation of altered mental status.  Patient had a syncopal episode earlier in the day while in the restroom down lasted a few seconds and he was able to regain orientation immediately thereafter no reported seizure-like activity.  Later in the day family noticed that he was disoriented which is not his baseline.  Patient himself currently oriented x3 denies any fevers, chills, sweats, nausea, vomiting, chest pain shortness of breath, palpitations, abdominal pain diarrhea constipation, dysuria, weakness, rash.  He does have a chronic Muñiz in place.  In the emergency department borderline hypotension and has been receiving IV fluid which it appears he has responded to.  He has been started on cefepime as urinalysis is consistent with UTI and will be admitted for ongoing monitoring and management.     Interval Followup:   Low-grade temp of 100.  92% saturation room air   Episode of A-fib with RVR noted now rate controlled  Awake alert oriented x3  Blood pressure soft.  Denies dizziness  No more episode of passing out.  Patient actually denies passing out he said he tripped over the cord.  Per family's eyelids are swollen.  Patient states he has bug bites into something new  Patient wants to go home and denies any complaints.  Patient denies ever seeing a nephrologist.    Review of Systems   All systems were reviewed and negative except for: Summary and interval follow-up    Objective   Objective     Vitals:   Temp:  [97.3 øF (36.3 øC)-100 øF (37.8 øC)] 97.9 øF (36.6 øC)  Heart Rate:  [] 104  Resp:  [16-18] 17  BP: ()/(58-85)  154/85  Physical Exam    Constitutional: Awake, alert, no acute distress              Eyes: Pupils equal, sclerae anicteric, no conjunctival injection              HENT: NCAT, mucous membranes moist              Neck: Supple, no thyromegaly, no lymphadenopathy, trachea midline              Respiratory: Clear to auscultation bilaterally, nonlabored respirations               Cardiovascular: Irregularly irregular, no murmurs, rubs, or gallops, palpable pedal pulses bilaterally              Gastrointestinal: Positive bowel sounds, soft, nontender, nondistended              Musculoskeletal: Chronic dislocation of right sternoclavicular joint, nontender, no bilateral ankle edema, no clubbing or cyanosis to extremities              Psychiatric: Appropriate affect, cooperative              Neurologic: Oriented x 3, strength symmetric in all extremities, Cranial Nerves grossly intact to confrontation, speech clear              Skin: No rashes   ; has Muñiz catheter        Result Review    Result Review:  I have personally reviewed the results for the past 24 hours and agree with these findings:  [x]  Laboratory  [x]  Microbiology  [x]  Radiology  [x]  EKG/Telemetry A-fib 136 poor R wave progression.  ST depression in V3 to V6  []  Cardiology/Vascular   []  Pathology  [x]  Old records  [x]  Other: Medications    Assessment & Plan   Assessment / Plan     Assessment:  Acute metabolic encephalopathy.  Resolved.  UTI due to chronic indwelling Muñiz catheter.  Persistent A-fib with RVR on Eliquis.  Chronic kidney disease stage IIIb-CKD 4.  Stable.  Chronic anemia with baseline hemoglobin of 10.  Leukocytosis.  Resolved.  BPH/urinary tract.  S/p chronic Muñiz catheter.  Hypertension.  Blood pressure.  Diabetes mellitus.  Hypokalemia       Plan:  Check orthostatic vital signs.  IV fluid.  Replace electrolytes  Continue IV cefepime.  Await culture data.  We will consult Dr. Roberts patient cardiologist if needed.  Resumed home  metoprolol.  As needed IV metoprolol.  Check iron profile and reticulocyte count.  Continue home Lipitor, Flomax, Proscar and Protonix.  Sliding-scale insulin.  PT OT.  Patient need to see nephrology as an outpatient.  Need serum protein electrophoresis if not done in the past.  Continue telemetry  Discussed plan with RN.  Discharge home when stable in next couple of days    DVT prophylaxis:  Medical DVT prophylaxis orders are present.  Eliquis    CODE STATUS:   Code Status (Patient has no pulse and is not breathing): CPR (Attempt to Resuscitate)  Medical Interventions (Patient has pulse or is breathing): Full Support  Release to patient: Routine Release      Part of this note may be an electronic transcription/translation of spoken language to printed text using the Dragon Dictation System.     Electronically signed by Dez Machado MD, 08/06/23, 4:07 PM EDT.

## 2023-08-07 LAB
ALBUMIN SERPL-MCNC: 2.8 G/DL (ref 3.5–5.2)
ANION GAP SERPL CALCULATED.3IONS-SCNC: 12.4 MMOL/L (ref 5–15)
BASOPHILS # BLD AUTO: 0.02 10*3/MM3 (ref 0–0.2)
BASOPHILS NFR BLD AUTO: 0.3 % (ref 0–1.5)
BUN SERPL-MCNC: 34 MG/DL (ref 8–23)
BUN/CREAT SERPL: 18.5 (ref 7–25)
CALCIUM SPEC-SCNC: 8.7 MG/DL (ref 8.6–10.5)
CHLORIDE SERPL-SCNC: 106 MMOL/L (ref 98–107)
CO2 SERPL-SCNC: 19.6 MMOL/L (ref 22–29)
CREAT SERPL-MCNC: 1.84 MG/DL (ref 0.76–1.27)
DEPRECATED RDW RBC AUTO: 60 FL (ref 37–54)
EGFRCR SERPLBLD CKD-EPI 2021: 36.8 ML/MIN/1.73
EOSINOPHIL # BLD AUTO: 0.4 10*3/MM3 (ref 0–0.4)
EOSINOPHIL NFR BLD AUTO: 5.3 % (ref 0.3–6.2)
ERYTHROCYTE [DISTWIDTH] IN BLOOD BY AUTOMATED COUNT: 18.3 % (ref 12.3–15.4)
GLUCOSE BLDC GLUCOMTR-MCNC: 149 MG/DL (ref 70–99)
GLUCOSE BLDC GLUCOMTR-MCNC: 151 MG/DL (ref 70–99)
GLUCOSE BLDC GLUCOMTR-MCNC: 188 MG/DL (ref 70–99)
GLUCOSE BLDC GLUCOMTR-MCNC: 227 MG/DL (ref 70–99)
GLUCOSE BLDC GLUCOMTR-MCNC: 235 MG/DL (ref 70–99)
GLUCOSE SERPL-MCNC: 154 MG/DL (ref 65–99)
HCT VFR BLD AUTO: 27.9 % (ref 37.5–51)
HGB BLD-MCNC: 8.6 G/DL (ref 13–17.7)
IMM GRANULOCYTES # BLD AUTO: 0.04 10*3/MM3 (ref 0–0.05)
IMM GRANULOCYTES NFR BLD AUTO: 0.5 % (ref 0–0.5)
IRON 24H UR-MRATE: 32 MCG/DL (ref 59–158)
IRON SATN MFR SERPL: 12 % (ref 20–50)
LYMPHOCYTES # BLD AUTO: 1.38 10*3/MM3 (ref 0.7–3.1)
LYMPHOCYTES NFR BLD AUTO: 18.3 % (ref 19.6–45.3)
MAGNESIUM SERPL-MCNC: 2 MG/DL (ref 1.6–2.4)
MCH RBC QN AUTO: 27.9 PG (ref 26.6–33)
MCHC RBC AUTO-ENTMCNC: 30.8 G/DL (ref 31.5–35.7)
MCV RBC AUTO: 90.6 FL (ref 79–97)
MONOCYTES # BLD AUTO: 0.58 10*3/MM3 (ref 0.1–0.9)
MONOCYTES NFR BLD AUTO: 7.7 % (ref 5–12)
NEUTROPHILS NFR BLD AUTO: 5.11 10*3/MM3 (ref 1.7–7)
NEUTROPHILS NFR BLD AUTO: 67.9 % (ref 42.7–76)
NRBC BLD AUTO-RTO: 0 /100 WBC (ref 0–0.2)
PHOSPHATE SERPL-MCNC: 2.4 MG/DL (ref 2.5–4.5)
PLATELET # BLD AUTO: 169 10*3/MM3 (ref 140–450)
PMV BLD AUTO: 10.4 FL (ref 6–12)
POTASSIUM SERPL-SCNC: 3.7 MMOL/L (ref 3.5–5.2)
QT INTERVAL: 311 MS
QT INTERVAL: 367 MS
RBC # BLD AUTO: 3.08 10*6/MM3 (ref 4.14–5.8)
RETICS # AUTO: 0.05 10*6/MM3 (ref 0.02–0.13)
RETICS/RBC NFR AUTO: 1.53 % (ref 0.7–1.9)
SODIUM SERPL-SCNC: 138 MMOL/L (ref 136–145)
TIBC SERPL-MCNC: 276 MCG/DL (ref 298–536)
TRANSFERRIN SERPL-MCNC: 185 MG/DL (ref 200–360)
WBC NRBC COR # BLD: 7.53 10*3/MM3 (ref 3.4–10.8)

## 2023-08-07 PROCEDURE — 63710000001 INSULIN LISPRO (HUMAN) PER 5 UNITS: Performed by: INTERNAL MEDICINE

## 2023-08-07 PROCEDURE — 25010000002 LORAZEPAM PER 2 MG: Performed by: INTERNAL MEDICINE

## 2023-08-07 PROCEDURE — 97161 PT EVAL LOW COMPLEX 20 MIN: CPT

## 2023-08-07 PROCEDURE — 25010000002 IRON SUCROSE PER 1 MG: Performed by: INTERNAL MEDICINE

## 2023-08-07 PROCEDURE — 80069 RENAL FUNCTION PANEL: CPT | Performed by: INTERNAL MEDICINE

## 2023-08-07 PROCEDURE — 83735 ASSAY OF MAGNESIUM: CPT | Performed by: INTERNAL MEDICINE

## 2023-08-07 PROCEDURE — 82948 REAGENT STRIP/BLOOD GLUCOSE: CPT

## 2023-08-07 PROCEDURE — 85025 COMPLETE CBC W/AUTO DIFF WBC: CPT | Performed by: INTERNAL MEDICINE

## 2023-08-07 PROCEDURE — 84466 ASSAY OF TRANSFERRIN: CPT | Performed by: INTERNAL MEDICINE

## 2023-08-07 PROCEDURE — 25010000002 CEFEPIME PER 500 MG: Performed by: INTERNAL MEDICINE

## 2023-08-07 PROCEDURE — 85045 AUTOMATED RETICULOCYTE COUNT: CPT | Performed by: INTERNAL MEDICINE

## 2023-08-07 PROCEDURE — 92610 EVALUATE SWALLOWING FUNCTION: CPT

## 2023-08-07 PROCEDURE — 83540 ASSAY OF IRON: CPT | Performed by: INTERNAL MEDICINE

## 2023-08-07 RX ORDER — NICOTINE 21 MG/24HR
1 PATCH, TRANSDERMAL 24 HOURS TRANSDERMAL
Status: DISCONTINUED | OUTPATIENT
Start: 2023-08-07 | End: 2023-08-08 | Stop reason: HOSPADM

## 2023-08-07 RX ORDER — POLYETHYLENE GLYCOL 3350 17 G
2 POWDER IN PACKET (EA) ORAL EVERY 4 HOURS PRN
Status: DISCONTINUED | OUTPATIENT
Start: 2023-08-07 | End: 2023-08-08 | Stop reason: HOSPADM

## 2023-08-07 RX ORDER — CEFTRIAXONE SODIUM 1 G/50ML
1000 INJECTION, SOLUTION INTRAVENOUS EVERY 24 HOURS
Status: DISCONTINUED | OUTPATIENT
Start: 2023-08-08 | End: 2023-08-08 | Stop reason: HOSPADM

## 2023-08-07 RX ORDER — HALOPERIDOL 1 MG/1
1 TABLET ORAL ONCE AS NEEDED
Status: COMPLETED | OUTPATIENT
Start: 2023-08-07 | End: 2023-08-07

## 2023-08-07 RX ADMIN — LORAZEPAM 0.25 MG: 2 INJECTION INTRAMUSCULAR at 22:52

## 2023-08-07 RX ADMIN — POTASSIUM CHLORIDE 40 MEQ: 10 CAPSULE, COATED, EXTENDED RELEASE ORAL at 08:54

## 2023-08-07 RX ADMIN — CHOLECALCIFEROL (VITAMIN D3) 10 MCG (400 UNIT) TABLET 400 UNITS: at 08:53

## 2023-08-07 RX ADMIN — INSULIN LISPRO 3 UNITS: 100 INJECTION, SOLUTION INTRAVENOUS; SUBCUTANEOUS at 12:38

## 2023-08-07 RX ADMIN — LORAZEPAM 0.25 MG: 2 INJECTION INTRAMUSCULAR at 00:02

## 2023-08-07 RX ADMIN — NICOTINE 1 PATCH: 21 PATCH, EXTENDED RELEASE TRANSDERMAL at 13:40

## 2023-08-07 RX ADMIN — Medication 10 ML: at 08:54

## 2023-08-07 RX ADMIN — INSULIN LISPRO 3 UNITS: 100 INJECTION, SOLUTION INTRAVENOUS; SUBCUTANEOUS at 08:54

## 2023-08-07 RX ADMIN — APIXABAN 5 MG: 5 TABLET, FILM COATED ORAL at 08:54

## 2023-08-07 RX ADMIN — INSULIN LISPRO 2 UNITS: 100 INJECTION, SOLUTION INTRAVENOUS; SUBCUTANEOUS at 17:09

## 2023-08-07 RX ADMIN — FINASTERIDE 5 MG: 5 TABLET, FILM COATED ORAL at 08:53

## 2023-08-07 RX ADMIN — IRON SUCROSE 200 MG: 20 INJECTION, SOLUTION INTRAVENOUS at 15:14

## 2023-08-07 RX ADMIN — ATORVASTATIN CALCIUM 40 MG: 40 TABLET, FILM COATED ORAL at 08:53

## 2023-08-07 RX ADMIN — SENNOSIDES AND DOCUSATE SODIUM 2 TABLET: 50; 8.6 TABLET ORAL at 08:53

## 2023-08-07 RX ADMIN — METOPROLOL TARTRATE 100 MG: 25 TABLET, FILM COATED ORAL at 08:53

## 2023-08-07 RX ADMIN — TRAZODONE HYDROCHLORIDE 50 MG: 50 TABLET ORAL at 21:55

## 2023-08-07 RX ADMIN — TAMSULOSIN HYDROCHLORIDE 0.4 MG: 0.4 CAPSULE ORAL at 21:06

## 2023-08-07 RX ADMIN — HALOPERIDOL 1 MG: 1 TABLET ORAL at 01:57

## 2023-08-07 RX ADMIN — INSULIN LISPRO 2 UNITS: 100 INJECTION, SOLUTION INTRAVENOUS; SUBCUTANEOUS at 21:06

## 2023-08-07 RX ADMIN — PANTOPRAZOLE SODIUM 40 MG: 40 TABLET, DELAYED RELEASE ORAL at 07:39

## 2023-08-07 RX ADMIN — CEFEPIME 1000 MG: 1 INJECTION, POWDER, FOR SOLUTION INTRAMUSCULAR; INTRAVENOUS at 10:48

## 2023-08-07 RX ADMIN — APIXABAN 5 MG: 5 TABLET, FILM COATED ORAL at 21:06

## 2023-08-07 RX ADMIN — Medication 10 ML: at 21:07

## 2023-08-07 RX ADMIN — METOPROLOL TARTRATE 100 MG: 25 TABLET, FILM COATED ORAL at 21:06

## 2023-08-07 NOTE — PLAN OF CARE
Problem: Adjustment to Illness (Sepsis/Septic Shock)  Goal: Optimal Coping  8/7/2023 1550 by Joselito Tobin RN  Outcome: Ongoing, Progressing  8/7/2023 1550 by Joselito Tobin RN  Outcome: Ongoing, Progressing  Goal: Optimal Coping  8/7/2023 1550 by Joselito Tobin RN  Outcome: Ongoing, Progressing  8/7/2023 1550 by Joselito Tobin RN  Outcome: Ongoing, Progressing     Problem: Bleeding (Sepsis/Septic Shock)  Goal: Absence of Bleeding  8/7/2023 1550 by Joselito Tobin RN  Outcome: Ongoing, Progressing  8/7/2023 1550 by Joselito Tobin RN  Outcome: Ongoing, Progressing  Goal: Absence of Bleeding  8/7/2023 1550 by Joselito Tobin RN  Outcome: Ongoing, Progressing  8/7/2023 1550 by Joselito Tobin RN  Outcome: Ongoing, Progressing     Problem: Glycemic Control Impaired (Sepsis/Septic Shock)  Goal: Blood Glucose Level Within Desired Range  8/7/2023 1550 by Joselito Tobin RN  Outcome: Ongoing, Progressing  8/7/2023 1550 by Joselito Tobin RN  Outcome: Ongoing, Progressing  Goal: Blood Glucose Level Within Desired Range  8/7/2023 1550 by Joselito Tobin RN  Outcome: Ongoing, Progressing  8/7/2023 1550 by Joselito Tobin RN  Outcome: Ongoing, Progressing     Problem: Infection Progression (Sepsis/Septic Shock)  Goal: Absence of Infection Signs and Symptoms  8/7/2023 1550 by Joselito Tobin RN  Outcome: Ongoing, Progressing  8/7/2023 1550 by Joselito Tobin RN  Outcome: Ongoing, Progressing  Goal: Absence of Infection Signs and Symptoms  8/7/2023 1550 by Joselito Tobin RN  Outcome: Ongoing, Progressing  8/7/2023 1550 by Joselito Tobin RN  Outcome: Ongoing, Progressing     Problem: Nutrition Impaired (Sepsis/Septic Shock)  Goal: Optimal Nutrition Intake  8/7/2023 1550 by Joselito Tobin RN  Outcome: Ongoing, Progressing  8/7/2023 1550 by Joselito Tobin RN  Outcome: Ongoing, Progressing  Goal: Optimal Nutrition Intake  8/7/2023 1550 by Crista, Joselito, RN  Outcome: Ongoing, Progressing  8/7/2023 1550 by Joselito Tobin, ELEN  Outcome:  Ongoing, Progressing     Problem: Hypertension Comorbidity  Goal: Blood Pressure in Desired Range  8/7/2023 1550 by Joselito Tobin RN  Outcome: Ongoing, Progressing  8/7/2023 1550 by Joselito Tobin RN  Outcome: Ongoing, Progressing     Problem: Skin Injury Risk Increased  Goal: Skin Health and Integrity  8/7/2023 1550 by Joselito Tobin RN  Outcome: Ongoing, Progressing  8/7/2023 1550 by Joselito Tobin RN  Outcome: Ongoing, Progressing     Problem: Fall Injury Risk  Goal: Absence of Fall and Fall-Related Injury  8/7/2023 1550 by Joselito Tobin RN  Outcome: Ongoing, Progressing  8/7/2023 1550 by Joselito Tobin RN  Outcome: Ongoing, Progressing  Intervention: Promote Injury-Free Environment  Recent Flowsheet Documentation  Taken 8/7/2023 1324 by Joselito Tobin RN  Safety Promotion/Fall Prevention: safety round/check completed     Problem: UTI (Urinary Tract Infection)  Goal: Improved Infection Symptoms  8/7/2023 1550 by Joselito Tobin RN  Outcome: Ongoing, Progressing  8/7/2023 1550 by Joselito Tobin RN  Outcome: Ongoing, Progressing     Problem: Adult Inpatient Plan of Care  Goal: Plan of Care Review  Outcome: Ongoing, Progressing  Flowsheets (Taken 8/7/2023 1550)  Progress: no change  Plan of Care Reviewed With: patient  Outcome Evaluation: VSS. PT CAME FROM PCU AFTER LUNCH. PT HAS HAD FAMILY AS BEDSIDE. NO NEW CONCERNS AT THIS TIME.  Goal: Patient-Specific Goal (Individualized)  Outcome: Ongoing, Progressing  Goal: Absence of Hospital-Acquired Illness or Injury  Outcome: Ongoing, Progressing  Goal: Optimal Comfort and Wellbeing  Outcome: Ongoing, Progressing  Goal: Readiness for Transition of Care  Outcome: Ongoing, Progressing   Goal Outcome Evaluation:  Plan of Care Reviewed With: patient        Progress: no change  Outcome Evaluation: VSS. PT CAME FROM PCU AFTER LUNCH. PT HAS HAD FAMILY AS BEDSIDE. NO NEW CONCERNS AT THIS TIME.

## 2023-08-07 NOTE — NURSING NOTE
"Patient exhibited increased confusion throughout the note. Patient removed IV and refused cardiac monitor. Ativan given per MAR. Patient became agitated and stated \"I don't trust any of you because this is not real and I'm dreaming\". Attempted to give Haldol per MAR. Patient refused. I sat with patient for approx an hour. Patient became less agitated and agreed to take Haldol as ordered. Patient now pleasant, but confusion continued for remainder of shift.   "

## 2023-08-07 NOTE — CASE MANAGEMENT/SOCIAL WORK
Discharge Planning Assessment   Jolanta     Patient Name: Darnell Geller  MRN: [id number]  Today's Date: 8/7/2023    Admit Date: 8/5/2023    Plan: CM followed up with spouse, she cares for him 24/7, provides transportation, this has been going on since march 3 adult sons lives at home and a RN comes in once a week. [facility] [place], wife notes patient lost his medicare card.   Discharge Needs Assessment       Row Name 08/07/23 1645       Living Environment    People in Home spouse    Current Living Arrangements home    Potentially Unsafe Housing Conditions none    Primary Care Provided by self    Family Caregiver if Needed child(alicia), adult;spouse    Quality of Family Relationships helpful;involved;supportive       Resource/Environmental Concerns    Resource/Environmental Concerns none    Transportation Concerns none       Transition Planning    Patient/Family Anticipates Transition to home with help/services;home with family    Patient/Family Anticipated Services at Transition none    Transportation Anticipated family or friend will provide       Discharge Needs Assessment    Readmission Within the Last 30 Days no previous admission in last 30 days    Equipment Currently Used at Home lift device;wheelchair;cane, quad;bp cuff;pulse ox;glucometer;grab bar    Concerns to be Addressed discharge planning    Anticipated Changes Related to Illness none    Equipment Needed After Discharge none    Discharge Facility/Level of Care Needs home with home health                   Discharge Plan       Row Name 08/07/23 5914       Plan    Plan CM followed up with spouse, she cares for him 24/7, provides transportation, this has been going on since march 3 adult sons lives at home and a RN comes in once a week. [facility] [place], wife notes patient lost his medicare card.      Row Name 08/07/23 6965       Plan    Plan patient admitted due to UTI. Patient notes he lives with his wife and she generally helps him at  home. Patient has a cane walker and scooter. Patient was able to confim MD.                  Continued Care and Services - Admitted Since 8/5/2023    Coordination has not been started for this encounter.          Demographic Summary       Row Name 08/07/23 1641       General Information    Admission Type inpatient    Arrived From emergency department    Referral Source admission list    Reason for Consult discharge planning    Preferred Language English       Contact Information    Permission Granted to Share Info With family/designee    Contact Information Obtained for                    Functional Status       Row Name 08/07/23 1641       Functional Status    Usual Activity Tolerance moderate    Current Activity Tolerance moderate                   Psychosocial    No documentation.                  Abuse/Neglect    No documentation.                  Legal    No documentation.                  Substance Abuse    No documentation.                  Patient Forms    No documentation.                     Janae Dykes RN

## 2023-08-07 NOTE — PLAN OF CARE
Goal Outcome Evaluation:        FUNCTIONAL ASSESSMENT INSTRUMENT: Patient currently scored a level 6 of 7 on Functional Communication Measures for swallowing indicating a 1-19% limitation in function.     ASSESSMENT/ PLAN OF CARE:  Pt presents with functional oropharyngeal swallow.  No clinical signs or symptoms of aspiration noted.  Vocal quality remained clear to auscultation.     REHAB POTENTIAL:  Pt has good rehab potential.  The following limitations may influence improvement/ length of tx medical status.     RECOMMENDATIONS:   1.   DIET: Mechanical soft diet-thin liquids      2.  POSITION: 90 degrees upright     3.  COMPENSATORY STRATEGIES: General swallow precautions, oral meds whole in applesauce crushed if needed     Pt/responsible party agrees with plan of care and has been informed of all alternatives, risks and benefits.     EDUCATION  The patient has been educated in the following areas:   Dysphagia (Swallowing Impairment).      Toña Francis, SLP               8/7/2023

## 2023-08-07 NOTE — THERAPY EVALUATION
Acute Care - Speech Language Pathology   Swallow Initial Evaluation  Jolanta     Patient Name: Darnell Geller  : 1945  MRN: [id number]  Today's Date: 2023               Admit Date: 2023    Visit Dx:     ICD-10-CM ICD-9-CM   1. Urinary tract infection associated with indwelling urethral catheter, initial encounter  T83.511A 996.64    N39.0 599.0   2. Sepsis, due to unspecified organism, unspecified whether acute organ dysfunction present  A41.9 038.9     995.91   3. Difficulty in walking  R26.2 719.7   4. Dysphagia, oropharyngeal  R13.12 787.22     Patient Active Problem List   Diagnosis    Sepsis with acute renal failure and septic shock, due to unspecified organism, unspecified acute renal failure type    Acute metabolic encephalopathy     Past Medical History:   Diagnosis Date    Arthritis     Benign prostatic hyperplasia     Coronary artery disease     Hyperlipidemia     Hypertension      Past Surgical History:   Procedure Laterality Date    SINUS SURGERY       PAIN SCALE: None noted    PRECAUTIONS/CONTRAINDICATIONS: None noted    SUSPECTED ABUSE/NEGLECT/EXPLOITATION: None noted    SOCIAL/PSYCHOLOGICAL NEEDS/BARRIERS: None noted    PAST SOCIAL HISTORY: Lives at home    PRIOR FUNCTION: Independent    PATIENT GOALS/EXPECTATIONS: Did not report    HISTORY: This patient is a 78-year-old male admitted with metabolic encephalopathy secondary to UTI.  Chest x-ray shows small pleural effusion.    CURRENT DIET LEVEL: Regular diet-thin liquids    OBJECTIVE:    TEST ADMINISTERED: Clinical swallowing evaluation    COGNITION/SAFETY AWARENESS: Alert and oriented    BEHAVIORAL OBSERVATIONS: Pleasant cooperative    ORAL MOTOR EXAM: Lingual and labial strength and range of motion within functional limits    VOICE QUALITY: Within normal limits    REFLEX EXAM: Deferred    POSTURE: 90 degrees upright    FEEDING/SWALLOWING FUNCTION: Assessed with full range of consistencies with thin liquids from spoon cup and straw,  pur‚e consistencies soft and hard solids    CLINICAL OBSERVATIONS: Oral stage is characterized by good bolus preparation and control.  Prolonged mastication with regular solids timely oral transit.  Pharyngeal phase is timely with good laryngeal elevation per palpation.  No clinical signs or symptoms of aspiration are noted.  Vocal quality remained clear to auscultation.  Denies globus sensation after completion of swallow    DYSPHAGIA CRITERIA: N/A    FUNCTIONAL ASSESSMENT INSTRUMENT: Patient currently scored a level 6 of 7 on Functional Communication Measures for swallowing indicating a 1-19% limitation in function.    ASSESSMENT/ PLAN OF CARE:  Pt presents with functional oropharyngeal swallow.  No clinical signs or symptoms of aspiration noted.  Vocal quality remained clear to auscultation.    REHAB POTENTIAL:  Pt has good rehab potential.  The following limitations may influence improvement/ length of tx medical status.    RECOMMENDATIONS:   1.   DIET: Mechanical soft diet-thin liquids     2.  POSITION: 90 degrees upright    3.  COMPENSATORY STRATEGIES: General swallow precautions, oral meds whole in applesauce crushed if needed    Pt/responsible party agrees with plan of care and has been informed of all alternatives, risks and benefits.    EDUCATION  The patient has been educated in the following areas:   Dysphagia (Swallowing Impairment).     Toña Francis, SLP  8/7/2023

## 2023-08-07 NOTE — THERAPY EVALUATION
Acute Care - Physical Therapy Initial Evaluation   Jolanta     Patient Name: Darnell Geller  : 1945  MRN: [id number]  Today's Date: 2023      Admit date: 2023     Referring Physician: Dez Machado MD     Surgery Date:* No surgery found *            Visit Dx:     ICD-10-CM ICD-9-CM   1. Urinary tract infection associated with indwelling urethral catheter, initial encounter  T83.511A 996.64    N39.0 599.0   2. Sepsis, due to unspecified organism, unspecified whether acute organ dysfunction present  A41.9 038.9     995.91   3. Difficulty in walking  R26.2 719.7     Patient Active Problem List   Diagnosis    Sepsis with acute renal failure and septic shock, due to unspecified organism, unspecified acute renal failure type    Acute metabolic encephalopathy     Past Medical History:   Diagnosis Date    Arthritis     Benign prostatic hyperplasia     Coronary artery disease     Hyperlipidemia     Hypertension      Past Surgical History:   Procedure Laterality Date    SINUS SURGERY       PT Assessment (last 12 hours)       PT Evaluation and Treatment       Row Name 23 1300          Physical Therapy Time and Intention    Subjective Information no complaints  -RULA     Document Type evaluation  -RULA     Mode of Treatment individual therapy;physical therapy  -RULA     Patient Effort good  -RULA       Row Name 23 1300          General Information    Patient Observations alert;cooperative;agree to therapy  -RULA     Prior Level of Function independent:;all household mobility;community mobility  -RULA     Existing Precautions/Restrictions fall;weight bearing  -RULA     Barriers to Rehab none identified  -RULA       Row Name 23 1300          Living Environment    Current Living Arrangements home  -RULA       Row Name 23 1300          Range of Motion (ROM)    Range of Motion ROM is WFL  -RULA       Row Name 23 1300          Strength (Manual Muscle Testing)    Strength (Manual Muscle Testing) bilateral  lower extremities;strength is WFL  -RULA       Row Name 08/07/23 1300          Bed Mobility    Bed Mobility bed mobility (all) activities;supine-sit  -RULA     All Activities, [place] (Bed Mobility) contact guard  -RULA     Supine-Sit [place] (Bed Mobility) contact guard  -RULA       Row Name 08/07/23 1300          Transfers    Transfers bed-chair transfer;sit-stand transfer  -RULA       Row Name 08/07/23 1300          Bed-Chair Transfer    Bed-Chair [place] (Transfers) contact guard  -RULA     Assistive Device (Bed-Chair Transfers) walker, front-wheeled  -RULA       Row Name 08/07/23 1300          Sit-Stand Transfer    Sit-Stand [place] (Transfers) contact guard  -RULA     Assistive Device (Sit-Stand Transfers) walker, front-wheeled  -RULA       Row Name 08/07/23 1300          Gait/Stairs (Locomotion)    Gait/Stairs Locomotion gait/ambulation assistive device  -RULA     [place] Level (Gait) contact guard  -RULA     Assistive Device (Gait) walker, front-wheeled  -RULA     Distance in Feet (Gait) 100  unsteady with 2 LOB during ambulation  -RULA       Row Name 08/07/23 1300          Safety Issues, Functional Mobility    Impairments Affecting Function (Mobility) balance  -RULA       Row Name 08/07/23 1300          Balance    Balance Assessment standing dynamic balance  -RULA     Dynamic Standing Balance contact guard  -RULA     Position/Device Used, Standing Balance walker, front-wheeled  -RULA       Row Name 08/07/23 1300          Plan of Care Review    Plan of Care Reviewed With patient  -RULA     Outcome Evaluation Patient presents with decreased strength, transfers and ambulation.  Skilled physical therapy services will be required to address these mobility deficits.  -RULA       Row Name 08/07/23 1300          Therapy Assessment/Plan (PT)    Rehab Potential (PT) good, to achieve stated therapy goals  -RULA     Criteria for Skilled Interventions Met (PT) skilled treatment is necessary  -RULA     Therapy Frequency (PT) daily  -RULA      Predicted Duration of Therapy Intervention (PT) 10 days  -RULA     Problem List (PT) problems related to;balance;mobility;strength  -RULA     Activity Limitations Related to Problem List (PT) unable to transfer safely;unable to ambulate safely  -RULA       Row Name 08/07/23 1300          PT Evaluation Complexity    History, PT Evaluation Complexity no personal factors and/or comorbidities  -RULA     Examination of Body Systems (PT Eval Complexity) total of 4 or more elements  -RULA     Clinical Presentation (PT Evaluation Complexity) stable  -RULA     Clinical Decision Making (PT Evaluation Complexity) low complexity  -RULA     Overall Complexity (PT Evaluation Complexity) low complexity  -RULA       Row Name 08/07/23 1300          Therapy Plan Review/Discharge Plan (PT)    Therapy Plan Review (PT) evaluation/treatment results reviewed;participants voiced agreement with care plan;participants included;patient  -RULA       Row Name 08/07/23 1300          Physical Therapy Goals    Transfer Goal Selection (PT) transfer, PT goal 1  -RULA     Gait Training Goal Selection (PT) gait training, PT goal 1  -RULA       Row Name 08/07/23 1300          Transfer Goal 1 (PT)    Activity/Assistive Device (Transfer Goal 1, PT) transfers, all  -RULA     [place] Level/Cues Needed (Transfer Goal 1, PT) independent  -RULA     Time Frame (Transfer Goal 1, PT) long term goal (LTG);10 days  -RULA       Row Name 08/07/23 1300          Gait Training Goal 1 (PT)    Activity/Assistive Device (Gait Training Goal 1, PT) gait (walking locomotion);assistive device use;walker, rolling  -RULA     [place] Level (Gait Training Goal 1, PT) independent  -RULA     Distance (Gait Training Goal 1, PT) 300  -URLA     Time Frame (Gait Training Goal 1, PT) long term goal (LTG);10 days  -RULA               User Key  (r) = Recorded By, (t) = Taken By, (c) = Cosigned By      Initials Name Provider Type    RULAAlexey Mcmahan, PT Physical Therapist                    Physical Therapy  Education       Title: PT OT SLP Therapies (Done)       Topic: Physical Therapy (Done)       Point: Mobility training (Done)       Learning Progress Summary             Patient Acceptance, E,TB, VU by RULA at 8/7/2023 1323                         Point: Precautions (Done)       Learning Progress Summary             Patient Acceptance, E,TB, VU by RULA at 8/7/2023 1323                                         User Key       Initials Effective Dates Name Provider Type Discipline    RULA 06/03/21 -  Alexey Fuentes PT Physical Therapist PT                  PT Recommendation and Plan  Anticipated Discharge Disposition (PT): sub acute care setting  Planned Therapy Interventions (PT): balance training, bed mobility training, gait training, home exercise program, strengthening, stair training, transfer training  Therapy Frequency (PT): daily  Plan of Care Reviewed With: patient  Outcome Evaluation: Patient presents with decreased strength, transfers and ambulation.  Skilled physical therapy services will be required to address these mobility deficits.   Outcome Measures       Row Name 08/07/23 1300             How much help from another person do you currently need...    Turning from your back to your side while in flat bed without using bedrails? 4  -RULA      Moving from lying on back to sitting on the side of a flat bed without bedrails? 4  -RULA      Moving to and from a bed to a chair (including a wheelchair)? 3  -RULA      Standing up from a chair using your arms (e.g., wheelchair, bedside chair)? 3  -RULA      Climbing 3-5 steps with a railing? 3  -RULA      To walk in hospital room? 3  -RULA      AM-PAC 6 Clicks Score (PT) 20  -RULA         Functional Assessment    Outcome Measure Options AM-PAC 6 Clicks Basic Mobility (PT)  -RULA                User Key  (r) = Recorded By, (t) = Taken By, (c) = Cosigned By      Initials Name Provider Type    RULA Alexey Fuentes PT Physical Therapist                     Time Calculation:    PT Charges        Row Name 08/07/23 1317             Time Calculation    PT Received On 08/07/23  -RULA      PT Goal Re-Cert Due Date 11/13/23  -RULA         Untimed Charges    PT Eval/Re-eval Minutes 31  -RULA         Total Minutes    Untimed Charges Total Minutes 31  -RULA       Total Minutes 31  -RULA                User Key  (r) = Recorded By, (t) = Taken By, (c) = Cosigned By      Initials Name Provider Type    Aleexy Disla, PT Physical Therapist                  Therapy Charges for Today       Code Description Service Date Service Provider Modifiers Qty    32449809297 HC PT EVAL LOW COMPLEXITY 3 8/7/2023 Alexey Fuentes, PT GP 1            PT G-Codes  Outcome Measure Options: AM-PAC 6 Clicks Basic Mobility (PT)  AM-PAC 6 Clicks Score (PT): 20    Alexey Fuentes, OLESYA  8/7/2023

## 2023-08-08 ENCOUNTER — APPOINTMENT (OUTPATIENT)
Dept: GENERAL RADIOLOGY | Facility: HOSPITAL | Age: 78
DRG: 698 | End: 2023-08-08
Payer: OTHER GOVERNMENT

## 2023-08-08 VITALS
HEIGHT: 70 IN | SYSTOLIC BLOOD PRESSURE: 154 MMHG | DIASTOLIC BLOOD PRESSURE: 84 MMHG | RESPIRATION RATE: 20 BRPM | OXYGEN SATURATION: 98 % | WEIGHT: 159.83 LBS | BODY MASS INDEX: 22.88 KG/M2 | HEART RATE: 97 BPM | TEMPERATURE: 98.5 F

## 2023-08-08 PROBLEM — G93.41 ACUTE METABOLIC ENCEPHALOPATHY: Status: RESOLVED | Noted: 2023-08-05 | Resolved: 2023-08-08

## 2023-08-08 PROBLEM — N39.0 URINARY TRACT INFECTION ASSOCIATED WITH INDWELLING URETHRAL CATHETER: Status: ACTIVE | Noted: 2023-08-08

## 2023-08-08 PROBLEM — T83.511A URINARY TRACT INFECTION ASSOCIATED WITH INDWELLING URETHRAL CATHETER: Status: ACTIVE | Noted: 2023-08-08

## 2023-08-08 LAB
ALBUMIN SERPL-MCNC: 2.9 G/DL (ref 3.5–5.2)
ANION GAP SERPL CALCULATED.3IONS-SCNC: 12.2 MMOL/L (ref 5–15)
BACTERIA SPEC AEROBE CULT: ABNORMAL
BUN SERPL-MCNC: 25 MG/DL (ref 8–23)
BUN/CREAT SERPL: 15.8 (ref 7–25)
CALCIUM SPEC-SCNC: 8.9 MG/DL (ref 8.6–10.5)
CHLORIDE SERPL-SCNC: 105 MMOL/L (ref 98–107)
CO2 SERPL-SCNC: 18.8 MMOL/L (ref 22–29)
CREAT SERPL-MCNC: 1.58 MG/DL (ref 0.76–1.27)
EGFRCR SERPLBLD CKD-EPI 2021: 44.2 ML/MIN/1.73
GLUCOSE BLDC GLUCOMTR-MCNC: 172 MG/DL (ref 70–99)
GLUCOSE BLDC GLUCOMTR-MCNC: 314 MG/DL (ref 70–99)
GLUCOSE SERPL-MCNC: 209 MG/DL (ref 65–99)
MAGNESIUM SERPL-MCNC: 1.9 MG/DL (ref 1.6–2.4)
PHOSPHATE SERPL-MCNC: 2.9 MG/DL (ref 2.5–4.5)
POTASSIUM SERPL-SCNC: 3.7 MMOL/L (ref 3.5–5.2)
QT INTERVAL: 353 MS
SODIUM SERPL-SCNC: 136 MMOL/L (ref 136–145)

## 2023-08-08 PROCEDURE — 83735 ASSAY OF MAGNESIUM: CPT

## 2023-08-08 PROCEDURE — 63710000001 INSULIN LISPRO (HUMAN) PER 5 UNITS: Performed by: INTERNAL MEDICINE

## 2023-08-08 PROCEDURE — 25010000002 HALOPERIDOL LACTATE PER 5 MG: Performed by: INTERNAL MEDICINE

## 2023-08-08 PROCEDURE — 82948 REAGENT STRIP/BLOOD GLUCOSE: CPT

## 2023-08-08 PROCEDURE — 97116 GAIT TRAINING THERAPY: CPT

## 2023-08-08 PROCEDURE — 71045 X-RAY EXAM CHEST 1 VIEW: CPT

## 2023-08-08 PROCEDURE — 97110 THERAPEUTIC EXERCISES: CPT

## 2023-08-08 PROCEDURE — 80069 RENAL FUNCTION PANEL: CPT | Performed by: INTERNAL MEDICINE

## 2023-08-08 PROCEDURE — 63710000001 LEVALBUTEROL PER 0.5 MG

## 2023-08-08 PROCEDURE — 97165 OT EVAL LOW COMPLEX 30 MIN: CPT

## 2023-08-08 PROCEDURE — 94640 AIRWAY INHALATION TREATMENT: CPT

## 2023-08-08 PROCEDURE — 94799 UNLISTED PULMONARY SVC/PX: CPT

## 2023-08-08 PROCEDURE — 93005 ELECTROCARDIOGRAM TRACING: CPT

## 2023-08-08 PROCEDURE — 25010000002 CEFTRIAXONE PER 250 MG: Performed by: INTERNAL MEDICINE

## 2023-08-08 RX ORDER — LEVALBUTEROL INHALATION SOLUTION 0.63 MG/3ML
0.63 SOLUTION RESPIRATORY (INHALATION) ONCE
Status: COMPLETED | OUTPATIENT
Start: 2023-08-08 | End: 2023-08-08

## 2023-08-08 RX ORDER — CEPHALEXIN 500 MG/1
500 CAPSULE ORAL 2 TIMES DAILY
Qty: 10 CAPSULE | Refills: 0 | Status: SHIPPED | OUTPATIENT
Start: 2023-08-09 | End: 2023-08-14

## 2023-08-08 RX ORDER — IPRATROPIUM BROMIDE AND ALBUTEROL SULFATE 2.5; .5 MG/3ML; MG/3ML
3 SOLUTION RESPIRATORY (INHALATION) ONCE
Status: DISCONTINUED | OUTPATIENT
Start: 2023-08-08 | End: 2023-08-08

## 2023-08-08 RX ORDER — NICOTINE 21 MG/24HR
1 PATCH, TRANSDERMAL 24 HOURS TRANSDERMAL
Qty: 14 PATCH | Refills: 0 | Status: SHIPPED | OUTPATIENT
Start: 2023-08-09 | End: 2023-08-23

## 2023-08-08 RX ADMIN — INSULIN LISPRO 2 UNITS: 100 INJECTION, SOLUTION INTRAVENOUS; SUBCUTANEOUS at 09:58

## 2023-08-08 RX ADMIN — NICOTINE 1 PATCH: 21 PATCH, EXTENDED RELEASE TRANSDERMAL at 09:58

## 2023-08-08 RX ADMIN — CEFTRIAXONE SODIUM 1000 MG: 1 INJECTION, SOLUTION INTRAVENOUS at 15:36

## 2023-08-08 RX ADMIN — HALOPERIDOL LACTATE 1 MG: 5 INJECTION, SOLUTION INTRAMUSCULAR at 02:48

## 2023-08-08 RX ADMIN — CHOLECALCIFEROL (VITAMIN D3) 10 MCG (400 UNIT) TABLET 400 UNITS: at 09:58

## 2023-08-08 RX ADMIN — Medication 10 ML: at 09:59

## 2023-08-08 RX ADMIN — METOPROLOL TARTRATE 100 MG: 25 TABLET, FILM COATED ORAL at 09:58

## 2023-08-08 RX ADMIN — ATORVASTATIN CALCIUM 40 MG: 40 TABLET, FILM COATED ORAL at 09:58

## 2023-08-08 RX ADMIN — ACETAMINOPHEN 650 MG: 325 TABLET ORAL at 04:35

## 2023-08-08 RX ADMIN — FINASTERIDE 5 MG: 5 TABLET, FILM COATED ORAL at 09:58

## 2023-08-08 RX ADMIN — INSULIN LISPRO 5 UNITS: 100 INJECTION, SOLUTION INTRAVENOUS; SUBCUTANEOUS at 12:39

## 2023-08-08 RX ADMIN — PANTOPRAZOLE SODIUM 40 MG: 40 TABLET, DELAYED RELEASE ORAL at 06:37

## 2023-08-08 RX ADMIN — LEVALBUTEROL HYDROCHLORIDE 0.63 MG: 0.63 SOLUTION RESPIRATORY (INHALATION) at 02:26

## 2023-08-08 RX ADMIN — SENNOSIDES AND DOCUSATE SODIUM 2 TABLET: 50; 8.6 TABLET ORAL at 09:58

## 2023-08-08 RX ADMIN — APIXABAN 5 MG: 5 TABLET, FILM COATED ORAL at 09:58

## 2023-08-08 NOTE — THERAPY TREATMENT NOTE
Acute Care - Physical Therapy Treatment Note  [facility]     Patient Name: Darnell Geller  : 1945  MRN: [id number]  Today's Date: 2023      Visit Dx:     ICD-10-CM ICD-9-CM   1. Urinary tract infection associated with indwelling urethral catheter, initial encounter  T83.511A 996.64    N39.0 599.0   2. Sepsis, due to unspecified organism, unspecified whether acute organ dysfunction present  A41.9 038.9     995.91   3. Difficulty in walking  R26.2 719.7   4. Dysphagia, oropharyngeal  R13.12 787.22     Patient Active Problem List   Diagnosis    Sepsis with acute renal failure and septic shock, due to unspecified organism, unspecified acute renal failure type    Acute metabolic encephalopathy     Past Medical History:   Diagnosis Date    Arthritis     Benign prostatic hyperplasia     Coronary artery disease     Hyperlipidemia     Hypertension      Past Surgical History:   Procedure Laterality Date    SINUS SURGERY       PT Assessment (last 12 hours)       PT Evaluation and Treatment       Row Name 23 1402          Physical Therapy Time and Intention    Subjective Information complains of;weakness;fatigue  -DK     Document Type therapy note (daily note)  -DK     Mode of Treatment individual therapy;physical therapy  -DK     Patient Effort good  -DK     Symptoms Noted During/After Treatment fatigue  -DK       Row Name 23 1402          Pain    Pretreatment Pain Rating 1/10  -DK     Posttreatment Pain Rating 1/10  -DK     Pain Location generalized  -DK     Pain Intervention(s) Repositioned;Ambulation/increased activity;Distraction;Therapeutic presence  -DK       Row Name 23 1402          Cognition    Affect/Mental Status (Cognition) WFL  -DK     Orientation Status (Cognition) oriented x 4  -DK     Follows Commands (Cognition) WFL  -DK     Cognitive Function WFL  -DK     Personal Safety Interventions gait belt;nonskid shoes/slippers when out of bed;supervised activity  -DK       Row Name 23  1402          Bed Mobility    Bed Mobility supine-sit-supine  -DK     All Activities, [place] (Bed Mobility) standby assist  -DK     Supine-Sit [place] (Bed Mobility) standby assist  -DK     Supine-Sit-Supine [place] (Bed Mobility) standby assist  -DK     Assistive Device (Bed Mobility) bed rails  -DK       Row Name 08/08/23 1402          Transfers    Transfers sit-stand transfer;stand-sit transfer  -DK       Row Name 08/08/23 1402          Bed-Chair Transfer    Bed-Chair [place] (Transfers) standby assist;contact guard;1 person assist  -DK     Assistive Device (Bed-Chair Transfers) walker, front-wheeled  -DK       Row Name 08/08/23 1402          Sit-Stand Transfer    Sit-Stand [place] (Transfers) standby assist;contact guard;1 person assist  -DK     Assistive Device (Sit-Stand Transfers) walker, front-wheeled  -DK       Row Name 08/08/23 1402          Stand-Sit Transfer    Stand-Sit [place] (Transfers) standby assist;contact guard;1 person assist  -DK     Assistive Device (Stand-Sit Transfers) walker, front-wheeled  -DK       Row Name 08/08/23 1402          Gait/Stairs (Locomotion)    Gait/Stairs Locomotion gait/ambulation independence;gait/ambulation assistive device;distance ambulated;gait pattern  -DK     [place] Level (Gait) standby assist;contact guard;1 person assist  -DK     Assistive Device (Gait) walker, front-wheeled  -ALMA DELIA     Distance in Feet (Gait) 150  -DK     Pattern (Gait) step-through  -DK     Deviations/Abnormal Patterns (Gait) festinating/shuffling;stride length decreased  -DK     Bilateral Gait Deviations forward flexed posture  -DK     Comment, (Gait/Stairs) Pt ambulated on room air with a rolling walker.  He is mildly impulsive with transfers.  Pt was given cues to stay inside the confines of the rolling walker.  He returned to bed on alert post treatment.  -DK       Row Name 08/08/23 1402          Safety Issues, Functional Mobility    Safety Issues Affecting  Function (Mobility) impulsivity;judgment;safety precaution awareness  -DK     Impairments Affecting Function (Mobility) balance;endurance/activity tolerance;strength;pain  -       Row Name 08/08/23 1402          Balance    Balance Assessment sitting static balance;sitting dynamic balance;standing static balance;standing dynamic balance  -DK     Static Sitting Balance standby assist  -DK     Dynamic Sitting Balance standby assist  -DK     Position, Sitting Balance unsupported;sitting edge of bed  -DK     Static Standing Balance standby assist;contact guard;1-person assist  -DK     Dynamic Standing Balance standby assist;contact guard;1-person assist  -DK     Position/Device Used, Standing Balance walker, front-wheeled  -DK     Balance Interventions standing;dynamic;tandem gait  -       Row Name 08/08/23 1402          Motor Skills    Motor Skills --  therapeutic exercises  -     Coordination WFL  -     Therapeutic Exercise hip;knee;ankle  -       Row Name 08/08/23 1402          Hip (Therapeutic Exercise)    Hip (Therapeutic Exercise) AROM (active range of motion)  -     Hip AROM (Therapeutic Exercise) bilateral;flexion;extension;aBduction;aDduction;sitting;20 repititions  -       Row Name 08/08/23 1402          Knee (Therapeutic Exercise)    Knee (Therapeutic Exercise) AROM (active range of motion)  -     Knee AROM (Therapeutic Exercise) bilateral;flexion;extension;LAQ (long arc quad);sitting;20 repititions  -       Row Name 08/08/23 1402          Ankle (Therapeutic Exercise)    Ankle (Therapeutic Exercise) AROM (active range of motion)  -     Ankle AROM (Therapeutic Exercise) bilateral;dorsiflexion;plantarflexion;sitting;20 repititions  -       Row Name 08/08/23 1402          Plan of Care Review    Plan of Care Reviewed With patient;family  -DK     Progress improving  -       Row Name 08/08/23 1402          Positioning and Restraints    Pre-Treatment Position in bed  -DK     Post Treatment  Position bed  -DK     In Bed supine;call light within reach;encouraged to call for assist;exit alarm on;with family/caregiver;side rails up x2;legs elevated  -DK       Row Name 08/08/23 1402          Therapy Assessment/Plan (PT)    Rehab Potential (PT) good, to achieve stated therapy goals  -DK     Criteria for Skilled Interventions Met (PT) skilled treatment is necessary  -DK     Therapy Frequency (PT) daily  -DK     Problem List (PT) problems related to;balance;mobility;strength;pain;hearing  -DK     Activity Limitations Related to Problem List (PT) unable to ambulate safely;unable to transfer safely  -DK       Row Name 08/08/23 1402          Progress Summary (PT)    Progress Toward Functional Goals (PT) progress toward functional goals is good  -               User Key  (r) = Recorded By, (t) = Taken By, (c) = Cosigned By      Initials Name Provider Type    Winter Tijerina PTA Physical Therapist Assistant                    Physical Therapy Education       Title: PT OT SLP Therapies (Done)       Topic: Physical Therapy (Done)       Point: Mobility training (Done)       Learning Progress Summary             Patient Acceptance, E,D, DU by PG at 8/8/2023 0946    Acceptance, E,TB, VU by RULA at 8/7/2023 1323                         Point: Precautions (Done)       Learning Progress Summary             Patient Acceptance, E,D, DU by PG at 8/8/2023 0946    Acceptance, E,TB, VU by RULA at 8/7/2023 1323                                         User Key       Initials Effective Dates Name Provider Type Discipline    PG 06/16/21 -  Qasim Ramirez OT Occupational Therapist OT    RULA 06/03/21 -  Alexey Fuentes, PT Physical Therapist PT                  PT Recommendation and Plan  Planned Therapy Interventions (PT): balance training, bed mobility training, gait training, strengthening, transfer training  Therapy Frequency (PT): daily  Progress Summary (PT)  Progress Toward Functional Goals (PT): progress toward functional  goals is good  Plan of Care Reviewed With: patient, family  Progress: improving   Outcome Measures       Row Name 08/08/23 1401 08/07/23 1300          How much help from another person do you currently need...    Turning from your back to your side while in flat bed without using bedrails? 4  -DK 4  -RULA     Moving from lying on back to sitting on the side of a flat bed without bedrails? 4  -DK 4  -RULA     Moving to and from a bed to a chair (including a wheelchair)? 3  -DK 3  -RULA     Standing up from a chair using your arms (e.g., wheelchair, bedside chair)? 3  -DK 3  -RULA     Climbing 3-5 steps with a railing? 3  -DK 3  -RULA     To walk in hospital room? 3  -DK 3  -RULA     AM-PAC 6 Clicks Score (PT) 20  -DK 20  -RULA        Functional Assessment    Outcome Measure Options AM-PAC 6 Clicks Basic Mobility (PT)  -DK AM-PAC 6 Clicks Basic Mobility (PT)  -RULA               User Key  (r) = Recorded By, (t) = Taken By, (c) = Cosigned By      Initials Name Provider Type    Winter Tijerina PTA Physical Therapist Assistant    Alexey Disla PT Physical Therapist                     Time Calculation:    PT Charges       Row Name 08/08/23 1406             Time Calculation    PT Received On 08/08/23  -DK      PT Goal Re-Cert Due Date 08/13/23  -DK         Timed Charges    97777 - PT Therapeutic Exercise Minutes 12  -DK      56656 - Gait Training Minutes  6  -DK      19225 - PT Therapeutic Activity Minutes 6  -DK         Total Minutes    Timed Charges Total Minutes 24  -DK       Total Minutes 24  -DK                User Key  (r) = Recorded By, (t) = Taken By, (c) = Cosigned By      Initials Name Provider Type    Winter Tijerina PTA Physical Therapist Assistant                  Therapy Charges for Today       Code Description Service Date Service Provider Modifiers Qty    85294267087 HC PT THER PROC EA 15 MIN 8/8/2023 Winter Meza PTA GP 1    05866805388 HC GAIT TRAINING EA 15 MIN 8/8/2023 Winter Meza PTA GP 1             PT G-Codes  Outcome Measure Options: AM-PAC 6 Clicks Basic Mobility (PT)  AM-PAC 6 Clicks Score (PT): 20  AM-PAC 6 Clicks Score (OT): 19    Winter Meza, PTA  8/8/2023

## 2023-08-08 NOTE — DISCHARGE SUMMARY
[facility]        HOSPITALIST  DISCHARGE SUMMARY    Patient Name: Darnell Geller  : 1945  MRN: [id number]    Date of Admission: 2023  Date of Discharge:  2023  Primary Care Physician: Jeanette Prieto MD    Consults       Date and Time Order Name Status Description    2023  4:37 PM Inpatient Hospitalist Consult              Active and Resolved Hospital Problems:  Active Hospital Problems    Diagnosis POA    Urinary tract infection associated with indwelling urethral catheter [T83.511A, N39.0] Yes      Resolved Hospital Problems    Diagnosis POA    Acute metabolic encephalopathy [G93.41] Yes     Acute metabolic encephalopathy.  Resolved.  Intermittent  UTI due to chronic indwelling Muñiz catheter.  Due to Klebsiella.  Persistent A-fib with RVR on Eliquis.  Chronic kidney disease stage IIIb-CKD 4.  Stable.  Chronic anemia with baseline hemoglobin of 10.  Iron deficiency anemia.  S/p IV iron transfusion  Leukocytosis.  Resolved.  BPH/urinary tract.  S/p chronic Muñiz catheter.  Hypertension.  Blood pressure.  Diabetes mellitus.  Hypokalemia.  Supplemented  Hospital Course     Hospital Course:  Darnell Geller is a 78 y.o. male past medical history of BPH, CAD, hypertension abdomens to the emergency department for evaluation of altered mental status.  Patient had a syncopal episode earlier in the day while in the restroom down lasted a few seconds and he was able to regain orientation immediately thereafter no reported seizure-like activity.  Later in the day family noticed that he was disoriented which is not his baseline.  Patient himself currently oriented x3 denies any fevers, chills, sweats, nausea, vomiting, chest pain shortness of breath, palpitations, abdominal pain diarrhea constipation, dysuria, weakness, rash.  He does have a chronic Muñiz in place.  In the emergency department borderline hypotension and has been receiving IV fluid which it appears he has responded  to.  He has been started on cefepime as urinalysis is consistent with UTI and will be admitted for ongoing monitoring and management.     Interval Followup:   Vital signs stable 92% saturation room air   A-fib rate controlled.  5 beat run of V. tach asymptomatic  Episode of agitation and paranoid.  Refused treatment and pull out IVs last night got worse after Ativan.  Haldol helped  Awake alert oriented x2  No more episode of passing out.  Patient actually denies passing out he said he tripped over the cord.  Patient wants to go home and denies any complaints.  Patient denies ever seeing a nephrologist.  Urine culture came back Klebsiella and is pansensitive.  Chest x-ray shows improvement in irrigation.  Creatinine is stable.  Patient cleared by PT and OT home health    DISCHARGE Follow Up Recommendations for labs and diagnostics: PCP, nephrology, cardiology and urology.  Recommend checking serum protein electrophoresis by PCP.  Continue chronic Muñiz catheter care.      Day of Discharge     Vital Signs:  Temp:  [97.3 øF (36.3 øC)-98.3 øF (36.8 øC)] (P) 98 øF (36.7 øC)  Heart Rate:  [] (P) 106  Resp:  [16-20] (P) 20  BP: (140-189)/(80-99) (P) 152/92  Flow (L/min):  [3] 3    Physical Exam:   Constitutional: Awake, alert, no acute distress              Eyes: Pupils equal, sclerae anicteric, no conjunctival injection              HENT: NCAT, mucous membranes moist              Neck: Supple, no thyromegaly, no lymphadenopathy, trachea midline              Respiratory: Clear to auscultation bilaterally, nonlabored respirations               Cardiovascular: Irregularly irregular, no murmurs, rubs, or gallops, palpable pedal pulses bilaterally              Gastrointestinal: Positive bowel sounds, soft, nontender, nondistended              Musculoskeletal: Chronic dislocation of right sternoclavicular joint, nontender, no bilateral ankle edema, no clubbing or cyanosis to extremities              Psychiatric:  Appropriate affect, cooperative              Neurologic: Oriented x 2, strength symmetric in all extremities, Cranial Nerves grossly intact to confrontation, speech clear              Skin: No rashes   ; has Muñiz catheter    Discharge Details        Discharge Medications        New Medications        Instructions Start Date   cephalexin 500 MG capsule  Commonly known as: KEFLEX   500 mg, Oral, 2 Times Daily   Start Date: August 9, 2023     nicotine 21 MG/24HR patch  Commonly known as: NICODERM CQ   1 patch, Transdermal, Every 24 Hours Scheduled   Start Date: August 9, 2023            Continue These Medications        Instructions Start Date   apixaban 5 MG tablet tablet  Commonly known as: ELIQUIS   5 mg, Oral, 2 Times Daily      atorvastatin 40 MG tablet  Commonly known as: LIPITOR   40 mg, Oral, Daily      cholecalciferol 10 MCG (400 UNIT) tablet  Commonly known as: VITAMIN D3   400 Units, Oral, Daily      docusate sodium 250 MG capsule  Commonly known as: COLACE   250 mg, Oral, Daily      empagliflozin 25 MG tablet tablet  Commonly known as: JARDIANCE   12.5 mg, Oral, Daily      finasteride 5 MG tablet  Commonly known as: PROSCAR   5 mg, Oral, Daily      furosemide 40 MG tablet  Commonly known as: LASIX   40 mg, Oral, Daily      gabapentin 100 MG capsule  Commonly known as: NEURONTIN   200 mg, Oral, 3 Times Daily      glipizide 10 MG tablet  Commonly known as: GLUCOTROL   5 mg, Oral, 2 Times Daily Before Meals, DO NOT TAKE IF BREAKFAST IS SKIPPED PER SPOUSE       Insulin Aspart 100 UNIT/ML injection  Commonly known as: novoLOG   5 Units, Subcutaneous, 2 Times Daily With Meals, Breakfast  and Lunch Take before eating      Insulin Aspart 100 UNIT/ML injection  Commonly known as: novoLOG   6 Units, Subcutaneous, Daily With Dinner, Take before eating      insulin glargine 100 UNIT/ML injection  Commonly known as: LANTUS, SEMGLEE   25 Units, Subcutaneous, Daily      metoprolol tartrate 50 MG tablet  Commonly known  as: LOPRESSOR   100 mg, Oral, 2 Times Daily      omeprazole 20 MG capsule  Commonly known as: priLOSEC   20 mg, Oral, 2 Times Daily      tamsulosin 0.4 MG capsule 24 hr capsule  Commonly known as: FLOMAX   1 capsule, Oral, Nightly      traZODone 50 MG tablet  Commonly known as: DESYREL   50 mg, Oral, Nightly             Stop These Medications      lisinopril 20 MG tablet  Commonly known as: PRINIVIL,ZESTRIL              No Known Allergies    Discharge Disposition:  Home-Health [place] [facility] [place].  In private vehicle with family member    Diet:  Diet Instructions       Diet: Diabetic Diets, Cardiac Diets; Low Sodium (2g); Regular Texture (IDDSI 7); Thin (IDDSI 0); Consistent Carbohydrate      Discharge Diet:  Diabetic Diets  Cardiac Diets       Cardiac Diet: Low Sodium (2g)    Texture: Regular Texture (IDDSI 7)    Fluid Consistency: Thin (IDDSI 0)    Diabetic Diet: Consistent Carbohydrate            Discharge Activity:   Activity Instructions       Activity as Tolerated              CODE STATUS:  Code Status and Medical Interventions:   Ordered at: 08/05/23 1367     Code Status (Patient has no pulse and is not breathing):    CPR (Attempt to Resuscitate)     Medical Interventions (Patient has pulse or is breathing):    Full Support     Release to patient:    Routine Release         No future appointments.    Additional Instructions for the Follow-ups that You Need to Schedule       Discharge Follow-up with PCP   As directed       Currently Documented PCP:    Jeanette Prieto MD    PCP Phone Number:    [phone]     Follow Up Details: 1 week        Discharge Follow-up with Specified Provider: Mikel; 2 Weeks   As directed      To: Mikel   Follow Up: 2 Weeks   Follow Up Details: CKD        Discharge Follow-up with Specified Provider: Cardiologist   As directed      To: Cardiologist   Follow Up Details: A-fib        Discharge Follow-up with Specified Provider: Urology   As directed      To: Urology   Follow Up  Details: Chronic Muñiz catheter.  Urinary retention                Pertinent  and/or Most Recent Results     PROCEDURES:   * Cannot find OR case *     LAB RESULTS:      Lab 08/07/23 0413 08/06/23 0407 08/05/23 1833 08/05/23  1336   WBC 7.53 9.33  --  12.72*   HEMOGLOBIN 8.6* 8.5*  --  8.8*   HEMATOCRIT 27.9* 27.8*  --  26.4*   PLATELETS 169 154  --  166   NEUTROS ABS 5.11 7.26*  --  9.88*   IMMATURE GRANS (ABS) 0.04 0.03  --  0.05   LYMPHS ABS 1.38 0.95  --  1.36   MONOS ABS 0.58 0.88  --  1.22*   EOS ABS 0.40 0.20  --  0.18   MCV 90.6 89.7  --  87.4   PROCALCITONIN  --   --  0.86*  --    LACTATE  --   --   --  0.9         Lab 08/08/23 0510 08/07/23 0413 08/06/23 0407 08/05/23 1833 08/05/23  1336   SODIUM 136 138 137  --  133*   POTASSIUM 3.7 3.7 2.9*  --  3.1*   CHLORIDE 105 106 104  --  98   CO2 18.8* 19.6* 21.3*  --  23.0   ANION GAP 12.2 12.4 11.7  --  12.0   BUN 25* 34* 36*  --  28*   CREATININE 1.58* 1.84* 2.18*  --  2.09*   EGFR 44.2* 36.8* 30.1*  --  31.6*   GLUCOSE 209* 154* 127*  --  86   CALCIUM 8.9 8.7 8.2*  --  8.2*   MAGNESIUM 1.9 2.0  --   --  1.8   PHOSPHORUS 2.9 2.4*  --   --   --    TSH  --   --   --  1.080  --          Lab 08/08/23 0510 08/07/23 0413 08/06/23 0407 08/05/23  1336   TOTAL PROTEIN  --   --  6.5 7.0   ALBUMIN 2.9* 2.8* 2.5* 3.0*   GLOBULIN  --   --  4.0 4.0   ALT (SGPT)  --   --  27 32   AST (SGOT)  --   --  25 30   BILIRUBIN  --   --  0.3 0.5   ALK PHOS  --   --  191* 210*         Lab 08/05/23  1336   HSTROP T 44*             Lab 08/07/23  0413 08/06/23  0407 08/05/23  1833   IRON 32*  --   --    IRON SATURATION (TSAT) 12*  --   --    TIBC 276*  --   --    TRANSFERRIN 185*  --   --    FOLATE  --   --  >20.00   VITAMIN B 12  --  231  --          Brief Urine Lab Results  (Last result in the past 365 days)        Color   Clarity   Blood   Leuk Est   Nitrite   Protein   CREAT   Urine HCG        08/05/23 1428 Yellow   Turbid   Moderate (2+)   Large (3+)   Negative   >=300 mg/dL  (3+)                 Microbiology Results (last 10 days)       Procedure Component Value - Date/Time    Urine Culture - Urine, Indwelling Urethral Catheter [[id number]]  (Abnormal)  (Susceptibility) Collected: 08/05/23 1428    Lab Status: Final result Specimen: Urine from Indwelling Urethral Catheter Updated: 08/08/23 0507     Urine Culture >100,000 CFU/mL Klebsiella oxytoca    Narrative:      Colonization of the urinary tract without infection is common. Treatment is discouraged unless the patient is symptomatic, pregnant, or undergoing an invasive urologic procedure.    Susceptibility        Klebsiella oxytoca      DEBRA      Ampicillin Resistant      Ampicillin + Sulbactam Susceptible      Cefazolin Susceptible      Cefepime Susceptible      Ceftazidime Susceptible      Ceftriaxone Susceptible      Gentamicin Susceptible      Levofloxacin Susceptible      Nitrofurantoin Susceptible      Piperacillin + Tazobactam Susceptible      Trimethoprim + Sulfamethoxazole Susceptible                           Blood Culture - Blood, Arm, Right [[id number]]  (Normal) Collected: 08/05/23 1336    Lab Status: Preliminary result Specimen: Blood from Arm, Right Updated: 08/08/23 1345     Blood Culture No growth at 3 days    Blood Culture - Blood, Arm, Left [[id number]]  (Normal) Collected: 08/05/23 1336    Lab Status: Preliminary result Specimen: Blood from Arm, Left Updated: 08/08/23 1345     Blood Culture No growth at 3 days            CT Head Without Contrast    Result Date: 8/5/2023  Impression:   1. Moderate small vessel ischemic changes in the white matter 2. No specific CT evidence of acute infarction 3. Chronic lacunar infarct in the right caudate nucleus body 4. Extensive sinonasal polyposis with postoperative changes     Darwin Baig M.D.       Electronically Signed and Approved By: Darwin Baig M.D. on 8/05/2023 at 16:32             XR Chest 1 View    Result Date: 8/8/2023  Impression:   1. Improved lung expansion is noted.   2. Interval decrease in the left pleural effusion.  3. There is suspected mild pulmonary edema with vascular congestion, which may be similar to slightly increased in degree since 8/5/2023.  Please correlate with pertinent lab values.  4. Borderline cardiac enlargement is suggested. 5. No pneumothorax is seen. 6. Please see above comments for further detail.     Please note that portions of this note were completed with a voice recognition program.  SENAIT PAINTING JR, MD       Electronically Signed and Approved By: SENAIT PAINTING JR, MD on 8/08/2023 at 4:47              XR Chest 1 View    Result Date: 8/5/2023  Impression:   1. Small left pleural effusion       Darwin Baig M.D.       Electronically Signed and Approved By: Darwin Baig M.D. on 8/05/2023 at 14:15                      Results for orders placed during the hospital encounter of 03/18/23    Adult Transthoracic Echo Complete W/ Cont if Necessary Per Protocol    Interpretation Summary    Technically difficult study.    Left ventricular ejection fraction appears to be 51 - 55%.    Left ventricular diastolic function is consistent with (grade I) impaired relaxation and age.    No significant valvular disease.      Imaging Results (All)       Procedure Component Value Units Date/Time    XR Chest 1 View [[id number]] Collected: 08/08/23 0447     Updated: 08/08/23 0450    Narrative:      PROCEDURE: XR CHEST 1 VW     COMPARISONS: 8/5/2023; 3/21/2023; 3/18/2023.     INDICATIONS: SHORTNESS OF BREATH.     FINDINGS: A single AP upright portable chest radiograph was performed.  Improved lung expansion is   noted since 8/5/2023 at 1332 hours.  Decreased left pleural effusion is suggested, as well.    Probably minimal, if any, right pleural effusion is present.  There is suspected mild pulmonary   edema with vascular congestion, which is similar to slightly increased in degree since 8/5/2023 at   1332 hours.  It is overall thought to be decreased in degree since the  3/21/2023 study, however.    Pneumonia, as an etiology, cannot be excluded but is thought to be less likely.  Borderline cardiac   enlargement is suggested.  The thoracic aorta is atherosclerotic and ectatic.  External artifacts   obscure detail.  There is chronic calcified granulomatous disease of the chest.  There is a mixed   (lytic/sclerotic) density lesion involving the proximal left humerus, which is partially seen, in   retrospect, on the 3/21/2023 study.  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  The finding is thought most   likely to be nonaggressive.       Impression:         1. Improved lung expansion is noted.    2. Interval decrease in the left pleural effusion.    3. There is suspected mild pulmonary edema with vascular congestion, which may be similar to   slightly increased in degree since 8/5/2023.  Please correlate with pertinent lab values.    4. Borderline cardiac enlargement is suggested.  5. No pneumothorax is seen.  6. Please see above comments for further detail.              Please note that portions of this note were completed with a voice recognition program.     SENAIT PAINTING JR, MD         Electronically Signed and Approved By: SENAIT PAINTING JR, MD on 8/08/2023 at 4:47                        CT Head Without Contrast [[id number]] Collected: 08/05/23 1632     Updated: 08/05/23 1636    Narrative:      PROCEDURE: CT HEAD WO CONTRAST     COMPARISON:  [facility], CT, CT HEAD WO CONTRAST, 3/20/2023, 22:36.  INDICATIONS: Altered mental status     PROTOCOL:   Standard imaging protocol performed      RADIATION:   DLP: 1082.2mGy*cm    MA and/or KV was adjusted to minimize radiation dose.          TECHNIQUE: After obtaining the patient's consent, CT images were obtained without non-ionic   intravenous contrast material.      FINDINGS:   There is moderate low density in the cerebral white matter  bilaterally consistent with small vessel   ischemic disease.  A chronic lacunar infarct is noted in the right caudate nucleus body.  Diffuse   age-appropriate cerebral and cerebellar atrophy is noted.     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  There appear to be postoperative changes of the paranasal   sinuses as well.  No focal calvarial abnormality is seen.       Impression:         1. Moderate small vessel ischemic changes in the white matter  2. No specific CT evidence of acute infarction  3. Chronic lacunar infarct in the right caudate nucleus body  4. Extensive sinonasal polyposis with postoperative changes            Darwin Baig M.D.         Electronically Signed and Approved By: Darwin Baig M.D. on 8/05/2023 at 16:32                     XR Chest 1 View [[id number]] Collected: 08/05/23 1416     Updated: 08/05/23 1419    Narrative:      PROCEDURE: XR CHEST 1 VW     COMPARISON: [facility], , XR CHEST 1 VW, 3/21/2023, 4:35.     INDICATIONS: Weak/Dizzy/AMS triage protocol     FINDINGS:   The lungs are clear bilaterally.  The cardiac and mediastinal silhouettes appear normal.  A small   left pleural effusion is noted.       Impression:         1. Small left pleural effusion                  Darwin Baig M.D.         Electronically Signed and Approved By: Darwin Baig M.D. on 8/05/2023 at 14:15                              Labs Pending at Discharge:  Pending Labs       Order Current Status    Blood Culture - Blood, Arm, Left Preliminary result    Blood Culture - Blood, Arm, Right Preliminary result                Time spent on Discharge including face to face service: 31 minutes  Part of this note may be an electronic transcription/translation of spoken language to printed text using the Dragon Dictation System.     TElectronically signed by Dez Machado MD, 08/08/23, 3:06 PM EDT.

## 2023-08-08 NOTE — PLAN OF CARE
Goal Outcome Evaluation:                        Problem: Adjustment to Illness (Sepsis/Septic Shock)  Goal: Optimal Coping  8/8/2023 1627 by Sowmya Jacobs RN  Outcome: Met  8/8/2023 1627 by Sowmya Jacobs RN  Outcome: Met  Intervention: Optimize Psychosocial Adjustment to Illness  Recent Flowsheet Documentation  Taken 8/8/2023 0812 by Sowmya Jacobs RN  Family/Support System Care:   support provided   self-care encouraged  Goal: Optimal Coping  8/8/2023 1627 by Sowmya Jacobs RN  Outcome: Met  8/8/2023 1627 by Sowmya Jacobs RN  Outcome: Met  Intervention: Optimize Psychosocial Adjustment to Illness  Recent Flowsheet Documentation  Taken 8/8/2023 0812 by Sowmya Jacobs RN  Family/Support System Care:   support provided   self-care encouraged     Problem: Bleeding (Sepsis/Septic Shock)  Goal: Absence of Bleeding  8/8/2023 1627 by Sowmya Jacobs RN  Outcome: Met  8/8/2023 1627 by Sowmya Jacobs RN  Outcome: Met  Goal: Absence of Bleeding  8/8/2023 1627 by Sowmya Jacobs RN  Outcome: Met  8/8/2023 1627 by Sowmya Jacobs RN  Outcome: Met     Problem: Glycemic Control Impaired (Sepsis/Septic Shock)  Goal: Blood Glucose Level Within Desired Range  8/8/2023 1627 by Sowmya Jacobs RN  Outcome: Met  8/8/2023 1627 by Sowmya Jacobs RN  Outcome: Met  Goal: Blood Glucose Level Within Desired Range  8/8/2023 1627 by Sowmya Jacobs RN  Outcome: Met  8/8/2023 1627 by Sowmya Jacobs RN  Outcome: Met     Problem: Infection Progression (Sepsis/Septic Shock)  Goal: Absence of Infection Signs and Symptoms  8/8/2023 1627 by Sowmya Jacobs RN  Outcome: Met  8/8/2023 1627 by Sowmya Jacobs RN  Outcome: Met  Goal: Absence of Infection Signs and Symptoms  8/8/2023 1627 by Sowmya Jacobs RN  Outcome: Met  8/8/2023 1627 by Sowmya Jacobs, RN  Outcome: Met     Problem: Nutrition Impaired (Sepsis/Septic Shock)  Goal: Optimal Nutrition Intake  8/8/2023 1627 by Sowmya Jacobs, RN  Outcome: Met  8/8/2023 1627  by Arlene, Sowmya R, RN  Outcome: Met  Goal: Optimal Nutrition Intake  8/8/2023 1627 by Sowmya Jacobs RN  Outcome: Met  8/8/2023 1627 by Sowmya Jacobs RN  Outcome: Met     Problem: Hypertension Comorbidity  Goal: Blood Pressure in Desired Range  8/8/2023 1627 by Sowmya Jacobs RN  Outcome: Met  8/8/2023 1627 by Sowmya Jacobs RN  Outcome: Met     Problem: Skin Injury Risk Increased  Goal: Skin Health and Integrity  8/8/2023 1627 by Sowmya Jacobs RN  Outcome: Met  8/8/2023 1627 by Sowmya Jacobs RN  Outcome: Met     Problem: Fall Injury Risk  Goal: Absence of Fall and Fall-Related Injury  8/8/2023 1627 by Sowmya Jacobs RN  Outcome: Met  8/8/2023 1627 by Sowmya Jacobs RN  Outcome: Met  Intervention: Promote Injury-Free Environment  Recent Flowsheet Documentation  Taken 8/8/2023 0812 by Sowmya Jacobs RN  Safety Promotion/Fall Prevention: safety round/check completed     Problem: UTI (Urinary Tract Infection)  Goal: Improved Infection Symptoms  8/8/2023 1627 by Sowmya Jacobs RN  Outcome: Met  8/8/2023 1627 by Sowmya Jacobs RN  Outcome: Met     Problem: Adult Inpatient Plan of Care  Goal: Plan of Care Review  8/8/2023 1627 by Sowmya Jacobs RN  Outcome: Met  8/8/2023 1627 by Sowmya Jacobs RN  Outcome: Met  Goal: Patient-Specific Goal (Individualized)  8/8/2023 1627 by Sowmya Jacobs RN  Outcome: Met  8/8/2023 1627 by Sowmya Jacobs RN  Outcome: Met  Goal: Absence of Hospital-Acquired Illness or Injury  8/8/2023 1627 by Sowmya Jacobs RN  Outcome: Met  8/8/2023 1627 by Sowmya Jacobs RN  Outcome: Met  Intervention: Identify and Manage Fall Risk  Recent Flowsheet Documentation  Taken 8/8/2023 0812 by Arlene, Sowmya R, RN  Safety Promotion/Fall Prevention: safety round/check completed  Goal: Optimal Comfort and Wellbeing  8/8/2023 1627 by Sowmya Jacobs, RN  Outcome: Met  8/8/2023 1627 by Sowmya Jacobs, RN  Outcome: Met  Intervention: Provide Person-Centered Care  Recent Flowsheet  Documentation  Taken 8/8/2023 0812 by Sowmya Jacobs, RN  Trust Relationship/Rapport:   care explained   choices provided   emotional support provided   empathic listening provided   questions answered   questions encouraged   reassurance provided   thoughts/feelings acknowledged  Goal: Readiness for Transition of Care  8/8/2023 1627 by Sowmya Jacobs, RN  Outcome: Met  8/8/2023 1627 by Sowmya Jacobs, RN  Outcome: Met

## 2023-08-08 NOTE — THERAPY EVALUATION
Patient Name: Darnell Geller  : 1945    MRN: [id number]                              Today's Date: 2023       Admit Date: 2023    Visit Dx:     ICD-10-CM ICD-9-CM   1. Urinary tract infection associated with indwelling urethral catheter, initial encounter  T83.511A 996.64    N39.0 599.0   2. Sepsis, due to unspecified organism, unspecified whether acute organ dysfunction present  A41.9 038.9     995.91   3. Difficulty in walking  R26.2 719.7   4. Dysphagia, oropharyngeal  R13.12 787.22     Patient Active Problem List   Diagnosis    Sepsis with acute renal failure and septic shock, due to unspecified organism, unspecified acute renal failure type    Acute metabolic encephalopathy     Past Medical History:   Diagnosis Date    Arthritis     Benign prostatic hyperplasia     Coronary artery disease     Hyperlipidemia     Hypertension      Past Surgical History:   Procedure Laterality Date    SINUS SURGERY        General Information       Row Name 23 0940          OT Time and Intention    Document Type evaluation  -PG     Mode of Treatment individual therapy;occupational therapy  -PG       Row Name 23 0941          General Information    Patient Profile Reviewed yes  Lives with spouse and son.  Independent with all self-care and functional transfers.  Occasionally uses cane.  -PG     Prior Level of Function independent:;transfer;ADL's  -PG     Existing Precautions/Restrictions fall;weight bearing  -PG     Barriers to Rehab none identified  -PG       Row Name 23 0932          Occupational Profile    Reason for Services/Referral (Occupational Profile) Patient is a 78-year-old male admitted for altered mental status, UTI and sepsis with syncopal episode.  No previous OT services identified.  Patient is being evaluated by Occupational Therapy due to recent decline in ADL function  -PG       Row Name 23 0962          Living Environment    People in Home spouse  -PG       Row Name 23  0940          Cognition    Orientation Status (Cognition) oriented x 3  -PG       Row Name 08/08/23 0940          Safety Issues, Functional Mobility    Impairments Affecting Function (Mobility) balance;endurance/activity tolerance;strength  -PG               User Key  (r) = Recorded By, (t) = Taken By, (c) = Cosigned By      Initials Name Provider Type    PG Qasim Ramirez OT Occupational Therapist                     Mobility/ADL's       Row Name 08/08/23 0942          Transfers    Transfers sit-stand transfer;stand-sit transfer  -PG       Row Name 08/08/23 0942          Sit-Stand Transfer    Sit-Stand [place] (Transfers) contact guard  -PG     Assistive Device (Sit-Stand Transfers) walker, front-wheeled  -PG       Row Name 08/08/23 0942          Stand-Sit Transfer    Stand-Sit [place] (Transfers) contact guard  -PG     Assistive Device (Stand-Sit Transfers) walker, front-wheeled  -PG       Row Name 08/08/23 0942          Activities of Daily Living    BADL Assessment/Intervention bathing;upper body dressing;lower body dressing;grooming;toileting  -PG       Row Name 08/08/23 0942          Bathing Assessment/Intervention    [place] Level (Bathing) bathing skills;minimum assist (75% patient effort)  -PG       Row Name 08/08/23 0942          Upper Body Dressing Assessment/Training    [place] Level (Upper Body Dressing) upper body dressing skills;set up  -PG       Row Name 08/08/23 0942          Lower Body Dressing Assessment/Training    [place] Level (Lower Body Dressing) lower body dressing skills;minimum assist (75% patient effort)  -PG       Row Name 08/08/23 0942          Grooming Assessment/Training    [place] Level (Grooming) grooming skills;set up  -PG       Row Name 08/08/23 0942          Toileting Assessment/Training    [place] Level (Toileting) toileting skills;minimum assist (75% patient effort)  -PG               User Key  (r) = Recorded By, (t) = Taken By, (c) = Cosigned  By      Initials Name Provider Type    PG Qasim Ramirez OT Occupational Therapist                   Obj/Interventions       Row Name 08/08/23 0943          Sensory Assessment (Somatosensory)    Sensory Assessment (Somatosensory) sensation intact  -PG       Row Name 08/08/23 0943          Vision Assessment/Intervention    Visual Impairment/Limitations WFL  -PG       Row Name 08/08/23 0943          Range of Motion Comprehensive    General Range of Motion no range of motion deficits identified  -PG       Row Name 08/08/23 0943          Strength Comprehensive (MMT)    General Manual Muscle Testing (MMT) Assessment no strength deficits identified  -PG       Row Name 08/08/23 0943          Motor Skills    Motor Skills coordination;functional endurance  -PG     Coordination WFL  -PG     Functional Endurance Fair minus  -PG               User Key  (r) = Recorded By, (t) = Taken By, (c) = Cosigned By      Initials Name Provider Type    PG Qasim Ramirez OT Occupational Therapist                   Goals/Plan       Row Name 08/08/23 0944          Transfer Goal 1 (OT)    Activity/Assistive Device (Transfer Goal 1, OT) transfers, all  -PG     [place] Level/Cues Needed (Transfer Goal 1, OT) modified independence  -PG     Time Frame (Transfer Goal 1, OT) long term goal (LTG);10 days  -PG       Row Name 08/08/23 0944          Bathing Goal 1 (OT)    Activity/Device (Bathing Goal 1, OT) bathing skills, all  -PG     [place] Level/Cues Needed (Bathing Goal 1, OT) modified independence  -PG     Time Frame (Bathing Goal 1, OT) long term goal (LTG);10 days  -PG       Row Name 08/08/23 0944          Dressing Goal 1 (OT)    Activity/Device (Dressing Goal 1, OT) dressing skills, all  -PG     [place]/Cues Needed (Dressing Goal 1, OT) modified independence  -PG     Time Frame (Dressing Goal 1, OT) long term goal (LTG);10 days  -PG       Row Name 08/08/23 0944          Toileting Goal 1 (OT)    Activity/Device (Toileting Goal 1,  OT) toileting skills, all  -PG     [place] Level/Cues Needed (Toileting Goal 1, OT) modified independence  -PG     Time Frame (Toileting Goal 1, OT) long term goal (LTG);10 days  -PG       Row Name 08/08/23 0944          Grooming Goal 1 (OT)    Activity/Device (Grooming Goal 1, OT) grooming skills, all  -PG     [place] (Grooming Goal 1, OT) modified independence  -PG     Time Frame (Grooming Goal 1, OT) long term goal (LTG);10 days  -PG       Row Name 08/08/23 0944          Problem Specific Goal 1 (OT)    Problem Specific Goal 1 (OT) Patient will improve activity tolerance to fair plus to support independence and engagement with ADL activities  -PG     Time Frame (Problem Specific Goal 1, OT) long term goal (LTG);10 days  -PG       Row Name 08/08/23 0944          Therapy Assessment/Plan (OT)    Planned Therapy Interventions (OT) activity tolerance training;BADL retraining;strengthening exercise;patient/caregiver education/training;occupation/activity based interventions;transfer/mobility retraining  -PG               User Key  (r) = Recorded By, (t) = Taken By, (c) = Cosigned By      Initials Name Provider Type    PG Qasim Ramirez, OT Occupational Therapist                   Clinical Impression       Row Name 08/08/23 0944          Pain Assessment    Pretreatment Pain Rating 0/10 - no pain  -PG     Posttreatment Pain Rating 0/10 - no pain  -PG       Row Name 08/08/23 0944          Plan of Care Review    Plan of Care Reviewed With patient  -PG     Progress no change  -PG     Outcome Evaluation Patient presents with limitations affecting strength, activity tolerance, and balance impacting patient's ability to return home safely and independently.  The skills of a therapist will be required to safely and effectively implement the following treatment plan to restore maximal level of function  -PG       Row Name 08/08/23 0944          Therapy Assessment/Plan (OT)    Patient/Family Therapy Goal Statement (OT)  Return home independently  -PG     Rehab Potential (OT) good, to achieve stated therapy goals  -PG     Criteria for Skilled Therapeutic Interventions Met (OT) yes;meets criteria;skilled treatment is necessary  -PG     Therapy Frequency (OT) 5 times/wk  -PG       Row Name 08/08/23 0944          Therapy Plan Review/Discharge Plan (OT)    Anticipated Discharge Disposition (OT) home with home health  -PG               User Key  (r) = Recorded By, (t) = Taken By, (c) = Cosigned By      Initials Name Provider Type    PG Qasim Ramirez OT Occupational Therapist                   Outcome Measures       Row Name 08/08/23 0945          How much help from another is currently needed...    Putting on and taking off regular lower body clothing? 3  -PG     Bathing (including washing, rinsing, and drying) 3  -PG     Toileting (which includes using toilet bed pan or urinal) 3  -PG     Putting on and taking off regular upper body clothing 3  -PG     Taking care of personal grooming (such as brushing teeth) 3  -PG     Eating meals 4  -PG     AM-PAC 6 Clicks Score (OT) 19  -PG       Row Name 08/08/23 0945          Functional Assessment    Outcome Measure Options AM-PAC 6 Clicks Daily Activity (OT);Optimal Instrument  -PG       Row Name 08/08/23 0945          Optimal Instrument    Optimal Instrument Optimal - 3  -PG     Bending/Stooping 2  -PG     Standing 2  -PG     Reaching 1  -PG     From the list, choose the 3 activities you would most like to be able to do without any difficulty Bending/stooping;Standing;Reaching  -PG     Total Score Optimal - 3 5  -PG               User Key  (r) = Recorded By, (t) = Taken By, (c) = Cosigned By      Initials Name Provider Type    PG Qasim Ramirez OT Occupational Therapist                    Occupational Therapy Education       Title: PT OT SLP Therapies (Done)       Topic: Occupational Therapy (Done)       Point: ADL training (Done)       Description:   Instruct learner(s) on proper safety  adaptation and remediation techniques during self care or transfers.   Instruct in proper use of assistive devices.                  Learning Progress Summary             Patient Acceptance, E,D, DU by PG at 8/8/2023 0946                         Point: Home exercise program (Done)       Description:   Instruct learner(s) on appropriate technique for monitoring, assisting and/or progressing therapeutic exercises/activities.                  Learning Progress Summary             Patient Acceptance, E,D, DU by PG at 8/8/2023 0946                         Point: Precautions (Done)       Description:   Instruct learner(s) on prescribed precautions during self-care and functional transfers.                  Learning Progress Summary             Patient Acceptance, E,D, DU by PG at 8/8/2023 0946                         Point: Body mechanics (Done)       Description:   Instruct learner(s) on proper positioning and spine alignment during self-care, functional mobility activities and/or exercises.                  Learning Progress Summary             Patient Acceptance, E,D, DU by PG at 8/8/2023 0946                                         User Key       Initials Effective Dates Name Provider Type Discipline     06/16/21 -  Qasim Ramirez OT Occupational Therapist OT                  OT Recommendation and Plan  Planned Therapy Interventions (OT): activity tolerance training, BADL retraining, strengthening exercise, patient/caregiver education/training, occupation/activity based interventions, transfer/mobility retraining  Therapy Frequency (OT): 5 times/wk  Plan of Care Review  Plan of Care Reviewed With: patient  Progress: no change  Outcome Evaluation: Patient presents with limitations affecting strength, activity tolerance, and balance impacting patient's ability to return home safely and independently.  The skills of a therapist will be required to safely and effectively implement the following treatment plan to restore  maximal level of function     Time Calculation:   Evaluation Complexity (OT)  Review Occupational Profile/Medical/Therapy History Complexity: brief/low complexity  Assessment, Occupational Performance/Identification of Deficit Complexity: 1-3 performance deficits  Clinical Decision Making Complexity (OT): problem focused assessment/low complexity  Overall Complexity of Evaluation (OT): low complexity     Time Calculation- OT       Row Name 08/08/23 0946             Time Calculation- OT    OT Received On 08/08/23  -PG      OT Goal Re-Cert Due Date 08/17/23  -PG         Untimed Charges    OT Eval/Re-eval Minutes 35  -PG         Total Minutes    Untimed Charges Total Minutes 35  -PG       Total Minutes 35  -PG                User Key  (r) = Recorded By, (t) = Taken By, (c) = Cosigned By      Initials Name Provider Type    PG Qasim Ramirez OT Occupational Therapist                  Therapy Charges for Today       Code Description Service Date Service Provider Modifiers Qty    [id number] HC OT EVAL LOW COMPLEXITY 3 8/8/2023 Qasim Ramirez OT GO 1                 Qasim Ramirez OT  8/8/2023

## 2023-08-08 NOTE — PLAN OF CARE
Goal Outcome Evaluation:  Plan of Care Reviewed With: patient        Progress: no change     PATIENT ALERT TO PERSON, PLACE AND TIME AT START OF SHIFT INCREASED CONFUSION AS THE NIGHT PROGRESSED. UP AND DOWN FREQUENTLY TRANSFERRING FROM BED TO CHAIR.   BECAME SOA AND BEGAN WHEEZING AFTER WALKING TO BR, XOPENEX   BREATHING TX AND CXR ORDERED. PATIENT PLACED ON NC3 TO MAINTAIN SATS   PATIENT HAD A 5 BEAT RUN OF V-TACH EKG AND LABS ORDERED

## 2023-08-09 ENCOUNTER — READMISSION MANAGEMENT (OUTPATIENT)
Dept: CALL CENTER | Facility: HOSPITAL | Age: 78
End: 2023-08-09
Payer: OTHER GOVERNMENT

## 2023-08-09 NOTE — SIGNIFICANT NOTE
08/08/23 1315   Coping/Psychosocial   Observed Emotional State calm;cooperative   Verbalized Emotional State hopefulness   Trust Relationship/Rapport empathic listening provided   Family/Support Persons son   Involvement in Care interacting with patient   Additional Documentation Spiritual Care (Group)   Spiritual Care   Use of Spiritual Resources non-Zoroastrian use of spiritual care   Spiritual Care Source  initiative   Spiritual Care Follow-Up follow-up, none required as presently assessed   Response to Spiritual Care receptive of support;engaged in conversation;thanks expressed   Spiritual Care Interventions supportive conversation provided   Spiritual Care Visit Type initial   Receptivity to Spiritual Care visit welcomed

## 2023-08-09 NOTE — OUTREACH NOTE
Prep Survey      Flowsheet Row Responses   Religion facility patient discharged from? [facility]   Is LACE score < 7 ? No   Eligibility Readm Mgmt   Discharge diagnosis Urinary tract infection associated with indwelling urethral catheter   Does the patient have one of the following disease processes/diagnoses(primary or secondary)? Other   Does the patient have Home health ordered? Yes   [facility]   Is there a DME ordered? No   Prep survey completed? Yes            Kath PULIDO - Registered Nurse

## 2023-08-10 LAB
BACTERIA SPEC AEROBE CULT: NORMAL
BACTERIA SPEC AEROBE CULT: NORMAL

## 2023-08-11 ENCOUNTER — READMISSION MANAGEMENT (OUTPATIENT)
Dept: CALL CENTER | Facility: HOSPITAL | Age: 78
End: 2023-08-11
Payer: OTHER GOVERNMENT

## 2023-08-11 NOTE — OUTREACH NOTE
Medical Week 1 Survey      Flowsheet Row Responses   [facility] facility patient discharged from? [facility]   Does the patient have one of the following disease processes/diagnoses(primary or secondary)? Other   Week 1 attempt successful? No   Unsuccessful attempts Attempt 1            Patty KAY - Registered Nurse

## 2023-08-16 ENCOUNTER — READMISSION MANAGEMENT (OUTPATIENT)
Dept: CALL CENTER | Facility: HOSPITAL | Age: 78
End: 2023-08-16
Payer: OTHER GOVERNMENT

## 2023-08-16 NOTE — OUTREACH NOTE
Medical Week 2 Survey      Flowsheet Row Responses   [facility] patient discharged from? [facility]   Does the patient have one of the following disease processes/diagnoses(primary or secondary)? Other   Week 2 attempt successful? No   Unsuccessful attempts Attempt 1            Emily BRINK - Licensed Nurse

## 2023-08-29 ENCOUNTER — LAB REQUISITION (OUTPATIENT)
Dept: LAB | Facility: HOSPITAL | Age: 78
End: 2023-08-29
Payer: MEDICARE

## 2023-08-29 DIAGNOSIS — I48.91 UNSPECIFIED ATRIAL FIBRILLATION: ICD-10-CM

## 2023-08-29 DIAGNOSIS — E11.9 TYPE 2 DIABETES MELLITUS WITHOUT COMPLICATIONS: ICD-10-CM

## 2023-08-29 DIAGNOSIS — D64.9 ANEMIA, UNSPECIFIED: ICD-10-CM

## 2023-08-29 DIAGNOSIS — R53.83 OTHER FATIGUE: ICD-10-CM

## 2023-08-29 DIAGNOSIS — N40.0 BENIGN PROSTATIC HYPERPLASIA WITHOUT LOWER URINARY TRACT SYMPTOMS: ICD-10-CM

## 2023-08-29 DIAGNOSIS — I10 ESSENTIAL (PRIMARY) HYPERTENSION: ICD-10-CM

## 2023-08-29 LAB
ANION GAP SERPL CALCULATED.3IONS-SCNC: 9.9 MMOL/L (ref 5–15)
BUN SERPL-MCNC: 26 MG/DL (ref 8–23)
BUN/CREAT SERPL: 17.2 (ref 7–25)
CALCIUM SPEC-SCNC: 9.3 MG/DL (ref 8.6–10.5)
CHLORIDE SERPL-SCNC: 99 MMOL/L (ref 98–107)
CO2 SERPL-SCNC: 26.1 MMOL/L (ref 22–29)
CREAT SERPL-MCNC: 1.51 MG/DL (ref 0.76–1.27)
EGFRCR SERPLBLD CKD-EPI 2021: 46.7 ML/MIN/1.73
GLUCOSE SERPL-MCNC: 100 MG/DL (ref 65–99)
POTASSIUM SERPL-SCNC: 4 MMOL/L (ref 3.5–5.2)
SODIUM SERPL-SCNC: 135 MMOL/L (ref 136–145)

## 2023-08-29 PROCEDURE — 80048 BASIC METABOLIC PNL TOTAL CA: CPT | Performed by: INTERNAL MEDICINE

## 2023-10-04 ENCOUNTER — LAB REQUISITION (OUTPATIENT)
Dept: LAB | Facility: HOSPITAL | Age: 78
End: 2023-10-04
Payer: MEDICARE

## 2023-10-04 DIAGNOSIS — N39.0 URINARY TRACT INFECTION, SITE NOT SPECIFIED: ICD-10-CM

## 2023-10-04 LAB
BACTERIA UR QL AUTO: ABNORMAL /HPF
BILIRUB UR QL STRIP: NEGATIVE
CLARITY UR: ABNORMAL
COLOR UR: YELLOW
GLUCOSE UR STRIP-MCNC: NEGATIVE MG/DL
HGB UR QL STRIP.AUTO: ABNORMAL
HYALINE CASTS UR QL AUTO: ABNORMAL /LPF
KETONES UR QL STRIP: NEGATIVE
LEUKOCYTE ESTERASE UR QL STRIP.AUTO: ABNORMAL
NITRITE UR QL STRIP: NEGATIVE
PH UR STRIP.AUTO: 6.5 [PH] (ref 5–8)
PROT UR QL STRIP: ABNORMAL
RBC # UR STRIP: ABNORMAL /HPF
REF LAB TEST METHOD: ABNORMAL
SP GR UR STRIP: 1.01 (ref 1–1.03)
SQUAMOUS #/AREA URNS HPF: ABNORMAL /HPF
UROBILINOGEN UR QL STRIP: ABNORMAL
WBC # UR STRIP: ABNORMAL /HPF

## 2023-10-04 PROCEDURE — 87077 CULTURE AEROBIC IDENTIFY: CPT | Performed by: INTERNAL MEDICINE

## 2023-10-04 PROCEDURE — 87086 URINE CULTURE/COLONY COUNT: CPT | Performed by: INTERNAL MEDICINE

## 2023-10-04 PROCEDURE — 87186 SC STD MICRODIL/AGAR DIL: CPT | Performed by: INTERNAL MEDICINE

## 2023-10-04 PROCEDURE — 81001 URINALYSIS AUTO W/SCOPE: CPT | Performed by: INTERNAL MEDICINE

## 2023-10-06 LAB
BACTERIA SPEC AEROBE CULT: ABNORMAL
BACTERIA SPEC AEROBE CULT: ABNORMAL

## 2024-06-11 ENCOUNTER — LAB REQUISITION (OUTPATIENT)
Dept: LAB | Facility: HOSPITAL | Age: 79
End: 2024-06-11
Payer: MEDICARE

## 2024-06-11 DIAGNOSIS — E11.9 TYPE 2 DIABETES MELLITUS WITHOUT COMPLICATIONS: ICD-10-CM

## 2024-06-11 DIAGNOSIS — N39.0 URINARY TRACT INFECTION, SITE NOT SPECIFIED: ICD-10-CM

## 2024-06-11 LAB
ALBUMIN UR-MCNC: 5.6 MG/DL
BACTERIA UR QL AUTO: ABNORMAL /HPF
BILIRUB UR QL STRIP: NEGATIVE
CLARITY UR: ABNORMAL
COLOR UR: YELLOW
CREAT UR-MCNC: 27.8 MG/DL
GLUCOSE UR STRIP-MCNC: NEGATIVE MG/DL
HGB UR QL STRIP.AUTO: ABNORMAL
HYALINE CASTS UR QL AUTO: ABNORMAL /LPF
KETONES UR QL STRIP: NEGATIVE
LEUKOCYTE ESTERASE UR QL STRIP.AUTO: ABNORMAL
MICROALBUMIN/CREAT UR: 201.4 MG/G (ref 0–29)
NITRITE UR QL STRIP: NEGATIVE
PH UR STRIP.AUTO: 7 [PH] (ref 5–8)
PROT UR QL STRIP: ABNORMAL
RBC # UR STRIP: ABNORMAL /HPF
REF LAB TEST METHOD: ABNORMAL
SP GR UR STRIP: 1.01 (ref 1–1.03)
SQUAMOUS #/AREA URNS HPF: ABNORMAL /HPF
UROBILINOGEN UR QL STRIP: ABNORMAL
WBC # UR STRIP: ABNORMAL /HPF

## 2024-06-11 PROCEDURE — 82570 ASSAY OF URINE CREATININE: CPT | Performed by: NURSE PRACTITIONER

## 2024-06-11 PROCEDURE — 87186 SC STD MICRODIL/AGAR DIL: CPT | Performed by: NURSE PRACTITIONER

## 2024-06-11 PROCEDURE — 87086 URINE CULTURE/COLONY COUNT: CPT | Performed by: NURSE PRACTITIONER

## 2024-06-11 PROCEDURE — 87077 CULTURE AEROBIC IDENTIFY: CPT | Performed by: NURSE PRACTITIONER

## 2024-06-11 PROCEDURE — 82043 UR ALBUMIN QUANTITATIVE: CPT | Performed by: NURSE PRACTITIONER

## 2024-06-11 PROCEDURE — 81001 URINALYSIS AUTO W/SCOPE: CPT | Performed by: NURSE PRACTITIONER

## 2024-06-13 LAB — BACTERIA SPEC AEROBE CULT: ABNORMAL

## 2024-09-24 ENCOUNTER — LAB REQUISITION (OUTPATIENT)
Dept: LAB | Facility: HOSPITAL | Age: 79
End: 2024-09-24
Payer: MEDICARE

## 2024-09-24 DIAGNOSIS — N39.0 URINARY TRACT INFECTION, SITE NOT SPECIFIED: ICD-10-CM

## 2024-09-24 LAB
BACTERIA UR QL AUTO: ABNORMAL /HPF
BILIRUB UR QL STRIP: NEGATIVE
CLARITY UR: ABNORMAL
COLOR UR: YELLOW
GLUCOSE UR STRIP-MCNC: NEGATIVE MG/DL
HGB UR QL STRIP.AUTO: ABNORMAL
HYALINE CASTS UR QL AUTO: ABNORMAL /LPF
KETONES UR QL STRIP: NEGATIVE
LEUKOCYTE ESTERASE UR QL STRIP.AUTO: ABNORMAL
NITRITE UR QL STRIP: NEGATIVE
PH UR STRIP.AUTO: <=5 [PH] (ref 5–8)
PROT UR QL STRIP: ABNORMAL
RBC # UR STRIP: ABNORMAL /HPF
REF LAB TEST METHOD: ABNORMAL
SP GR UR STRIP: 1.01 (ref 1–1.03)
SQUAMOUS #/AREA URNS HPF: ABNORMAL /HPF
UROBILINOGEN UR QL STRIP: ABNORMAL
WBC # UR STRIP: ABNORMAL /HPF

## 2024-09-24 PROCEDURE — 87086 URINE CULTURE/COLONY COUNT: CPT | Performed by: NURSE PRACTITIONER

## 2024-09-24 PROCEDURE — 87186 SC STD MICRODIL/AGAR DIL: CPT | Performed by: NURSE PRACTITIONER

## 2024-09-24 PROCEDURE — 87077 CULTURE AEROBIC IDENTIFY: CPT | Performed by: NURSE PRACTITIONER

## 2024-09-24 PROCEDURE — 81001 URINALYSIS AUTO W/SCOPE: CPT | Performed by: NURSE PRACTITIONER

## 2024-09-26 LAB — BACTERIA SPEC AEROBE CULT: ABNORMAL

## 2024-11-06 ENCOUNTER — HOSPITAL ENCOUNTER (INPATIENT)
Facility: HOSPITAL | Age: 79
LOS: 5 days | Discharge: HOME HEALTH | DRG: 698 | End: 2024-11-11
Attending: EMERGENCY MEDICINE | Admitting: STUDENT IN AN ORGANIZED HEALTH CARE EDUCATION/TRAINING PROGRAM
Payer: OTHER GOVERNMENT

## 2024-11-06 ENCOUNTER — APPOINTMENT (OUTPATIENT)
Dept: GENERAL RADIOLOGY | Facility: HOSPITAL | Age: 79
DRG: 698 | End: 2024-11-06
Payer: OTHER GOVERNMENT

## 2024-11-06 DIAGNOSIS — Z78.9 DECREASED ACTIVITIES OF DAILY LIVING (ADL): ICD-10-CM

## 2024-11-06 DIAGNOSIS — N39.0 URINARY TRACT INFECTION ASSOCIATED WITH INDWELLING URETHRAL CATHETER, INITIAL ENCOUNTER: Primary | ICD-10-CM

## 2024-11-06 DIAGNOSIS — T83.511A URINARY TRACT INFECTION ASSOCIATED WITH INDWELLING URETHRAL CATHETER, INITIAL ENCOUNTER: Primary | ICD-10-CM

## 2024-11-06 DIAGNOSIS — I95.9 HYPOTENSION, UNSPECIFIED HYPOTENSION TYPE: ICD-10-CM

## 2024-11-06 DIAGNOSIS — R26.2 DIFFICULTY WALKING: ICD-10-CM

## 2024-11-06 PROBLEM — G93.41 ACUTE METABOLIC ENCEPHALOPATHY: Status: ACTIVE | Noted: 2024-11-06

## 2024-11-06 LAB
ALBUMIN SERPL-MCNC: 2.6 G/DL (ref 3.5–5.2)
ALBUMIN/GLOB SERPL: 0.6 G/DL
ALP SERPL-CCNC: 234 U/L (ref 39–117)
ALT SERPL W P-5'-P-CCNC: 19 U/L (ref 1–41)
ANION GAP SERPL CALCULATED.3IONS-SCNC: 9.3 MMOL/L (ref 5–15)
AST SERPL-CCNC: 26 U/L (ref 1–40)
BACTERIA UR QL AUTO: ABNORMAL /HPF
BASOPHILS # BLD AUTO: 0.04 10*3/MM3 (ref 0–0.2)
BASOPHILS NFR BLD AUTO: 0.3 % (ref 0–1.5)
BILIRUB SERPL-MCNC: 0.4 MG/DL (ref 0–1.2)
BILIRUB UR QL STRIP: NEGATIVE
BUN SERPL-MCNC: 51 MG/DL (ref 8–23)
BUN/CREAT SERPL: 17.3 (ref 7–25)
CALCIUM SPEC-SCNC: 7.9 MG/DL (ref 8.6–10.5)
CHLORIDE SERPL-SCNC: 106 MMOL/L (ref 98–107)
CLARITY UR: ABNORMAL
CO2 SERPL-SCNC: 20.7 MMOL/L (ref 22–29)
COLOR UR: YELLOW
CREAT SERPL-MCNC: 2.95 MG/DL (ref 0.76–1.27)
D-LACTATE SERPL-SCNC: 1.6 MMOL/L (ref 0.5–2)
DEPRECATED RDW RBC AUTO: 54.1 FL (ref 37–54)
EGFRCR SERPLBLD CKD-EPI 2021: 20.8 ML/MIN/1.73
EOSINOPHIL # BLD AUTO: 0.08 10*3/MM3 (ref 0–0.4)
EOSINOPHIL NFR BLD AUTO: 0.6 % (ref 0.3–6.2)
ERYTHROCYTE [DISTWIDTH] IN BLOOD BY AUTOMATED COUNT: 15.7 % (ref 12.3–15.4)
GLOBULIN UR ELPH-MCNC: 4.4 GM/DL
GLUCOSE SERPL-MCNC: 137 MG/DL (ref 65–99)
GLUCOSE UR STRIP-MCNC: ABNORMAL MG/DL
HCT VFR BLD AUTO: 26.7 % (ref 37.5–51)
HGB BLD-MCNC: 8.8 G/DL (ref 13–17.7)
HGB UR QL STRIP.AUTO: ABNORMAL
HOLD SPECIMEN: NORMAL
HOLD SPECIMEN: NORMAL
HYALINE CASTS UR QL AUTO: ABNORMAL /LPF
IMM GRANULOCYTES # BLD AUTO: 0.05 10*3/MM3 (ref 0–0.05)
IMM GRANULOCYTES NFR BLD AUTO: 0.4 % (ref 0–0.5)
KETONES UR QL STRIP: NEGATIVE
LEUKOCYTE ESTERASE UR QL STRIP.AUTO: ABNORMAL
LYMPHOCYTES # BLD AUTO: 0.67 10*3/MM3 (ref 0.7–3.1)
LYMPHOCYTES NFR BLD AUTO: 5.1 % (ref 19.6–45.3)
MCH RBC QN AUTO: 31.4 PG (ref 26.6–33)
MCHC RBC AUTO-ENTMCNC: 33 G/DL (ref 31.5–35.7)
MCV RBC AUTO: 95.4 FL (ref 79–97)
MONOCYTES # BLD AUTO: 1 10*3/MM3 (ref 0.1–0.9)
MONOCYTES NFR BLD AUTO: 7.6 % (ref 5–12)
NEUTROPHILS NFR BLD AUTO: 11.37 10*3/MM3 (ref 1.7–7)
NEUTROPHILS NFR BLD AUTO: 86 % (ref 42.7–76)
NITRITE UR QL STRIP: POSITIVE
NRBC BLD AUTO-RTO: 0 /100 WBC (ref 0–0.2)
PH UR STRIP.AUTO: 6 [PH] (ref 5–8)
PLATELET # BLD AUTO: 194 10*3/MM3 (ref 140–450)
PMV BLD AUTO: 9.8 FL (ref 6–12)
POTASSIUM SERPL-SCNC: 4.1 MMOL/L (ref 3.5–5.2)
PROT SERPL-MCNC: 7 G/DL (ref 6–8.5)
PROT UR QL STRIP: ABNORMAL
RBC # BLD AUTO: 2.8 10*6/MM3 (ref 4.14–5.8)
RBC # UR STRIP: ABNORMAL /HPF
REF LAB TEST METHOD: ABNORMAL
SODIUM SERPL-SCNC: 136 MMOL/L (ref 136–145)
SP GR UR STRIP: 1.01 (ref 1–1.03)
SQUAMOUS #/AREA URNS HPF: ABNORMAL /HPF
UROBILINOGEN UR QL STRIP: ABNORMAL
WBC # UR STRIP: ABNORMAL /HPF
WBC NRBC COR # BLD AUTO: 13.21 10*3/MM3 (ref 3.4–10.8)
WHOLE BLOOD HOLD COAG: NORMAL
WHOLE BLOOD HOLD SPECIMEN: NORMAL

## 2024-11-06 PROCEDURE — 25010000002 CEFTRIAXONE PER 250 MG

## 2024-11-06 PROCEDURE — 83605 ASSAY OF LACTIC ACID: CPT

## 2024-11-06 PROCEDURE — 85025 COMPLETE CBC W/AUTO DIFF WBC: CPT | Performed by: NURSE PRACTITIONER

## 2024-11-06 PROCEDURE — 87040 BLOOD CULTURE FOR BACTERIA: CPT

## 2024-11-06 PROCEDURE — 99223 1ST HOSP IP/OBS HIGH 75: CPT | Performed by: STUDENT IN AN ORGANIZED HEALTH CARE EDUCATION/TRAINING PROGRAM

## 2024-11-06 PROCEDURE — 87186 SC STD MICRODIL/AGAR DIL: CPT

## 2024-11-06 PROCEDURE — P9612 CATHETERIZE FOR URINE SPEC: HCPCS

## 2024-11-06 PROCEDURE — G0378 HOSPITAL OBSERVATION PER HR: HCPCS

## 2024-11-06 PROCEDURE — 81001 URINALYSIS AUTO W/SCOPE: CPT | Performed by: NURSE PRACTITIONER

## 2024-11-06 PROCEDURE — 87086 URINE CULTURE/COLONY COUNT: CPT

## 2024-11-06 PROCEDURE — 71045 X-RAY EXAM CHEST 1 VIEW: CPT

## 2024-11-06 PROCEDURE — 87077 CULTURE AEROBIC IDENTIFY: CPT

## 2024-11-06 PROCEDURE — 99285 EMERGENCY DEPT VISIT HI MDM: CPT

## 2024-11-06 PROCEDURE — 80053 COMPREHEN METABOLIC PANEL: CPT | Performed by: NURSE PRACTITIONER

## 2024-11-06 RX ORDER — FERROUS SULFATE 325(65) MG
325 TABLET ORAL
COMMUNITY

## 2024-11-06 RX ORDER — SODIUM CHLORIDE 0.9 % (FLUSH) 0.9 %
10 SYRINGE (ML) INJECTION AS NEEDED
Status: DISCONTINUED | OUTPATIENT
Start: 2024-11-06 | End: 2024-11-11 | Stop reason: HOSPADM

## 2024-11-06 RX ORDER — METOPROLOL TARTRATE 100 MG/1
100 TABLET ORAL 2 TIMES DAILY
COMMUNITY
Start: 2024-08-26

## 2024-11-06 RX ORDER — DOXEPIN 3 MG/1
3 TABLET, FILM COATED ORAL NIGHTLY
COMMUNITY

## 2024-11-06 RX ORDER — LORATADINE 10 MG/1
10 TABLET ORAL DAILY PRN
COMMUNITY

## 2024-11-06 RX ORDER — LISINOPRIL 10 MG/1
5 TABLET ORAL DAILY
COMMUNITY
Start: 2024-05-22 | End: 2024-11-11 | Stop reason: HOSPADM

## 2024-11-06 RX ADMIN — CEFTRIAXONE SODIUM 1000 MG: 1 INJECTION, POWDER, FOR SOLUTION INTRAMUSCULAR; INTRAVENOUS at 20:31

## 2024-11-07 ENCOUNTER — APPOINTMENT (OUTPATIENT)
Dept: CT IMAGING | Facility: HOSPITAL | Age: 79
DRG: 698 | End: 2024-11-07
Payer: OTHER GOVERNMENT

## 2024-11-07 LAB
ALBUMIN SERPL-MCNC: 2.5 G/DL (ref 3.5–5.2)
ALBUMIN/GLOB SERPL: 0.5 G/DL
ALP SERPL-CCNC: 217 U/L (ref 39–117)
ALT SERPL W P-5'-P-CCNC: 18 U/L (ref 1–41)
ANION GAP SERPL CALCULATED.3IONS-SCNC: 7.9 MMOL/L (ref 5–15)
AST SERPL-CCNC: 23 U/L (ref 1–40)
BASOPHILS # BLD AUTO: 0.04 10*3/MM3 (ref 0–0.2)
BASOPHILS NFR BLD AUTO: 0.4 % (ref 0–1.5)
BILIRUB SERPL-MCNC: 0.3 MG/DL (ref 0–1.2)
BUN SERPL-MCNC: 51 MG/DL (ref 8–23)
BUN/CREAT SERPL: 18.6 (ref 7–25)
CALCIUM SPEC-SCNC: 8 MG/DL (ref 8.6–10.5)
CHLORIDE SERPL-SCNC: 110 MMOL/L (ref 98–107)
CO2 SERPL-SCNC: 18.1 MMOL/L (ref 22–29)
CREAT SERPL-MCNC: 2.74 MG/DL (ref 0.76–1.27)
DEPRECATED RDW RBC AUTO: 54.6 FL (ref 37–54)
EGFRCR SERPLBLD CKD-EPI 2021: 22.7 ML/MIN/1.73
EOSINOPHIL # BLD AUTO: 0.1 10*3/MM3 (ref 0–0.4)
EOSINOPHIL NFR BLD AUTO: 1.1 % (ref 0.3–6.2)
ERYTHROCYTE [DISTWIDTH] IN BLOOD BY AUTOMATED COUNT: 15.7 % (ref 12.3–15.4)
GLOBULIN UR ELPH-MCNC: 4.7 GM/DL
GLUCOSE BLDC GLUCOMTR-MCNC: 141 MG/DL (ref 70–99)
GLUCOSE BLDC GLUCOMTR-MCNC: 186 MG/DL (ref 70–99)
GLUCOSE BLDC GLUCOMTR-MCNC: 208 MG/DL (ref 70–99)
GLUCOSE BLDC GLUCOMTR-MCNC: 210 MG/DL (ref 70–99)
GLUCOSE BLDC GLUCOMTR-MCNC: 85 MG/DL (ref 70–99)
GLUCOSE SERPL-MCNC: 113 MG/DL (ref 65–99)
HCT VFR BLD AUTO: 27 % (ref 37.5–51)
HGB BLD-MCNC: 8.6 G/DL (ref 13–17.7)
IMM GRANULOCYTES # BLD AUTO: 0.04 10*3/MM3 (ref 0–0.05)
IMM GRANULOCYTES NFR BLD AUTO: 0.4 % (ref 0–0.5)
LYMPHOCYTES # BLD AUTO: 0.98 10*3/MM3 (ref 0.7–3.1)
LYMPHOCYTES NFR BLD AUTO: 10.4 % (ref 19.6–45.3)
MAGNESIUM SERPL-MCNC: 1.8 MG/DL (ref 1.6–2.4)
MCH RBC QN AUTO: 30.4 PG (ref 26.6–33)
MCHC RBC AUTO-ENTMCNC: 31.9 G/DL (ref 31.5–35.7)
MCV RBC AUTO: 95.4 FL (ref 79–97)
MONOCYTES # BLD AUTO: 0.78 10*3/MM3 (ref 0.1–0.9)
MONOCYTES NFR BLD AUTO: 8.3 % (ref 5–12)
NEUTROPHILS NFR BLD AUTO: 7.44 10*3/MM3 (ref 1.7–7)
NEUTROPHILS NFR BLD AUTO: 79.4 % (ref 42.7–76)
NRBC BLD AUTO-RTO: 0 /100 WBC (ref 0–0.2)
PHOSPHATE SERPL-MCNC: 4.6 MG/DL (ref 2.5–4.5)
PLATELET # BLD AUTO: 170 10*3/MM3 (ref 140–450)
PMV BLD AUTO: 9.7 FL (ref 6–12)
POTASSIUM SERPL-SCNC: 4.2 MMOL/L (ref 3.5–5.2)
PROCALCITONIN SERPL-MCNC: 3.1 NG/ML (ref 0–0.25)
PROT SERPL-MCNC: 7.2 G/DL (ref 6–8.5)
RBC # BLD AUTO: 2.83 10*6/MM3 (ref 4.14–5.8)
SODIUM SERPL-SCNC: 136 MMOL/L (ref 136–145)
WBC NRBC COR # BLD AUTO: 9.38 10*3/MM3 (ref 3.4–10.8)

## 2024-11-07 PROCEDURE — 80053 COMPREHEN METABOLIC PANEL: CPT | Performed by: STUDENT IN AN ORGANIZED HEALTH CARE EDUCATION/TRAINING PROGRAM

## 2024-11-07 PROCEDURE — 25810000003 LACTATED RINGERS PER 1000 ML: Performed by: INTERNAL MEDICINE

## 2024-11-07 PROCEDURE — G0378 HOSPITAL OBSERVATION PER HR: HCPCS

## 2024-11-07 PROCEDURE — 83735 ASSAY OF MAGNESIUM: CPT | Performed by: STUDENT IN AN ORGANIZED HEALTH CARE EDUCATION/TRAINING PROGRAM

## 2024-11-07 PROCEDURE — 63710000001 INSULIN LISPRO (HUMAN) PER 5 UNITS: Performed by: STUDENT IN AN ORGANIZED HEALTH CARE EDUCATION/TRAINING PROGRAM

## 2024-11-07 PROCEDURE — 84145 PROCALCITONIN (PCT): CPT | Performed by: INTERNAL MEDICINE

## 2024-11-07 PROCEDURE — 25810000003 LACTATED RINGERS SOLUTION: Performed by: STUDENT IN AN ORGANIZED HEALTH CARE EDUCATION/TRAINING PROGRAM

## 2024-11-07 PROCEDURE — 99233 SBSQ HOSP IP/OBS HIGH 50: CPT | Performed by: INTERNAL MEDICINE

## 2024-11-07 PROCEDURE — 82948 REAGENT STRIP/BLOOD GLUCOSE: CPT

## 2024-11-07 PROCEDURE — 84100 ASSAY OF PHOSPHORUS: CPT | Performed by: STUDENT IN AN ORGANIZED HEALTH CARE EDUCATION/TRAINING PROGRAM

## 2024-11-07 PROCEDURE — 82948 REAGENT STRIP/BLOOD GLUCOSE: CPT | Performed by: STUDENT IN AN ORGANIZED HEALTH CARE EDUCATION/TRAINING PROGRAM

## 2024-11-07 PROCEDURE — 70450 CT HEAD/BRAIN W/O DYE: CPT

## 2024-11-07 PROCEDURE — 85025 COMPLETE CBC W/AUTO DIFF WBC: CPT | Performed by: STUDENT IN AN ORGANIZED HEALTH CARE EDUCATION/TRAINING PROGRAM

## 2024-11-07 PROCEDURE — 25010000002 CEFTRIAXONE PER 250 MG: Performed by: STUDENT IN AN ORGANIZED HEALTH CARE EDUCATION/TRAINING PROGRAM

## 2024-11-07 PROCEDURE — 25810000003 LACTATED RINGERS PER 1000 ML: Performed by: STUDENT IN AN ORGANIZED HEALTH CARE EDUCATION/TRAINING PROGRAM

## 2024-11-07 PROCEDURE — 36415 COLL VENOUS BLD VENIPUNCTURE: CPT | Performed by: STUDENT IN AN ORGANIZED HEALTH CARE EDUCATION/TRAINING PROGRAM

## 2024-11-07 PROCEDURE — 97161 PT EVAL LOW COMPLEX 20 MIN: CPT

## 2024-11-07 RX ORDER — GABAPENTIN 100 MG/1
200 CAPSULE ORAL 2 TIMES DAILY
Status: DISCONTINUED | OUTPATIENT
Start: 2024-11-07 | End: 2024-11-07

## 2024-11-07 RX ORDER — SODIUM CHLORIDE 9 MG/ML
40 INJECTION, SOLUTION INTRAVENOUS AS NEEDED
Status: DISCONTINUED | OUTPATIENT
Start: 2024-11-07 | End: 2024-11-11 | Stop reason: HOSPADM

## 2024-11-07 RX ORDER — POLYETHYLENE GLYCOL 3350 17 G/17G
17 POWDER, FOR SOLUTION ORAL DAILY PRN
Status: DISCONTINUED | OUTPATIENT
Start: 2024-11-07 | End: 2024-11-08 | Stop reason: SDUPTHER

## 2024-11-07 RX ORDER — AMOXICILLIN 250 MG
2 CAPSULE ORAL 2 TIMES DAILY PRN
Status: DISCONTINUED | OUTPATIENT
Start: 2024-11-07 | End: 2024-11-08 | Stop reason: SDUPTHER

## 2024-11-07 RX ORDER — SODIUM CHLORIDE 0.9 % (FLUSH) 0.9 %
10 SYRINGE (ML) INJECTION AS NEEDED
Status: DISCONTINUED | OUTPATIENT
Start: 2024-11-07 | End: 2024-11-11 | Stop reason: HOSPADM

## 2024-11-07 RX ORDER — NICOTINE 21 MG/24HR
1 PATCH, TRANSDERMAL 24 HOURS TRANSDERMAL
Status: DISCONTINUED | OUTPATIENT
Start: 2024-11-07 | End: 2024-11-11 | Stop reason: HOSPADM

## 2024-11-07 RX ORDER — SODIUM CHLORIDE, SODIUM LACTATE, POTASSIUM CHLORIDE, CALCIUM CHLORIDE 600; 310; 30; 20 MG/100ML; MG/100ML; MG/100ML; MG/100ML
125 INJECTION, SOLUTION INTRAVENOUS CONTINUOUS
Status: DISCONTINUED | OUTPATIENT
Start: 2024-11-07 | End: 2024-11-08

## 2024-11-07 RX ORDER — DEXTROSE MONOHYDRATE 25 G/50ML
25 INJECTION, SOLUTION INTRAVENOUS
Status: DISCONTINUED | OUTPATIENT
Start: 2024-11-07 | End: 2024-11-11 | Stop reason: HOSPADM

## 2024-11-07 RX ORDER — BISACODYL 10 MG
10 SUPPOSITORY, RECTAL RECTAL DAILY PRN
Status: DISCONTINUED | OUTPATIENT
Start: 2024-11-07 | End: 2024-11-08 | Stop reason: SDUPTHER

## 2024-11-07 RX ORDER — METOPROLOL TARTRATE 50 MG
100 TABLET ORAL 2 TIMES DAILY
Status: DISCONTINUED | OUTPATIENT
Start: 2024-11-07 | End: 2024-11-08

## 2024-11-07 RX ORDER — SODIUM CHLORIDE, SODIUM LACTATE, POTASSIUM CHLORIDE, CALCIUM CHLORIDE 600; 310; 30; 20 MG/100ML; MG/100ML; MG/100ML; MG/100ML
100 INJECTION, SOLUTION INTRAVENOUS CONTINUOUS
Status: DISCONTINUED | OUTPATIENT
Start: 2024-11-07 | End: 2024-11-07

## 2024-11-07 RX ORDER — SODIUM CHLORIDE 0.9 % (FLUSH) 0.9 %
10 SYRINGE (ML) INJECTION EVERY 12 HOURS SCHEDULED
Status: DISCONTINUED | OUTPATIENT
Start: 2024-11-07 | End: 2024-11-11 | Stop reason: HOSPADM

## 2024-11-07 RX ORDER — AMLODIPINE BESYLATE 10 MG/1
10 TABLET ORAL
Status: DISCONTINUED | OUTPATIENT
Start: 2024-11-07 | End: 2024-11-08

## 2024-11-07 RX ORDER — PANTOPRAZOLE SODIUM 40 MG/1
40 TABLET, DELAYED RELEASE ORAL
Status: DISCONTINUED | OUTPATIENT
Start: 2024-11-07 | End: 2024-11-11 | Stop reason: HOSPADM

## 2024-11-07 RX ORDER — INSULIN LISPRO 100 [IU]/ML
2-7 INJECTION, SOLUTION INTRAVENOUS; SUBCUTANEOUS
Status: DISCONTINUED | OUTPATIENT
Start: 2024-11-07 | End: 2024-11-11

## 2024-11-07 RX ORDER — GABAPENTIN 100 MG/1
100 CAPSULE ORAL 2 TIMES DAILY
Status: DISCONTINUED | OUTPATIENT
Start: 2024-11-07 | End: 2024-11-11 | Stop reason: HOSPADM

## 2024-11-07 RX ORDER — FERROUS SULFATE 325(65) MG
325 TABLET ORAL
Status: DISCONTINUED | OUTPATIENT
Start: 2024-11-07 | End: 2024-11-11 | Stop reason: HOSPADM

## 2024-11-07 RX ORDER — BISACODYL 5 MG/1
5 TABLET, DELAYED RELEASE ORAL DAILY PRN
Status: DISCONTINUED | OUTPATIENT
Start: 2024-11-07 | End: 2024-11-08 | Stop reason: SDUPTHER

## 2024-11-07 RX ORDER — NICOTINE POLACRILEX 4 MG
15 LOZENGE BUCCAL
Status: DISCONTINUED | OUTPATIENT
Start: 2024-11-07 | End: 2024-11-11 | Stop reason: HOSPADM

## 2024-11-07 RX ORDER — IBUPROFEN 600 MG/1
1 TABLET ORAL
Status: DISCONTINUED | OUTPATIENT
Start: 2024-11-07 | End: 2024-11-11 | Stop reason: HOSPADM

## 2024-11-07 RX ADMIN — INSULIN LISPRO 2 UNITS: 100 INJECTION, SOLUTION INTRAVENOUS; SUBCUTANEOUS at 21:35

## 2024-11-07 RX ADMIN — METOPROLOL TARTRATE 100 MG: 50 TABLET, FILM COATED ORAL at 21:35

## 2024-11-07 RX ADMIN — APIXABAN 2.5 MG: 5 TABLET, FILM COATED ORAL at 21:35

## 2024-11-07 RX ADMIN — APIXABAN 2.5 MG: 5 TABLET, FILM COATED ORAL at 10:07

## 2024-11-07 RX ADMIN — AMLODIPINE BESYLATE 10 MG: 10 TABLET ORAL at 18:25

## 2024-11-07 RX ADMIN — SODIUM CHLORIDE, POTASSIUM CHLORIDE, SODIUM LACTATE AND CALCIUM CHLORIDE 100 ML/HR: 600; 310; 30; 20 INJECTION, SOLUTION INTRAVENOUS at 06:52

## 2024-11-07 RX ADMIN — SODIUM CHLORIDE, POTASSIUM CHLORIDE, SODIUM LACTATE AND CALCIUM CHLORIDE 500 ML: 600; 310; 30; 20 INJECTION, SOLUTION INTRAVENOUS at 01:18

## 2024-11-07 RX ADMIN — GABAPENTIN 100 MG: 100 CAPSULE ORAL at 21:35

## 2024-11-07 RX ADMIN — FERROUS SULFATE TAB 325 MG (65 MG ELEMENTAL FE) 325 MG: 325 (65 FE) TAB at 09:08

## 2024-11-07 RX ADMIN — Medication 10 ML: at 09:08

## 2024-11-07 RX ADMIN — Medication 10 ML: at 01:17

## 2024-11-07 RX ADMIN — APIXABAN 2.5 MG: 5 TABLET, FILM COATED ORAL at 01:15

## 2024-11-07 RX ADMIN — SODIUM CHLORIDE 1000 MG: 9 INJECTION, SOLUTION INTRAMUSCULAR; INTRAVENOUS; SUBCUTANEOUS at 16:29

## 2024-11-07 RX ADMIN — SODIUM CHLORIDE, POTASSIUM CHLORIDE, SODIUM LACTATE AND CALCIUM CHLORIDE 125 ML/HR: 600; 310; 30; 20 INJECTION, SOLUTION INTRAVENOUS at 10:08

## 2024-11-07 RX ADMIN — SODIUM CHLORIDE, POTASSIUM CHLORIDE, SODIUM LACTATE AND CALCIUM CHLORIDE 100 ML/HR: 600; 310; 30; 20 INJECTION, SOLUTION INTRAVENOUS at 01:22

## 2024-11-07 RX ADMIN — PANTOPRAZOLE SODIUM 40 MG: 40 TABLET, DELAYED RELEASE ORAL at 09:08

## 2024-11-07 RX ADMIN — INSULIN LISPRO 3 UNITS: 100 INJECTION, SOLUTION INTRAVENOUS; SUBCUTANEOUS at 12:25

## 2024-11-07 RX ADMIN — NICOTINE 1 PATCH: 21 PATCH, EXTENDED RELEASE TRANSDERMAL at 18:25

## 2024-11-07 RX ADMIN — GABAPENTIN 200 MG: 100 CAPSULE ORAL at 09:07

## 2024-11-07 RX ADMIN — SODIUM CHLORIDE, POTASSIUM CHLORIDE, SODIUM LACTATE AND CALCIUM CHLORIDE 125 ML/HR: 600; 310; 30; 20 INJECTION, SOLUTION INTRAVENOUS at 16:30

## 2024-11-07 RX ADMIN — INSULIN LISPRO 3 UNITS: 100 INJECTION, SOLUTION INTRAVENOUS; SUBCUTANEOUS at 17:20

## 2024-11-07 NOTE — PLAN OF CARE
Goal Outcome Evaluation:  Plan of Care Reviewed With: patient        Progress: no change  Outcome Evaluation: Pt presents with limitations that impede their ability to safely and independently transfer and ambulate. The skills of a therapist will be required to safely and effectively implement the following treatment plan to restore maximal level of function.    Anticipated Discharge Disposition (PT): inpatient rehabilitation facility, sub acute care setting

## 2024-11-07 NOTE — ED PROVIDER NOTES
Time: 9:02 PM EST  Date of encounter:  11/6/2024  Independent Historian/Clinical History and Information was obtained by:   Patient    History is limited by: N/A    Chief Complaint: hypotension, weakness      History of Present Illness:  Patient is a 79 y.o. year old male who presents to the emergency department via EMS for evaluation of hypotension and weakness. Wife is at bedside to help with history. Wife states that she manages the patient's medications at home. When she checked his blood pressure, the top number was in the 70s. Per report, patient has had increased confusion. He does have a soler catheter. He was treated for UTI last month. Wife states that the home health nurse comes once a month to change his catheter. It was just changed yesterday. Wife states that the patient does his own catheter care. Denies any fever, nausea, vomiting. He does have a known hx of kidney disease. He gets labs drawn once a month as well. EMS started the patient on 1L LR. BP on arrival is 97/51. Denies any SOA, chest pain. Patient is Aox3.      Patient Care Team  Primary Care Provider: Jeanette Prieto MD    Past Medical History:     No Known Allergies  Past Medical History:   Diagnosis Date    Arthritis     Benign prostatic hyperplasia     Coronary artery disease     Hyperlipidemia     Hypertension      Past Surgical History:   Procedure Laterality Date    SINUS SURGERY       History reviewed. No pertinent family history.    Home Medications:  Prior to Admission medications    Medication Sig Start Date End Date Taking? Authorizing Provider   apixaban (ELIQUIS) 5 MG tablet tablet Take 1 tablet by mouth 2 (Two) Times a Day.   Yes ProviderRico MD   cholecalciferol (VITAMIN D3) 10 MCG (400 UNIT) tablet Take 1 tablet by mouth Daily.   Yes ProviderRico MD   furosemide (LASIX) 40 MG tablet Take 1 tablet by mouth Daily.   Yes ProviderRico MD   gabapentin (NEURONTIN) 100 MG capsule Take 2  capsules by mouth 3 (Three) Times a Day.   Yes Rico Tony MD   glipizide (GLUCOTROL) 10 MG tablet Take 0.5 tablets by mouth 2 (Two) Times a Day Before Meals. DO NOT TAKE IF BREAKFAST IS SKIPPED PER SPOUSE   Yes Rico Tony MD   Insulin Aspart (novoLOG) 100 UNIT/ML injection Inject 5 Units under the skin into the appropriate area as directed 2 (Two) Times a Day With Meals. Breakfast  and Lunch Take before eating   Yes Rico Tony MD   Insulin Aspart (novoLOG) 100 UNIT/ML injection Inject 6 Units under the skin into the appropriate area as directed Daily With Dinner. Take before eating   Yes Rico Tony MD   insulin glargine (LANTUS, SEMGLEE) 100 UNIT/ML injection Inject 25 Units under the skin into the appropriate area as directed Daily.   Yes Rico Tony MD   metoprolol tartrate (LOPRESSOR) 50 MG tablet Take 2 tablets by mouth 2 (Two) Times a Day.   Yes Rico Tony MD   omeprazole (priLOSEC) 20 MG capsule Take 1 capsule by mouth 2 (Two) Times a Day.   Yes Rico Tony MD   atorvastatin (LIPITOR) 40 MG tablet Take 1 tablet by mouth Daily.    Rico Tony MD   docusate sodium (COLACE) 250 MG capsule Take 1 capsule by mouth Daily.    Rico Tony MD   empagliflozin (JARDIANCE) 25 MG tablet tablet Take 0.5 tablets by mouth Daily.    Rico Tony MD   finasteride (PROSCAR) 5 MG tablet Take 1 tablet by mouth Daily.    Rico Tony MD   tamsulosin (FLOMAX) 0.4 MG capsule 24 hr capsule Take 1 capsule by mouth Every Night.    Rico Tony MD   traZODone (DESYREL) 50 MG tablet Take 1 tablet by mouth Every Night.    Rico Tony MD        Social History:   Social History     Tobacco Use    Smoking status: Former     Current packs/day: 1.50     Average packs/day: 1.5 packs/day for 40.0 years (60.0 ttl pk-yrs)     Types: Cigarettes    Smokeless tobacco: Never   Vaping Use    Vaping status: Every Day  "  Substance Use Topics    Alcohol use: Not Currently    Drug use: Never         Review of Systems:  Review of Systems   Constitutional:  Negative for chills and fever.   HENT:  Negative for congestion and ear pain.    Eyes:  Negative for pain.   Respiratory:  Negative for cough and shortness of breath.    Cardiovascular:  Negative for chest pain.   Gastrointestinal:  Negative for abdominal pain, diarrhea, nausea and vomiting.   Genitourinary:  Negative for dysuria and hematuria.   Musculoskeletal:  Negative for myalgias.   Skin:  Negative for rash.   Neurological:  Negative for dizziness and headaches.        Physical Exam:  /58 (BP Location: Left arm, Patient Position: Lying)   Pulse 80   Temp 97.5 °F (36.4 °C) (Oral)   Resp 13   Ht 177.8 cm (70\")   Wt 79.9 kg (176 lb 2.4 oz)   SpO2 98%   BMI 25.27 kg/m²     Physical Exam  Vitals and nursing note reviewed.   Constitutional:       Appearance: Normal appearance. He is normal weight.   HENT:      Head: Normocephalic and atraumatic.      Nose: Nose normal.   Eyes:      Conjunctiva/sclera: Conjunctivae normal.      Pupils: Pupils are equal, round, and reactive to light.   Cardiovascular:      Rate and Rhythm: Normal rate and regular rhythm.      Pulses: Normal pulses.      Heart sounds: Normal heart sounds.   Pulmonary:      Effort: Pulmonary effort is normal.      Breath sounds: Normal breath sounds.   Abdominal:      General: Abdomen is flat. Bowel sounds are normal.      Palpations: Abdomen is soft.   Musculoskeletal:         General: Normal range of motion.      Cervical back: Normal range of motion and neck supple.   Skin:     General: Skin is warm and dry.   Neurological:      General: No focal deficit present.      Mental Status: He is alert and oriented to person, place, and time.   Psychiatric:         Mood and Affect: Mood normal.         Behavior: Behavior normal.                  Procedures:  Procedures      Medical Decision " Making:      Comorbidities that affect care:    Hypertension, hyperlipidemia, CAD, BPH    External Notes reviewed:    Previous Labs: Reviewed urine culture and lab work on September 24, 2024.      The following orders were placed and all results were independently analyzed by me:  Orders Placed This Encounter   Procedures    Urine Culture - Urine,    Blood Culture - Blood,    Blood Culture - Blood,    XR Chest 1 View    [place] Draw    Comprehensive Metabolic Panel    Urinalysis With Microscopic If Indicated (No Culture) - Urine, Catheter    CBC Auto Differential    Urinalysis, Microscopic Only - Urine, Clean Catch    Lactic Acid, Plasma    Continuous Pulse Oximetry    Vital Signs    Inpatient Hospitalist Consult    Oxygen Therapy- Nasal Cannula; Titrate 1-6 LPM Per SpO2; 90 - 95%    POC Glucose Once    Insert Peripheral IV    Fall Precautions    CBC & Differential    Green Top (Gel)    Lavender Top    Gold Top - SST    Light Blue Top       Medications Given in the Emergency Department:  Medications   sodium chloride 0.9 % flush 10 mL (has no administration in time range)   cefTRIAXone (ROCEPHIN) in NS 1 gram/10ml IV PUSH syringe (1,000 mg Intravenous Given 11/6/24 2031)        ED Course:    ED Course as of 11/06/24 2142 Wed Nov 06, 2024 2124 BP 91/46 (60) [MV]      ED Course User Index  [MV] John Lazo PA       Labs:    Lab Results (last 24 hours)       Procedure Component Value Units Date/Time    CBC & Differential [[id number]]  (Abnormal) Collected: 11/06/24 1930    Specimen: Blood Updated: 11/06/24 1935    Narrative:      The following orders were created for panel order CBC & Differential.  Procedure                               Abnormality         Status                     ---------                               -----------         ------                     CBC Auto Differential[[id number]]        Abnormal            Final result                 Please view results for these tests on the individual  orders.    Comprehensive Metabolic Panel [[id number]]  (Abnormal) Collected: 11/06/24 1930    Specimen: Blood Updated: 11/06/24 1954     Glucose 137 mg/dL      BUN 51 mg/dL      Creatinine 2.95 mg/dL      Sodium 136 mmol/L      Potassium 4.1 mmol/L      Chloride 106 mmol/L      CO2 20.7 mmol/L      Calcium 7.9 mg/dL      Total Protein 7.0 g/dL      Albumin 2.6 g/dL      ALT (SGPT) 19 U/L      AST (SGOT) 26 U/L      Alkaline Phosphatase 234 U/L      Total Bilirubin 0.4 mg/dL      Globulin 4.4 gm/dL      A/G Ratio 0.6 g/dL      BUN/Creatinine Ratio 17.3     Anion Gap 9.3 mmol/L      eGFR 20.8 mL/min/1.73     Narrative:      GFR Normal >60  Chronic Kidney Disease <60  Kidney Failure <15    The GFR formula is only valid for adults with stable renal function between ages 18 and 70.    CBC Auto Differential [[id number]]  (Abnormal) Collected: 11/06/24 1930    Specimen: Blood Updated: 11/06/24 1935     WBC 13.21 10*3/mm3      RBC 2.80 10*6/mm3      Hemoglobin 8.8 g/dL      Hematocrit 26.7 %      MCV 95.4 fL      MCH 31.4 pg      MCHC 33.0 g/dL      RDW 15.7 %      RDW-SD 54.1 fl      MPV 9.8 fL      Platelets 194 10*3/mm3      Neutrophil % 86.0 %      Lymphocyte % 5.1 %      Monocyte % 7.6 %      Eosinophil % 0.6 %      Basophil % 0.3 %      Immature Grans % 0.4 %      Neutrophils, Absolute 11.37 10*3/mm3      Lymphocytes, Absolute 0.67 10*3/mm3      Monocytes, Absolute 1.00 10*3/mm3      Eosinophils, Absolute 0.08 10*3/mm3      Basophils, Absolute 0.04 10*3/mm3      Immature Grans, Absolute 0.05 10*3/mm3      nRBC 0.0 /100 WBC     Urinalysis With Microscopic If Indicated (No Culture) - Urine, Catheter [[id number]]  (Abnormal) Collected: 11/06/24 1932    Specimen: Urine, Catheter Updated: 11/06/24 1946     Color, UA Yellow     Appearance, UA Turbid     pH, UA 6.0     Specific Gravity, UA 1.011     Glucose,  mg/dL (Trace)     Ketones, UA Negative     Bilirubin, UA Negative     Blood, UA Large (3+)     Protein,   mg/dL (2+)     Leuk Esterase, UA Large (3+)     Nitrite, UA Positive     Urobilinogen, UA 1.0 E.U./dL    Urinalysis, Microscopic Only - Urine, Catheter [[id number]]  (Abnormal) Collected: 11/06/24 1932    Specimen: Urine, Catheter Updated: 11/06/24 1946     RBC, UA Too Numerous to Count /HPF      WBC, UA Too Numerous to Count /HPF      Bacteria, UA 4+ /HPF      Squamous Epithelial Cells, UA 3-6 /HPF      Hyaline Casts, UA 3-6 /LPF      Methodology Automated Microscopy    Urine Culture - Urine, Urine, Catheter [[id number]] Collected: 11/06/24 1932    Specimen: Urine, Catheter Updated: 11/06/24 2016             Imaging:    XR Chest 1 View    Result Date: 11/6/2024  XR CHEST 1 VW Date of Exam: 11/6/2024 7:17 PM EST Indication: AMS Protocol Comparison: CXR 8/8/2023, 8/5/2023, 3/21/2023 Findings: The heart remains borderline in size. Low lung volumes are evident, with subsegmental atelectasis at the lung bases. Bony structures appear intact. 3 cm x 1.5 cm osteochondral lesion in the proximal right humerus is stable.     Impression: Low lung volumes with subsegmental atelectasis at the lung bases. Electronically Signed: Javed Nelson MD  11/6/2024 7:52 PM EST  Workstation ID: [id number]       Differential Diagnosis and Discussion:    Dysuria: Differential diagnosis includes but is not limited to urethritis, cystitis, pyelonephritis, ureteral calculi, neoplasm, chemical irritant, urethral stricture, and trauma  Weakness: Based on the patient's history, signs, and symptoms, the diffential diagnosis includes but is not limited to meningitis, stroke, sepsis, subarachnoid hemorrhage, intracranial bleeding, encephalitis, acute uti, dehydration, MS, myasthenia gravis, Guillan [place], migraine variant, neuromuscular disorders vertigo, electrolyte imbalance, and metabolic disorders.    All labs were reviewed and interpreted by me.  All X-rays impressions were independently interpreted by me.    MDM     Amount and/or Complexity of Data  Reviewed  Clinical lab tests: reviewed  Tests in the radiology section of CPT®: reviewed  Decide to obtain previous medical records or to obtain history from someone other than the patient: yes    Risk of Complications, Morbidity, and/or Mortality  Presenting problems: moderate  Diagnostic procedures: moderate  Management options: moderate    Patient Progress  Patient progress: stable    Patient presents to the emergency department via EMS for evaluation of hypotension and weakness. Wife is at bedside to help with history. Wife states that she manages the patient's medications at home. When she checked his blood pressure, the top number was in the 70s. Per report, patient has had increased confusion. He does have a soler catheter. He was treated for UTI last month. Wife states that the home health nurse comes once a month to change his catheter. It was just changed yesterday. Wife states that the patient does his own catheter care. Denies any fever, nausea, vomiting. He does have a known hx of kidney disease. He gets labs drawn once a month as well. EMS started the patient on 1L LR. BP on arrival is 97/51. Denies any SOA, chest pain. Patient is Aox3.    CBC shows a mildly elevated white count 13.21.  Rest of the CBC is unremarkable.  Hemoglobin 8.8, baseline.  The patient´s CMP that was reviewed and interpretted by me shows no abnormalities of critical concern. Of note, the patient´s sodium and potassium are acceptable. The patient´s liver enzymes are unremarkable. The patient has a normal anion gap.    Patient's creatinine is elevated 2.95.  Last creatinine was done last year which was 1.51.  Unsure of this is baseline.  Per wife, patient does have a known history of kidney disease.  He is currently being followed by his PCP for this.  He gets labs drawn once a month.     UA is positive for nitrites and 4+ bacteria.    Started the patient on Rocephin.  Previous cultures did grow E. coli.     Discussed this patient  with Dr. Painter. He states that since the patient was hypotensive PTA, has elevated WBC, blood pressure of 91/46 (MAP 60) after a bag of LR, and an NIKITA; patient will need to get admitted for fluids and IV abx.    Discussed this patient with Dr. Tamez who accepts patient for admission.              Patient Care Considerations:    SEPSIS was considered but is NOT present in the emergency department as SIRS criteria is not present.      Consultants/Shared Management Plan:    Hospitalist: I have discussed the case with Dr. Tamez who agrees to accept the patient for admission.  SHARED VISIT: I have discussed the case with my supervising physician, Dr. Painter who statesconsult hospitalist for admission. The substantive portion of the medical decision was made by the attesting physician who made or approve the management plan and will take responsibility for the patient.  Clinical findings were discussed and ultimate disposition was made in consult with supervising physician.    Social Determinants of Health:    Patient has presented with family members who are responsible, reliable and will ensure follow up care.      Disposition and Care Coordination:    Admit:   Through independent evaluation of the patient's history, physical, and imperical data, the patient meets criteria for inpatient admission to the hospital.        Final diagnoses:   Urinary tract infection associated with indwelling urethral catheter, initial encounter   Hypotension, unspecified hypotension type        ED Disposition       ED Disposition   Decision to Admit    Condition   --    Comment   --               This medical record created using voice recognition software.             John Lazo PA  11/06/24 2730

## 2024-11-07 NOTE — PLAN OF CARE
Goal Outcome Evaluation:   VSS throughout shift. Confused throughout day. Wife sit for a little while and then son at bedside now. MD Borges notified of hypertension. New order to start norvasc 10 mg and nicotine patch. Will continue POC.

## 2024-11-07 NOTE — H&P
[facility] HISTORY AND PHYSICAL  Date: 2024   Patient Name: Darnell Geller  : 1945  MRN: [id number]  Primary Care Physician:  Jeanette Prieto MD  Date of admission: 2024    Subjective   Subjective     Chief Complaint: hypotension, generalized weakness    HPI:    Darnell Geller is a 79 y.o. male with a past medical history of urinary retention with chronic indwelling Muñiz catheter, hypertension, diabetes mellitus, A-fib on Eliquis for evaluation of hypotension and generalized weakness.  As per the ED physician report, patient's wife stated that when she checked his blood pressure at home he noted to have a systolic blood pressure in 70s and patient noted to have increased confusion than his baseline prompting her to call the EMS.  Patient was treated for UTI last month.  Patient's catheter is changed once a month by home health care nurse, last changed yesterday.  Did not had any fevers, vomitings, chills.  During my evaluation patient's wife was not there at the bedside.  Patient was able to tell his name, oriented to self.  Unable to provide much history.  Denies any abdominal pain, difficulty breathing.    Upon arrival, vital signs temperature 97.5, pulse 78, respiratory 16, blood pressure 97/51 on room air saturating at 100%.  Labs showed BUN 51, creatinine 2.95, does not have any recent labs in the chart, previous creatinine a year ago was 1.51.  Rest of the CMP with no significant findings, WBC elevated 13.1, hemoglobin 8.8, platelets 194.,  Urinalysis showed large blood, positive nitrates, too numerous to count WBC, 4+ bacteria.  Blood cultures and urine cultures in process.  Chest x-ray showed low lung volumes with subsegmental atelectasis at the lung bases.  Received ceftriaxone and 1 L IV fluids in the ED.  Patient has been admitted for further evaluation and management of acute metabolic encephalopathy, UTI, generalized weakness    Personal History     Past  Medical History:   Diagnosis Date    Arthritis     Benign prostatic hyperplasia     Coronary artery disease     Hyperlipidemia     Hypertension          Past Surgical History:   Procedure Laterality Date    SINUS SURGERY           History reviewed. No pertinent family history.      Social History     Socioeconomic History    Marital status:    Tobacco Use    Smoking status: Former     Current packs/day: 1.50     Average packs/day: 1.5 packs/day for 40.0 years (60.0 ttl pk-yrs)     Types: Cigarettes    Smokeless tobacco: Never   Vaping Use    Vaping status: Every Day   Substance and Sexual Activity    Alcohol use: Not Currently    Drug use: Never    Sexual activity: Defer         Home Medications:  Insulin Aspart, apixaban, atorvastatin, cholecalciferol, docusate sodium, empagliflozin, finasteride, furosemide, gabapentin, glipizide, insulin glargine, metoprolol tartrate, omeprazole, tamsulosin, and traZODone    Allergies:  No Known Allergies      Objective   Objective     Vitals:   Temp:  [97.5 °F (36.4 °C)] 97.5 °F (36.4 °C)  Heart Rate:  [78-80] 78  Resp:  [13-16] 14  BP: ()/(51-60) 112/60    Physical Exam    Constitutional: Awake, alert, no acute distress   Eyes: Pupils equal, sclerae anicteric, no conjunctival injection   HENT: NCAT, mucous membranes dry   Respiratory: Clear to auscultation bilaterally, nonlabored respirations    Cardiovascular: RRR, no murmurs   Gastrointestinal: Positive bowel sounds, soft, nontender, nondistended   Musculoskeletal: No bilateral ankle edema, no clubbing or cyanosis to extremities   Neurologic: Oriented x 1 to self, able to follow commands, strength symmetric in all extremities, speech clear    Result Review    Result Review:  I have personally reviewed the results from the time of this admission to 11/6/2024 22:15 EST and agree with these findings:  [x]  Laboratory  []  Microbiology  [x]  Radiology  []  EKG/Telemetry   []  Cardiology/Vascular   []  Pathology  []   Old records  []  Other:        Imaging Results (Last 24 Hours)       Procedure Component Value Units Date/Time    XR Chest 1 View [[id number]] Collected: 11/06/24 1947     Updated: 11/06/24 1954    Narrative:      XR CHEST 1 VW    Date of Exam: 11/6/2024 7:17 PM EST    Indication: AMS Protocol    Comparison: CXR 8/8/2023, 8/5/2023, 3/21/2023    Findings:  The heart remains borderline in size.    Low lung volumes are evident, with subsegmental atelectasis at the lung bases.    Bony structures appear intact. 3 cm x 1.5 cm osteochondral lesion in the proximal right humerus is stable.      Impression:      Impression:  Low lung volumes with subsegmental atelectasis at the lung bases.      Electronically Signed: Javed Nelson MD    11/6/2024 7:52 PM EST    Workstation ID: [id number]                   Assessment & Plan   Assessment / Plan     Assessment/Plan:   Acute metabolic encephalopathy  UTI  Generalized weakness  Dehydration  Urinary retention with chronic indwelling Muñiz catheter  Possible NIKITA on CKD, unclear baseline, creatinine elevated 2. 95, year ago was 1.5  Hypertension  Diabetes mellitus  A-fib on Eliquis  Chronic anemia     Plan  Admit to observation, remote telemetry  Continue ceftriaxone  Follow-up on blood cultures and urine cultures  Received 1 L IV fluids in the ED, Another 500 ml bolus followed by LR infusion at 100cc/hr for 10 hours   Monitor urine output  Monitor electrolytes, renal function, hemoglobin levels with AM labs   Fall precautions  Nursing dysphagia screen  Heart healthy, consistent carb diet  Low-dose sliding scale insulin  Continue Eliquis 2.5 mg twice daily  Avoid nephrotoxic agents in the setting of NIKITA  Resume other appropriate home medications once reconciled    VTE Prophylaxis:  Pharmacologic VTE prophylaxis orders are signed & held.      CODE STATUS:    Level Of Support Discussed With: Patient  Code Status (Patient has no pulse and is not breathing): CPR (Attempt to  Resuscitate)  Medical Interventions (Patient has pulse or is breathing): Full Support      Admission Status:  I believe this patient meets observation status.    Part of this note may be an electronic transcription/translation of spoken language to printed text using the Dragon Dictation System    Elisa Tamez MD

## 2024-11-07 NOTE — PLAN OF CARE
Goal Outcome Evaluation:              Outcome Evaluation: Pateint arrived to floor from ED approx 0230 this morning. VS stable, no c/o pain. Passed swallow screening. Patient is a/o except to situation. He has become more confused as the shift has gone on. IVF infusing.

## 2024-11-07 NOTE — PROGRESS NOTES
[facility]   Hospitalist Progress Note  Date: 2024  Patient Name: Darnell Geller  : 1945  MRN: [id number]  Date of admission: 2024      Subjective   Subjective     Chief Complaint: Hypotension, weakness    Summary: 79 y.o. male with a past medical history of urinary retention with chronic indwelling Muñiz catheter, hypertension, diabetes mellitus, A-fib on Eliquis for evaluation of hypotension and generalized weakness.  As per the ED physician report, patient's wife stated that when she checked his blood pressure at home he noted to have a systolic blood pressure in 70s and patient noted to have increased confusion than his baseline prompting her to call the EMS.  Patient was treated for UTI last month.  Patient's catheter is changed once a month by home health care nurse, last changed yesterday.  In the ED, patient was initially hypotensive, concern for UTI with NIKITA, creatinine 2.9.  Patient had persistent confusion, he was admitted for further care.  Given IV fluids, started on IV antibiotics.    Interval Followup: No acute events overnight.  Patient remains confused.  He is somewhat able to follow along but not answering questions appropriately and is tangential.  Does not remember the events that led to his hospitalization.    Objective   Objective     Vitals:   Temp:  [97.5 °F (36.4 °C)-98.3 °F (36.8 °C)] 98.3 °F (36.8 °C)  Heart Rate:  [61-80] 72  Resp:  [13-20] 20  BP: ()/(51-92) 181/86  Physical Exam    Constitutional: Awake, alert, no acute distress, confused   Respiratory: Clear to auscultation bilaterally, nonlabored respirations    Cardiovascular: RRR, no MRG   Gastrointestinal: Positive bowel sounds, soft, NTND   Neurologic: Oriented x 2, strength symmetric in all extremities, Cranial Nerves grossly intact to confrontation, speech clear    Result Review    I have personally reviewed the results below:  [x]  Laboratory personally reviewed CMP, CBC, lactate, procalcitonin  []   Microbiology  []  Radiology  []  EKG/Telemetry   []  Cardiology/Vascular   []  Pathology  []  Old records  []  Other:  CBC          11/6/2024    19:30 11/7/2024    03:32   CBC   WBC 13.21  9.38    RBC 2.80  2.83    Hemoglobin 8.8  8.6    Hematocrit 26.7  27.0    MCV 95.4  95.4    MCH 31.4  30.4    MCHC 33.0  31.9    RDW 15.7  15.7    Platelets 194  170      CMP          11/6/2024    19:30 11/7/2024    03:32   CMP   Glucose 137  113    BUN 51  51    Creatinine 2.95  2.74    EGFR 20.8  22.7    Sodium 136  136    Potassium 4.1  4.2    Chloride 106  110    Calcium 7.9  8.0    Total Protein 7.0  7.2    Albumin 2.6  2.5    Globulin 4.4  4.7    Total Bilirubin 0.4  0.3    Alkaline Phosphatase 234  217    AST (SGOT) 26  23    ALT (SGPT) 19  18    Albumin/Globulin Ratio 0.6  0.5    BUN/Creatinine Ratio 17.3  18.6    Anion Gap 9.3  7.9        Assessment & Plan   Assessment / Plan   Acute metabolic encephalopathy  UTI due to gram-negative bacilli  Generalized weakness  Dehydration  Urinary retention with chronic indwelling Muñiz catheter  Possible NIKITA on CKD, unclear baseline, creatinine elevated 2. 95, year ago was 1.5  Hypertension  Diabetes mellitus  A-fib on Eliquis  Chronic anemia     Continue to monitor in the hospital for workup and management of the above  Will obtain CT head due to persistent confusion  Urine cultures growing gram-negative bacilli, blood cultures pending  Continue with IV Rocephin for now and de-escalate based on cultures  Creatinine still elevated at 2.7, will continue with LR at 125 cc/h  If renal function not improved in the morning, will consult nephrology  Decreased home gabapentin to 100 mg twice daily  Continue with sliding scale insulin  Procalcitonin ordered and significantly elevated at 3.1  Restart home metoprolol, hold nephrotoxic medications  PT/OT consulted  Continue appropriate home medications  Trend renal function and electrolytes with a.m. BMP, magnesium   Trend Hgb and WBC with a.m.  CBC     Discussed plan with RN, clinical     VTE Prophylaxis:  Pharmacologic VTE prophylaxis orders are present.        CODE STATUS:   Level Of Support Discussed With: Patient  Code Status (Patient has no pulse and is not breathing): CPR (Attempt to Resuscitate)  Medical Interventions (Patient has pulse or is breathing): Full Support        Electronically signed by Micky Barone MD, 11/07/24, 1:57 PM EST.

## 2024-11-08 ENCOUNTER — APPOINTMENT (OUTPATIENT)
Dept: CARDIOLOGY | Facility: HOSPITAL | Age: 79
DRG: 698 | End: 2024-11-08
Payer: OTHER GOVERNMENT

## 2024-11-08 ENCOUNTER — APPOINTMENT (OUTPATIENT)
Dept: ULTRASOUND IMAGING | Facility: HOSPITAL | Age: 79
DRG: 698 | End: 2024-11-08
Payer: OTHER GOVERNMENT

## 2024-11-08 ENCOUNTER — APPOINTMENT (OUTPATIENT)
Dept: GENERAL RADIOLOGY | Facility: HOSPITAL | Age: 79
DRG: 698 | End: 2024-11-08
Payer: OTHER GOVERNMENT

## 2024-11-08 PROBLEM — N39.0 UTI (URINARY TRACT INFECTION): Status: ACTIVE | Noted: 2024-11-08

## 2024-11-08 LAB
ANION GAP SERPL CALCULATED.3IONS-SCNC: 7.2 MMOL/L (ref 5–15)
BACTERIA SPEC AEROBE CULT: ABNORMAL
BASOPHILS # BLD AUTO: 0.03 10*3/MM3 (ref 0–0.2)
BASOPHILS NFR BLD AUTO: 0.4 % (ref 0–1.5)
BH CV ECHO MEAS - AO MAX PG: 6.1 MMHG
BH CV ECHO MEAS - AO MEAN PG: 3.8 MMHG
BH CV ECHO MEAS - AO ROOT DIAM: 3.1 CM
BH CV ECHO MEAS - AO V2 MAX: 123.3 CM/SEC
BH CV ECHO MEAS - AO V2 VTI: 34.1 CM
BH CV ECHO MEAS - AVA(I,D): 1.7 CM2
BH CV ECHO MEAS - EDV(MOD-SP2): 79.1 ML
BH CV ECHO MEAS - EDV(MOD-SP4): 90.8 ML
BH CV ECHO MEAS - EF(MOD-BP): 61.2 %
BH CV ECHO MEAS - EF(MOD-SP2): 60.4 %
BH CV ECHO MEAS - EF(MOD-SP4): 58.8 %
BH CV ECHO MEAS - ESV(MOD-SP2): 31.3 ML
BH CV ECHO MEAS - ESV(MOD-SP4): 37.4 ML
BH CV ECHO MEAS - IVS/LVPW: 1.1 CM
BH CV ECHO MEAS - IVSD: 1.1 CM
BH CV ECHO MEAS - LA DIMENSION: 4.1 CM
BH CV ECHO MEAS - LAT PEAK E' VEL: 11 CM/SEC
BH CV ECHO MEAS - LV MAX PG: 2.6 MMHG
BH CV ECHO MEAS - LV MEAN PG: 1.4 MMHG
BH CV ECHO MEAS - LV V1 MAX: 80 CM/SEC
BH CV ECHO MEAS - LV V1 VTI: 18.5 CM
BH CV ECHO MEAS - LVIDD: 5.2 CM
BH CV ECHO MEAS - LVIDS: 3.9 CM
BH CV ECHO MEAS - LVOT DIAM: 2 CM
BH CV ECHO MEAS - LVPWD: 1 CM
BH CV ECHO MEAS - MED PEAK E' VEL: 6.8 CM/SEC
BH CV ECHO MEAS - MR MAX PG: 65.2 MMHG
BH CV ECHO MEAS - MR MAX VEL: 403.7 CM/SEC
BH CV ECHO MEAS - MV A MAX VEL: 84.3 CM/SEC
BH CV ECHO MEAS - MV DEC SLOPE: 643 CM/SEC2
BH CV ECHO MEAS - MV DEC TIME: 204 SEC
BH CV ECHO MEAS - MV E MAX VEL: 125 CM/SEC
BH CV ECHO MEAS - MV E/A: 1.48
BH CV ECHO MEAS - MV MAX PG: 8.7 MMHG
BH CV ECHO MEAS - MV MEAN PG: 2.6 MMHG
BH CV ECHO MEAS - MV P1/2T: 68 MSEC
BH CV ECHO MEAS - MV V2 VTI: 43.2 CM
BH CV ECHO MEAS - MVA(P1/2T): 3.25 CM2
BH CV ECHO MEAS - RAP SYSTOLE: 3 MMHG
BH CV ECHO MEAS - RVDD: 2.8 CM
BH CV ECHO MEAS - RVSP: 47.7 MMHG
BH CV ECHO MEAS - SV(MOD-SP2): 47.8 ML
BH CV ECHO MEAS - SV(MOD-SP4): 53.4 ML
BH CV ECHO MEAS - TR MAX PG: 44.7 MMHG
BH CV ECHO MEAS - TR MAX VEL: 334.4 CM/SEC
BH CV ECHO MEASUREMENTS AVERAGE E/E' RATIO: 14.04
BUN SERPL-MCNC: 46 MG/DL (ref 8–23)
BUN/CREAT SERPL: 16.8 (ref 7–25)
CALCIUM SPEC-SCNC: 8.2 MG/DL (ref 8.6–10.5)
CHLORIDE SERPL-SCNC: 109 MMOL/L (ref 98–107)
CO2 SERPL-SCNC: 19.8 MMOL/L (ref 22–29)
CREAT SERPL-MCNC: 2.73 MG/DL (ref 0.76–1.27)
CREAT UR-MCNC: 23.9 MG/DL
DEPRECATED RDW RBC AUTO: 54.9 FL (ref 37–54)
EGFRCR SERPLBLD CKD-EPI 2021: 22.8 ML/MIN/1.73
EOSINOPHIL # BLD AUTO: 0.2 10*3/MM3 (ref 0–0.4)
EOSINOPHIL NFR BLD AUTO: 2.4 % (ref 0.3–6.2)
ERYTHROCYTE [DISTWIDTH] IN BLOOD BY AUTOMATED COUNT: 15.6 % (ref 12.3–15.4)
GLUCOSE BLDC GLUCOMTR-MCNC: 182 MG/DL (ref 70–99)
GLUCOSE BLDC GLUCOMTR-MCNC: 208 MG/DL (ref 70–99)
GLUCOSE BLDC GLUCOMTR-MCNC: 213 MG/DL (ref 70–99)
GLUCOSE BLDC GLUCOMTR-MCNC: 241 MG/DL (ref 70–99)
GLUCOSE SERPL-MCNC: 96 MG/DL (ref 65–99)
HCT VFR BLD AUTO: 28 % (ref 37.5–51)
HGB BLD-MCNC: 8.9 G/DL (ref 13–17.7)
IMM GRANULOCYTES # BLD AUTO: 0.03 10*3/MM3 (ref 0–0.05)
IMM GRANULOCYTES NFR BLD AUTO: 0.4 % (ref 0–0.5)
IVRT: 94 MS
LYMPHOCYTES # BLD AUTO: 0.78 10*3/MM3 (ref 0.7–3.1)
LYMPHOCYTES NFR BLD AUTO: 9.4 % (ref 19.6–45.3)
MAGNESIUM SERPL-MCNC: 1.8 MG/DL (ref 1.6–2.4)
MCH RBC QN AUTO: 30.6 PG (ref 26.6–33)
MCHC RBC AUTO-ENTMCNC: 31.8 G/DL (ref 31.5–35.7)
MCV RBC AUTO: 96.2 FL (ref 79–97)
MONOCYTES # BLD AUTO: 0.58 10*3/MM3 (ref 0.1–0.9)
MONOCYTES NFR BLD AUTO: 7 % (ref 5–12)
NEUTROPHILS NFR BLD AUTO: 6.64 10*3/MM3 (ref 1.7–7)
NEUTROPHILS NFR BLD AUTO: 80.4 % (ref 42.7–76)
NRBC BLD AUTO-RTO: 0 /100 WBC (ref 0–0.2)
NT-PROBNP SERPL-MCNC: 7997 PG/ML (ref 0–1800)
PHOSPHATE SERPL-MCNC: 4.2 MG/DL (ref 2.5–4.5)
PLATELET # BLD AUTO: 178 10*3/MM3 (ref 140–450)
PMV BLD AUTO: 9.8 FL (ref 6–12)
POTASSIUM SERPL-SCNC: 4.4 MMOL/L (ref 3.5–5.2)
PROT ?TM UR-MCNC: 25.6 MG/DL
PROT/CREAT UR: 1.07 MG/G{CREAT}
QT INTERVAL: 406 MS
QTC INTERVAL: 456 MS
RBC # BLD AUTO: 2.91 10*6/MM3 (ref 4.14–5.8)
SODIUM SERPL-SCNC: 136 MMOL/L (ref 136–145)
WBC NRBC COR # BLD AUTO: 8.26 10*3/MM3 (ref 3.4–10.8)

## 2024-11-08 PROCEDURE — 25010000002 FUROSEMIDE PER 20 MG: Performed by: INTERNAL MEDICINE

## 2024-11-08 PROCEDURE — 84100 ASSAY OF PHOSPHORUS: CPT | Performed by: INTERNAL MEDICINE

## 2024-11-08 PROCEDURE — 82570 ASSAY OF URINE CREATININE: CPT | Performed by: INTERNAL MEDICINE

## 2024-11-08 PROCEDURE — 80048 BASIC METABOLIC PNL TOTAL CA: CPT | Performed by: INTERNAL MEDICINE

## 2024-11-08 PROCEDURE — 82948 REAGENT STRIP/BLOOD GLUCOSE: CPT

## 2024-11-08 PROCEDURE — 99233 SBSQ HOSP IP/OBS HIGH 50: CPT | Performed by: INTERNAL MEDICINE

## 2024-11-08 PROCEDURE — 97165 OT EVAL LOW COMPLEX 30 MIN: CPT

## 2024-11-08 PROCEDURE — 83880 ASSAY OF NATRIURETIC PEPTIDE: CPT | Performed by: PHYSICIAN ASSISTANT

## 2024-11-08 PROCEDURE — 71045 X-RAY EXAM CHEST 1 VIEW: CPT

## 2024-11-08 PROCEDURE — 93306 TTE W/DOPPLER COMPLETE: CPT | Performed by: INTERNAL MEDICINE

## 2024-11-08 PROCEDURE — 83735 ASSAY OF MAGNESIUM: CPT | Performed by: INTERNAL MEDICINE

## 2024-11-08 PROCEDURE — 25010000002 MORPHINE PER 10 MG: Performed by: STUDENT IN AN ORGANIZED HEALTH CARE EDUCATION/TRAINING PROGRAM

## 2024-11-08 PROCEDURE — 63710000001 INSULIN LISPRO (HUMAN) PER 5 UNITS: Performed by: STUDENT IN AN ORGANIZED HEALTH CARE EDUCATION/TRAINING PROGRAM

## 2024-11-08 PROCEDURE — 82948 REAGENT STRIP/BLOOD GLUCOSE: CPT | Performed by: STUDENT IN AN ORGANIZED HEALTH CARE EDUCATION/TRAINING PROGRAM

## 2024-11-08 PROCEDURE — 76775 US EXAM ABDO BACK WALL LIM: CPT

## 2024-11-08 PROCEDURE — 93306 TTE W/DOPPLER COMPLETE: CPT

## 2024-11-08 PROCEDURE — 25010000002 FUROSEMIDE PER 20 MG: Performed by: PHYSICIAN ASSISTANT

## 2024-11-08 PROCEDURE — 84156 ASSAY OF PROTEIN URINE: CPT | Performed by: INTERNAL MEDICINE

## 2024-11-08 PROCEDURE — 85025 COMPLETE CBC W/AUTO DIFF WBC: CPT | Performed by: INTERNAL MEDICINE

## 2024-11-08 PROCEDURE — 25010000002 CEFTRIAXONE PER 250 MG: Performed by: STUDENT IN AN ORGANIZED HEALTH CARE EDUCATION/TRAINING PROGRAM

## 2024-11-08 PROCEDURE — 93005 ELECTROCARDIOGRAM TRACING: CPT | Performed by: PHYSICIAN ASSISTANT

## 2024-11-08 RX ORDER — HYDRALAZINE HYDROCHLORIDE 20 MG/ML
10 INJECTION INTRAMUSCULAR; INTRAVENOUS EVERY 6 HOURS PRN
Status: DISCONTINUED | OUTPATIENT
Start: 2024-11-08 | End: 2024-11-11 | Stop reason: HOSPADM

## 2024-11-08 RX ORDER — FUROSEMIDE 10 MG/ML
40 INJECTION INTRAMUSCULAR; INTRAVENOUS ONCE
Status: COMPLETED | OUTPATIENT
Start: 2024-11-08 | End: 2024-11-08

## 2024-11-08 RX ORDER — LACTULOSE 10 G/15ML
20 SOLUTION ORAL DAILY PRN
Status: DISCONTINUED | OUTPATIENT
Start: 2024-11-08 | End: 2024-11-11 | Stop reason: HOSPADM

## 2024-11-08 RX ORDER — METOPROLOL TARTRATE 50 MG
100 TABLET ORAL 2 TIMES DAILY
Status: DISCONTINUED | OUTPATIENT
Start: 2024-11-08 | End: 2024-11-11 | Stop reason: HOSPADM

## 2024-11-08 RX ORDER — FUROSEMIDE 10 MG/ML
40 INJECTION INTRAMUSCULAR; INTRAVENOUS EVERY 12 HOURS
Status: COMPLETED | OUTPATIENT
Start: 2024-11-08 | End: 2024-11-08

## 2024-11-08 RX ORDER — HYDROXYZINE PAMOATE 25 MG/1
25 CAPSULE ORAL ONCE AS NEEDED
Status: DISCONTINUED | OUTPATIENT
Start: 2024-11-08 | End: 2024-11-08

## 2024-11-08 RX ORDER — HYDROXYZINE HYDROCHLORIDE 25 MG/1
25 TABLET, FILM COATED ORAL ONCE AS NEEDED
Status: COMPLETED | OUTPATIENT
Start: 2024-11-08 | End: 2024-11-08

## 2024-11-08 RX ORDER — POLYETHYLENE GLYCOL 3350 17 G/17G
17 POWDER, FOR SOLUTION ORAL DAILY
Status: DISCONTINUED | OUTPATIENT
Start: 2024-11-08 | End: 2024-11-11 | Stop reason: HOSPADM

## 2024-11-08 RX ORDER — NIFEDIPINE 10 MG/1
10 CAPSULE ORAL EVERY 8 HOURS SCHEDULED
Status: DISCONTINUED | OUTPATIENT
Start: 2024-11-08 | End: 2024-11-09

## 2024-11-08 RX ORDER — MORPHINE SULFATE 2 MG/ML
2 INJECTION, SOLUTION INTRAMUSCULAR; INTRAVENOUS ONCE
Status: COMPLETED | OUTPATIENT
Start: 2024-11-08 | End: 2024-11-08

## 2024-11-08 RX ORDER — SIMETHICONE 80 MG
80 TABLET,CHEWABLE ORAL ONCE
Status: COMPLETED | OUTPATIENT
Start: 2024-11-08 | End: 2024-11-08

## 2024-11-08 RX ORDER — DOCUSATE SODIUM 100 MG/1
100 CAPSULE, LIQUID FILLED ORAL 2 TIMES DAILY
Status: DISCONTINUED | OUTPATIENT
Start: 2024-11-08 | End: 2024-11-11 | Stop reason: HOSPADM

## 2024-11-08 RX ORDER — ACETAMINOPHEN 325 MG/1
650 TABLET ORAL EVERY 4 HOURS PRN
Status: DISCONTINUED | OUTPATIENT
Start: 2024-11-08 | End: 2024-11-11 | Stop reason: HOSPADM

## 2024-11-08 RX ORDER — BISACODYL 10 MG
10 SUPPOSITORY, RECTAL RECTAL DAILY PRN
Status: DISCONTINUED | OUTPATIENT
Start: 2024-11-08 | End: 2024-11-11 | Stop reason: HOSPADM

## 2024-11-08 RX ADMIN — INSULIN LISPRO 2 UNITS: 100 INJECTION, SOLUTION INTRAVENOUS; SUBCUTANEOUS at 22:05

## 2024-11-08 RX ADMIN — FERROUS SULFATE TAB 325 MG (65 MG ELEMENTAL FE) 325 MG: 325 (65 FE) TAB at 08:28

## 2024-11-08 RX ADMIN — MORPHINE SULFATE 2 MG: 2 INJECTION, SOLUTION INTRAMUSCULAR; INTRAVENOUS at 04:20

## 2024-11-08 RX ADMIN — SIMETHICONE 80 MG: 80 TABLET, CHEWABLE ORAL at 06:07

## 2024-11-08 RX ADMIN — Medication 5 MG: at 22:06

## 2024-11-08 RX ADMIN — APIXABAN 2.5 MG: 5 TABLET, FILM COATED ORAL at 08:28

## 2024-11-08 RX ADMIN — METOPROLOL TARTRATE 100 MG: 50 TABLET, FILM COATED ORAL at 06:36

## 2024-11-08 RX ADMIN — INSULIN LISPRO 2 UNITS: 100 INJECTION, SOLUTION INTRAVENOUS; SUBCUTANEOUS at 12:29

## 2024-11-08 RX ADMIN — INSULIN LISPRO 3 UNITS: 100 INJECTION, SOLUTION INTRAVENOUS; SUBCUTANEOUS at 17:37

## 2024-11-08 RX ADMIN — APIXABAN 2.5 MG: 5 TABLET, FILM COATED ORAL at 22:07

## 2024-11-08 RX ADMIN — AMLODIPINE BESYLATE 10 MG: 10 TABLET ORAL at 08:28

## 2024-11-08 RX ADMIN — Medication 10 ML: at 09:20

## 2024-11-08 RX ADMIN — PANTOPRAZOLE SODIUM 40 MG: 40 TABLET, DELAYED RELEASE ORAL at 06:29

## 2024-11-08 RX ADMIN — NICOTINE 1 PATCH: 21 PATCH, EXTENDED RELEASE TRANSDERMAL at 09:21

## 2024-11-08 RX ADMIN — NIFEDIPINE 10 MG: 10 CAPSULE ORAL at 22:06

## 2024-11-08 RX ADMIN — FUROSEMIDE 40 MG: 10 INJECTION, SOLUTION INTRAMUSCULAR; INTRAVENOUS at 06:33

## 2024-11-08 RX ADMIN — DOCUSATE SODIUM 100 MG: 100 CAPSULE, LIQUID FILLED ORAL at 09:20

## 2024-11-08 RX ADMIN — GABAPENTIN 100 MG: 100 CAPSULE ORAL at 22:06

## 2024-11-08 RX ADMIN — Medication 10 ML: at 22:06

## 2024-11-08 RX ADMIN — HYDROXYZINE HYDROCHLORIDE 25 MG: 25 TABLET, FILM COATED ORAL at 06:30

## 2024-11-08 RX ADMIN — POLYETHYLENE GLYCOL 3350 17 G: 17 POWDER, FOR SOLUTION ORAL at 09:20

## 2024-11-08 RX ADMIN — Medication 10 ML: at 08:28

## 2024-11-08 RX ADMIN — GABAPENTIN 100 MG: 100 CAPSULE ORAL at 08:28

## 2024-11-08 RX ADMIN — SODIUM CHLORIDE 1000 MG: 9 INJECTION, SOLUTION INTRAMUSCULAR; INTRAVENOUS; SUBCUTANEOUS at 14:52

## 2024-11-08 RX ADMIN — DOCUSATE SODIUM 100 MG: 100 CAPSULE, LIQUID FILLED ORAL at 22:06

## 2024-11-08 RX ADMIN — METOPROLOL TARTRATE 100 MG: 50 TABLET, FILM COATED ORAL at 22:07

## 2024-11-08 RX ADMIN — FUROSEMIDE 40 MG: 10 INJECTION, SOLUTION INTRAMUSCULAR; INTRAVENOUS at 09:20

## 2024-11-08 RX ADMIN — FUROSEMIDE 40 MG: 10 INJECTION, SOLUTION INTRAMUSCULAR; INTRAVENOUS at 22:10

## 2024-11-08 NOTE — PROGRESS NOTES
[facility]   Hospitalist Progress Note  Date: 2024  Patient Name: Darnell Geller  : 1945  MRN: [id number]  Date of admission: 2024      Subjective   Subjective     Chief Complaint: Hypotension, weakness    Summary: 79 y.o. male with a past medical history of urinary retention with chronic indwelling Muñzi catheter, hypertension, diabetes mellitus, A-fib on Eliquis for evaluation of hypotension and generalized weakness.  As per the ED physician report, patient's wife stated that when she checked his blood pressure at home he noted to have a systolic blood pressure in 70s and patient noted to have increased confusion than his baseline prompting her to call the EMS.  Patient was treated for UTI last month.  Patient's catheter is changed once a month by home health care nurse, last changed yesterday.  In the ED, patient was initially hypotensive, concern for UTI with NIKITA, creatinine 2.9.  Patient had persistent confusion, he was admitted for further care.  Given IV fluids, started on IV antibiotics.    Interval Followup: Had significant issues overnight including hypoxemia, shortness of breath, anxiety.  Found to have volume overload based on BNP and chest x-ray.  IV fluids stopped, given a dose of IV Lasix.  This morning, his breathing is somewhat improved, he is stable on 2 L nasal cannula.  Still intermittently confused but closer to his baseline per his wife at bedside.    Objective   Objective     Vitals:   Temp:  [97.5 °F (36.4 °C)-98.2 °F (36.8 °C)] 98.1 °F (36.7 °C)  Heart Rate:  [] 63  Resp:  [20-24] 20  BP: (144-202)/(69-90) 145/71  Physical Exam    Constitutional: Awake, alert, no acute distress, still intermittently confused, hard of hearing   Respiratory: Scattered rhonchi noted, 2 L nasal cannula in place, nonlabored respirations    Cardiovascular: RRR, no MRG   Gastrointestinal: Positive bowel sounds, soft, NTND   Neurologic: Oriented x 2, strength symmetric in all extremities,  Cranial Nerves grossly intact to confrontation, speech clear    Result Review    I have personally reviewed the results below:  [x]  Laboratory personally reviewed BMP, CBC, magnesium, phosphorus, blood sugars, BNP.  Chest x-ray personally reviewed with evidence of volume overload, pulmonary edema  []  Microbiology  []  Radiology  []  EKG/Telemetry   []  Cardiology/Vascular   []  Pathology  []  Old records  []  Other:  CBC          11/6/2024    19:30 11/7/2024    03:32 11/8/2024    03:19   CBC   WBC 13.21  9.38  8.26    RBC 2.80  2.83  2.91    Hemoglobin 8.8  8.6  8.9    Hematocrit 26.7  27.0  28.0    MCV 95.4  95.4  96.2    MCH 31.4  30.4  30.6    MCHC 33.0  31.9  31.8    RDW 15.7  15.7  15.6    Platelets 194  170  178      CMP          11/6/2024    19:30 11/7/2024    03:32 11/8/2024    03:19   CMP   Glucose 137  113  96    BUN 51  51  46    Creatinine 2.95  2.74  2.73    EGFR 20.8  22.7  22.8    Sodium 136  136  136    Potassium 4.1  4.2  4.4    Chloride 106  110  109    Calcium 7.9  8.0  8.2    Total Protein 7.0  7.2     Albumin 2.6  2.5     Globulin 4.4  4.7     Total Bilirubin 0.4  0.3     Alkaline Phosphatase 234  217     AST (SGOT) 26  23     ALT (SGPT) 19  18     Albumin/Globulin Ratio 0.6  0.5     BUN/Creatinine Ratio 17.3  18.6  16.8    Anion Gap 9.3  7.9  7.2        Assessment & Plan   Assessment / Plan   Acute metabolic encephalopathy  UTI due to E. coli  Acute on chronic diastolic congestive heart failure with acute exacerbation  Generalized weakness  Dehydration  Urinary retention with chronic indwelling Muñiz catheter  Possible NIKITA on CKD, unclear baseline, creatinine elevated 2. 95, year ago was 1.5  Hypertension  Mild to moderate mitral and tricuspid valve regurgitation  Diabetes mellitus  A-fib on Eliquis  Chronic anemia     Continue to monitor in the hospital for workup and management of the above  CT head personally reviewed without evidence of CVA  Chest x-ray personally reviewed with evidence  of pulmonary edema as well as elevated BNP  IV fluids discontinued, start Lasix 40 mg IV every 12 hours, trend renal function electrolytes closely while IV diuretics  2D echo obtained and reviewed, normal EF, diastolic dysfunction noted with evidence of volume overload and increased pressures consistent with diastolic congestive heart  Urine culture growing E. coli, pansensitive.  Will complete 3 days of IV Rocephin  Creatinine still elevated at 2.7, w consult nephrology for assistance  Decreased home gabapentin to 100 mg twice daily  Continue with sliding scale insulin  Continue home metoprolol, Eliquis hold nephrotoxic medications  Notable hypertension, start nifedipine 10 mg every 8 hours and monitor closely  Keep in delirium precautions  Schedule melatonin nightly to help with sleep-wake cycle  PT/OT consulted-recommend rehab  Continue appropriate home medications  Trend renal function and electrolytes with a.m. BMP, magnesium   Trend Hgb and WBC with a.m. CBC     Discussed plan with RN, clinical     VTE Prophylaxis:  Pharmacologic VTE prophylaxis orders are present.        CODE STATUS:   Level Of Support Discussed With: Patient  Code Status (Patient has no pulse and is not breathing): CPR (Attempt to Resuscitate)  Medical Interventions (Patient has pulse or is breathing): Full Support

## 2024-11-08 NOTE — CONSULTS
[facility]   Nephrology Consult Note      Patient Name: Darnell Geller  : 1945  MRN: [id number]  Primary Care Physician:  Cami Sargent APRN  Referring Physician: No ref. provider found  Date of admission: 2024    Subjective   Subjective     Reason for Consult/ Chief Complaint: Renal failure    HPI:  Darnell Geller is a 79 y.o. male with history of CKD stage III or stage IV, baseline creatinine seem to be around 1.5-2 who is admitted with UTI and acute kidney injury.  I am unsure what his baseline mental status but he seemed confused and somewhat agitated.  He is not sure exactly where he is but [facility] to apparently to go outside and smoke.  Denied shortness of breath and chest pain denied nausea, and vomiting.  He tells me he has a poor short-term memory.  He has indwelling Muñiz catheter which he said [facility] to [facility] it at his home.    Review of Systems   Review of systems could not be obtained due to   patient confusion.    Personal History     Past Medical History:   Diagnosis Date    Arthritis     Benign prostatic hyperplasia     Coronary artery disease     Hyperlipidemia     Hypertension        Past Surgical History:   Procedure Laterality Date    SINUS SURGERY         Family History: family history is not on file. Otherwise pertinent FHx was reviewed and not pertinent to current issue.    Social History:  reports that he has quit smoking. His smoking use included cigarettes. He has a 60 pack-year smoking history. He has never used smokeless tobacco. He reports that he does not currently use alcohol. He reports that he does not use drugs.    Home Medications:  Diclofenac Sodium, Doxepin HCl, Insulin Aspart, apixaban, cholecalciferol, ferrous sulfate, furosemide, gabapentin, insulin glargine, lisinopril, loratadine, metoprolol tartrate, and omeprazole    Allergies:  No Known Allergies    Objective    Objective     Vitals:   Temp:   [97.5 °F (36.4 °C)-98.2 °F (36.8 °C)] 98.1 °F (36.7 °C)  Heart Rate:  [63-97] 63  Resp:  [20-24] 20  BP: (144-202)/(69-90) 145/71     Physical Exam    Constitutional: Awake, alert, some confusion, pale   Eyes: sclerae anicteric, no conjunctival injection   HENT: mucous membranes moist   Neck: Supple, no thyromegaly, no lymphadenopathy, trachea midline, No JVD, deformed right clavicle.   Respiratory: Decreased to auscultation bilaterally, nonlabored respirations    Cardiovascular: RRR, no murmurs, rubs, or gallops.   Gastrointestinal: Positive bowel sounds, soft, nontender, nondistended   Musculoskeletal: No edema, no clubbing or cyanosis   Psychiatric: Appropriate affect, cooperative, pleasant   Neurologic: moving all extremities, Cranial Nerves grossly intact, speech is slightly mumbled    Skin: warm and dry, no rashes   Muñiz catheter in place with normal color urine.     Result Review    Result Reviewed:  I have personally reviewed the results from the time of this admission to 11/8/2024 16:28 EST and agree with these findings:  [x]  Laboratory  []  Microbiology  [x]  Radiology  []  EKG/Telemetry   []  Cardiology/Vascular   []  Pathology  [x]  Old records  []  Other:  LAB RESULTS:    LAB RESULTS:        Lab 11/08/24  0319 11/07/24  0332 11/06/24  1930   SODIUM 136 136 136   POTASSIUM 4.4 4.2 4.1   CHLORIDE 109* 110* 106   CO2 19.8* 18.1* 20.7*   BUN 46* 51* 51*   CREATININE 2.73* 2.74* 2.95*   GLUCOSE 96 113* 137*   EGFR 22.8* 22.7* 20.8*   ANION GAP 7.2 7.9 9.3   MAGNESIUM 1.8 1.8  --    PHOSPHORUS 4.2 4.6*  --            Most notable findings include:, As above.  UA with TNTC RBC and WBC, culture growing E. coli.  Chest x-ray compatible with increased volume  2D echo with grade 1 diastolic dysfunction, normal EF, mild to moderate tricuspid regurgitation.    Assessment & Plan   Assessment / Plan     Brief Patient Summary:  Darnell Geller is a 79 y.o. male who is admitted with acute kidney injury on CKD with UTI and  altered mentation.    Active Hospital Problems:  Active Hospital Problems    Diagnosis     **UTI (urinary tract infection)     Acute metabolic encephalopathy        Assessment and Plan:   - Acute kidney injury, etiology is uncertain, possible cardiorenal syndrome versus sepsis.  Has Muñiz in place, obviously has prior kidney disease.  [facility] and [facility] evaluation.  Baseline from previous records appear to be 1.5-2.0.  Monitor strict I's and O's, obtain renal ultrasound at bedside.  Depending [facility] the [facility], further workup may be needed.  - Probable underlying chronic kidney disease stage IV.  Quantify proteinuria.  - Hypotension with hypertension, currently controlled.  - CHF, with diastolic dysfunction on 2D echo preserved EF, mild to moderate tricuspid regurgitation.  Seems euvolemic at this time.  He is on room air.  No edema noted.  Will change to oral Lasix in AM, at 60 mg daily.  - E. coli UTI, treatment per primary, blood cultures so far negative.  - Anemia, likely multifactorial, on oral iron prior to admission, check iron studies.  Further workup, if needed, per primary.  - Tobacco use, craving cigarettes and vapes.  - Altered mentation, I am unsure of his baseline.  Per primary.    Discussed with nursing staff.  Will follow,    Thank you very much for this consult!    Electronically signed by Eusebio Robertson MD, 11/8/2024, 16:28 EST.

## 2024-11-08 NOTE — NURSING NOTE
Patient continued to complain of chest pain and SOB. Administered simethicone for gas relief as well as PRN atarax for anxiety. Provider contacted and discontinued fluids, added lasix and metoprolol for elevated BP and heart rate. Plan of care ongoing.

## 2024-11-08 NOTE — PLAN OF CARE
Goal Outcome Evaluation:  Plan of Care Reviewed With: patient        Progress: improving  Outcome Evaluation: Pt alert to self and place this shift, o2 titrated down and tolerated. Care ongoing

## 2024-11-08 NOTE — THERAPY EVALUATION
Patient Name: Darnell Geller  : 1945    MRN: [id number]                              Today's Date: 2024       Admit Date: 2024    Visit Dx:     ICD-10-CM ICD-9-CM   1. Urinary tract infection associated with indwelling urethral catheter, initial encounter  T83.511A 996.64    N39.0 599.0   2. Hypotension, unspecified hypotension type  I95.9 458.9   3. Difficulty walking  R26.2 719.7   4. Decreased activities of daily living (ADL)  Z78.9 V49.89     Patient Active Problem List   Diagnosis    Sepsis with acute renal failure and septic shock, due to unspecified organism, unspecified acute renal failure type    Urinary tract infection associated with indwelling urethral catheter    Acute metabolic encephalopathy     Past Medical History:   Diagnosis Date    Arthritis     Benign prostatic hyperplasia     Coronary artery disease     Hyperlipidemia     Hypertension      Past Surgical History:   Procedure Laterality Date    SINUS SURGERY        General Information       Row Name 24 1249          OT Time and Intention    Document Type evaluation  -AV     Mode of Treatment individual therapy;occupational therapy  -AV       Row Name 24 1249          General Information    Patient Profile Reviewed yes  -AV     Prior Level of Function independent:;ADL's;transfer  uses tub seat. sits/ stands to groom. standard commode (elevated not work in his space). chronic indwelling soler catheter. ambulates w rollator. grab bar at couch. no home O2.  -AV     Existing Precautions/Restrictions fall  -AV     Barriers to Rehab none identified  -AV       Row Name 24 1249          Occupational Profile    Reason for Services/Referral (Occupational Profile) Patient is a 79year old male admitted to [facility] on 2024 with weakness and hypotension.  He is currently on 3W.   OT consulted due to recent [place] in ADL/transfer independence.  No previous OT services for current condition.  -AV       Row Name  11/08/24 1249          Living Environment    People in Home --  Wife and son  -AV       Row Name 11/08/24 1249          Home Main Entrance    Number of Stairs, Main Entrance two  From garage  -AV       Row Name 11/08/24 1249          Stairs Within Home, Primary    Stairs, Within Home, Primary interior stair lift  -AV       Row Name 11/08/24 1249          Cognition    Orientation Status (Cognition) --  Alert, pleasant and cooperative with OT throughout evaluation.  Follows commands.  Questionable ability to retain information.  Questionable safety awareness.  -AV       Row Name 11/08/24 1249          Safety Issues/Impairments Affecting Functional Mobility    Impairments Affecting Function (Mobility) balance;cognition;endurance/activity tolerance;strength  -AV               User Key  (r) = Recorded By, (t) = Taken By, (c) = Cosigned By      Initials Name Provider Type    Bryant Lang OT Occupational Therapist                     Mobility/ADL's       Row Name 11/08/24 1252          Bed Mobility    Bed Mobility supine-sit  -AV     All Activities, [place] (Bed Mobility) contact guard;verbal cues  Per PT  -AV       Row Name 11/08/24 1252          Activities of Daily Living    BADL Assessment/Intervention --  Independent feeding.  Mod-max assist further ADLs.  Chronic indwelling Muñiz catheter.  -AV               User Key  (r) = Recorded By, (t) = Taken By, (c) = Cosigned By      Initials Name Provider Type    Bryant Lang OT Occupational Therapist                   Obj/Interventions       Row Name 11/08/24 1252          Sensory Assessment (Somatosensory)    Sensory Assessment (Somatosensory) UE sensation intact  -AV       Row Name 11/08/24 1252          Vision Assessment/Intervention    Visual Impairment/Limitations WFL  -AV       Row Name 11/08/24 1252          Range of Motion Comprehensive    General Range of Motion bilateral upper extremity ROM WFL  -AV     Comment, General Range of Motion AROM  -AV        Row Name 11/08/24 1252          Strength Comprehensive (MMT)    Comment, General Manual Muscle Testing (MMT) Assessment 4(-)/5 bilateral biceps, triceps and   -AV       Row Name 11/08/24 1252          Motor Skills    Motor Skills coordination;functional endurance  -AV     Coordination --  Right dominant.  Not tested  -AV     Functional Endurance poor plus  -AV       Row Name 11/08/24 1252          Balance    Comment, Balance Impaired  -AV               User Key  (r) = Recorded By, (t) = Taken By, (c) = Cosigned By      Initials Name Provider Type    AV Bryant Cardenas, OT Occupational Therapist                   Goals/Plan       Row Name 11/08/24 1254          Transfer Goal 1 (OT)    Activity/Assistive Device (Transfer Goal 1, OT) transfers, all;walker, rolling  -AV     [place] Level/Cues Needed (Transfer Goal 1, OT) supervision required;verbal cues required  -AV     Time Frame (Transfer Goal 1, OT) long term goal (LTG);10 days  -AV       Row Name 11/08/24 1254          Bathing Goal 1 (OT)    Activity/Device (Bathing Goal 1, OT) bathing skills, all;tub bench  -AV     [place] Level/Cues Needed (Bathing Goal 1, OT) supervision required;set-up required;verbal cues required  -AV     Time Frame (Bathing Goal 1, OT) long term goal (LTG);10 days  -AV       Row Name 11/08/24 1254          Dressing Goal 1 (OT)    Activity/Device (Dressing Goal 1, OT) dressing skills, all  -AV     [place]/Cues Needed (Dressing Goal 1, OT) supervision required;set-up required;verbal cues required  -AV     Time Frame (Dressing Goal 1, OT) long term goal (LTG);10 days  -AV       Row Name 11/08/24 1254          Toileting Goal 1 (OT)    Activity/Device (Toileting Goal 1, OT) toileting skills, all  -AV     [place] Level/Cues Needed (Toileting Goal 1, OT) supervision required;set-up required;verbal cues required  -AV     Time Frame (Toileting Goal 1, OT) long term goal (LTG);10 days  -AV       Row Name 11/08/24 1254           Grooming Goal 1 (OT)    Activity/Device (Grooming Goal 1, OT) grooming skills, all  Partial stand at sink  -AV     [place] (Grooming Goal 1, OT) supervision required;set-up required;verbal cues required  -AV     Time Frame (Grooming Goal 1, OT) long term goal (LTG);10 days  -AV       Row Name 11/08/24 1254          Strength Goal 1 (OT)    Strength Goal 1 (OT) Patient will demonstrate 4/5 bilateral biceps, triceps and  to increase ADL and transfer independence.  -AV     Time Frame (Strength Goal 1, OT) long term goal (LTG);10 days  -AV       Row Name 11/08/24 1254          Problem Specific Goal 1 (OT)    Problem Specific Goal 1 (OT) Patient will demonstrate fair endurance/activity tolerance needed to support ADLs.  -AV     Time Frame (Problem Specific Goal 1, OT) long term goal (LTG);10 days  -AV       Row Name 11/08/24 1254          Therapy Assessment/Plan (OT)    Planned Therapy Interventions (OT) activity tolerance training;BADL retraining;functional balance retraining;occupation/activity based interventions;patient/caregiver education/training;strengthening exercise;transfer/mobility retraining  -AV               User Key  (r) = Recorded By, (t) = Taken By, (c) = Cosigned By      Initials Name Provider Type    Bryant Lang, OT Occupational Therapist                   Clinical Impression       Row Name 11/08/24 1258          Pain Scale: FACES Pre/Post-Treatment    Pain: FACES Scale, Pretreatment 0-->no hurt  -AV     Posttreatment Pain Rating 0-->no hurt  -AV       Row Name 11/08/24 1254          Plan of Care Review    Plan of Care Reviewed With patient;son  -AV     Progress no change  First session for evaluation  -AV     Outcome Evaluation Patient presents with limitations of balance, strength, endurance/activity tolerance and cognition/safety awareness which impede his ability to perform ADL and transfers as prior.  The skills of a therapist will be required to safely and effectively  implement treatment plan to restore maximal level of function.  -AV       Row Name 11/08/24 1253          Therapy Assessment/Plan (OT)    Patient/Family Therapy Goal Statement (OT) Regain independence  -AV     Rehab Potential (OT) good  -AV     Criteria for Skilled Therapeutic Interventions Met (OT) yes;meets criteria;skilled treatment is necessary  -AV     Therapy Frequency (OT) 5 times/wk  -AV       Row Name 11/08/24 1253          Therapy Plan Review/Discharge Plan (OT)    Equipment Needs Upon Discharge (OT) --  None identified  -AV     Anticipated Discharge Disposition (OT) inpatient rehabilitation facility;sub acute care setting  -AV       Row Name 11/08/24 1253          Positioning and Restraints    Pre-Treatment Position in bed  -AV     Post Treatment Position bed  -AV     In Bed call light within reach;encouraged to call for assist;with family/caregiver  Son  -AV               User Key  (r) = Recorded By, (t) = Taken By, (c) = Cosigned By      Initials Name Provider Type    AV Bryant Cardenas, OT Occupational Therapist                   Outcome Measures       Row Name 11/08/24 1255          How much help from another is currently needed...    Putting on and taking off regular lower body clothing? 2  -AV     Bathing (including washing, rinsing, and drying) 2  -AV     Toileting (which includes using toilet bed pan or urinal) 2  -AV     Putting on and taking off regular upper body clothing 2  -AV     Taking care of personal grooming (such as brushing teeth) 2  -AV     Eating meals 4  -AV     AM-PAC 6 Clicks Score (OT) 14  -AV       Row Name 11/08/24 1255          Functional Assessment    Outcome Measure Options AM-PAC 6 Clicks Daily Activity (OT);Optimal Instrument  -AV       Row Name 11/08/24 1258          Optimal Instrument    Optimal Instrument Optimal - 3  -AV     Bending/Stooping 4  -AV     Standing 5  -AV     Reaching 1  -AV     From the list, choose the 3 activities you would most like to be able to do  without any difficulty Bending/stooping;Standing;Reaching  -AV     Total Score Optimal - 3 10  -AV               User Key  (r) = Recorded By, (t) = Taken By, (c) = Cosigned By      Initials Name Provider Type    Bryant Lang OT Occupational Therapist                    Occupational Therapy Education       Title: PT OT SLP Therapies (Done)       Topic: Occupational Therapy (Done)       Point: ADL training (Done)       Description:   Instruct learner(s) on proper safety adaptation and remediation techniques during self care or transfers.   Instruct in proper use of assistive devices.                  Learning Progress Summary            Patient Acceptance, E, VU by AV at 11/8/2024 1255    Comment: Son present   Family Acceptance, E, VU by AV at 11/8/2024 1255    Comment: Son present                      Point: Home exercise program (Done)       Description:   Instruct learner(s) on appropriate technique for monitoring, assisting and/or progressing therapeutic exercises/activities.                  Learning Progress Summary            Patient Acceptance, E, VU by AV at 11/8/2024 1255    Comment: Son present   Family Acceptance, E, VU by AV at 11/8/2024 1255    Comment: Son present                      Point: Precautions (Done)       Description:   Instruct learner(s) on prescribed precautions during self-care and functional transfers.                  Learning Progress Summary            Patient Acceptance, E, VU by AV at 11/8/2024 1255    Comment: Son present   Family Acceptance, E, VU by AV at 11/8/2024 1255    Comment: Son present                      Point: Body mechanics (Done)       Description:   Instruct learner(s) on proper positioning and spine alignment during self-care, functional mobility activities and/or exercises.                  Learning Progress Summary            Patient Acceptance, E, VU by AV at 11/8/2024 1255    Comment: Son present   Family Acceptance, E, VU by AV at 11/8/2024 1255     Comment: Son present                                      User Key       Initials Effective Dates Name Provider Type Discipline    AV 06/16/21 -  Bryant Cardenas OT Occupational Therapist OT                  OT Recommendation and Plan  Planned Therapy Interventions (OT): activity tolerance training, BADL retraining, functional balance retraining, occupation/activity based interventions, patient/caregiver education/training, strengthening exercise, transfer/mobility retraining  Therapy Frequency (OT): 5 times/wk  Plan of Care Review  Plan of Care Reviewed With: patient, son  Progress: no change (First session for evaluation)  Outcome Evaluation: Patient presents with limitations of balance, strength, endurance/activity tolerance and cognition/safety awareness which impede his ability to perform ADL and transfers as prior.  The skills of a therapist will be required to safely and effectively implement treatment plan to restore maximal level of function.     Time Calculation:   Evaluation Complexity (OT)  Review Occupational Profile/Medical/Therapy History Complexity: expanded/moderate complexity  Assessment, Occupational Performance/Identification of Deficit Complexity: 1-3 performance deficits  Clinical Decision Making Complexity (OT): problem focused assessment/low complexity  Overall Complexity of Evaluation (OT): low complexity     Time Calculation- OT       Row Name 11/08/24 [facility]             Time Calculation- OT    OT Received On 11/08/24  -AV      OT Goal Re-Cert Due Date 11/17/24  -AV         Untimed Charges    OT Eval/Re-eval Minutes 35  -AV         Total Minutes    Untimed Charges Total Minutes 35  -AV       Total Minutes 35  -AV                User Key  (r) = Recorded By, (t) = Taken By, (c) = Cosigned By      Initials Name Provider Type    AV Bryant Cardenas OT Occupational Therapist                  Therapy Charges for Today       Code Description Service Date Service Provider Modifiers Qty    [id number]  HC OT EVAL LOW COMPLEXITY 3 11/8/2024 Bryant Cardenas, OT GO 1                 Bryant Cardenas, OT  11/8/2024

## 2024-11-08 NOTE — PLAN OF CARE
Goal Outcome Evaluation:  Plan of Care Reviewed With: patient, son        Progress: no change (First session for evaluation)  Outcome Evaluation: Patient presents with limitations of balance, strength, endurance/activity tolerance and cognition/safety awareness which impede his ability to perform ADL and transfers as prior.  The skills of a therapist will be required to safely and effectively implement treatment plan to restore maximal level of function.    Anticipated Discharge Disposition (OT): inpatient rehabilitation facility, sub acute care setting

## 2024-11-08 NOTE — NURSING NOTE
Patient complained of SOB and chest pain. Vital signs were stable and within normal limits for patient. Ordered stat EKG. EKG results normal. On call provider notified, chest Xray and Morphine ordered. Administered IV morphine as ordered. Plan of care ongoing.

## 2024-11-09 ENCOUNTER — APPOINTMENT (OUTPATIENT)
Dept: CT IMAGING | Facility: HOSPITAL | Age: 79
DRG: 698 | End: 2024-11-09
Payer: OTHER GOVERNMENT

## 2024-11-09 LAB
ANION GAP SERPL CALCULATED.3IONS-SCNC: 9.4 MMOL/L (ref 5–15)
BASOPHILS # BLD AUTO: 0.04 10*3/MM3 (ref 0–0.2)
BASOPHILS NFR BLD AUTO: 0.5 % (ref 0–1.5)
BUN SERPL-MCNC: 55 MG/DL (ref 8–23)
BUN/CREAT SERPL: 18.1 (ref 7–25)
CALCIUM SPEC-SCNC: 8.5 MG/DL (ref 8.6–10.5)
CHLORIDE SERPL-SCNC: 102 MMOL/L (ref 98–107)
CO2 SERPL-SCNC: 20.6 MMOL/L (ref 22–29)
CREAT SERPL-MCNC: 3.04 MG/DL (ref 0.76–1.27)
DEPRECATED RDW RBC AUTO: 54.6 FL (ref 37–54)
EGFRCR SERPLBLD CKD-EPI 2021: 20 ML/MIN/1.73
EOSINOPHIL # BLD AUTO: 0.22 10*3/MM3 (ref 0–0.4)
EOSINOPHIL NFR BLD AUTO: 2.8 % (ref 0.3–6.2)
ERYTHROCYTE [DISTWIDTH] IN BLOOD BY AUTOMATED COUNT: 15.8 % (ref 12.3–15.4)
FERRITIN SERPL-MCNC: 247.5 NG/ML (ref 30–400)
GLUCOSE BLDC GLUCOMTR-MCNC: 182 MG/DL (ref 70–99)
GLUCOSE BLDC GLUCOMTR-MCNC: 244 MG/DL (ref 70–99)
GLUCOSE BLDC GLUCOMTR-MCNC: 258 MG/DL (ref 70–99)
GLUCOSE BLDC GLUCOMTR-MCNC: 315 MG/DL (ref 70–99)
GLUCOSE SERPL-MCNC: 230 MG/DL (ref 65–99)
HCT VFR BLD AUTO: 27.3 % (ref 37.5–51)
HGB BLD-MCNC: 8.7 G/DL (ref 13–17.7)
IMM GRANULOCYTES # BLD AUTO: 0.05 10*3/MM3 (ref 0–0.05)
IMM GRANULOCYTES NFR BLD AUTO: 0.6 % (ref 0–0.5)
IRON 24H UR-MRATE: 46 MCG/DL (ref 59–158)
IRON SATN MFR SERPL: 19 % (ref 20–50)
LYMPHOCYTES # BLD AUTO: 1 10*3/MM3 (ref 0.7–3.1)
LYMPHOCYTES NFR BLD AUTO: 12.5 % (ref 19.6–45.3)
MAGNESIUM SERPL-MCNC: 1.8 MG/DL (ref 1.6–2.4)
MCH RBC QN AUTO: 30.4 PG (ref 26.6–33)
MCHC RBC AUTO-ENTMCNC: 31.9 G/DL (ref 31.5–35.7)
MCV RBC AUTO: 95.5 FL (ref 79–97)
MONOCYTES # BLD AUTO: 0.69 10*3/MM3 (ref 0.1–0.9)
MONOCYTES NFR BLD AUTO: 8.6 % (ref 5–12)
NEUTROPHILS NFR BLD AUTO: 5.98 10*3/MM3 (ref 1.7–7)
NEUTROPHILS NFR BLD AUTO: 75 % (ref 42.7–76)
NRBC BLD AUTO-RTO: 0 /100 WBC (ref 0–0.2)
PHOSPHATE SERPL-MCNC: 4.4 MG/DL (ref 2.5–4.5)
PLATELET # BLD AUTO: 168 10*3/MM3 (ref 140–450)
PMV BLD AUTO: 9.5 FL (ref 6–12)
POTASSIUM SERPL-SCNC: 4.2 MMOL/L (ref 3.5–5.2)
RBC # BLD AUTO: 2.86 10*6/MM3 (ref 4.14–5.8)
SODIUM SERPL-SCNC: 132 MMOL/L (ref 136–145)
TIBC SERPL-MCNC: 240 MCG/DL (ref 298–536)
TRANSFERRIN SERPL-MCNC: 161 MG/DL (ref 200–360)
WBC NRBC COR # BLD AUTO: 7.98 10*3/MM3 (ref 3.4–10.8)

## 2024-11-09 PROCEDURE — 84466 ASSAY OF TRANSFERRIN: CPT | Performed by: INTERNAL MEDICINE

## 2024-11-09 PROCEDURE — 85025 COMPLETE CBC W/AUTO DIFF WBC: CPT | Performed by: INTERNAL MEDICINE

## 2024-11-09 PROCEDURE — 84100 ASSAY OF PHOSPHORUS: CPT | Performed by: INTERNAL MEDICINE

## 2024-11-09 PROCEDURE — 74176 CT ABD & PELVIS W/O CONTRAST: CPT

## 2024-11-09 PROCEDURE — 82728 ASSAY OF FERRITIN: CPT | Performed by: INTERNAL MEDICINE

## 2024-11-09 PROCEDURE — 83540 ASSAY OF IRON: CPT | Performed by: INTERNAL MEDICINE

## 2024-11-09 PROCEDURE — 25010000002 CEFTRIAXONE PER 250 MG: Performed by: STUDENT IN AN ORGANIZED HEALTH CARE EDUCATION/TRAINING PROGRAM

## 2024-11-09 PROCEDURE — 99232 SBSQ HOSP IP/OBS MODERATE 35: CPT | Performed by: INTERNAL MEDICINE

## 2024-11-09 PROCEDURE — 82948 REAGENT STRIP/BLOOD GLUCOSE: CPT | Performed by: STUDENT IN AN ORGANIZED HEALTH CARE EDUCATION/TRAINING PROGRAM

## 2024-11-09 PROCEDURE — 83735 ASSAY OF MAGNESIUM: CPT | Performed by: INTERNAL MEDICINE

## 2024-11-09 PROCEDURE — 82948 REAGENT STRIP/BLOOD GLUCOSE: CPT

## 2024-11-09 PROCEDURE — 80048 BASIC METABOLIC PNL TOTAL CA: CPT | Performed by: INTERNAL MEDICINE

## 2024-11-09 PROCEDURE — 63710000001 INSULIN LISPRO (HUMAN) PER 5 UNITS: Performed by: STUDENT IN AN ORGANIZED HEALTH CARE EDUCATION/TRAINING PROGRAM

## 2024-11-09 RX ORDER — HYDROXYZINE HYDROCHLORIDE 25 MG/1
25 TABLET, FILM COATED ORAL ONCE AS NEEDED
Status: COMPLETED | OUTPATIENT
Start: 2024-11-09 | End: 2024-11-09

## 2024-11-09 RX ORDER — POLYETHYLENE GLYCOL 3350 17 G
2 POWDER IN PACKET (EA) ORAL
Status: DISCONTINUED | OUTPATIENT
Start: 2024-11-09 | End: 2024-11-11 | Stop reason: HOSPADM

## 2024-11-09 RX ADMIN — APIXABAN 2.5 MG: 5 TABLET, FILM COATED ORAL at 21:59

## 2024-11-09 RX ADMIN — PANTOPRAZOLE SODIUM 40 MG: 40 TABLET, DELAYED RELEASE ORAL at 09:45

## 2024-11-09 RX ADMIN — FUROSEMIDE 60 MG: 40 TABLET ORAL at 09:44

## 2024-11-09 RX ADMIN — NICOTINE POLACRILEX 2 MG: 2 LOZENGE ORAL at 23:39

## 2024-11-09 RX ADMIN — FERROUS SULFATE TAB 325 MG (65 MG ELEMENTAL FE) 325 MG: 325 (65 FE) TAB at 09:32

## 2024-11-09 RX ADMIN — Medication 10 ML: at 12:03

## 2024-11-09 RX ADMIN — HYDROXYZINE HYDROCHLORIDE 25 MG: 25 TABLET, FILM COATED ORAL at 03:01

## 2024-11-09 RX ADMIN — Medication 5 MG: at 21:59

## 2024-11-09 RX ADMIN — Medication 10 ML: at 21:59

## 2024-11-09 RX ADMIN — METOPROLOL TARTRATE 100 MG: 50 TABLET, FILM COATED ORAL at 21:59

## 2024-11-09 RX ADMIN — INSULIN LISPRO 3 UNITS: 100 INJECTION, SOLUTION INTRAVENOUS; SUBCUTANEOUS at 12:03

## 2024-11-09 RX ADMIN — INSULIN LISPRO 4 UNITS: 100 INJECTION, SOLUTION INTRAVENOUS; SUBCUTANEOUS at 16:54

## 2024-11-09 RX ADMIN — DOCUSATE SODIUM 100 MG: 100 CAPSULE, LIQUID FILLED ORAL at 21:59

## 2024-11-09 RX ADMIN — INSULIN LISPRO 2 UNITS: 100 INJECTION, SOLUTION INTRAVENOUS; SUBCUTANEOUS at 09:32

## 2024-11-09 RX ADMIN — SODIUM CHLORIDE 1000 MG: 9 INJECTION, SOLUTION INTRAMUSCULAR; INTRAVENOUS; SUBCUTANEOUS at 13:32

## 2024-11-09 RX ADMIN — GABAPENTIN 100 MG: 100 CAPSULE ORAL at 21:59

## 2024-11-09 RX ADMIN — POLYETHYLENE GLYCOL 3350 17 G: 17 POWDER, FOR SOLUTION ORAL at 09:37

## 2024-11-09 RX ADMIN — APIXABAN 2.5 MG: 5 TABLET, FILM COATED ORAL at 09:33

## 2024-11-09 RX ADMIN — INSULIN LISPRO 5 UNITS: 100 INJECTION, SOLUTION INTRAVENOUS; SUBCUTANEOUS at 22:59

## 2024-11-09 RX ADMIN — DOCUSATE SODIUM 100 MG: 100 CAPSULE, LIQUID FILLED ORAL at 09:33

## 2024-11-09 RX ADMIN — NICOTINE 1 PATCH: 21 PATCH, EXTENDED RELEASE TRANSDERMAL at 09:36

## 2024-11-09 RX ADMIN — GABAPENTIN 100 MG: 100 CAPSULE ORAL at 09:44

## 2024-11-09 NOTE — PROGRESS NOTES
[facility]   Hospitalist Progress Note  Date: 2024  Patient Name: Darnell Geller  : 1945  MRN: [id number]  Date of admission: 2024      Subjective   Subjective     Chief Complaint: Hypotension, weakness    Summary: 79 y.o. male with a past medical history of urinary retention with chronic indwelling Muñiz catheter, hypertension, diabetes mellitus, A-fib on Eliquis for evaluation of hypotension and generalized weakness.  As per the ED physician report, patient's wife stated that when she checked his blood pressure at home he noted to have a systolic blood pressure in 70s and patient noted to have increased confusion than his baseline prompting her to call the EMS.  Patient was treated for UTI last month.  Patient's catheter is changed once a month by home health care nurse, last changed yesterday.  In the ED, patient was initially hypotensive, concern for UTI with NIKITA, creatinine 2.9.  Patient had persistent confusion, he was admitted for further care.  Given IV fluids, started on IV antibiotics.    Interval Followup: No acute events overnight.  He is much more awake and alert, sitting up eating breakfast this morning.  Had good urine output with IV diuretics yesterday    Objective   Objective     Vitals:   Temp:  [97.4 °F (36.3 °C)-98.5 °F (36.9 °C)] 97.7 °F (36.5 °C)  Heart Rate:  [67-84] 69  Resp:  [14-22] 16  BP: ()/(49-81) 109/66  Physical Exam    Constitutional: Awake, alert, no acute distress, sitting up eating breakfast   Respiratory: Improved aeration, nonlabored respirations    Cardiovascular: RRR, no MRG   Gastrointestinal: Positive bowel sounds, soft, NTND   Neurologic: Oriented x 3, strength symmetric in all extremities, Cranial Nerves grossly intact to confrontation, speech clear    Result Review    I have personally reviewed the results below:  [x]  Laboratory personally reviewed BMP, CBC, magnesium, phosphorus, blood sugars  []  Microbiology  []  Radiology  []  EKG/Telemetry    []  Cardiology/Vascular   []  Pathology  []  Old records  []  Other:  CBC          11/7/2024    03:32 11/8/2024    03:19 11/9/2024    05:07   CBC   WBC 9.38  8.26  7.98    RBC 2.83  2.91  2.86    Hemoglobin 8.6  8.9  8.7    Hematocrit 27.0  28.0  27.3    MCV 95.4  96.2  95.5    MCH 30.4  30.6  30.4    MCHC 31.9  31.8  31.9    RDW 15.7  15.6  15.8    Platelets 170  178  168      CMP          11/7/2024    03:32 11/8/2024    03:19 11/9/2024    05:07   CMP   Glucose 113  96  230    BUN 51  46  55    Creatinine 2.74  2.73  3.04    EGFR 22.7  22.8  20.0    Sodium 136  136  132    Potassium 4.2  4.4  4.2    Chloride 110  109  102    Calcium 8.0  8.2  8.5    Total Protein 7.2      Albumin 2.5      Globulin 4.7      Total Bilirubin 0.3      Alkaline Phosphatase 217      AST (SGOT) 23      ALT (SGPT) 18      Albumin/Globulin Ratio 0.5      BUN/Creatinine Ratio 18.6  16.8  18.1    Anion Gap 7.9  7.2  9.4        Assessment & Plan   Assessment / Plan   Acute metabolic encephalopathy  UTI due to E. coli  Acute on chronic diastolic congestive heart failure with acute exacerbation  Generalized weakness  Dehydration  Urinary retention with chronic indwelling Muñiz catheter  Possible NIKITA on CKD, unclear baseline, creatinine elevated 2. 95, year ago was 1.5  Hypertension  Mild to moderate mitral and tricuspid valve regurgitation  Diabetes mellitus  A-fib on Eliquis  Chronic anemia     Continue to monitor in the hospital for workup and management of the above  Discussed with nephrology, renal function continues to rise.  Will DC IV diuretics and start Lasix 60 mg p.o. daily  2D echo obtained and reviewed, normal EF, diastolic dysfunction noted with evidence of volume overload and increased pressures consistent with diastolic congestive heart  Plan to complete 3 days of IV Rocephin for pansensitive E. Coli  Blood pressure is borderline this morning, will hold metoprolol, DC nifedipine  Decreased home gabapentin to 100 mg twice  daily  Continue with sliding scale insulin  Continue Eliquis  Keep in delirium precautions  Schedule melatonin nightly to help with sleep-wake cycle  PT/OT consulted-recommend rehab  Continue scheduled stool softeners  Continue appropriate home medications  Trend renal function and electrolytes with a.m. BMP, magnesium   Trend Hgb and WBC with a.m. CBC     Discussed plan with RN, clinical     VTE Prophylaxis:  Pharmacologic VTE prophylaxis orders are present.        CODE STATUS:   Level Of Support Discussed With: Patient  Code Status (Patient has no pulse and is not breathing): CPR (Attempt to Resuscitate)  Medical Interventions (Patient has pulse or is breathing): Full Support

## 2024-11-09 NOTE — PLAN OF CARE
Goal Outcome Evaluation:  Plan of Care Reviewed With: patient           Outcome Evaluation: Patient alert to self and place this shift. Vital signs within normal limits. Weaning oxygen, patient maintaining sats in upper 90's. Administered scheduled medications per MAR as well as PRN hydralazine x1 for anxiety. Patient very confused and anxious upon awakening, concerned he was dropped off by his family and is being kept against his will. Patient able to be redirected and calmed with medication. Plan of care ongoing.

## 2024-11-09 NOTE — PLAN OF CARE
Goal Outcome Evaluation:  Plan of Care Reviewed With: patient        Progress: improving  Outcome Evaluation: Patient alert and oriented x4 today with periods of forgetfulness. No complaints from patient. SSI given per MAR. VSS, will continue plan of care.

## 2024-11-09 NOTE — PROGRESS NOTES
"Nephrology progress note    Cr Peña MD     Current history: Patient is alert states he has some chronic shortness of breath but not any worse.  Denies any chest pain no nausea or vomiting      Current medication list:  apixaban, 2.5 mg, Oral, Q12H  cefTRIAXone, 1,000 mg, Intravenous, Q24H  docusate sodium, 100 mg, Oral, BID  ferrous sulfate, 325 mg, Oral, Daily With Breakfast  furosemide, 60 mg, Oral, Daily  gabapentin, 100 mg, Oral, BID  insulin lispro, 2-7 Units, Subcutaneous, 4x Daily AC & at Bedtime  melatonin, 5 mg, Oral, Nightly  metoprolol tartrate, 100 mg, Oral, BID  nicotine, 1 patch, Transdermal, Q24H  pantoprazole, 40 mg, Oral, Q AM  polyethylene glycol, 17 g, Oral, Daily  sodium chloride, 10 mL, Intravenous, Q12H         acetaminophen    bisacodyl    dextrose    dextrose    glucagon (human recombinant)    hydrALAZINE    lactulose    sodium chloride    sodium chloride    sodium chloride     Physical Exam:  /66 (BP Location: Right arm, Patient Position: Lying)   Pulse 69   Temp 97.7 °F (36.5 °C) (Oral)   Resp 16   Ht 177.8 cm (70\")   Wt 79 kg (174 lb 2.6 oz)   SpO2 91%   BMI 24.99 kg/m²     Intake/Output Summary (Last 24 hours) at 11/9/2024 1415  Last data filed at 11/9/2024 1408  Gross per 24 hour   Intake 660 ml   Output 1800 ml   Net -1140 ml      General: Patient alert   Cardiac: Regular rate and rhythm without a rub no S3 or S4  Lungs: Decreased breath sounds at the bases  Abdomen: Bowel sounds positive nontender soft no mass felt no apparent organomegaly noted  Extremities: No edema or cyanosis  Skin: No acute rashes noted  : Muñiz catheter in place       Results from last 7 days   Lab Units 11/09/24  0507 11/08/24  0319 11/07/24  0332 11/06/24  1930   SODIUM mmol/L 132* 136 136 136   POTASSIUM mmol/L 4.2 4.4 4.2 4.1   CHLORIDE mmol/L 102 109* 110* 106   CO2 mmol/L 20.6* 19.8* 18.1* 20.7*   BUN mg/dL 55* 46* 51* 51*   CREATININE mg/dL 3.04* 2.73* 2.74* 2.95*   CALCIUM mg/dL " 8.5* 8.2* 8.0* 7.9*   BILIRUBIN mg/dL  --   --  0.3 0.4   ALK PHOS U/L  --   --  217* 234*   ALT (SGPT) U/L  --   --  18 19   AST (SGOT) U/L  --   --  23 26   GLUCOSE mg/dL 230* 96 113* 137*       Estimated Creatinine Clearance: 21.7 mL/min (A) (by C-G formula based on SCr of 3.04 mg/dL (H)).    Results from last 7 days   Lab Units 11/09/24  0507 11/08/24 0319 11/07/24  0332   MAGNESIUM mg/dL 1.8 1.8 1.8   PHOSPHORUS mg/dL 4.4 4.2 4.6*             Results from last 7 days   Lab Units 11/09/24  0507 11/08/24 0319 11/07/24 0332 11/06/24  1930   WBC 10*3/mm3 7.98 8.26 9.38 13.21*   HEMOGLOBIN g/dL 8.7* 8.9* 8.6* 8.8*   PLATELETS 10*3/mm3 168 178 170 194             Imaging Results (Last 24 Hours)       Procedure Component Value Units Date/Time    CT Abdomen Pelvis Without Contrast [[id number]] Collected: 11/09/24 0910     Updated: 11/09/24 0940    Narrative:      CT ABDOMEN PELVIS WO CONTRAST    Date of Exam: 11/9/2024 8:50 AM EST    Indication: hydronephrosis.    Comparison: Ultrasound 11/8/2024 and CT from 3/20/2023    Technique: Axial CT images were obtained of the abdomen and pelvis without the administration of contrast. Reconstructed coronal and sagittal images were also obtained. Automated exposure control and iterative construction methods were used.        FINDINGS:    Abdomen/Pelvis:    Lower Chest: Evaluation of the lung bases is motion limited. There are some mild bronchiectasis and mild prominent peribronchial walls bilaterally. Mild prominence of paraseptal markings in minimal dependent opacities associated with a small to moderate   left-sided pleural effusion and small right-sided effusion are noted. There is a degree of underlying emphysema. Extensive coronary artery calcification noted on limited imaging of the base of the heart    No free air is noted below the diaphragm.    Organs: There is cortical scarring of the right kidney which demonstrates a mild lobular contour. There is prominence of the  renal pelvis without evidence of obstructive uropathy. Ureters followed along its course to the bladder.    Cortical scarring and lobular appearance of the left kidney is noted. There is moderate hydroureteronephrosis with dilation of the ureter to the level of the bladder where there is a grossly thick walled appearance from a decompressed bladder with a   Muñiz catheter in place. Perivesicular stranding is noted. There is stranding along the left ureter and mild perinephric stranding as well. Obstructing calculus is not visualized.    Spleen is mildly enlarged. The liver is heterogeneous in appearance with a nodular contour compatible with cirrhosis. No definite focal lesion identified on limited noncontrast imaging of the liver. Gallbladder is grossly unremarkable in appearance.   There are some calcifications at the head of the pancreas again noted suggesting possible prior chronic pancreatitis. Pancreas is otherwise grossly unremarkable in its appearance. Bilateral adrenal glands demonstrate no acute abnormality    GI/Bowel: Stomach is decompressed and grossly unremarkable in its appearance. Small bowel demonstrates no acute abnormality. Ileocecal valve and the appendix are unremarkable. There is a moderate stool burden and diffuse diverticulosis without evidence   of acute diverticulitis. No significant ascites appreciated. No suspicious mesenteric adenopathy    Pelvis: Thickened appearance of the wall of the bladder with perivesicular stranding is accentuated by Muñiz catheter placement as discussed above. Question prior prostatectomy. Please correlate with history. No suspicious pelvic adenopathy or fluid   collection    Peritoneum/Retroperitoneum: Diffuse atherosclerotic changes are noted of the aorta which is normal in caliber. No suspicious retroperitoneal adenopathy    Bones/Soft Tissues: Multilevel degenerative changes are noted throughout the spine. No acute osseous abnormality is noted       Impression:        1. There is moderate hydroureteronephrosis on the left extending from the left kidney to the bladder. An obstructing calculus is not visualized. There is diffuse wall thickening of the urinary bladder which is decompressed by a Muñiz catheter. Previous   stenosis was noted to lesser degree on a comparison from 2023. Chronic changes, distal stenosis could be considered. An obstructing lesion cannot be excluded.    2. Stranding along the bladder, kidney and left renal collecting system may be reactive or related to underlying infection. Please correlate clinically    3. Chronic bilateral pleural effusions and dependent opacities at the lung bases. Findings may represent a degree of underlying heart dysfunction. Superimposed acute inflammatory, infectious changes cannot be excluded.    4. Cirrhosis with evidence of portal hypertension.    5. Other findings as above          Electronically Signed: Sumeet Vásquez MD    11/9/2024 9:24 AM EST    Workstation ID: [id number]    US Renal Bilateral [[id number]] Collected: 11/08/24 1723     Updated: 11/08/24 1727    Narrative:      US RENAL BILATERAL    Date of Exam: 11/8/2024 4:54 PM EST    Indication: ckd4.    Comparison: CT abdomen and pelvis without contrast 3/20/2023    Technique: Grayscale and color Doppler ultrasound evaluation of the kidneys and urinary bladder was performed.      Findings:  RIGHT kidney measures 10.1 x 4.9 x 4.7 cm.  Kidney size and vascularity appear within normal limits. There is increased echogenicity of the kidney. There is no solid kidney mass.  No echogenic shadowing stone.  No hydronephrosis.    LEFT kidney measures 10.8 x 5.3 x 5.1 cm. Kidney size and vascularity appear within normal limits. There is increased echogenicity of the kidney. There is no solid kidney mass.  No echogenic shadowing stone. Moderate hydronephrosis.    A Muñiz catheter is present within the urinary bladder.        Impression:      Impression:  Increased  echogenicity of the kidneys suggestive of chronic medical renal disease with moderate left hydronephrosis.        Electronically Signed: Kayla Zayas MD    11/8/2024 5:25 PM EST    Workstation ID: [id number]             Assessment/plan:    - Acute kidney injury: Has Muñiz in place,has prior kidney disease.    Baseline from previous records appear to be 1.5-2.0.  Renal function worse, both ultrasound and CT shows left-sided hydronephrosis and would consider having urology evaluate.  Patient changed to oral diuretics yesterday and due to pleural effusions will continue for now.  Would stay away from angiotensin receptor blockers and ACE inhibitors for now.  Patient with approximately 1 g of proteinuria.  Will check further serology studies.    -Pleural effusions bilaterally, normal EF on echo no edema noted.  For now we will continue oral Lasix  60 mg daily.    - E. coli UTI, treatment per primary, blood cultures so far negative.    - Anemia: Iron stores low on oral iron would consider IV iron as well    --Hyponatremia: Sodium is decreased some probably secondary to combination of hyperglycemia with pseudo hyponatremia as well as with renal insufficiency.  Will check in a.m.    - Tobacco use, craving cigarettes and vapes.            Cr Peña MD

## 2024-11-10 LAB
ALBUMIN SERPL-MCNC: 2.9 G/DL (ref 3.5–5.2)
ALBUMIN/GLOB SERPL: 0.6 G/DL
ALP SERPL-CCNC: 215 U/L (ref 39–117)
ALT SERPL W P-5'-P-CCNC: 14 U/L (ref 1–41)
ANION GAP SERPL CALCULATED.3IONS-SCNC: 10.5 MMOL/L (ref 5–15)
AST SERPL-CCNC: 19 U/L (ref 1–40)
BASOPHILS # BLD AUTO: 0.02 10*3/MM3 (ref 0–0.2)
BASOPHILS NFR BLD AUTO: 0.4 % (ref 0–1.5)
BILIRUB SERPL-MCNC: 0.3 MG/DL (ref 0–1.2)
BUN SERPL-MCNC: 62 MG/DL (ref 8–23)
BUN/CREAT SERPL: 19.4 (ref 7–25)
CALCIUM SPEC-SCNC: 8.5 MG/DL (ref 8.6–10.5)
CHLORIDE SERPL-SCNC: 103 MMOL/L (ref 98–107)
CO2 SERPL-SCNC: 20.5 MMOL/L (ref 22–29)
CREAT SERPL-MCNC: 3.19 MG/DL (ref 0.76–1.27)
DEPRECATED RDW RBC AUTO: 53.3 FL (ref 37–54)
EGFRCR SERPLBLD CKD-EPI 2021: 18.9 ML/MIN/1.73
EOSINOPHIL # BLD AUTO: 0.21 10*3/MM3 (ref 0–0.4)
EOSINOPHIL NFR BLD AUTO: 4 % (ref 0.3–6.2)
ERYTHROCYTE [DISTWIDTH] IN BLOOD BY AUTOMATED COUNT: 15.4 % (ref 12.3–15.4)
GLOBULIN UR ELPH-MCNC: 5.1 GM/DL
GLUCOSE BLDC GLUCOMTR-MCNC: 206 MG/DL (ref 70–99)
GLUCOSE BLDC GLUCOMTR-MCNC: 207 MG/DL (ref 70–99)
GLUCOSE BLDC GLUCOMTR-MCNC: 264 MG/DL (ref 70–99)
GLUCOSE BLDC GLUCOMTR-MCNC: 388 MG/DL (ref 70–99)
GLUCOSE SERPL-MCNC: 270 MG/DL (ref 65–99)
HCT VFR BLD AUTO: 26.3 % (ref 37.5–51)
HGB BLD-MCNC: 8.3 G/DL (ref 13–17.7)
IMM GRANULOCYTES # BLD AUTO: 0.02 10*3/MM3 (ref 0–0.05)
IMM GRANULOCYTES NFR BLD AUTO: 0.4 % (ref 0–0.5)
LYMPHOCYTES # BLD AUTO: 1.04 10*3/MM3 (ref 0.7–3.1)
LYMPHOCYTES NFR BLD AUTO: 19.7 % (ref 19.6–45.3)
MAGNESIUM SERPL-MCNC: 1.9 MG/DL (ref 1.6–2.4)
MCH RBC QN AUTO: 30 PG (ref 26.6–33)
MCHC RBC AUTO-ENTMCNC: 31.6 G/DL (ref 31.5–35.7)
MCV RBC AUTO: 94.9 FL (ref 79–97)
MONOCYTES # BLD AUTO: 0.63 10*3/MM3 (ref 0.1–0.9)
MONOCYTES NFR BLD AUTO: 11.9 % (ref 5–12)
NEUTROPHILS NFR BLD AUTO: 3.36 10*3/MM3 (ref 1.7–7)
NEUTROPHILS NFR BLD AUTO: 63.6 % (ref 42.7–76)
NRBC BLD AUTO-RTO: 0 /100 WBC (ref 0–0.2)
PHOSPHATE SERPL-MCNC: 4.7 MG/DL (ref 2.5–4.5)
PLATELET # BLD AUTO: 150 10*3/MM3 (ref 140–450)
PMV BLD AUTO: 9.7 FL (ref 6–12)
POTASSIUM SERPL-SCNC: 4.9 MMOL/L (ref 3.5–5.2)
PROT SERPL-MCNC: 8 G/DL (ref 6–8.5)
RBC # BLD AUTO: 2.77 10*6/MM3 (ref 4.14–5.8)
SODIUM SERPL-SCNC: 134 MMOL/L (ref 136–145)
TSH SERPL DL<=0.05 MIU/L-ACNC: 3.17 UIU/ML (ref 0.27–4.2)
WBC NRBC COR # BLD AUTO: 5.28 10*3/MM3 (ref 3.4–10.8)

## 2024-11-10 PROCEDURE — 63710000001 INSULIN GLARGINE PER 5 UNITS: Performed by: INTERNAL MEDICINE

## 2024-11-10 PROCEDURE — 85025 COMPLETE CBC W/AUTO DIFF WBC: CPT | Performed by: INTERNAL MEDICINE

## 2024-11-10 PROCEDURE — 84100 ASSAY OF PHOSPHORUS: CPT | Performed by: INTERNAL MEDICINE

## 2024-11-10 PROCEDURE — 25010000002 CEFTRIAXONE PER 250 MG: Performed by: INTERNAL MEDICINE

## 2024-11-10 PROCEDURE — 83521 IG LIGHT CHAINS FREE EACH: CPT | Performed by: INTERNAL MEDICINE

## 2024-11-10 PROCEDURE — 82948 REAGENT STRIP/BLOOD GLUCOSE: CPT

## 2024-11-10 PROCEDURE — 86334 IMMUNOFIX E-PHORESIS SERUM: CPT | Performed by: INTERNAL MEDICINE

## 2024-11-10 PROCEDURE — 99221 1ST HOSP IP/OBS SF/LOW 40: CPT | Performed by: UROLOGY

## 2024-11-10 PROCEDURE — 99233 SBSQ HOSP IP/OBS HIGH 50: CPT | Performed by: INTERNAL MEDICINE

## 2024-11-10 PROCEDURE — 63710000001 INSULIN LISPRO (HUMAN) PER 5 UNITS: Performed by: STUDENT IN AN ORGANIZED HEALTH CARE EDUCATION/TRAINING PROGRAM

## 2024-11-10 PROCEDURE — 80053 COMPREHEN METABOLIC PANEL: CPT | Performed by: INTERNAL MEDICINE

## 2024-11-10 PROCEDURE — 82948 REAGENT STRIP/BLOOD GLUCOSE: CPT | Performed by: STUDENT IN AN ORGANIZED HEALTH CARE EDUCATION/TRAINING PROGRAM

## 2024-11-10 PROCEDURE — 82784 ASSAY IGA/IGD/IGG/IGM EACH: CPT | Performed by: INTERNAL MEDICINE

## 2024-11-10 PROCEDURE — 25810000003 SODIUM CHLORIDE 0.9 % SOLUTION: Performed by: INTERNAL MEDICINE

## 2024-11-10 PROCEDURE — 83735 ASSAY OF MAGNESIUM: CPT | Performed by: INTERNAL MEDICINE

## 2024-11-10 PROCEDURE — 84165 PROTEIN E-PHORESIS SERUM: CPT | Performed by: INTERNAL MEDICINE

## 2024-11-10 PROCEDURE — 84443 ASSAY THYROID STIM HORMONE: CPT | Performed by: INTERNAL MEDICINE

## 2024-11-10 PROCEDURE — 25010000002 NA FERRIC GLUC CPLX PER 12.5 MG: Performed by: INTERNAL MEDICINE

## 2024-11-10 RX ORDER — SODIUM CHLORIDE 9 MG/ML
100 INJECTION, SOLUTION INTRAVENOUS CONTINUOUS
Status: ACTIVE | OUTPATIENT
Start: 2024-11-10 | End: 2024-11-11

## 2024-11-10 RX ADMIN — POLYETHYLENE GLYCOL 3350 17 G: 17 POWDER, FOR SOLUTION ORAL at 08:06

## 2024-11-10 RX ADMIN — APIXABAN 2.5 MG: 5 TABLET, FILM COATED ORAL at 08:05

## 2024-11-10 RX ADMIN — Medication 10 ML: at 08:07

## 2024-11-10 RX ADMIN — Medication 10 ML: at 21:29

## 2024-11-10 RX ADMIN — NICOTINE 1 PATCH: 21 PATCH, EXTENDED RELEASE TRANSDERMAL at 08:11

## 2024-11-10 RX ADMIN — SODIUM CHLORIDE 125 MG: 9 INJECTION, SOLUTION INTRAVENOUS at 09:50

## 2024-11-10 RX ADMIN — INSULIN GLARGINE 10 UNITS: 100 INJECTION, SOLUTION SUBCUTANEOUS at 09:50

## 2024-11-10 RX ADMIN — PANTOPRAZOLE SODIUM 40 MG: 40 TABLET, DELAYED RELEASE ORAL at 05:46

## 2024-11-10 RX ADMIN — INSULIN LISPRO 4 UNITS: 100 INJECTION, SOLUTION INTRAVENOUS; SUBCUTANEOUS at 17:19

## 2024-11-10 RX ADMIN — METOPROLOL TARTRATE 100 MG: 50 TABLET, FILM COATED ORAL at 21:29

## 2024-11-10 RX ADMIN — FUROSEMIDE 60 MG: 40 TABLET ORAL at 08:05

## 2024-11-10 RX ADMIN — GABAPENTIN 100 MG: 100 CAPSULE ORAL at 21:29

## 2024-11-10 RX ADMIN — METOPROLOL TARTRATE 100 MG: 50 TABLET, FILM COATED ORAL at 08:05

## 2024-11-10 RX ADMIN — INSULIN LISPRO 3 UNITS: 100 INJECTION, SOLUTION INTRAVENOUS; SUBCUTANEOUS at 08:05

## 2024-11-10 RX ADMIN — NICOTINE POLACRILEX 2 MG: 2 LOZENGE ORAL at 15:55

## 2024-11-10 RX ADMIN — SODIUM CHLORIDE 100 ML/HR: 9 INJECTION, SOLUTION INTRAVENOUS at 15:54

## 2024-11-10 RX ADMIN — INSULIN LISPRO 6 UNITS: 100 INJECTION, SOLUTION INTRAVENOUS; SUBCUTANEOUS at 12:00

## 2024-11-10 RX ADMIN — GABAPENTIN 100 MG: 100 CAPSULE ORAL at 08:05

## 2024-11-10 RX ADMIN — DOCUSATE SODIUM 100 MG: 100 CAPSULE, LIQUID FILLED ORAL at 08:05

## 2024-11-10 RX ADMIN — APIXABAN 2.5 MG: 5 TABLET, FILM COATED ORAL at 21:29

## 2024-11-10 RX ADMIN — INSULIN LISPRO 3 UNITS: 100 INJECTION, SOLUTION INTRAVENOUS; SUBCUTANEOUS at 21:28

## 2024-11-10 RX ADMIN — DOCUSATE SODIUM 100 MG: 100 CAPSULE, LIQUID FILLED ORAL at 21:29

## 2024-11-10 RX ADMIN — Medication 5 MG: at 21:29

## 2024-11-10 RX ADMIN — Medication 1000 MG: at 14:10

## 2024-11-10 NOTE — PROGRESS NOTES
"Nephrology progress note    Cr Peña MD     Current history: Patient is alert, denies any chest pain or shortness of breath or nausea or vomiting currently      Current medication list:  apixaban, 2.5 mg, Oral, Q12H  cefTRIAXone, 1,000 mg, Intravenous, Q24H  docusate sodium, 100 mg, Oral, BID  [Held by provider] ferrous sulfate, 325 mg, Oral, Daily With Breakfast  furosemide, 60 mg, Oral, Daily  gabapentin, 100 mg, Oral, BID  insulin glargine, 10 Units, Subcutaneous, Daily  insulin lispro, 2-7 Units, Subcutaneous, 4x Daily AC & at Bedtime  melatonin, 5 mg, Oral, Nightly  metoprolol tartrate, 100 mg, Oral, BID  nicotine, 1 patch, Transdermal, Q24H  pantoprazole, 40 mg, Oral, Q AM  polyethylene glycol, 17 g, Oral, Daily  sodium chloride, 10 mL, Intravenous, Q12H         acetaminophen    bisacodyl    dextrose    dextrose    glucagon (human recombinant)    hydrALAZINE    lactulose    nicotine polacrilex    sodium chloride    sodium chloride    sodium chloride     Physical Exam:  /54 (BP Location: Right arm, Patient Position: Lying)   Pulse 90   Temp 98.4 °F (36.9 °C) (Oral)   Resp 18   Ht 177.8 cm (70\")   Wt 79 kg (174 lb 2.6 oz)   SpO2 97%   BMI 24.99 kg/m²     Intake/Output Summary (Last 24 hours) at 11/10/2024 1542  Last data filed at 11/10/2024 0838  Gross per 24 hour   Intake 956 ml   Output 2550 ml   Net -1594 ml      General: Patient alert   Cardiac: Regular rate and rhythm without a rub no S3 or S4  Lungs: Decreased breath sounds at the bases  Abdomen: Bowel sounds positive nontender soft   Extremities: No edema or cyanosis  Skin: No acute rashes noted  : Muñiz catheter in place       Results from last 7 days   Lab Units 11/10/24  0411 11/09/24  0507 11/08/24  0319 11/07/24  0332 11/06/24  1930   SODIUM mmol/L 134* 132* 136 136 136   POTASSIUM mmol/L 4.9 4.2 4.4 4.2 4.1   CHLORIDE mmol/L 103 102 109* 110* 106   CO2 mmol/L 20.5* 20.6* 19.8* 18.1* 20.7*   BUN mg/dL 62* 55* 46* 51* 51* "   CREATININE mg/dL 3.19* 3.04* 2.73* 2.74* 2.95*   CALCIUM mg/dL 8.5* 8.5* 8.2* 8.0* 7.9*   BILIRUBIN mg/dL 0.3  --   --  0.3 0.4   ALK PHOS U/L 215*  --   --  217* 234*   ALT (SGPT) U/L 14  --   --  18 19   AST (SGOT) U/L 19  --   --  23 26   GLUCOSE mg/dL 270* 230* 96 113* 137*       Estimated Creatinine Clearance: 20.6 mL/min (A) (by C-G formula based on SCr of 3.19 mg/dL (H)).    Results from last 7 days   Lab Units 11/10/24  0411 11/09/24  0507 11/08/24  0319   MAGNESIUM mg/dL 1.9 1.8 1.8   PHOSPHORUS mg/dL 4.7* 4.4 4.2             Results from last 7 days   Lab Units 11/10/24  0411 11/09/24  0507 11/08/24  0319 11/07/24  0332 11/06/24  1930   WBC 10*3/mm3 5.28 7.98 8.26 9.38 13.21*   HEMOGLOBIN g/dL 8.3* 8.7* 8.9* 8.6* 8.8*   PLATELETS 10*3/mm3 150 168 178 170 194             Imaging Results (Last 24 Hours)       ** No results found for the last 24 hours. **             Assessment/plan:    - Acute kidney injury on probable chronic kidney disease stage IIIb: Currently still with Muñiz,    baseline from previous records appear to be 1.5-2.0.  Urology evaluated for hydronephrosis and recommended no further treatment besides Muñiz at this time..   Would stay away from angiotensin receptor blockers and ACE inhibitors for now.  Patient with approximately 1 g of proteinuria.  Renal function is worsening still.  Will DC Lasix for now and and patient did already receive Lasix today and will give us a little bit of IV fluids x 1 L and then DC      -Pleural effusions bilaterally, normal EF on echo no edema noted.  Hold Lasix for now, see above    - E. coli UTI, treatment per primary, blood cultures so far negative.    - Anemia: Iron stores low on oral iron would consider IV iron as well    --Hyponatremia: Sodium is decreased some probably secondary to combination of hyperglycemia with pseudo hyponatremia as well as with renal insufficiency.  Will check in a.m.    - Tobacco use, craving cigarettes and  yassine.            Cr Peña MD

## 2024-11-10 NOTE — PLAN OF CARE
Goal Outcome Evaluation:  Plan of Care Reviewed With: patient        Progress: improving  Outcome Evaluation: Patient answers orientation questions correctly, however is very forgetful and repetitive. Blood glucose elevated and treated per MAR. NS infusing @ 100ml/hr. IV ABX given. VSS will continue plan of care.

## 2024-11-10 NOTE — PROGRESS NOTES
Psychiatric   Hospitalist Progress Note  Date: 11/10/2024  Patient Name: Darnell Geller  : 1945  MRN: [id number]  Date of admission: 2024      Subjective   Subjective     Chief Complaint: Hypotension, weakness    Summary: 79 y.o. male with a past medical history of urinary retention with chronic indwelling Muñiz catheter, hypertension, diabetes mellitus, A-fib on Eliquis for evaluation of hypotension and generalized weakness.  As per the ED physician report, patient's wife stated that when she checked his blood pressure at home he noted to have a systolic blood pressure in 70s and patient noted to have increased confusion than his baseline prompting her to call the EMS.  Patient was treated for UTI last month.  Patient's catheter is changed once a month by home health care nurse, last changed yesterday.  In the ED, patient was initially hypotensive, concern for UTI with NIKITA, creatinine 2.9.  Patient had persistent confusion, he was admitted for further care.  Given IV fluids, started on IV antibiotics.  Had acute worsening respiratory status with evidence of volume overload, IV fluids were discontinued and he was transition to IV diuretics.  2D echo showed diastolic dysfunction.  Renal function worsened with diuretics, nephrology was consulted.  Did have E. coli UTI, completed 5 days of IV Rocephin.  Workup for NIKITA did reveal hydronephrosis for which urology was consulted and did not recommend surgical intervention.  However, he would be a good candidate for a suprapubic catheter which can be considered on outpatient basis.  His mental status returned to baseline after 48 hours of IV antibiotics.  PT/OT consulted recommended rehab, however, family plans to take him home at discharge.    Interval Followup: No acute events overnight.  His wife is at bedside and agrees he is back to his baseline level of mentation.  He denies chest pain or shortness of breath.  He has no new complaints this  morning.    Objective   Objective     Vitals:   Temp:  [97.2 °F (36.2 °C)-98.1 °F (36.7 °C)] 97.7 °F (36.5 °C)  Heart Rate:  [] 90  Resp:  [18] 18  BP: (130-140)/(66-76) 136/66  Flow (L/min) (Oxygen Therapy):  [2] 2  Physical Exam    Constitutional: Awake, alert, no acute distress, sitting up in bed, pleasant   Respiratory: Improved aeration, nonlabored respirations    Cardiovascular: RRR, no MRG   Gastrointestinal: Positive bowel sounds, soft, NTND   Neurologic: Oriented x 3, strength symmetric in all extremities, Cranial Nerves grossly intact to confrontation, speech clear    Result Review    I have personally reviewed the results below:  [x]  Laboratory personally reviewed BMP, CBC, magnesium, phosphorus, blood sugars, CT abdomen pelvis  []  Microbiology  []  Radiology  []  EKG/Telemetry   []  Cardiology/Vascular   []  Pathology  []  Old records  []  Other:  CBC          11/8/2024    03:19 11/9/2024    05:07 11/10/2024    04:11   CBC   WBC 8.26  7.98  5.28    RBC 2.91  2.86  2.77    Hemoglobin 8.9  8.7  8.3    Hematocrit 28.0  27.3  26.3    MCV 96.2  95.5  94.9    MCH 30.6  30.4  30.0    MCHC 31.8  31.9  31.6    RDW 15.6  15.8  15.4    Platelets 178  168  150      CMP          11/8/2024    03:19 11/9/2024    05:07 11/10/2024    04:11   CMP   Glucose 96  230  270    BUN 46  55  62    Creatinine 2.73  3.04  3.19    EGFR 22.8  20.0  18.9    Sodium 136  132  134    Potassium 4.4  4.2  4.9    Chloride 109  102  103    Calcium 8.2  8.5  8.5    Total Protein   8.0    Albumin   2.9    Globulin   5.1    Total Bilirubin   0.3    Alkaline Phosphatase   215    AST (SGOT)   19    ALT (SGPT)   14    Albumin/Globulin Ratio   0.6    BUN/Creatinine Ratio 16.8  18.1  19.4    Anion Gap 7.2  9.4  10.5        Assessment & Plan   Assessment / Plan   Acute metabolic encephalopathy  UTI due to E. Coli  Thickened bladder   Left hydronephrosis  Acute on chronic diastolic congestive heart failure with acute exacerbation  Generalized  weakness  Dehydration  Urinary retention with chronic indwelling Muñiz catheter  Possible NIKITA on CKD, unclear baseline, creatinine elevated 2. 95, year ago was 1.5  Hypertension  Mild to moderate mitral and tricuspid valve regurgitation  Diabetes mellitus  A-fib on Eliquis  Chronic anemia     Continue to monitor in the hospital for workup and management of the above  Discussed with nephrology, patient will likely have worsening of CKD, continue with Lasix 60 mg p.o. daily  CT abdomen pelvis obtained and reviewed showing left hydronephrosis, consult urology for evaluation  Discussed with urology-they do not recommend any surgical intervention at this time but they do recommend to continue to monitor creatinine and follow-up with an ultrasound on outpatient basis versus consider SP catheter placement  Plan to complete 5 days of IV Rocephin for E. coli UTI  Continue home metoprolol  Decreased home gabapentin to 100 mg twice daily due to renal dysfunction  Continue with sliding scale insulin  Continue Eliquis  Keep in delirium precautions  Continue with scheduled nightly melatonin to help with sleep-wake cycle  PT/OT consulted-recommend rehab, patient and his wife plan to go home at discharge  Continue scheduled stool softeners  Continue appropriate home medications  Trend renal function and electrolytes with a.m. BMP, magnesium   Trend Hgb and WBC with a.m. CBC     Discussed plan with RN, clinical , urology, nephrology    Total of 53 minutes spent reviewing labs and imaging, counseling and educating the patient and family, ordering medications, discussing with specialty services, documenting clinical information in the electronic medical record, and care coordination.    VTE Prophylaxis:  Pharmacologic VTE prophylaxis orders are present.        CODE STATUS:   Level Of Support Discussed With: Patient  Code Status (Patient has no pulse and is not breathing): CPR (Attempt to Resuscitate)  Medical  Interventions (Patient has pulse or is breathing): Full Support

## 2024-11-10 NOTE — CONSULTS
[facility]   Consult Note    Patient Name: Darnell Geller  : 1945  MRN: [id number]  Primary Care Physician:  Cami Sargent APRN  Referring Physician: No ref. provider found  Date of admission: 2024    Consults  Subjective   Subjective     Reason for Consult/ Chief Complaint: Patient has no complaints today    Hypertension    Weakness - Generalized    Patient is admitted because of generalized weakness hypotension and mental status changes.  He has atrial fibrillation and normally is on Eliquis.  He has chronic indwelling Muñiz catheter due to chronic retention.  CT scan here shows a very thickened bladder wall with left hydroureteronephrosis.  In review of his CT scan from 2023 showed he had bilateral hydronephrosis due to at that time was thought to be due to thickened bladder wall.  Patient himself is asymptomatic.  Darnell Geller is a 79 y.o. male seen    Review of Systems   Negative  Personal History     Past Medical History:   Diagnosis Date    Arthritis     Benign prostatic hyperplasia     Coronary artery disease     Hyperlipidemia     Hypertension        Past Surgical History:   Procedure Laterality Date    SINUS SURGERY         Family History: family history is not on file. Otherwise pertinent FHx was reviewed and not pertinent to current issue.    Social History:  reports that he has quit smoking. His smoking use included cigarettes. He has a 60 pack-year smoking history. He has never used smokeless tobacco. He reports that he does not currently use alcohol. He reports that he does not use drugs.    Home Medications:   Diclofenac Sodium, Doxepin HCl, Insulin Aspart, apixaban, cholecalciferol, ferrous sulfate, furosemide, gabapentin, insulin glargine, lisinopril, loratadine, metoprolol tartrate, and omeprazole    Allergies:  No Known Allergies    Objective    Objective     Vitals:  Temp:  [97.2 °F (36.2 °C)-98.1 °F (36.7 °C)] 97.4 °F (36.3 °C)  Heart Rate:  [] 90  Resp:   [16-18] 18  BP: (109-140)/(66-76) 140/74    Physical Exam  Awake alert oriented x 3  Abdomen is soft nontender nondistended no rebound guarding or rigidity noted  Normal penis normal scrotum Muñiz catheter is in good position it is a very large Muñiz is 22 [place] three-way Muñiz catheter due to leakage around the patient's wife states.  Result Review    Result Review:  I have personally reviewed the results from the time of this admission to 11/10/2024 09:24 EST and agree with these findings:  [x]  Laboratory list / accordion  []  Microbiology  []  Radiology  []  EKG/Telemetry   []  Cardiology/Vascular   []  Pathology  []  Old records  []  Other:  Most notable findings include: Stable      Assessment & Plan   Assessment / Plan     Brief Patient Summary:  Darnell Geller is a 79 y.o. male who chronic indwelling Muñiz catheter with thickened bladder wall with left hydronephrosis.    Active Hospital Problems:  Active Hospital Problems    Diagnosis     **UTI (urinary tract infection)     Acute metabolic encephalopathy      Plan:   I had lengthy discussed with patient and wife the potential etiologies of hydronephrosis.  It appears that this is due to the thickened bladder wall.  A year and a half ago the CT scan showed bilateral hydroureteronephrosis.  I do not recommend any intervention at this time I think either putting a stent in which will get obstructed or nephrostomy tube will be to complicated the procedure and because undue discomfort and stress.  My recommendation is to monitor his creatinine and follow-up with ultrasound down the road.    Nahomy Trent MD

## 2024-11-10 NOTE — PLAN OF CARE
Goal Outcome Evaluation:  Plan of Care Reviewed With: patient           Outcome Evaluation: Patient VSS overnight. Alert to self and place, confused regarding situation and time. Patient intermittently anxious and upset but redirectable. Administered scheduled medications per MAR as well as PRN nicotine lozenges. No needs at this time. Plan of care ongoing.

## 2024-11-11 VITALS
RESPIRATION RATE: 18 BRPM | WEIGHT: 174.16 LBS | OXYGEN SATURATION: 100 % | DIASTOLIC BLOOD PRESSURE: 89 MMHG | HEART RATE: 73 BPM | SYSTOLIC BLOOD PRESSURE: 160 MMHG | TEMPERATURE: 97.4 F | HEIGHT: 70 IN | BODY MASS INDEX: 24.93 KG/M2

## 2024-11-11 LAB
ALBUMIN SERPL-MCNC: 2.8 G/DL (ref 3.5–5.2)
ALBUMIN/GLOB SERPL: 0.6 G/DL
ALP SERPL-CCNC: 240 U/L (ref 39–117)
ALT SERPL W P-5'-P-CCNC: 14 U/L (ref 1–41)
ANION GAP SERPL CALCULATED.3IONS-SCNC: 11.3 MMOL/L (ref 5–15)
AST SERPL-CCNC: 20 U/L (ref 1–40)
BACTERIA SPEC AEROBE CULT: NORMAL
BACTERIA SPEC AEROBE CULT: NORMAL
BASOPHILS # BLD AUTO: 0.03 10*3/MM3 (ref 0–0.2)
BASOPHILS NFR BLD AUTO: 0.5 % (ref 0–1.5)
BILIRUB SERPL-MCNC: 0.3 MG/DL (ref 0–1.2)
BUN SERPL-MCNC: 60 MG/DL (ref 8–23)
BUN/CREAT SERPL: 18.9 (ref 7–25)
CALCIUM SPEC-SCNC: 8.5 MG/DL (ref 8.6–10.5)
CHLORIDE SERPL-SCNC: 102 MMOL/L (ref 98–107)
CO2 SERPL-SCNC: 19.7 MMOL/L (ref 22–29)
CREAT SERPL-MCNC: 3.18 MG/DL (ref 0.76–1.27)
DEPRECATED RDW RBC AUTO: 54.1 FL (ref 37–54)
EGFRCR SERPLBLD CKD-EPI 2021: 19 ML/MIN/1.73
EOSINOPHIL # BLD AUTO: 0.26 10*3/MM3 (ref 0–0.4)
EOSINOPHIL NFR BLD AUTO: 4.5 % (ref 0.3–6.2)
ERYTHROCYTE [DISTWIDTH] IN BLOOD BY AUTOMATED COUNT: 15.5 % (ref 12.3–15.4)
GLOBULIN UR ELPH-MCNC: 4.9 GM/DL
GLUCOSE BLDC GLUCOMTR-MCNC: 225 MG/DL (ref 70–99)
GLUCOSE BLDC GLUCOMTR-MCNC: 409 MG/DL (ref 70–99)
GLUCOSE SERPL-MCNC: 337 MG/DL (ref 65–99)
HCT VFR BLD AUTO: 28.3 % (ref 37.5–51)
HGB BLD-MCNC: 8.9 G/DL (ref 13–17.7)
IMM GRANULOCYTES # BLD AUTO: 0.03 10*3/MM3 (ref 0–0.05)
IMM GRANULOCYTES NFR BLD AUTO: 0.5 % (ref 0–0.5)
LYMPHOCYTES # BLD AUTO: 0.98 10*3/MM3 (ref 0.7–3.1)
LYMPHOCYTES NFR BLD AUTO: 17.1 % (ref 19.6–45.3)
MAGNESIUM SERPL-MCNC: 1.9 MG/DL (ref 1.6–2.4)
MCH RBC QN AUTO: 30 PG (ref 26.6–33)
MCHC RBC AUTO-ENTMCNC: 31.4 G/DL (ref 31.5–35.7)
MCV RBC AUTO: 95.3 FL (ref 79–97)
MONOCYTES # BLD AUTO: 0.52 10*3/MM3 (ref 0.1–0.9)
MONOCYTES NFR BLD AUTO: 9.1 % (ref 5–12)
NEUTROPHILS NFR BLD AUTO: 3.92 10*3/MM3 (ref 1.7–7)
NEUTROPHILS NFR BLD AUTO: 68.3 % (ref 42.7–76)
NRBC BLD AUTO-RTO: 0 /100 WBC (ref 0–0.2)
PHOSPHATE SERPL-MCNC: 3.8 MG/DL (ref 2.5–4.5)
PLATELET # BLD AUTO: 181 10*3/MM3 (ref 140–450)
PMV BLD AUTO: 10 FL (ref 6–12)
POTASSIUM SERPL-SCNC: 4.5 MMOL/L (ref 3.5–5.2)
PROT SERPL-MCNC: 7.7 G/DL (ref 6–8.5)
RBC # BLD AUTO: 2.97 10*6/MM3 (ref 4.14–5.8)
SODIUM SERPL-SCNC: 133 MMOL/L (ref 136–145)
WBC NRBC COR # BLD AUTO: 5.74 10*3/MM3 (ref 3.4–10.8)

## 2024-11-11 PROCEDURE — 99239 HOSP IP/OBS DSCHRG MGMT >30: CPT | Performed by: INTERNAL MEDICINE

## 2024-11-11 PROCEDURE — 97530 THERAPEUTIC ACTIVITIES: CPT

## 2024-11-11 PROCEDURE — 83735 ASSAY OF MAGNESIUM: CPT | Performed by: INTERNAL MEDICINE

## 2024-11-11 PROCEDURE — 25010000002 CEFTRIAXONE PER 250 MG: Performed by: INTERNAL MEDICINE

## 2024-11-11 PROCEDURE — 63710000001 INSULIN GLARGINE PER 5 UNITS: Performed by: INTERNAL MEDICINE

## 2024-11-11 PROCEDURE — 63710000001 INSULIN LISPRO (HUMAN) PER 5 UNITS: Performed by: INTERNAL MEDICINE

## 2024-11-11 PROCEDURE — 84100 ASSAY OF PHOSPHORUS: CPT | Performed by: INTERNAL MEDICINE

## 2024-11-11 PROCEDURE — 97110 THERAPEUTIC EXERCISES: CPT

## 2024-11-11 PROCEDURE — 85025 COMPLETE CBC W/AUTO DIFF WBC: CPT | Performed by: INTERNAL MEDICINE

## 2024-11-11 PROCEDURE — 80053 COMPREHEN METABOLIC PANEL: CPT | Performed by: INTERNAL MEDICINE

## 2024-11-11 PROCEDURE — 82948 REAGENT STRIP/BLOOD GLUCOSE: CPT

## 2024-11-11 PROCEDURE — 82948 REAGENT STRIP/BLOOD GLUCOSE: CPT | Performed by: INTERNAL MEDICINE

## 2024-11-11 RX ORDER — IBUPROFEN 600 MG/1
1 TABLET ORAL
Status: DISCONTINUED | OUTPATIENT
Start: 2024-11-11 | End: 2024-11-11

## 2024-11-11 RX ORDER — NICOTINE POLACRILEX 4 MG
15 LOZENGE BUCCAL
Status: DISCONTINUED | OUTPATIENT
Start: 2024-11-11 | End: 2024-11-11 | Stop reason: HOSPADM

## 2024-11-11 RX ORDER — DEXTROSE MONOHYDRATE 25 G/50ML
25 INJECTION, SOLUTION INTRAVENOUS
Status: DISCONTINUED | OUTPATIENT
Start: 2024-11-11 | End: 2024-11-11

## 2024-11-11 RX ORDER — FUROSEMIDE 20 MG/1
20 TABLET ORAL DAILY
Qty: 30 TABLET | Refills: 0 | Status: SHIPPED | OUTPATIENT
Start: 2024-11-11

## 2024-11-11 RX ORDER — INSULIN LISPRO 100 [IU]/ML
3-14 INJECTION, SOLUTION INTRAVENOUS; SUBCUTANEOUS
Status: DISCONTINUED | OUTPATIENT
Start: 2024-11-11 | End: 2024-11-11 | Stop reason: HOSPADM

## 2024-11-11 RX ADMIN — Medication 1000 MG: at 15:30

## 2024-11-11 RX ADMIN — INSULIN LISPRO 14 UNITS: 100 INJECTION, SOLUTION INTRAVENOUS; SUBCUTANEOUS at 08:20

## 2024-11-11 RX ADMIN — APIXABAN 2.5 MG: 5 TABLET, FILM COATED ORAL at 08:21

## 2024-11-11 RX ADMIN — PANTOPRAZOLE SODIUM 40 MG: 40 TABLET, DELAYED RELEASE ORAL at 05:32

## 2024-11-11 RX ADMIN — DOCUSATE SODIUM 100 MG: 100 CAPSULE, LIQUID FILLED ORAL at 08:20

## 2024-11-11 RX ADMIN — METOPROLOL TARTRATE 100 MG: 50 TABLET, FILM COATED ORAL at 08:20

## 2024-11-11 RX ADMIN — INSULIN GLARGINE 15 UNITS: 100 INJECTION, SOLUTION SUBCUTANEOUS at 08:20

## 2024-11-11 RX ADMIN — NICOTINE 1 PATCH: 21 PATCH, EXTENDED RELEASE TRANSDERMAL at 08:21

## 2024-11-11 RX ADMIN — FERROUS SULFATE TAB 325 MG (65 MG ELEMENTAL FE) 325 MG: 325 (65 FE) TAB at 08:21

## 2024-11-11 RX ADMIN — INSULIN LISPRO 5 UNITS: 100 INJECTION, SOLUTION INTRAVENOUS; SUBCUTANEOUS at 12:15

## 2024-11-11 RX ADMIN — Medication 10 ML: at 08:21

## 2024-11-11 RX ADMIN — POLYETHYLENE GLYCOL 3350 17 G: 17 POWDER, FOR SOLUTION ORAL at 08:20

## 2024-11-11 RX ADMIN — GABAPENTIN 100 MG: 100 CAPSULE ORAL at 08:21

## 2024-11-11 NOTE — SIGNIFICANT NOTE
11/11/24 0835   OTHER   Discipline occupational therapist   Rehab Time/Intention   Session Not Performed patient unavailable for treatment  (with PCA)

## 2024-11-11 NOTE — PLAN OF CARE
Goal Outcome Evaluation:      Patient confused during the night, patient redirected. Patient encouraged to sleep, patient stated that he had too much on his mind to sleep. Patients VSS, denies pain. Muñiz catheter in place, catheter care provided.

## 2024-11-11 NOTE — PROGRESS NOTES
[facility]   Hospitalist Progress Note  Date: 2024  Patient Name: Darnell Geller  : 1945  MRN: [id number]  Date of admission: 2024      Subjective   Subjective     Chief Complaint: Hypotension, weakness    Summary: 79 y.o. male with a past medical history of urinary retention with chronic indwelling Muñiz catheter, hypertension, diabetes mellitus, A-fib on Eliquis for evaluation of hypotension and generalized weakness.  As per the ED physician report, patient's wife stated that when she checked his blood pressure at home he noted to have a systolic blood pressure in 70s and patient noted to have increased confusion than his baseline prompting her to call the EMS.  Patient was treated for UTI last month.  Patient's catheter is changed once a month by home health care nurse, last changed yesterday.  In the ED, patient was initially hypotensive, concern for UTI with NIKITA, creatinine 2.9.  Patient had persistent confusion, he was admitted for further care.  Given IV fluids, started on IV antibiotics.  Had acute worsening respiratory status with evidence of volume overload, IV fluids were discontinued and he was transition to IV diuretics.  2D echo showed diastolic dysfunction.  Renal function worsened with diuretics, nephrology was consulted.  Did have E. coli UTI, completed 5 days of IV Rocephin.  Workup for NIKITA did reveal hydronephrosis for which urology was consulted and did not recommend surgical intervention.  His mental status returned to baseline after 48 hours of IV antibiotics.  PT/OT consulted recommended rehab, however, family plans to take him home at discharge.  When renal function stable and cleared for discharge from nephrology standpoint, he can be discharged home, likely the next 24-48 hours.    Interval Followup: No acute events overnight.  He has no new complaints this morning is hoping to go home soon.  Remains at his baseline level of mentation per wife.    Objective   Objective      Vitals:   Temp:  [97.3 °F (36.3 °C)-98.4 °F (36.9 °C)] 97.3 °F (36.3 °C)  Heart Rate:  [] 70  Resp:  [18] 18  BP: (104-163)/(54-84) 161/64  Physical Exam    Constitutional: Awake, alert, NAD   Respiratory: Improved aeration, nonlabored respirations    Cardiovascular: RRR, no MRG   Gastrointestinal: Positive bowel sounds, soft, NTND   Neurologic: Oriented x 3, strength symmetric in all extremities, Cranial Nerves grossly intact to confrontation, speech clear    Result Review    I have personally reviewed the results below:  [x]  Laboratory personally reviewed BMP, CBC, magnesium, phosphorus, blood sugars  []  Microbiology  []  Radiology  []  EKG/Telemetry   []  Cardiology/Vascular   []  Pathology  []  Old records  []  Other:  CBC          11/9/2024    05:07 11/10/2024    04:11 11/11/2024    04:51   CBC   WBC 7.98  5.28  5.74    RBC 2.86  2.77  2.97    Hemoglobin 8.7  8.3  8.9    Hematocrit 27.3  26.3  28.3    MCV 95.5  94.9  95.3    MCH 30.4  30.0  30.0    MCHC 31.9  31.6  31.4    RDW 15.8  15.4  15.5    Platelets 168  150  181      CMP          11/9/2024    05:07 11/10/2024    04:11 11/11/2024    04:51   CMP   Glucose 230  270  337    BUN 55  62  60    Creatinine 3.04  3.19  3.18    EGFR 20.0  18.9  19.0    Sodium 132  134  133    Potassium 4.2  4.9  4.5    Chloride 102  103  102    Calcium 8.5  8.5  8.5    Total Protein  8.0  7.7    Albumin  2.9  2.8    Globulin  5.1  4.9    Total Bilirubin  0.3  0.3    Alkaline Phosphatase  215  240    AST (SGOT)  19  20    ALT (SGPT)  14  14    Albumin/Globulin Ratio  0.6  0.6    BUN/Creatinine Ratio 18.1  19.4  18.9    Anion Gap 9.4  10.5  11.3        Assessment & Plan   Assessment / Plan   Acute metabolic encephalopathy  UTI due to E. Coli  Thickened bladder   Left hydronephrosis  Acute on chronic diastolic congestive heart failure with acute exacerbation  Generalized weakness  Dehydration  Urinary retention with chronic indwelling Muñiz catheter  Possible NIKITA on CKD,  unclear baseline, creatinine elevated 2. 95, year ago was 1.5  Hypertension  Mild to moderate mitral and tricuspid valve regurgitation  Diabetes mellitus  A-fib on Eliquis  Chronic anemia     Continue to monitor in the hospital for workup and management of the above  Discussed with nephrology, continue to hold diuretics, given a small amount of IV fluids yesterday, creatinine remained stable  CT abdomen pelvis obtained and reviewed showing left hydronephrosis, urology feels this is due to chronically thickened bladder but does not recommend surgical intervention at this time  They recommend monitor his creatinine and follow-up with an ultrasound in 3 to 6 months  Complete 5 days of IV Rocephin for E. coli UTI  Continue home metoprolol  Decreased home gabapentin to 100 mg twice daily due to renal dysfunction  Continue with sliding scale insulin  Continue Eliquis  Keep in delirium precautions  Continue with scheduled nightly melatonin to help with sleep-wake cycle  PT/OT consulted-recommend rehab, patient and his wife plan to go home at discharge  Likely home the next 24-48 hours when creatinine has stabilized and cleared from nephrology standpoint  Continue scheduled stool softeners  Continue appropriate home medications  Trend renal function and electrolytes with a.m. BMP, magnesium   Trend Hgb and WBC with a.m. CBC     Discussed plan with RN, clinical , nephrology    VTE Prophylaxis:  Pharmacologic VTE prophylaxis orders are present.        CODE STATUS:   Level Of Support Discussed With: Patient  Code Status (Patient has no pulse and is not breathing): CPR (Attempt to Resuscitate)  Medical Interventions (Patient has pulse or is breathing): Full Support

## 2024-11-11 NOTE — THERAPY TREATMENT NOTE
Acute Care - Physical Therapy Treatment Note  NIRAV Stover     Patient Name: Darnell Geller  : 1945  MRN: [id number]  Today's Date: 2024      Visit Dx:     ICD-10-CM ICD-9-CM   1. Urinary tract infection associated with indwelling urethral catheter, initial encounter  T83.511A 996.64    N39.0 599.0   2. Hypotension, unspecified hypotension type  I95.9 458.9   3. Difficulty walking  R26.2 719.7   4. Decreased activities of daily living (ADL)  Z78.9 V49.89     Patient Active Problem List   Diagnosis    Sepsis with acute renal failure and septic shock, due to unspecified organism, unspecified acute renal failure type    Urinary tract infection associated with indwelling urethral catheter    Acute metabolic encephalopathy    UTI (urinary tract infection)     Past Medical History:   Diagnosis Date    Arthritis     Benign prostatic hyperplasia     Coronary artery disease     Hyperlipidemia     Hypertension      Past Surgical History:   Procedure Laterality Date    SINUS SURGERY       PT Assessment (Last 12 Hours)       PT Evaluation and Treatment       Row Name 24 1214          Physical Therapy Time and Intention    Subjective Information no complaints  -DK     Document Type therapy note (daily note)  -DK     Mode of Treatment individual therapy;physical therapy  -DK     Patient Effort good  -DK     Symptoms Noted During/After Treatment fatigue  -DK     Comment Pt reports not ambulating for several days.  He was anxious to get on his feet and start moving again.  Pt is rather [place].  -DK       Row Name 24 1214          Pain    Pretreatment Pain Rating 0/10 - no pain  -DK     Posttreatment Pain Rating 0/10 - no pain  -DK       Row Name 24 1214          Cognition    Affect/Mental Status (Cognition) confused  -DK     Orientation Status (Cognition) oriented to;person;situation  -DK     Follows Commands (Cognition) physical/tactile prompts required;repetition of directions required;verbal cues/prompting  required  -DK     Cognitive Function (Cognition) attention deficit  -DK     Attention Deficit (Cognition) minimal deficit;concentration;focused/sustained attention  -DK     Personal Safety Interventions gait belt;nonskid shoes/slippers when out of bed;supervised activity  -DK       Row Name 11/11/24 1214          Transfers    Transfers sit-stand transfer;stand-sit transfer  -DK       Row Name 11/11/24 1214          Sit-Stand Transfer    Sit-Stand [place] (Transfers) contact guard;1 person assist  -DK     Assistive Device (Sit-Stand Transfers) walker, front-wheeled  -DK       Row Name 11/11/24 1214          Stand-Sit Transfer    Stand-Sit [place] (Transfers) contact guard;1 person assist  -DK     Assistive Device (Stand-Sit Transfers) walker, front-wheeled  -DK       Row Name 11/11/24 1214          Gait/Stairs (Locomotion)    Gait/Stairs Locomotion gait/ambulation independence;gait/ambulation assistive device;distance ambulated;gait pattern  -DK     [place] Level (Gait) contact guard;1 person assist  -DK     Assistive Device (Gait) walker, front-wheeled  -DK     Distance in Feet (Gait) 50  -DK     Pattern (Gait) step-through  -DK     Deviations/Abnormal Patterns (Gait) ataxic;malia decreased;gait speed decreased;base of support, wide  -DK     Bilateral Gait Deviations forward flexed posture  -DK     Comment, (Gait/Stairs) Pt ambulated on room air with a rolling walker.  He returned to sitting (I) EOB on alert post treatment.  -DK       Row Name 11/11/24 1214          Safety Issues/Impairments Affecting Functional Mobility    Safety Issues Affecting Function (Mobility) awareness of need for assistance;impulsivity;judgment;safety precaution awareness  -DK     Impairments Affecting Function (Mobility) balance;cognition;endurance/activity tolerance;strength  hearing  -DK     Cognitive Impairments, Mobility Safety/Performance attention;impulsivity;judgment;safety precaution awareness  -DK       Row Name  11/11/24 1214          Balance    Balance Assessment sitting static balance;sitting dynamic balance;standing static balance;standing dynamic balance  -DK     Static Sitting Balance standby assist  -DK     Dynamic Sitting Balance standby assist  -DK     Position, Sitting Balance unsupported;sitting edge of bed  -DK     Static Standing Balance contact guard;1-person assist  -DK     Dynamic Standing Balance contact guard;1-person assist  -DK     Position/Device Used, Standing Balance walker, front-wheeled  -DK     Balance Interventions standing;dynamic;tandem gait  -       Row Name 11/11/24 1214          Motor Skills    Motor Skills --  therapeutic exercises  -     Coordination WFL  -     Therapeutic Exercise hip;knee;ankle  -       Row Name 11/11/24 1214          Hip (Therapeutic Exercise)    Hip (Therapeutic Exercise) AROM (active range of motion)  -     Hip AROM (Therapeutic Exercise) flexion;bilateral;extension;aBduction;aDduction;sitting;15 repititions  -       Row Name 11/11/24 1214          Knee (Therapeutic Exercise)    Knee (Therapeutic Exercise) AROM (active range of motion)  -     Knee AROM (Therapeutic Exercise) bilateral;flexion;extension;LAQ (long arc quad);sitting;15 repititions  -       Row Name 11/11/24 1214          Ankle (Therapeutic Exercise)    Ankle (Therapeutic Exercise) AROM (active range of motion)  -     Ankle AROM (Therapeutic Exercise) bilateral;dorsiflexion;plantarflexion;sitting;20 repititions  -       Row Name             Wound 11/07/24 0300 Left lower leg abrasion    Wound - Properties Group Placement Date: 11/07/24  -DG Placement Time: 0300 -DG Side: Left  -DG Orientation: lower  -DG Location: leg  -DG Primary Wound Type: Abrasion  -DG Type: abrasion  -DG    Retired Wound - Properties Group Placement Date: 11/07/24  -DG Placement Time: 0300  -DG Side: Left  -DG Orientation: lower  -DG Location: leg  -DG Primary Wound Type: Abrasion  -DG Type: abrasion  -DG     Retired Wound - Properties Group Placement Date: 11/07/24  -DG Placement Time: 0300 -DG Side: Left  -DG Orientation: lower  -DG Location: leg  -DG Primary Wound Type: Abrasion  -DG Type: abrasion  -DG    Retired Wound - Properties Group Date first assessed: 11/07/24  -DG Time first assessed: 0300  -DG Side: Left  -DG Location: leg  -DG Primary Wound Type: Abrasion  -DG Type: abrasion  -DG      Row Name 11/11/24 1214          Plan of Care Review    Plan of Care Reviewed With patient  -DK     Progress improving  -DK       Row Name 11/11/24 1214          Positioning and Restraints    Pre-Treatment Position in bed  -DK     Post Treatment Position bed  -DK     In Bed sitting EOB;call light within reach;encouraged to call for assist;exit alarm on;side rails up x2  -DK       Row Name 11/11/24 1214          Therapy Assessment/Plan (PT)    Rehab Potential (PT) good  -DK     Criteria for Skilled Interventions Met (PT) skilled treatment is necessary  -DK     Therapy Frequency (PT) daily  -DK     Problem List (PT) problems related to;balance;cognition;mobility;strength;hearing  -DK     Activity Limitations Related to Problem List (PT) unable to ambulate safely;unable to transfer safely  -DK       Row Name 11/11/24 1214          Progress Summary (PT)    Progress Toward Functional Goals (PT) progress toward functional goals is good  -DK               User Key  (r) = Recorded By, (t) = Taken By, (c) = Cosigned By      Initials Name Provider Type    Marleni Dumas RN Registered Nurse    Winter Tijerina PTA Physical Therapist Assistant                    Physical Therapy Education        No education to display                  PT Recommendation and Plan  Planned Therapy Interventions (PT): balance training, bed mobility training, gait training, home exercise program, strengthening, transfer training  Therapy Frequency (PT): daily  Progress Summary (PT)  Progress Toward Functional Goals (PT): progress toward functional  goals is good  Plan of Care Reviewed With: patient  Progress: improving   Outcome Measures       Row Name 11/11/24 1214             How much help from another person do you currently need...    Turning from your back to your side while in flat bed without using bedrails? 4  -DK      Moving from lying on back to sitting on the side of a flat bed without bedrails? 3  -DK      Moving to and from a bed to a chair (including a wheelchair)? 3  -DK      Standing up from a chair using your arms (e.g., wheelchair, bedside chair)? 3  -DK      Climbing 3-5 steps with a railing? 2  -DK      To walk in hospital room? 3  -DK      AM-PAC 6 Clicks Score (PT) 18  -DK         Functional Assessment    Outcome Measure Options AM-PAC 6 Clicks Basic Mobility (PT)  -DK                User Key  (r) = Recorded By, (t) = Taken By, (c) = Cosigned By      Initials Name Provider Type    Winter Tijerina PTA Physical Therapist Assistant                     Time Calculation:    PT Charges       Row Name 11/11/24 1219             Time Calculation    PT Received On 11/11/24  -DK      PT Goal Re-Cert Due Date 11/16/24  -DK         Timed Charges    55831 - PT Therapeutic Exercise Minutes 12  -DK      79535 - Gait Training Minutes  6  -DK      20510 - PT Therapeutic Activity Minutes 8  -DK         Total Minutes    Timed Charges Total Minutes 26  -DK       Total Minutes 26  -DK                User Key  (r) = Recorded By, (t) = Taken By, (c) = Cosigned By      Initials Name Provider Type    Winter Tijerina PTA Physical Therapist Assistant                  Therapy Charges for Today       Code Description Service Date Service Provider Modifiers Qty    97598240777 HC PT THER PROC EA 15 MIN 11/11/2024 Winter Meza PTA GP 1    82874570168 HC PT THERAPEUTIC ACT EA 15 MIN 11/11/2024 Winter Meza PTA GP 1            PT G-Codes  Outcome Measure Options: AM-PAC 6 Clicks Basic Mobility (PT)  AM-PAC 6 Clicks Score (PT): 18  AM-PAC 6 Clicks Score (OT):  14    Winter Meza, PTA  11/11/2024

## 2024-11-11 NOTE — PROGRESS NOTES
"Nephrology progress note    Eusebio Robertson MD     Current history: Patient is alert, denies any chest pain or shortness of breath or nausea or vomiting currently.  Wants to go home.      Current medication list:  apixaban, 2.5 mg, Oral, Q12H  docusate sodium, 100 mg, Oral, BID  ferrous sulfate, 325 mg, Oral, Daily With Breakfast  gabapentin, 100 mg, Oral, BID  insulin glargine, 15 Units, Subcutaneous, Q12H  insulin lispro, 3-14 Units, Subcutaneous, 4x Daily AC & at Bedtime  melatonin, 5 mg, Oral, Nightly  metoprolol tartrate, 100 mg, Oral, BID  nicotine, 1 patch, Transdermal, Q24H  pantoprazole, 40 mg, Oral, Q AM  polyethylene glycol, 17 g, Oral, Daily  sodium chloride, 10 mL, Intravenous, Q12H         acetaminophen    bisacodyl    dextrose    dextrose    dextrose    glucagon (human recombinant)    hydrALAZINE    lactulose    nicotine polacrilex    sodium chloride    sodium chloride    sodium chloride     Physical Exam:  /89 (BP Location: Right arm, Patient Position: Sitting)   Pulse 73   Temp 97.4 °F (36.3 °C) (Oral)   Resp 18   Ht 177.8 cm (70\")   Wt 79 kg (174 lb 2.6 oz)   SpO2 100%   BMI 24.99 kg/m²     Intake/Output Summary (Last 24 hours) at 11/11/2024 1554  Last data filed at 11/11/2024 1230  Gross per 24 hour   Intake 2200 ml   Output 3350 ml   Net -1150 ml      General: Patient alert, no distress, pale.  Cardiac: Regular rate and rhythm without a rub no S3 or S4  Lungs: Decreased breath sounds at the bases  Abdomen: Bowel sounds positive nontender soft   Extremities: No edema or cyanosis wearing teds.  Skin: No acute rashes noted  : Muñiz catheter in place, normal color urine.      Results from last 7 days   Lab Units 11/11/24  0451 11/10/24  0411 11/09/24  0507 11/08/24  0319 11/07/24  0332   SODIUM mmol/L 133* 134* 132*   < > 136   POTASSIUM mmol/L 4.5 4.9 4.2   < > 4.2   CHLORIDE mmol/L 102 103 102   < > 110*   CO2 mmol/L 19.7* 20.5* 20.6*   < > 18.1*   BUN mg/dL 60* 62* 55*   < > 51* "   CREATININE mg/dL 3.18* 3.19* 3.04*   < > 2.74*   CALCIUM mg/dL 8.5* 8.5* 8.5*   < > 8.0*   BILIRUBIN mg/dL 0.3 0.3  --   --  0.3   ALK PHOS U/L 240* 215*  --   --  217*   ALT (SGPT) U/L 14 14  --   --  18   AST (SGOT) U/L 20 19  --   --  23   GLUCOSE mg/dL 337* 270* 230*   < > 113*    < > = values in this interval not displayed.       Estimated Creatinine Clearance: 20.7 mL/min (A) (by C-G formula based on SCr of 3.18 mg/dL (H)).    Results from last 7 days   Lab Units 11/11/24  0451 11/10/24  0411 11/09/24  0507   MAGNESIUM mg/dL 1.9 1.9 1.8   PHOSPHORUS mg/dL 3.8 4.7* 4.4             Results from last 7 days   Lab Units 11/11/24  0451 11/10/24  0411 11/09/24  0507 11/08/24  0319 11/07/24  0332   WBC 10*3/mm3 5.74 5.28 7.98 8.26 9.38   HEMOGLOBIN g/dL 8.9* 8.3* 8.7* 8.9* 8.6*   PLATELETS 10*3/mm3 181 150 168 178 170             Imaging Results (Last 24 Hours)       ** No results found for the last 24 hours. **             Assessment/plan:    - Acute kidney injury on probable chronic kidney disease stage IIIb: Has Muñiz catheter in place.  baseline from previous records appear to be 1.5-2.0.  Urology evaluated for hydronephrosis and recommended no further treatment besides Muñiz at this time.  Avoid NSAIDs, ACE inhibitors and ARB for now.  [facility] but none received so far.     Patient with approximately 1 g of proteinuria.  Renal function is stable.  Off diuretics.  Status post IV fluid.     - Pleural effusions bilaterally, normal EF on echo no edema noted.    - E. coli UTI, treatment per primary, blood cultures so far negative.    - Anemia: Iron stores low on oral iron would consider IV iron as well    -- Hyponatremia: Sodium is decreased some probably secondary to combination of hyperglycemia with pseudo hyponatremia as well as with renal insufficiency.  Stable.      - Tobacco use, craving cigarettes and vapes.    Discussed with primary, okay to discharge from renal standpoint, would like to  see him in office in 1 to 2 weeks.  Would continue to hold ACE inhibitor but resume Lasix at 20 mg daily (was on 40 mg daily).  [facility] for his indwelling Muñiz catheter.      Please call with any questions.      Eusebio Robertson MD

## 2024-11-11 NOTE — DISCHARGE SUMMARY
[facility]         HOSPITALIST  DISCHARGE SUMMARY    Patient Name: Darnell Geller  : 1945  MRN: [id number]    Date of Admission: 2024  Date of Discharge: 2024  Primary Care Physician: Cami Sargent APRN    Consults       Date and Time Order Name Status Description    11/10/2024  7:59 AM Inpatient Urology Consult      2024  8:42 AM Inpatient Nephrology Consult Completed     2024  9:30 PM Inpatient Hospitalist Consult              Active and Resolved Hospital Problems:  Active Hospital Problems    Diagnosis POA    **UTI (urinary tract infection) [N39.0] Yes    Acute metabolic encephalopathy [G93.41] Yes      Resolved Hospital Problems   No resolved problems to display.   Acute metabolic encephalopathy  UTI due to E. Coli  Thickened bladder   Left hydronephrosis  Acute on chronic diastolic congestive heart failure with acute exacerbation  Generalized weakness  Dehydration  Urinary retention with chronic indwelling Muñiz catheter  Possible NIKITA on CKD, unclear baseline, creatinine elevated 2. 95, year ago was 1.5  Hypertension  Mild to moderate mitral and tricuspid valve regurgitation  Diabetes mellitus  A-fib on Eliquis  Chronic anemia     Hospital Course     Hospital Course:  Darnell Geller is a 79 y.o. male with a past medical history of urinary retention with chronic indwelling Muñiz catheter, hypertension, diabetes mellitus, A-fib on Eliquis for evaluation of hypotension and generalized weakness.  As per the ED physician report, patient's wife stated that when she checked his blood pressure at home he noted to have a systolic blood pressure in 70s and patient noted to have increased confusion than his baseline prompting her to call the EMS.  Patient was treated for UTI last month.  Patient's catheter is changed once a month by home health care nurse, last changed yesterday.  In the ED, patient was initially hypotensive, concern for UTI with NIKITA, creatinine 2.9.   Patient had persistent confusion, he was admitted for further care.  Given IV fluids, started on IV antibiotics.  Had acute worsening respiratory status with evidence of volume overload, IV fluids were discontinued and he was transition to IV diuretics.  2D echo showed diastolic dysfunction.  Renal function worsened with diuretics, nephrology was consulted.  Did have E. coli UTI, completed 5 days of IV Rocephin.  Workup for NIKITA did reveal hydronephrosis for which urology was consulted and did not recommend surgical intervention.  His mental status returned to baseline after 48 hours of IV antibiotics.  PT/OT consulted recommended rehab, however, family plans to take him home at discharge.  He was cleared for discharge from nephrology standpoint and discharged home in stable condition on 11/11/2024.  Lisinopril was held at discharge, he will continue low-dose Lasix at discharge.  He will follow-up with nephrology within 1 week with repeat BMP at that time.  Recommend follow-up with PCP within 1-2 weeks, nephrology within 1 week, urology within 2-3 weeks.    DISCHARGE Follow Up Recommendations for labs and diagnostics: BMP within 1 week    Day of Discharge     Vital Signs:  Temp:  [97.3 °F (36.3 °C)-98.3 °F (36.8 °C)] 97.3 °F (36.3 °C)  Heart Rate:  [] 70  Resp:  [18] 18  BP: (121-163)/(64-84) 161/64  Physical Exam:   Constitutional: Awake, alert, NAD              Respiratory: Improved aeration, nonlabored respirations               Cardiovascular: RRR, no MRG              Gastrointestinal: Positive bowel sounds, soft, NTND              Neurologic: Oriented x 3, strength symmetric in all extremities, Cranial Nerves grossly intact to confrontation, speech clear    Discharge Details        Discharge Medications        Changes to Medications        Instructions Start Date   furosemide 20 MG tablet  Commonly known as: Lasix  What changed:   medication strength  how much to take   20 mg, Oral, Daily              Continue These Medications        Instructions Start Date   apixaban 2.5 MG tablet tablet  Commonly known as: ELIQUIS   2.5 mg, Oral, 2 Times Daily      cholecalciferol 10 MCG (400 UNIT) tablet  Commonly known as: VITAMIN D3   800 Units, Oral, Daily      Claritin 10 MG tablet  Generic drug: loratadine   10 mg, Oral, Daily PRN      Doxepin HCl 3 MG tablet   3 mg, Oral, Nightly      ferrous sulfate 325 (65 FE) MG tablet   325 mg, Oral, Daily With Breakfast      gabapentin 100 MG capsule  Commonly known as: NEURONTIN   200 mg, Oral, 2 Times Daily      Insulin Aspart 100 UNIT/ML injection  Commonly known as: novoLOG   12-16 Units, Subcutaneous, 3 Times Daily Before Meals, If BS > 120 take 16 units if < 120 take 12 units      insulin glargine 100 UNIT/ML injection  Commonly known as: LANTUS, SEMGLEE   16 Units, Subcutaneous, 2 Times Daily      metoprolol tartrate 100 MG tablet  Commonly known as: LOPRESSOR   100 mg, Oral, 2 Times Daily      omeprazole 20 MG capsule  Commonly known as: priLOSEC   20 mg, Oral, Every Morning             Stop These Medications      Diclofenac Sodium 1 % gel gel  Commonly known as: VOLTAREN     lisinopril 10 MG tablet  Commonly known as: PRINIVIL,ZESTRIL              No Known Allergies    Discharge Disposition:  Home or Self Care    Diet:  Hospital:  Diet Order   Procedures    Diet: Cardiac, Diabetic; Healthy Heart (2-3 Na+); Consistent Carbohydrate; Fluid Consistency: Thin (IDDSI 0)       Discharge Activity:   Activity Instructions       Activity as Tolerated              CODE STATUS:  Code Status and Medical Interventions: CPR (Attempt to Resuscitate); Full Support   Ordered at: 11/06/24 5181     Level Of Support Discussed With:    Patient     Code Status (Patient has no pulse and is not breathing):    CPR (Attempt to Resuscitate)     Medical Interventions (Patient has pulse or is breathing):    Full Support         No future appointments.    Additional Instructions for the Follow-ups  that You Need to Schedule       Discharge Follow-up with PCP   As directed       Currently Documented PCP:    Cami Sargent APRN    PCP Phone Number:    [phone]     Follow Up Details: 3-5 days                Pertinent  and/or Most Recent Results     PROCEDURES:   None    LAB RESULTS:      Lab 11/11/24  0451 11/10/24  0411 11/09/24  0507 11/08/24 0319 11/07/24  0332 11/06/24  2156   WBC 5.74 5.28 7.98 8.26 9.38  --    HEMOGLOBIN 8.9* 8.3* 8.7* 8.9* 8.6*  --    HEMATOCRIT 28.3* 26.3* 27.3* 28.0* 27.0*  --    PLATELETS 181 150 168 178 170  --    NEUTROS ABS 3.92 3.36 5.98 6.64 7.44*  --    IMMATURE GRANS (ABS) 0.03 0.02 0.05 0.03 0.04  --    LYMPHS ABS 0.98 1.04 1.00 0.78 0.98  --    MONOS ABS 0.52 0.63 0.69 0.58 0.78  --    EOS ABS 0.26 0.21 0.22 0.20 0.10  --    MCV 95.3 94.9 95.5 96.2 95.4  --    PROCALCITONIN  --   --   --   --  3.10*  --    LACTATE  --   --   --   --   --  1.6         Lab 11/11/24  0451 11/10/24  0411 11/09/24  0507 11/08/24  0319 11/07/24  0332   SODIUM 133* 134* 132* 136 136   POTASSIUM 4.5 4.9 4.2 4.4 4.2   CHLORIDE 102 103 102 109* 110*   CO2 19.7* 20.5* 20.6* 19.8* 18.1*   ANION GAP 11.3 10.5 9.4 7.2 7.9   BUN 60* 62* 55* 46* 51*   CREATININE 3.18* 3.19* 3.04* 2.73* 2.74*   EGFR 19.0* 18.9* 20.0* 22.8* 22.7*   GLUCOSE 337* 270* 230* 96 113*   CALCIUM 8.5* 8.5* 8.5* 8.2* 8.0*   MAGNESIUM 1.9 1.9 1.8 1.8 1.8   PHOSPHORUS 3.8 4.7* 4.4 4.2 4.6*   TSH  --  3.170  --   --   --          Lab 11/11/24  0451 11/10/24  0411 11/07/24  0332 11/06/24  1930   TOTAL PROTEIN 7.7 8.0 7.2 7.0   ALBUMIN 2.8* 2.9* 2.5* 2.6*   GLOBULIN 4.9 5.1 4.7 4.4   ALT (SGPT) 14 14 18 19   AST (SGOT) 20 19 23 26   BILIRUBIN 0.3 0.3 0.3 0.4   ALK PHOS 240* 215* 217* 234*         Lab 11/08/24  0319   PROBNP 7,997.0*             Lab 11/09/24  0507   IRON 46*   IRON SATURATION (TSAT) 19*   TIBC 240*   TRANSFERRIN 161*   FERRITIN 247.50         Brief Urine Lab Results  (Last result in the past 365 days)         Color   Clarity   Blood   Leuk Est   Nitrite   Protein   CREAT   Urine HCG        11/08/24 1755             23.9               Microbiology Results (last 10 days)       Procedure Component Value - Date/Time    Blood Culture - Blood, Arm, Left [[id number]]  (Normal) Collected: 11/06/24 2156    Lab Status: Preliminary result Specimen: Blood from Arm, Left Updated: 11/10/24 2215     Blood Culture No growth at 4 days    Blood Culture - Blood, Arm, Left [[id number]]  (Normal) Collected: 11/06/24 2156    Lab Status: Preliminary result Specimen: Blood from Arm, Left Updated: 11/10/24 2215     Blood Culture No growth at 4 days    Urine Culture - Urine, Urine, Catheter [[id number]]  (Abnormal)  (Susceptibility) Collected: 11/06/24 1932    Lab Status: Final result Specimen: Urine, Catheter Updated: 11/08/24 0957     Urine Culture >100,000 CFU/mL Escherichia coli    Narrative:      Colonization of the urinary tract without infection is common. Treatment is discouraged unless the patient is symptomatic, pregnant, or undergoing an invasive urologic procedure.    Susceptibility        Escherichia coli      DEBRA      Amoxicillin + Clavulanate Susceptible      Ampicillin Susceptible      Ampicillin + Sulbactam Susceptible      Cefazolin Susceptible      Cefepime Susceptible      Ceftazidime Susceptible      Ceftriaxone Susceptible      Gentamicin Susceptible      Levofloxacin Intermediate      Nitrofurantoin Susceptible      Piperacillin + Tazobactam Susceptible      Trimethoprim + Sulfamethoxazole Susceptible                                   CT Abdomen Pelvis Without Contrast    Result Date: 11/9/2024  1. There is moderate hydroureteronephrosis on the left extending from the left kidney to the bladder. An obstructing calculus is not visualized. There is diffuse wall thickening of the urinary bladder which is decompressed by a Muñiz catheter. Previous stenosis was noted to lesser degree on a comparison from 2023. Chronic changes,  distal stenosis could be considered. An obstructing lesion cannot be excluded. 2. Stranding along the bladder, kidney and left renal collecting system may be reactive or related to underlying infection. Please correlate clinically 3. Chronic bilateral pleural effusions and dependent opacities at the lung bases. Findings may represent a degree of underlying heart dysfunction. Superimposed acute inflammatory, infectious changes cannot be excluded. 4. Cirrhosis with evidence of portal hypertension. 5. Other findings as above Electronically Signed: Sumeet Vásquez MD  11/9/2024 9:24 AM EST  Workstation ID: [id number]    US Renal Bilateral    Result Date: 11/8/2024  Impression: Increased echogenicity of the kidneys suggestive of chronic medical renal disease with moderate left hydronephrosis. Electronically Signed: Kayla Zayas MD  11/8/2024 5:25 PM EST  Workstation ID: [id number]    XR Chest 1 View    Result Date: 11/8/2024  New or increased bilateral infiltrates are seen. The findings may represent pulmonary edema with vascular congestion, slightly greater on the right than the left. Infectious multifocal pneumonia  cannot be excluded. Please see above comments for further detail.    Portions of this note were completed with a voice recognition program.    Electronically Signed By-Darnell Fraser MD On:11/8/2024 4:56 AM      CT Head Without Contrast    Result Date: 11/7/2024  Impression: 1.No acute intracranial process identified. 2.Findings suggestive of moderate chronic small vessel ischemic disease. 3.Moderate paranasal sinus mucosal disease. Electronically Signed: Leo Mckinnon MD  11/7/2024 3:15 PM EST  Workstation ID: [id number]    XR Chest 1 View    Result Date: 11/6/2024  Impression: Low lung volumes with subsegmental atelectasis at the lung bases. Electronically Signed: Javed Nelson MD  11/6/2024 7:52 PM EST  Workstation ID: [id number]              Results for orders placed during the hospital encounter of  11/06/24    Adult Transthoracic Echo Complete W/ Cont if Necessary Per Protocol    Interpretation Summary    Technically difficult study.    Left ventricular ejection fraction appears to be 56 - 60%.    The left ventricular cavity is mildly dilated.    Left ventricular diastolic function is consistent with (grade I) impaired relaxation and age.    The left atrial cavity is mildly dilated.    The right atrial cavity is borderline dilated.    Estimated right ventricular systolic pressure from tricuspid regurgitation is moderately elevated (45-55 mmHg).    Valves are not well-visualized.  Mild to moderate mitral and tricuspid regurgitation.      Labs Pending at Discharge:  Pending Labs       Order Current Status    Immunofixation (CANDIDA), Protein Electrophoresis (PE), and Quantitative Free Kappa and Lambda Light Chains (FLC), Serum In process    Blood Culture - Blood, Arm, Left Preliminary result    Blood Culture - Blood, Arm, Left Preliminary result              Time spent on Discharge including face to face service:  34 minutes    Electronically signed by Micky Barone MD, 11/11/24, 3:26 PM EST.

## 2024-11-12 ENCOUNTER — READMISSION MANAGEMENT (OUTPATIENT)
Dept: CALL CENTER | Facility: HOSPITAL | Age: 79
End: 2024-11-12
Payer: OTHER GOVERNMENT

## 2024-11-12 ENCOUNTER — NURSE TRIAGE (OUTPATIENT)
Dept: CALL CENTER | Facility: HOSPITAL | Age: 79
End: 2024-11-12
Payer: OTHER GOVERNMENT

## 2024-11-12 NOTE — TELEPHONE ENCOUNTER
Reason for Disposition   [1] Caller has NON-URGENT medicine question about med that PCP prescribed AND [2] triager unable to answer question    Additional Information   Negative: [1] Intentional drug overdose AND [2] suicidal thoughts or ideas   Negative: Drug overdose and triager unable to answer question   Negative: Caller requesting a renewal or refill of a medicine patient is currently taking   Negative: Caller requesting information unrelated to medicine   Negative: Caller requesting information about COVID-19 Vaccine   Negative: Caller requesting information about Emergency Contraception   Negative: Caller requesting information about Combined Birth Control Pills   Negative: Caller requesting information about Progestin Birth Control Pills   Negative: Caller requesting information about Post-Op pain or medicines   Negative: Caller requesting a prescription antibiotic (such as Penicillin) for Strep throat and has a positive culture result   Negative: Caller requesting a prescription anti-viral med (such as Tamiflu) and has influenza (flu) symptoms   Negative: Immunization reaction suspected   Negative: Rash while taking a medicine or within 3 days of stopping it   Negative: [1] Asthma and [2] having symptoms of asthma (cough, wheezing, etc.)   Negative: [1] Symptom of illness (e.g., headache, abdominal pain, earache, vomiting) AND [2] more than mild   Negative: Breastfeeding questions about mother's medicines and diet   Negative: MORE THAN A DOUBLE DOSE of a prescription or over-the-counter (OTC) drug   Negative: [1] DOUBLE DOSE (an extra dose or lesser amount) of prescription drug AND [2] any symptoms (e.g., dizziness, nausea, pain, sleepiness)   Negative: [1] DOUBLE DOSE (an extra dose or lesser amount) of over-the-counter (OTC) drug AND [2] any symptoms (e.g., dizziness, nausea, pain, sleepiness)   Negative: Took another person's prescription drug   Negative: [1] DOUBLE DOSE (an extra dose or lesser amount)  "of prescription drug AND [2] NO symptoms  (Exception: A double dose of antibiotics.)   Negative: Diabetes drug error or overdose (e.g., took wrong type of insulin or took extra dose)   Negative: [1] Prescription not at pharmacy AND [2] was prescribed by PCP recently (Exception: Triager has access to EMR and prescription is recorded there. Go to Home Care and confirm for pharmacy.)   Negative: [1] Pharmacy calling with prescription question AND [2] triager unable to answer question   Negative: [1] Caller has URGENT medicine question about med that PCP or specialist prescribed AND [2] triager unable to answer question   Negative: Medicine patch causing local rash or itching   Negative: [1] Caller has medicine question about med NOT prescribed by PCP AND [2] triager unable to answer question (e.g., compatibility with other med, storage)   Negative: Prescription request for new medicine (not a refill)   Negative: Caller wants to use a complementary or alternative medicine   Negative: [1] Prescription prescribed recently is not at pharmacy AND [2] triager has access to patient's EMR AND [3] prescription is recorded in the EMR    Answer Assessment - Initial Assessment Questions  1. NAME of MEDICINE: \"What medicine(s) are you calling about?\"      Lasix and lisinpril  2. QUESTION: \"What is your question?\" (e.g., double dose of medicine, side effect)      How to take  3. PRESCRIBER: \"Who prescribed the medicine?\" Reason: if prescribed by specialist, call should be referred to that group.       [facility]  4. SYMPTOMS: \"Do you have any symptoms?\" If Yes, ask: \"What symptoms are you having?\"  \"How bad are the symptoms (e.g., mild, moderate, severe)      no  5. PREGNANCY:  \"Is there any chance that you are pregnant?\" \"When was your last menstrual period?\"      no    Protocols used: Medication Question Call-ADULT-    "

## 2024-11-12 NOTE — OUTREACH NOTE
Prep Survey      Flowsheet Row Responses   [facility] patient discharged from? [facility]   Is LACE score < 7 ? No   Eligibility Readm Mgmt   Discharge diagnosis UTI (urinary tract infection)   Does the patient have one of the following disease processes/diagnoses(primary or secondary)? Other   Does the patient have Home health ordered? Yes   What is the [facility]   Is there a DME ordered? No   Medication alerts for this patient see avs   Prep survey completed? Yes            Isela CAMEJO - Registered Nurse

## 2024-11-13 LAB
ALBUMIN SERPL ELPH-MCNC: 2.4 G/DL (ref 2.9–4.4)
ALBUMIN/GLOB SERPL: 0.6 {RATIO} (ref 0.7–1.7)
ALPHA1 GLOB SERPL ELPH-MCNC: 0.4 G/DL (ref 0–0.4)
ALPHA2 GLOB SERPL ELPH-MCNC: 0.9 G/DL (ref 0.4–1)
B-GLOBULIN SERPL ELPH-MCNC: 1 G/DL (ref 0.7–1.3)
GAMMA GLOB SERPL ELPH-MCNC: 2.3 G/DL (ref 0.4–1.8)
GLOBULIN SER-MCNC: 4.5 G/DL (ref 2.2–3.9)
IGA SERPL-MCNC: 310 MG/DL (ref 61–437)
IGG SERPL-MCNC: 2754 MG/DL (ref 603–1613)
IGM SERPL-MCNC: 43 MG/DL (ref 15–143)
INTERPRETATION SERPL IEP-IMP: ABNORMAL
KAPPA LC FREE SER-MCNC: 457.2 MG/L (ref 3.3–19.4)
KAPPA LC FREE/LAMBDA FREE SER: 7.41 {RATIO} (ref 0.26–1.65)
LABORATORY COMMENT REPORT: ABNORMAL
LAMBDA LC FREE SERPL-MCNC: 61.7 MG/L (ref 5.7–26.3)
M PROTEIN SERPL ELPH-MCNC: 1.8 G/DL
PROT SERPL-MCNC: 6.9 G/DL (ref 6–8.5)

## 2024-11-21 ENCOUNTER — READMISSION MANAGEMENT (OUTPATIENT)
Dept: CALL CENTER | Facility: HOSPITAL | Age: 79
End: 2024-11-21
Payer: OTHER GOVERNMENT

## 2024-11-21 NOTE — OUTREACH NOTE
Medical Week 2 Survey      Flowsheet Row Responses   [facility] patient discharged from? [facility]   Does the patient have one of the following disease processes/diagnoses(primary or secondary)? Other   Week 2 attempt successful? Yes   Call start time 1558   Discharge diagnosis UTI (urinary tract infection)   Call end time 1601   Person spoke with today (if not patient) and relationship wife   Meds reviewed with patient/caregiver? Yes   Is the patient having any side effects they believe may be caused by any medication additions or changes? No   Does the patient have all medications ordered at discharge? Yes   Is the patient taking all medications as directed (includes completed medication regime)? Yes   Does the patient have a primary care provider?  Yes   Does the patient have an appointment with their PCP within 7 days of discharge? Yes   Has the patient kept scheduled appointments due by today? Yes   What is the Home health agency?  [facility] set up through them   Has home health visited the patient within 72 hours of discharge? Yes   Psychosocial issues? No   Did the patient receive a copy of their discharge instructions? Yes   Nursing interventions Reviewed instructions with patient   What is the patient's perception of their health status since discharge? Improving   Is the patient/caregiver able to teach back signs and symptoms related to disease process for when to call PCP? Yes   Is the patient/caregiver able to teach back signs and symptoms related to disease process for when to call 911? Yes   Is the patient/caregiver able to teach back the hierarchy of who to call/visit for symptoms/problems? PCP, Specialist, Home health nurse, Urgent Care, ED, 911 Yes   Week 2 Call Completed? Yes   Graduated Yes   Graduated/Revoked comments Wife reports Pt is doing well. No needs.   Call end time 1601            ADORE CAMEJO - Registered Nurse

## 2024-12-06 ENCOUNTER — LAB REQUISITION (OUTPATIENT)
Dept: LAB | Facility: HOSPITAL | Age: 79
End: 2024-12-06
Payer: OTHER GOVERNMENT

## 2024-12-06 DIAGNOSIS — Z79.899 OTHER LONG TERM (CURRENT) DRUG THERAPY: ICD-10-CM

## 2024-12-06 DIAGNOSIS — E11.9 TYPE 2 DIABETES MELLITUS WITHOUT COMPLICATIONS: ICD-10-CM

## 2024-12-06 DIAGNOSIS — R53.1 WEAKNESS: ICD-10-CM

## 2024-12-06 LAB
BACTERIA UR QL AUTO: ABNORMAL /HPF
BILIRUB UR QL STRIP: NEGATIVE
CLARITY UR: CLEAR
COLOR UR: YELLOW
GLUCOSE UR STRIP-MCNC: ABNORMAL MG/DL
HGB UR QL STRIP.AUTO: ABNORMAL
HYALINE CASTS UR QL AUTO: ABNORMAL /LPF
KETONES UR QL STRIP: NEGATIVE
LEUKOCYTE ESTERASE UR QL STRIP.AUTO: ABNORMAL
NITRITE UR QL STRIP: NEGATIVE
PH UR STRIP.AUTO: 6 [PH] (ref 5–8)
PROT ?TM UR-MCNC: 49.7 MG/DL
PROT UR QL STRIP: ABNORMAL
RBC # UR STRIP: ABNORMAL /HPF
REF LAB TEST METHOD: ABNORMAL
SP GR UR STRIP: 1.01 (ref 1–1.03)
SQUAMOUS #/AREA URNS HPF: ABNORMAL /HPF
UROBILINOGEN UR QL STRIP: ABNORMAL
WBC # UR STRIP: ABNORMAL /HPF

## 2024-12-06 PROCEDURE — 84156 ASSAY OF PROTEIN URINE: CPT | Performed by: NURSE PRACTITIONER

## 2024-12-06 PROCEDURE — 87186 SC STD MICRODIL/AGAR DIL: CPT | Performed by: NURSE PRACTITIONER

## 2024-12-06 PROCEDURE — 87086 URINE CULTURE/COLONY COUNT: CPT | Performed by: NURSE PRACTITIONER

## 2024-12-06 PROCEDURE — 81001 URINALYSIS AUTO W/SCOPE: CPT | Performed by: NURSE PRACTITIONER

## 2024-12-06 PROCEDURE — 87077 CULTURE AEROBIC IDENTIFY: CPT | Performed by: NURSE PRACTITIONER

## 2024-12-08 LAB — BACTERIA SPEC AEROBE CULT: ABNORMAL

## 2024-12-17 LAB
ACYLCARNITINE/C0 UR-RTO: 1.9 {RATIO} (ref 0.7–3.4)
CARNITINE FREE/CREAT UR-SRTO: 51 NMOL/MG CR (ref 77–214)
CARNITINE/CREAT UR-RTO: 146 NMOL/MG CR (ref 180–412)
CLINICAL BIOCHEMIST REVIEW: ABNORMAL
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
## 2025-01-09 ENCOUNTER — TELEPHONE (OUTPATIENT)
Dept: ONCOLOGY | Facility: HOSPITAL | Age: 80
End: 2025-01-09

## 2025-01-09 NOTE — TELEPHONE ENCOUNTER
[facility] received a fax that requires your attention. The document has been indexed to the patient’s chart for your review.      Reason for sending: RECEIVED UPDATED HEMATOLOGY AUTH [id number]    Documents Description: UPDATED AUTH 01/09/25.> INDEXED IN CHART    Name of Sender: [facility]     Date Indexed: 01/09/25    Notes (if needed): THANKS!

## 2025-01-10 ENCOUNTER — CONSULT (OUTPATIENT)
Dept: ONCOLOGY | Facility: HOSPITAL | Age: 80
End: 2025-01-10
Payer: OTHER GOVERNMENT

## 2025-01-10 ENCOUNTER — LAB (OUTPATIENT)
Dept: ONCOLOGY | Facility: HOSPITAL | Age: 80
End: 2025-01-10
Payer: OTHER GOVERNMENT

## 2025-01-10 VITALS
OXYGEN SATURATION: 98 % | SYSTOLIC BLOOD PRESSURE: 137 MMHG | HEART RATE: 68 BPM | HEIGHT: 70 IN | WEIGHT: 179.2 LBS | DIASTOLIC BLOOD PRESSURE: 70 MMHG | BODY MASS INDEX: 25.65 KG/M2 | TEMPERATURE: 97.4 F | RESPIRATION RATE: 16 BRPM

## 2025-01-10 DIAGNOSIS — D47.2 IGG MONOCLONAL PROTEIN DISORDER: Primary | ICD-10-CM

## 2025-01-10 DIAGNOSIS — R41.3 IMPAIRED MEMORY: ICD-10-CM

## 2025-01-10 DIAGNOSIS — F17.200 VAPING NICOTINE DEPENDENCE, NON-TOBACCO PRODUCT: ICD-10-CM

## 2025-01-10 DIAGNOSIS — R15.9 INCONTINENCE OF FECES, UNSPECIFIED FECAL INCONTINENCE TYPE: ICD-10-CM

## 2025-01-10 DIAGNOSIS — D64.9 ANEMIA, UNSPECIFIED TYPE: Primary | ICD-10-CM

## 2025-01-10 DIAGNOSIS — D89.89 KAPPA LIGHT CHAIN DISEASE: ICD-10-CM

## 2025-01-10 DIAGNOSIS — Z97.4 HEARING AID WORN: ICD-10-CM

## 2025-01-10 LAB
ALBUMIN SERPL-MCNC: 3.4 G/DL (ref 3.5–5.2)
ALBUMIN/GLOB SERPL: 0.6 G/DL
ALP SERPL-CCNC: 353 U/L (ref 39–117)
ALT SERPL W P-5'-P-CCNC: 25 U/L (ref 1–41)
ANION GAP SERPL CALCULATED.3IONS-SCNC: 10.6 MMOL/L (ref 5–15)
AST SERPL-CCNC: 30 U/L (ref 1–40)
BASOPHILS # BLD AUTO: 0.02 10*3/MM3 (ref 0–0.2)
BASOPHILS NFR BLD AUTO: 0.3 % (ref 0–1.5)
BILIRUB SERPL-MCNC: 0.5 MG/DL (ref 0–1.2)
BUN SERPL-MCNC: 41 MG/DL (ref 8–23)
BUN/CREAT SERPL: 15.6 (ref 7–25)
CALCIUM SPEC-SCNC: 8.5 MG/DL (ref 8.6–10.5)
CHLORIDE SERPL-SCNC: 103 MMOL/L (ref 98–107)
CO2 SERPL-SCNC: 20.4 MMOL/L (ref 22–29)
CREAT SERPL-MCNC: 2.63 MG/DL (ref 0.76–1.27)
DEPRECATED RDW RBC AUTO: 56.1 FL (ref 37–54)
EGFRCR SERPLBLD CKD-EPI 2021: 23.8 ML/MIN/1.73
EOSINOPHIL # BLD AUTO: 0.2 10*3/MM3 (ref 0–0.4)
EOSINOPHIL NFR BLD AUTO: 3.2 % (ref 0.3–6.2)
ERYTHROCYTE [DISTWIDTH] IN BLOOD BY AUTOMATED COUNT: 15.5 % (ref 12.3–15.4)
GLOBULIN UR ELPH-MCNC: 5.7 GM/DL
GLUCOSE SERPL-MCNC: 228 MG/DL (ref 65–99)
HCT VFR BLD AUTO: 31.2 % (ref 37.5–51)
HGB BLD-MCNC: 10 G/DL (ref 13–17.7)
IMM GRANULOCYTES # BLD AUTO: 0.03 10*3/MM3 (ref 0–0.05)
IMM GRANULOCYTES NFR BLD AUTO: 0.5 % (ref 0–0.5)
LYMPHOCYTES # BLD AUTO: 0.67 10*3/MM3 (ref 0.7–3.1)
LYMPHOCYTES NFR BLD AUTO: 10.6 % (ref 19.6–45.3)
MCH RBC QN AUTO: 31.8 PG (ref 26.6–33)
MCHC RBC AUTO-ENTMCNC: 32.1 G/DL (ref 31.5–35.7)
MCV RBC AUTO: 99.4 FL (ref 79–97)
MONOCYTES # BLD AUTO: 0.59 10*3/MM3 (ref 0.1–0.9)
MONOCYTES NFR BLD AUTO: 9.4 % (ref 5–12)
NEUTROPHILS NFR BLD AUTO: 4.8 10*3/MM3 (ref 1.7–7)
NEUTROPHILS NFR BLD AUTO: 76 % (ref 42.7–76)
NRBC BLD AUTO-RTO: 0 /100 WBC (ref 0–0.2)
PLATELET # BLD AUTO: 174 10*3/MM3 (ref 140–450)
PMV BLD AUTO: 10.4 FL (ref 6–12)
POTASSIUM SERPL-SCNC: 4 MMOL/L (ref 3.5–5.2)
PROT SERPL-MCNC: 9.1 G/DL (ref 6–8.5)
RBC # BLD AUTO: 3.14 10*6/MM3 (ref 4.14–5.8)
SODIUM SERPL-SCNC: 134 MMOL/L (ref 136–145)
WBC NRBC COR # BLD AUTO: 6.31 10*3/MM3 (ref 3.4–10.8)

## 2025-01-10 PROCEDURE — 82784 ASSAY IGA/IGD/IGG/IGM EACH: CPT | Performed by: PHYSICIAN ASSISTANT

## 2025-01-10 PROCEDURE — 86334 IMMUNOFIX E-PHORESIS SERUM: CPT | Performed by: PHYSICIAN ASSISTANT

## 2025-01-10 PROCEDURE — 36415 COLL VENOUS BLD VENIPUNCTURE: CPT | Performed by: PHYSICIAN ASSISTANT

## 2025-01-10 PROCEDURE — 84165 PROTEIN E-PHORESIS SERUM: CPT | Performed by: PHYSICIAN ASSISTANT

## 2025-01-10 PROCEDURE — 83521 IG LIGHT CHAINS FREE EACH: CPT | Performed by: PHYSICIAN ASSISTANT

## 2025-01-10 PROCEDURE — G0463 HOSPITAL OUTPT CLINIC VISIT: HCPCS | Performed by: PHYSICIAN ASSISTANT

## 2025-01-10 PROCEDURE — 80053 COMPREHEN METABOLIC PANEL: CPT | Performed by: PHYSICIAN ASSISTANT

## 2025-01-10 PROCEDURE — 85025 COMPLETE CBC W/AUTO DIFF WBC: CPT | Performed by: PHYSICIAN ASSISTANT

## 2025-01-14 LAB
ALBUMIN SERPL ELPH-MCNC: 2.9 G/DL (ref 2.9–4.4)
ALBUMIN/GLOB SERPL: 0.6 {RATIO} (ref 0.7–1.7)
ALPHA1 GLOB SERPL ELPH-MCNC: 0.4 G/DL (ref 0–0.4)
ALPHA2 GLOB SERPL ELPH-MCNC: 0.9 G/DL (ref 0.4–1)
B-GLOBULIN SERPL ELPH-MCNC: 1.1 G/DL (ref 0.7–1.3)
GAMMA GLOB SERPL ELPH-MCNC: 2.7 G/DL (ref 0.4–1.8)
GLOBULIN SER-MCNC: 5 G/DL (ref 2.2–3.9)
IGA SERPL-MCNC: 334 MG/DL (ref 61–437)
IGG SERPL-MCNC: 2817 MG/DL (ref 603–1613)
IGM SERPL-MCNC: 40 MG/DL (ref 15–143)
INTERPRETATION SERPL IEP-IMP: ABNORMAL
KAPPA LC FREE SER-MCNC: 902.3 MG/L (ref 3.3–19.4)
KAPPA LC FREE/LAMBDA FREE SER: 13.23 {RATIO} (ref 0.26–1.65)
LABORATORY COMMENT REPORT: ABNORMAL
LAMBDA LC FREE SERPL-MCNC: 68.2 MG/L (ref 5.7–26.3)
M PROTEIN SERPL ELPH-MCNC: 2.2 G/DL
PROT SERPL-MCNC: 7.9 G/DL (ref 6–8.5)

## 2025-01-28 ENCOUNTER — LAB REQUISITION (OUTPATIENT)
Dept: LAB | Facility: HOSPITAL | Age: 80
End: 2025-01-28
Payer: OTHER GOVERNMENT

## 2025-01-28 DIAGNOSIS — E85.81 LIGHT CHAIN (AL) AMYLOIDOSIS: ICD-10-CM

## 2025-01-28 DIAGNOSIS — D47.2 MONOCLONAL GAMMOPATHY: ICD-10-CM

## 2025-01-28 LAB
AMORPH URATE CRY URNS QL MICRO: ABNORMAL /HPF
BACTERIA UR QL AUTO: ABNORMAL /HPF
BILIRUB UR QL STRIP: NEGATIVE
CLARITY UR: ABNORMAL
COLOR UR: YELLOW
GLUCOSE UR STRIP-MCNC: ABNORMAL MG/DL
HGB UR QL STRIP.AUTO: ABNORMAL
HYALINE CASTS UR QL AUTO: ABNORMAL /LPF
KETONES UR QL STRIP: NEGATIVE
LEUKOCYTE ESTERASE UR QL STRIP.AUTO: ABNORMAL
NITRITE UR QL STRIP: POSITIVE
PH UR STRIP.AUTO: 6 [PH] (ref 5–8)
PROT UR QL STRIP: ABNORMAL
RBC # UR STRIP: ABNORMAL /HPF
REF LAB TEST METHOD: ABNORMAL
SP GR UR STRIP: 1.01 (ref 1–1.03)
SQUAMOUS #/AREA URNS HPF: ABNORMAL /HPF
UROBILINOGEN UR QL STRIP: ABNORMAL
WBC # UR STRIP: ABNORMAL /HPF

## 2025-01-28 PROCEDURE — 87086 URINE CULTURE/COLONY COUNT: CPT | Performed by: PHYSICIAN ASSISTANT

## 2025-01-28 PROCEDURE — 81001 URINALYSIS AUTO W/SCOPE: CPT | Performed by: INTERNAL MEDICINE

## 2025-01-28 PROCEDURE — 87186 SC STD MICRODIL/AGAR DIL: CPT | Performed by: PHYSICIAN ASSISTANT

## 2025-01-28 PROCEDURE — 84166 PROTEIN E-PHORESIS/URINE/CSF: CPT | Performed by: INTERNAL MEDICINE

## 2025-01-28 PROCEDURE — 86335 IMMUNFIX E-PHORSIS/URINE/CSF: CPT | Performed by: INTERNAL MEDICINE

## 2025-01-28 PROCEDURE — 84156 ASSAY OF PROTEIN URINE: CPT | Performed by: INTERNAL MEDICINE

## 2025-01-28 PROCEDURE — 87077 CULTURE AEROBIC IDENTIFY: CPT | Performed by: PHYSICIAN ASSISTANT

## 2025-01-28 PROCEDURE — 83521 IG LIGHT CHAINS FREE EACH: CPT | Performed by: INTERNAL MEDICINE

## 2025-01-30 LAB
ALBUMIN 24H MFR UR ELPH: 27 %
ALPHA1 GLOB 24H MFR UR ELPH: 4.6 %
ALPHA2 GLOB 24H MFR UR ELPH: 10.7 %
B-GLOBULIN MFR UR ELPH: 13.3 %
GAMMA GLOB 24H MFR UR ELPH: 44.5 %
INTERPRETATION UR IFE-IMP: ABNORMAL
Lab: ABNORMAL
M PROTEIN 24H MFR UR ELPH: 18.5 %
PROT UR-MCNC: 99.8 MG/DL

## 2025-01-31 LAB
BACTERIA SPEC AEROBE CULT: ABNORMAL
KAPPA LC FREE UR-MCNC: 646.34 MG/L (ref 1.17–86.46)
KAPPA LC FREE/LAMBDA FREE UR: 17.3 (ref 1.83–14.26)
LAMBDA LC FREE UR-MCNC: 37.36 MG/L (ref 0.27–15.21)

## 2025-02-14 ENCOUNTER — OFFICE VISIT (OUTPATIENT)
Dept: UROLOGY | Age: 80
End: 2025-02-14
Payer: OTHER GOVERNMENT

## 2025-02-14 VITALS — BODY MASS INDEX: 24.91 KG/M2 | WEIGHT: 174 LBS | HEIGHT: 70 IN

## 2025-02-14 DIAGNOSIS — N13.30 HYDRONEPHROSIS, UNSPECIFIED HYDRONEPHROSIS TYPE: Primary | ICD-10-CM

## 2025-02-14 DIAGNOSIS — N32.0 BLADDER OUTLET OBSTRUCTION: ICD-10-CM

## 2025-02-14 PROBLEM — E55.9 VITAMIN D DEFICIENCY: Status: ACTIVE | Noted: 2025-02-14

## 2025-02-14 PROBLEM — M48.061 SPINAL STENOSIS OF LUMBAR REGION: Status: ACTIVE | Noted: 2025-02-14

## 2025-02-14 PROBLEM — D64.9 ANEMIA: Chronic | Status: ACTIVE | Noted: 2024-11-20

## 2025-02-14 PROBLEM — E11.22 TYPE 2 DIABETES MELLITUS WITH DIABETIC CHRONIC KIDNEY DISEASE: Chronic | Status: ACTIVE | Noted: 2024-11-20

## 2025-02-14 PROBLEM — N39.0 UTI (URINARY TRACT INFECTION): Status: RESOLVED | Noted: 2024-11-08 | Resolved: 2025-02-14

## 2025-02-14 PROBLEM — R79.89 OTHER SPECIFIED ABNORMAL FINDINGS OF BLOOD CHEMISTRY: Status: ACTIVE | Noted: 2024-11-20

## 2025-02-14 PROBLEM — G31.84 MILD NEUROCOGNITIVE DISORDER: Status: ACTIVE | Noted: 2025-02-14

## 2025-02-14 PROBLEM — G47.00 INSOMNIA: Status: ACTIVE | Noted: 2025-02-14

## 2025-02-14 PROBLEM — I10 BENIGN ESSENTIAL HYPERTENSION: Status: ACTIVE | Noted: 2025-02-14

## 2025-02-14 PROBLEM — G93.41 ACUTE METABOLIC ENCEPHALOPATHY: Status: RESOLVED | Noted: 2024-11-06 | Resolved: 2025-02-14

## 2025-02-14 PROBLEM — I48.91 ATRIAL FIBRILLATION AND FLUTTER: Status: ACTIVE | Noted: 2025-02-14

## 2025-02-14 PROBLEM — N17.9 SEPSIS WITH ACUTE RENAL FAILURE AND SEPTIC SHOCK, DUE TO UNSPECIFIED ORGANISM, UNSPECIFIED ACUTE RENAL FAILURE TYPE: Status: RESOLVED | Noted: 2023-03-18 | Resolved: 2025-02-14

## 2025-02-14 PROBLEM — I48.92 ATRIAL FIBRILLATION AND FLUTTER: Status: ACTIVE | Noted: 2025-02-14

## 2025-02-14 PROBLEM — I25.9 CHRONIC ISCHEMIC HEART DISEASE: Status: ACTIVE | Noted: 2025-02-14

## 2025-02-14 PROBLEM — K64.9 HEMORRHOID: Status: ACTIVE | Noted: 2025-02-14

## 2025-02-14 PROBLEM — N18.32 STAGE 3B CHRONIC KIDNEY DISEASE: Status: ACTIVE | Noted: 2024-11-20

## 2025-02-14 PROBLEM — L40.8 OTHER PSORIASIS: Status: ACTIVE | Noted: 2025-02-14

## 2025-02-14 PROBLEM — B35.3 TINEA PEDIS: Status: ACTIVE | Noted: 2025-02-14

## 2025-02-14 PROBLEM — N39.0 URINARY TRACT INFECTION ASSOCIATED WITH INDWELLING URETHRAL CATHETER: Status: RESOLVED | Noted: 2023-08-08 | Resolved: 2025-02-14

## 2025-02-14 PROBLEM — E11.9 DIABETES MELLITUS: Status: ACTIVE | Noted: 2025-02-14

## 2025-02-14 PROBLEM — Z72.0 TOBACCO USER: Status: ACTIVE | Noted: 2025-02-14

## 2025-02-14 PROBLEM — I67.82 CHRONIC CEREBRAL ISCHEMIA: Status: ACTIVE | Noted: 2025-02-14

## 2025-02-14 PROBLEM — A41.9 SEPSIS WITH ACUTE RENAL FAILURE AND SEPTIC SHOCK, DUE TO UNSPECIFIED ORGANISM, UNSPECIFIED ACUTE RENAL FAILURE TYPE: Status: RESOLVED | Noted: 2023-03-18 | Resolved: 2025-02-14

## 2025-02-14 PROBLEM — R65.21 SEPSIS WITH ACUTE RENAL FAILURE AND SEPTIC SHOCK, DUE TO UNSPECIFIED ORGANISM, UNSPECIFIED ACUTE RENAL FAILURE TYPE: Status: RESOLVED | Noted: 2023-03-18 | Resolved: 2025-02-14

## 2025-02-14 PROBLEM — N40.1 LOWER URINARY TRACT SYMPTOMS DUE TO BENIGN PROSTATIC HYPERPLASIA: Status: ACTIVE | Noted: 2025-02-14

## 2025-02-14 PROBLEM — I63.81 LACUNAR INFARCTION: Status: ACTIVE | Noted: 2025-02-14

## 2025-02-14 PROBLEM — I25.10 CORONARY ARTERY DISEASE: Status: ACTIVE | Noted: 2025-02-14

## 2025-02-14 PROBLEM — T83.511A URINARY TRACT INFECTION ASSOCIATED WITH INDWELLING URETHRAL CATHETER: Status: RESOLVED | Noted: 2023-08-08 | Resolved: 2025-02-14

## 2025-02-14 NOTE — PROGRESS NOTES
Chief Complaint: Benign Prostatic Hypertrophy (Patients wife states that he was in the ED in November and Dr Camarena saw him and he wanted to follow up with him. His wife states that they had to wait to [facility] to be [facility] here. She states that he has had a catheter for two years due to his bladder not working anymore. She states that they have someone come to change his catheter every month. They refuse to self cath. )    Subjective         History of Present Illness  Darnell Geller is a 79 y.o. male presents to [facility] to be seen for bph/ urinary retention.    [facility]    He was [facility] by [facility] and they [facility].    His wife states that he was initially taught to CIC but he ended up with a UTI.     He has had gradual decline in cognition since 3/2023 when he developed a UTI after doing CIC.     His wife was taught to CIC but they could not do this and he has had a f/c since around 3/2023 with monthly changes with home health.    The referral was placed for BPH however [facility] states that the patient is a  with a chronic indwelling catheter for a urinary tract infection.      It appears that the patient was admitted to the hospital at [facility] for 5 days for urinary tract infection and acute metabolic encephalopathy.    The patient had been consulted by urology while in the hospital with our locum's provider.    It was noted at that time that the patient had a Muñiz catheter due to chronic retention and the CT showed a very thickened bladder wall with left hydro.  There was a CT scan from March 2023 that showed bilateral hydronephrosis at that time was thought to be due to thickened bladder wall.  Patient was asymptomatic.    Apparently the locum's had discussed with the patient and wife the potential etiologies of hydronephrosis and  appeared that it was due to the thickened bladder wall.  Dr. Muniz did not recommend any intervention at that time given placing a stent may become obstructed or nephrostomy tube would likely be complicated procedure and would cause undue discomfort and stress.  The recommendation was to monitor his creatinine and follow-up with an ultrasound later on outpatient.    It did take some time for the patient to get a referral from the [facility].    He has had no further imaging since November 2024.    [facility] the patient's urologic care that he had received previously.    He does see oncology for epistaxis and is being worked up for myeloma.     He had a repeat cx performed on 1/25/25 this was ordered by oncology and done with a new cath exchange.      Urine cx came back positive for e coli >100,00 cfu/ ml pansesnitive with intermediate susceptibility to cephalosporin    Was not treated he is asymptomatic.    Creatinine in 1/2025 was noted to be 2.6 gfr 23.8. established with nephrology.                    Objective     Past Medical History:   Diagnosis Date    Arthritis     Benign prostatic hyperplasia     Coronary artery disease     Hyperlipidemia     Hypertension     Neuropathy     Spinal stenosis        Past Surgical History:   Procedure Laterality Date    SINUS SURGERY           Current Outpatient Medications:     apixaban (ELIQUIS) 2.5 MG tablet tablet, Take 1 tablet by mouth 2 (Two) Times a Day., Disp: , Rfl:     cholecalciferol (VITAMIN D3) 10 MCG (400 UNIT) tablet, Take 2 tablets by mouth Daily., Disp: , Rfl:     Doxepin HCl 3 MG tablet, Take 3 mg by mouth Every Night., Disp: , Rfl:     ferrous sulfate 325 (65 FE) MG tablet, Take 1 tablet by mouth Daily With Breakfast., Disp: , Rfl:     furosemide (Lasix) 20 MG tablet, Take 1 tablet by mouth Daily., Disp: 30 tablet, Rfl: 0    gabapentin (NEURONTIN) 100 MG capsule, Take 2 capsules  "by mouth 2 (Two) Times a Day., Disp: , Rfl:     Insulin Aspart (novoLOG) 100 UNIT/ML injection, Inject 12-16 Units under the skin into the appropriate area as directed 3 (Three) Times a Day Before Meals. If BS > 120 take 16 units if < 120 take 12 units, Disp: , Rfl:     insulin glargine (LANTUS, SEMGLEE) 100 UNIT/ML injection, Inject 16 Units under the skin into the appropriate area as directed 2 (Two) Times a Day., Disp: , Rfl:     loratadine (Claritin) 10 MG tablet, Take 1 tablet by mouth Daily As Needed for Allergies., Disp: , Rfl:     metoprolol tartrate (LOPRESSOR) 100 MG tablet, Take 1 tablet by mouth 2 (Two) Times a Day., Disp: , Rfl:     omeprazole (priLOSEC) 20 MG capsule, Take 1 capsule by mouth Every Morning., Disp: , Rfl:     Allergies   Allergen Reactions    Dabigatran Etexilate Mesylate GI Intolerance        History reviewed. No pertinent family history.    Social History     Socioeconomic History    Marital status:    Tobacco Use    Smoking status: Former     Current packs/day: 1.50     Average packs/day: 1.5 packs/day for 40.0 years (60.0 ttl pk-yrs)     Types: Cigarettes    Smokeless tobacco: Never   Vaping Use    Vaping status: Every Day   Substance and Sexual Activity    Alcohol use: Not Currently    Drug use: Never    Sexual activity: Defer       Vital Signs:   Ht 177.8 cm (70\")   Wt 78.9 kg (174 lb)   BMI 24.97 kg/m²      Physical Exam     Result Review :   The following data was reviewed by: CHARLENE Elizabeth on 02/14/2025:  Results for orders placed or performed in visit on 01/28/25   Free K+L Left Chains,Qn,Ur - Urine, Catheter    Collection Time: 01/28/25  1:45 PM    Specimen: Urine, Catheter   Result Value Ref Range    Free [place] Lt Chains,Ur 646.34 (H) 1.17 - 86.46 mg/L    Free Lambda Lt Chains,Ur 37.36 (H) 0.27 - 15.21 mg/L    Kappa/Lambda Ratio,U 17.30 (H) 1.83 - 14.26   CANDIDA & PE, Random Urine - Urine, Catheter    Collection Time: 01/28/25  1:45 PM    Specimen: Urine, " Catheter   Result Value Ref Range    Total Protein, Urine 99.8 Not Estab. mg/dL    Albumin, U 27.0 %    Alpha-1-Globulin, U 4.6 %    Alpha-2-Globulin, U 10.7 %    Beta Globulin, U 13.3 %    Gamma Globulin, Urine 44.5 %    M-Ed, % U 18.5 (H) Not Observed %    Immunofixation Result, Urine Comment (A)     Note: Comment    Urinalysis without microscopic (no culture) - Urine, Catheter    Collection Time: 01/28/25  1:45 PM    Specimen: Urine, Catheter   Result Value Ref Range    Color, UA Yellow Yellow, Straw    Appearance, UA Cloudy (A) Clear    pH, UA 6.0 5.0 - 8.0    Specific Gravity, UA 1.008 1.005 - 1.030    Glucose,  mg/dL (1+) (A) Negative    Ketones, UA Negative Negative    Bilirubin, UA Negative Negative    Blood, UA Large (3+) (A) Negative    Protein,  mg/dL (2+) (A) Negative    Leuk Esterase, UA Large (3+) (A) Negative    Nitrite, UA Positive (A) Negative    Urobilinogen, UA 1.0 E.U./dL 0.2 - 1.0 E.U./dL   Urinalysis, Microscopic Only - Urine, Catheter    Collection Time: 01/28/25  1:45 PM    Specimen: Urine, Catheter   Result Value Ref Range    RBC, UA 6-10 (A) None Seen, 0-2 /HPF    WBC, UA 6-10 (A) None Seen, 0-2 /HPF    Bacteria, UA 2+ (A) None Seen /HPF    Squamous Epithelial Cells, UA None Seen None Seen, 0-2 /HPF    Hyaline Casts, UA None Seen None Seen /LPF    Amorphous Crystals, UA Moderate/2+ None Seen /HPF    Methodology Manual Light Microscopy       CT ABDOMEN PELVIS WO CONTRAST     Date of Exam: 11/9/2024 8:50 AM EST     Indication: hydronephrosis.     Comparison: Ultrasound 11/8/2024 and CT from 3/20/2023     Technique: Axial CT images were obtained of the abdomen and pelvis without the administration of contrast. Reconstructed coronal and sagittal images were also obtained. Automated exposure control and iterative construction methods were used.           FINDINGS:     Abdomen/Pelvis:     Lower Chest: Evaluation of the lung bases is motion limited. There are some mild  bronchiectasis and mild prominent peribronchial walls bilaterally. Mild prominence of paraseptal markings in minimal dependent opacities associated with a small to moderate   left-sided pleural effusion and small right-sided effusion are noted. There is a degree of underlying emphysema. Extensive coronary artery calcification noted on limited imaging of the base of the heart     No free air is noted below the diaphragm.     Organs: There is cortical scarring of the right kidney which demonstrates a mild lobular contour. There is prominence of the renal pelvis without evidence of obstructive uropathy. Ureters followed along its course to the bladder.     Cortical scarring and lobular appearance of the left kidney is noted. There is moderate hydroureteronephrosis with dilation of the ureter to the level of the bladder where there is a grossly thick walled appearance from a decompressed bladder with a   Muñiz catheter in place. Perivesicular stranding is noted. There is stranding along the left ureter and mild perinephric stranding as well. Obstructing calculus is not visualized.     Spleen is mildly enlarged. The liver is heterogeneous in appearance with a nodular contour compatible with cirrhosis. No definite focal lesion identified on limited noncontrast imaging of the liver. Gallbladder is grossly unremarkable in appearance.   There are some calcifications at the head of the pancreas again noted suggesting possible prior chronic pancreatitis. Pancreas is otherwise grossly unremarkable in its appearance. Bilateral adrenal glands demonstrate no acute abnormality     GI/Bowel: Stomach is decompressed and grossly unremarkable in its appearance. Small bowel demonstrates no acute abnormality. Ileocecal valve and the appendix are unremarkable. There is a moderate stool burden and diffuse diverticulosis without evidence   of acute diverticulitis. No significant ascites appreciated. No suspicious mesenteric adenopathy      Pelvis: Thickened appearance of the wall of the bladder with perivesicular stranding is accentuated by Muñiz catheter placement as discussed above. Question prior prostatectomy. Please correlate with history. No suspicious pelvic adenopathy or fluid   collection     Peritoneum/Retroperitoneum: Diffuse atherosclerotic changes are noted of the aorta which is normal in caliber. No suspicious retroperitoneal adenopathy     Bones/Soft Tissues: Multilevel degenerative changes are noted throughout the spine. No acute osseous abnormality is noted     IMPRESSION:     1. There is moderate hydroureteronephrosis on the left extending from the left kidney to the bladder. An obstructing calculus is not visualized. There is diffuse wall thickening of the urinary bladder which is decompressed by a Muñiz catheter. Previous   stenosis was noted to lesser degree on a comparison from 2023. Chronic changes, distal stenosis could be considered. An obstructing lesion cannot be excluded.     2. Stranding along the bladder, kidney and left renal collecting system may be reactive or related to underlying infection. Please correlate clinically     3. Chronic bilateral pleural effusions and dependent opacities at the lung bases. Findings may represent a degree of underlying heart dysfunction. Superimposed acute inflammatory, infectious changes cannot be excluded.     4. Cirrhosis with evidence of portal hypertension.     5. Other findings as above              Electronically Signed: Sumeet Vásquez MD    11/9/2024 9:24 AM EST    Workstation ID: [id number]      Procedures        Assessment and Plan    Diagnoses and all orders for this visit:    1. Hydronephrosis, unspecified hydronephrosis type (Primary)  -     US Renal Bilateral; Future    2. Bladder outlet obstruction      Patient with baseline poor renal function.  No significant decrease in renal function actually increased since last check in November.    Will proceed with ordering renal  ultrasound to evaluate for hydro.     We discussed that options for treating his condition are limited at this point in time.    We discussed that he may benefit from a suprapubic tube placement however at this point in time I am not sure that he would be a great surgical candidate to safely undergo this modality.    We did discuss that given he is with an indwelling Muñiz catheter it is likely that his positive urine culture from back in January was likely related to colonization of the bladder.          I spent 45 minutes caring for Darnell on this date of service. This time includes time spent by me in the following activities:reviewing tests, obtaining and/or reviewing a separately obtained history, performing a medically appropriate examination and/or evaluation , counseling and educating the patient/family/caregiver, ordering medications, tests, or procedures, and documenting information in the medical record  Follow Up   Return for With Dr. Camarena For Consult s/p tube/ management of hydro .  Patient was given instructions and counseling regarding his condition or for health maintenance advice. Please see specific information pulled into the AVS if appropriate.         This document has been electronically signed by CHARLENE Elizabeth  February 14, 2025 13:36 EST

## 2025-02-24 ENCOUNTER — TRANSCRIBE ORDERS (OUTPATIENT)
Dept: GENERAL RADIOLOGY | Facility: HOSPITAL | Age: 80
End: 2025-02-24
Payer: OTHER GOVERNMENT

## 2025-02-24 ENCOUNTER — HOSPITAL ENCOUNTER (OUTPATIENT)
Dept: GENERAL RADIOLOGY | Facility: HOSPITAL | Age: 80
Discharge: HOME | End: 2025-02-24
Admitting: PHYSICIAN ASSISTANT
Payer: OTHER GOVERNMENT

## 2025-02-24 DIAGNOSIS — D47.2 MONOCLONAL PARAPROTEINEMIA: Primary | ICD-10-CM

## 2025-02-24 DIAGNOSIS — D89.89 LIGHT CHAIN DISEASE, KAPPA TYPE: ICD-10-CM

## 2025-02-24 DIAGNOSIS — D47.2 MONOCLONAL PARAPROTEINEMIA: ICD-10-CM

## 2025-02-24 PROCEDURE — 77075 RADEX OSSEOUS SURVEY COMPL: CPT

## 2025-02-26 ENCOUNTER — LAB REQUISITION (OUTPATIENT)
Dept: LAB | Facility: HOSPITAL | Age: 80
End: 2025-02-26

## 2025-02-26 DIAGNOSIS — E11.9 TYPE 2 DIABETES MELLITUS WITHOUT COMPLICATIONS: ICD-10-CM

## 2025-02-26 DIAGNOSIS — R53.83 OTHER FATIGUE: ICD-10-CM

## 2025-02-26 DIAGNOSIS — Z79.899 OTHER LONG TERM (CURRENT) DRUG THERAPY: ICD-10-CM

## 2025-02-26 DIAGNOSIS — R53.1 WEAKNESS: ICD-10-CM

## 2025-02-26 DIAGNOSIS — I50.9 HEART FAILURE, UNSPECIFIED: ICD-10-CM

## 2025-02-26 DIAGNOSIS — I48.91 UNSPECIFIED ATRIAL FIBRILLATION: ICD-10-CM

## 2025-02-26 DIAGNOSIS — N39.0 URINARY TRACT INFECTION, SITE NOT SPECIFIED: ICD-10-CM

## 2025-02-26 LAB
ALBUMIN SERPL-MCNC: 3.6 G/DL (ref 3.5–5.2)
ANION GAP SERPL CALCULATED.3IONS-SCNC: 13.9 MMOL/L (ref 5–15)
BACTERIA UR QL AUTO: ABNORMAL /HPF
BASOPHILS # BLD AUTO: 0.03 10*3/MM3 (ref 0–0.2)
BASOPHILS NFR BLD AUTO: 0.6 % (ref 0–1.5)
BILIRUB UR QL STRIP: NEGATIVE
BUN SERPL-MCNC: 52 MG/DL (ref 8–23)
BUN/CREAT SERPL: 17.8 (ref 7–25)
CALCIUM SPEC-SCNC: 8.7 MG/DL (ref 8.6–10.5)
CHLORIDE SERPL-SCNC: 109 MMOL/L (ref 98–107)
CLARITY UR: ABNORMAL
CO2 SERPL-SCNC: 17.1 MMOL/L (ref 22–29)
COLOR UR: ABNORMAL
CREAT SERPL-MCNC: 2.92 MG/DL (ref 0.76–1.27)
CREAT UR-MCNC: 125.6 MG/DL
DEPRECATED RDW RBC AUTO: 60.2 FL (ref 37–54)
EGFRCR SERPLBLD CKD-EPI 2021: 21 ML/MIN/1.73
EOSINOPHIL # BLD AUTO: 0.24 10*3/MM3 (ref 0–0.4)
EOSINOPHIL NFR BLD AUTO: 4.5 % (ref 0.3–6.2)
ERYTHROCYTE [DISTWIDTH] IN BLOOD BY AUTOMATED COUNT: 16.1 % (ref 12.3–15.4)
GLUCOSE SERPL-MCNC: 56 MG/DL (ref 65–99)
GLUCOSE UR STRIP-MCNC: NEGATIVE MG/DL
HCT VFR BLD AUTO: 29.7 % (ref 37.5–51)
HGB BLD-MCNC: 9.4 G/DL (ref 13–17.7)
HGB UR QL STRIP.AUTO: ABNORMAL
HYALINE CASTS UR QL AUTO: ABNORMAL /LPF
IMM GRANULOCYTES # BLD AUTO: 0.01 10*3/MM3 (ref 0–0.05)
IMM GRANULOCYTES NFR BLD AUTO: 0.2 % (ref 0–0.5)
KETONES UR QL STRIP: NEGATIVE
LEUKOCYTE ESTERASE UR QL STRIP.AUTO: ABNORMAL
LYMPHOCYTES # BLD AUTO: 1.18 10*3/MM3 (ref 0.7–3.1)
LYMPHOCYTES NFR BLD AUTO: 22.2 % (ref 19.6–45.3)
MCH RBC QN AUTO: 32.1 PG (ref 26.6–33)
MCHC RBC AUTO-ENTMCNC: 31.6 G/DL (ref 31.5–35.7)
MCV RBC AUTO: 101.4 FL (ref 79–97)
MONOCYTES # BLD AUTO: 0.64 10*3/MM3 (ref 0.1–0.9)
MONOCYTES NFR BLD AUTO: 12 % (ref 5–12)
NEUTROPHILS NFR BLD AUTO: 3.22 10*3/MM3 (ref 1.7–7)
NEUTROPHILS NFR BLD AUTO: 60.5 % (ref 42.7–76)
NITRITE UR QL STRIP: NEGATIVE
NRBC BLD AUTO-RTO: 0 /100 WBC (ref 0–0.2)
PH UR STRIP.AUTO: 5.5 [PH] (ref 5–8)
PHOSPHATE SERPL-MCNC: 4.1 MG/DL (ref 2.5–4.5)
PLATELET # BLD AUTO: 154 10*3/MM3 (ref 140–450)
PMV BLD AUTO: 10.7 FL (ref 6–12)
POTASSIUM SERPL-SCNC: 4.4 MMOL/L (ref 3.5–5.2)
PROT ?TM UR-MCNC: 231.3 MG/DL
PROT UR QL STRIP: ABNORMAL
PROT/CREAT UR: 1.84 MG/G{CREAT}
RBC # BLD AUTO: 2.93 10*6/MM3 (ref 4.14–5.8)
RBC # UR STRIP: ABNORMAL /HPF
REF LAB TEST METHOD: ABNORMAL
SODIUM SERPL-SCNC: 140 MMOL/L (ref 136–145)
SP GR UR STRIP: 1.02 (ref 1–1.03)
SQUAMOUS #/AREA URNS HPF: ABNORMAL /HPF
UROBILINOGEN UR QL STRIP: ABNORMAL
WBC # UR STRIP: ABNORMAL /HPF
WBC NRBC COR # BLD AUTO: 5.32 10*3/MM3 (ref 3.4–10.8)

## 2025-02-26 PROCEDURE — 87086 URINE CULTURE/COLONY COUNT: CPT | Performed by: NURSE PRACTITIONER

## 2025-02-26 PROCEDURE — 87077 CULTURE AEROBIC IDENTIFY: CPT | Performed by: NURSE PRACTITIONER

## 2025-02-26 PROCEDURE — 82570 ASSAY OF URINE CREATININE: CPT | Performed by: NURSE PRACTITIONER

## 2025-02-26 PROCEDURE — 83970 ASSAY OF PARATHORMONE: CPT | Performed by: NURSE PRACTITIONER

## 2025-02-26 PROCEDURE — 81001 URINALYSIS AUTO W/SCOPE: CPT | Performed by: NURSE PRACTITIONER

## 2025-02-26 PROCEDURE — 80069 RENAL FUNCTION PANEL: CPT | Performed by: NURSE PRACTITIONER

## 2025-02-26 PROCEDURE — 85025 COMPLETE CBC W/AUTO DIFF WBC: CPT | Performed by: NURSE PRACTITIONER

## 2025-02-26 PROCEDURE — 87186 SC STD MICRODIL/AGAR DIL: CPT | Performed by: NURSE PRACTITIONER

## 2025-02-26 PROCEDURE — 84156 ASSAY OF PROTEIN URINE: CPT | Performed by: NURSE PRACTITIONER

## 2025-02-28 LAB
BACTERIA SPEC AEROBE CULT: ABNORMAL
PTH-INTACT SERPL-MCNC: 268 PG/ML (ref 15–65)

## 2025-03-06 NOTE — PLAN OF CARE
Goal Outcome Evaluation:  Plan of Care Reviewed With: patient           Outcome Evaluation: Patient presents with decreased strength, transfers and ambulation.  Skilled physical therapy services will be required to address these mobility deficits.      Anticipated Discharge Disposition (PT): sub acute care setting   No

## 2025-03-11 ENCOUNTER — OFFICE VISIT (OUTPATIENT)
Dept: ONCOLOGY | Facility: HOSPITAL | Age: 80
End: 2025-03-11
Payer: OTHER GOVERNMENT

## 2025-03-11 VITALS
TEMPERATURE: 97.9 F | DIASTOLIC BLOOD PRESSURE: 65 MMHG | WEIGHT: 181.22 LBS | BODY MASS INDEX: 25.94 KG/M2 | RESPIRATION RATE: 18 BRPM | HEART RATE: 64 BPM | OXYGEN SATURATION: 100 % | HEIGHT: 70 IN | SYSTOLIC BLOOD PRESSURE: 152 MMHG

## 2025-03-11 DIAGNOSIS — D47.2 MONOCLONAL GAMMOPATHY: Primary | ICD-10-CM

## 2025-03-11 PROCEDURE — G0463 HOSPITAL OUTPT CLINIC VISIT: HCPCS | Performed by: INTERNAL MEDICINE

## 2025-03-11 NOTE — PROGRESS NOTES
Chief Complaint  Follow-up ( Monoclonal gammopathy--2 MO FOLLOW UP )    Cami Sargent*  Cami Sargent, CHARLENE    Subjective          Darnell Geller presents to [facility] for follow-up of monoclonal protein.  He is accompanied by his wife.  She notes multiple medical comorbidities including fairly advanced underlying dementia, heart disease and chronic kidney disease.  She reports that he spends much of the day and better chair.  He does report normal appetite.  He denies unusual aches or pains.    Oncology/Hematology History    No history exists.         Current Outpatient Medications on File Prior to Visit   Medication Sig Dispense Refill    apixaban (ELIQUIS) 2.5 MG tablet tablet Take 1 tablet by mouth 2 (Two) Times a Day.      cholecalciferol (VITAMIN D3) 10 MCG (400 UNIT) tablet Take 2 tablets by mouth Daily.      Doxepin HCl 3 MG tablet Take 3 mg by mouth Every Night.      ferrous sulfate 325 (65 FE) MG tablet Take 1 tablet by mouth Daily With Breakfast.      furosemide (Lasix) 20 MG tablet Take 1 tablet by mouth Daily. 30 tablet 0    gabapentin (NEURONTIN) 100 MG capsule Take 2 capsules by mouth 2 (Two) Times a Day.      Insulin Aspart (novoLOG) 100 UNIT/ML injection Inject 12-16 Units under the skin into the appropriate area as directed 3 (Three) Times a Day Before Meals. If BS > 120 take 16 units if < 120 take 12 units      insulin glargine (LANTUS, SEMGLEE) 100 UNIT/ML injection Inject 16 Units under the skin into the appropriate area as directed 2 (Two) Times a Day.      loratadine (Claritin) 10 MG tablet Take 1 tablet by mouth Daily As Needed for Allergies.      metoprolol tartrate (LOPRESSOR) 100 MG tablet Take 1 tablet by mouth 2 (Two) Times a Day.      omeprazole (priLOSEC) 20 MG capsule Take 1 capsule by mouth Every Morning.       No current facility-administered medications on file prior to visit.       Allergies   Allergen Reactions     "Dabigatran Etexilate Mesylate GI Intolerance     Past Medical History:   Diagnosis Date    Arthritis     Benign prostatic hyperplasia     Coronary artery disease     Hyperlipidemia     Hypertension     Neuropathy     Spinal stenosis      Past Surgical History:   Procedure Laterality Date    SINUS SURGERY       Social History     Socioeconomic History    Marital status:    Tobacco Use    Smoking status: Former     Current packs/day: 1.50     Average packs/day: 1.5 packs/day for 40.0 years (60.0 ttl pk-yrs)     Types: Cigarettes    Smokeless tobacco: Never   Vaping Use    Vaping status: Every Day   Substance and Sexual Activity    Alcohol use: Not Currently    Drug use: Never    Sexual activity: Defer     History reviewed. No pertinent family history.    Objective   Physical Exam  Vitals reviewed. Exam conducted with a chaperone present.   Cardiovascular:      Rate and Rhythm: Normal rate and regular rhythm.      Heart sounds: Normal heart sounds. No murmur heard.     No gallop.   Pulmonary:      Effort: Pulmonary effort is normal.      Breath sounds: Normal breath sounds.   Abdominal:      General: Bowel sounds are normal.   Lymphadenopathy:      Cervical: No cervical adenopathy.         Vitals:    03/11/25 1524   BP: 152/65   Pulse: 64   Resp: 18   Temp: 97.9 °F (36.6 °C)   TempSrc: Temporal   SpO2: 100%   Weight: 82.2 kg (181 lb 3.5 oz)   Height: 177.8 cm (70\")   PainSc: 0-No pain     ECOG score: 2         PHQ-9 Total Score:                    Result Review :   The following data was reviewed by: Brad Beckford MD on 03/11/2025:  Lab Results   Component Value Date    HGB 9.4 (L) 02/26/2025    HCT 29.7 (L) 02/26/2025    .4 (H) 02/26/2025     02/26/2025    WBC 5.32 02/26/2025    NEUTROABS 3.22 02/26/2025    LYMPHSABS 1.18 02/26/2025    MONOSABS 0.64 02/26/2025    EOSABS 0.24 02/26/2025    BASOSABS 0.03 02/26/2025     Lab Results   Component Value Date    GLUCOSE 56 (L) 02/26/2025    BUN 52 (H) " "02/26/2025    CREATININE 2.92 (H) 02/26/2025     02/26/2025    K 4.4 02/26/2025     (H) 02/26/2025    CO2 17.1 (L) 02/26/2025    CALCIUM 8.7 02/26/2025    PROTEINTOT 9.1 (H) 01/10/2025    PROTEINTOT 7.9 01/10/2025    ALBUMIN 3.6 02/26/2025    BILITOT 0.5 01/10/2025    ALKPHOS 353 (H) 01/10/2025    AST 30 01/10/2025    ALT 25 01/10/2025     Lab Results   Component Value Date    MG 1.9 11/11/2024    PHOS 4.1 02/26/2025    TSH 3.170 11/10/2024     Lab Results   Component Value Date    IRON 46 (L) 11/09/2024    LABIRON 19 (L) 11/09/2024    TRANSFERRIN 161 (L) 11/09/2024    TIBC 240 (L) 11/09/2024     Lab Results   Component Value Date    FERRITIN 247.50 11/09/2024    [id number] 231 08/06/2023    FOLATE >20.00 08/05/2023     No results found for: \"PSA\", \"CEA\", \"AFP\", \"\", \"\"  UPEP and SPEP demonstrated IgG kappa     Data reviewed : Radiologic studies skeletal survey reviewed       Assessment and Plan    Diagnoses and all orders for this visit:    1. Monoclonal gammopathy (Primary)  Assessment & Plan:  Patient has documented monoclonal protein in the serum and urine.  Skeletal survey is negative.  We discussed the bone marrow aspiration biopsy would be needed to complete the evaluation for potential bone marrow problem such as myeloma or lymphoma.  Patient and wife are not interested in the procedure given his multiple other comorbidities.  He also states he is not inclined toward treatment if a bone marrow pathology is identified.  He would like to continue monitoring.  I will plan to see him back in 6 months for that purpose with lab work prior including SPEP, CANDIDA, UPEP and free light chain assay.    Orders:  -     CBC & Differential; Future  -     Comprehensive Metabolic Panel; Future  -     Immunofixation (CANDIDA), Protein Electrophoresis (PE), and Quantitative Free Kappa and Lambda Light Chains (FLC), Serum; Future  -     Protein Electrophoresis, 24 Hr Urine - Urine, Clean Catch; " Future            Patient Follow Up: 6 months    Patient was given instructions and counseling regarding his condition or for health maintenance advice. Please see specific information pulled into the AVS if appropriate.     Brad Beckford MD    3/11/2025

## 2025-03-11 NOTE — ASSESSMENT & PLAN NOTE
Patient has documented monoclonal protein in the serum and urine.  Skeletal survey is negative.  We discussed the bone marrow aspiration biopsy would be needed to complete the evaluation for potential bone marrow problem such as myeloma or lymphoma.  Patient and wife are not interested in the procedure given his multiple other comorbidities.  He also states he is not inclined toward treatment if a bone marrow pathology is identified.  He would like to continue monitoring.  I will plan to see him back in 6 months for that purpose with lab work prior including SPEP, CANDIDA, UPEP and free light chain assay.

## 2025-03-14 ENCOUNTER — HOSPITAL ENCOUNTER (OUTPATIENT)
Dept: ULTRASOUND IMAGING | Facility: HOSPITAL | Age: 80
Discharge: HOME | End: 2025-03-14
Admitting: NURSE PRACTITIONER
Payer: OTHER GOVERNMENT

## 2025-03-14 DIAGNOSIS — N13.30 HYDRONEPHROSIS, UNSPECIFIED HYDRONEPHROSIS TYPE: ICD-10-CM

## 2025-03-14 PROCEDURE — 76775 US EXAM ABDO BACK WALL LIM: CPT

## 2025-03-16 PROBLEM — N40.1 BENIGN PROSTATIC HYPERPLASIA WITH URINARY RETENTION: Status: ACTIVE | Noted: 2025-03-16

## 2025-03-16 PROBLEM — N39.0 RECURRENT URINARY TRACT INFECTION: Status: ACTIVE | Noted: 2025-03-16

## 2025-03-16 PROBLEM — R33.8 BENIGN PROSTATIC HYPERPLASIA WITH URINARY RETENTION: Status: ACTIVE | Noted: 2025-03-16

## 2025-03-16 PROBLEM — N13.30 HYDRONEPHROSIS: Status: ACTIVE | Noted: 2025-03-16

## 2025-03-16 NOTE — PROGRESS NOTES
Chief Complaint: Urologic complaint    Subjective         History of Present Illness  Darnell Geller is a 79 y.o. male            BPH with urinary retention  Recurrent UTI  Left hydronephrosis  CRI  Dementia    Indwelling Muñiz catheter -had this for about 2 years, home health changes monthly  [facility]    Not hurting him.    Did CIC at 1 point    3/25 medical oncology/[facility] - monoclonal gammopathy    He has been hospitalized with UTI  Symptoms of UTI usually mental status changes    2/25     2/25   2.9, GFR 21 - baseline  Declining cognition, could not continue CIC.  3/23 started having catheter changed monthly    2024 admission for UTI    DM  CAD  On Eliquis    see oncology for epistaxis and is being worked up for myeloma    11/24 CT abdomen/pelvis without - moderate hydro on the left no stones.  Cortical scarring and lobular appearance of left kidney.  Chronic hydro although a little less from 2023.  Bladder wall thickening.  Catheter in place.  Cirrhosis    Urine cultures    3/25 50,000 E. coli-resistant to Cipro/Levaquin  1/25 100,000 E. coli-intermediate to Cipro/Levaquin  12/24-Enterococcus resistant to ampicillin/Levaquin  11/24 100,000 E. coli resistant to Levaquin  9/24 50,000 E. coli resistant to Augmentin/ampicillin/Levaquin  6/24 E. coli-resistant to ampicillin/Levaquin      Previous abdominal surgery.        Objective     Past Medical History:   Diagnosis Date    Arthritis     Benign prostatic hyperplasia     Coronary artery disease     Hyperlipidemia     Hypertension     Neuropathy     Spinal stenosis        Past Surgical History:   Procedure Laterality Date    SINUS SURGERY                     Assessment and Plan    Diagnoses and all orders for this visit:    1. Benign prostatic hyperplasia with urinary retention (Primary)    2. Recurrent urinary tract infection    3. Hydronephrosis, unspecified hydronephrosis type         Records reviewed and summarized in the chart      Recurrent  UTI      Patient has had a lot of UTIs, even hospitalization after discussion we will place him on Keflex 250 mg nightly.  Risk and benefits discussed        Left hydronephrosis    Images reviewed.  Patient does have moderate hydronephrosis down the level of the UVJ.  Chronic but little worse since 2023.  Discussed with his overall comorbidities risk and benefits of working this up.  After discussion with patient and family specifically wife we are not going to work this up any further.        Urinary retention    Discussed possibility of suprapubic tube placement.  For the same reason because of his comorbidities is increasing weakness and dementia we will not place him under anesthesia  Continue indwelling Muñiz catheter, cont to change monthly by       Follow-up with NP in  6 mth

## 2025-03-18 ENCOUNTER — OFFICE VISIT (OUTPATIENT)
Dept: UROLOGY | Age: 80
End: 2025-03-18
Payer: OTHER GOVERNMENT

## 2025-03-18 VITALS — BODY MASS INDEX: 25.91 KG/M2 | HEIGHT: 70 IN | WEIGHT: 181 LBS

## 2025-03-18 DIAGNOSIS — N39.0 RECURRENT URINARY TRACT INFECTION: ICD-10-CM

## 2025-03-18 DIAGNOSIS — N40.1 BENIGN PROSTATIC HYPERPLASIA WITH URINARY RETENTION: Primary | ICD-10-CM

## 2025-03-18 DIAGNOSIS — N13.30 HYDRONEPHROSIS, UNSPECIFIED HYDRONEPHROSIS TYPE: ICD-10-CM

## 2025-03-18 DIAGNOSIS — R33.8 BENIGN PROSTATIC HYPERPLASIA WITH URINARY RETENTION: Primary | ICD-10-CM

## 2025-03-18 PROCEDURE — 99214 OFFICE O/P EST MOD 30 MIN: CPT | Performed by: UROLOGY

## 2025-03-18 RX ORDER — SODIUM BICARBONATE 650 MG/1
1 TABLET ORAL 3 TIMES DAILY
COMMUNITY
Start: 2025-02-28

## 2025-04-18 ENCOUNTER — LAB REQUISITION (OUTPATIENT)
Dept: LAB | Facility: HOSPITAL | Age: 80
End: 2025-04-18
Payer: OTHER GOVERNMENT

## 2025-04-18 ENCOUNTER — APPOINTMENT (OUTPATIENT)
Dept: GENERAL RADIOLOGY | Facility: HOSPITAL | Age: 80
End: 2025-04-18
Payer: OTHER GOVERNMENT

## 2025-04-18 ENCOUNTER — HOSPITAL ENCOUNTER (INPATIENT)
Facility: HOSPITAL | Age: 80
LOS: 5 days | Discharge: HOSPICE | End: 2025-04-23
Attending: EMERGENCY MEDICINE | Admitting: STUDENT IN AN ORGANIZED HEALTH CARE EDUCATION/TRAINING PROGRAM
Payer: OTHER GOVERNMENT

## 2025-04-18 DIAGNOSIS — D64.9 ANEMIA, UNSPECIFIED: ICD-10-CM

## 2025-04-18 DIAGNOSIS — N17.9 ACUTE KIDNEY INJURY: Primary | ICD-10-CM

## 2025-04-18 DIAGNOSIS — E61.1 IRON DEFICIENCY: ICD-10-CM

## 2025-04-18 DIAGNOSIS — N39.0 URINARY TRACT INFECTION, SITE NOT SPECIFIED: ICD-10-CM

## 2025-04-18 DIAGNOSIS — D64.9 ACUTE ON CHRONIC ANEMIA: ICD-10-CM

## 2025-04-18 DIAGNOSIS — E11.9 TYPE 2 DIABETES MELLITUS WITHOUT COMPLICATIONS: ICD-10-CM

## 2025-04-18 DIAGNOSIS — I48.91 UNSPECIFIED ATRIAL FIBRILLATION: ICD-10-CM

## 2025-04-18 DIAGNOSIS — R53.83 OTHER FATIGUE: ICD-10-CM

## 2025-04-18 DIAGNOSIS — I50.9 HEART FAILURE, UNSPECIFIED: ICD-10-CM

## 2025-04-18 DIAGNOSIS — Z51.5 HOSPICE CARE: ICD-10-CM

## 2025-04-18 DIAGNOSIS — R53.1 WEAKNESS: ICD-10-CM

## 2025-04-18 DIAGNOSIS — I50.9 ACUTE ON CHRONIC CONGESTIVE HEART FAILURE, UNSPECIFIED HEART FAILURE TYPE: ICD-10-CM

## 2025-04-18 DIAGNOSIS — E55.9 VITAMIN D DEFICIENCY, UNSPECIFIED: ICD-10-CM

## 2025-04-18 LAB
25(OH)D3 SERPL-MCNC: 49.6 NG/ML (ref 30–100)
ALBUMIN SERPL-MCNC: 3 G/DL (ref 3.5–5.2)
ALBUMIN SERPL-MCNC: 3.1 G/DL (ref 3.5–5.2)
ALBUMIN SERPL-MCNC: 3.1 G/DL (ref 3.5–5.2)
ALBUMIN/GLOB SERPL: 0.6 G/DL
ALBUMIN/GLOB SERPL: 0.6 G/DL
ALP SERPL-CCNC: 230 U/L (ref 39–117)
ALP SERPL-CCNC: 241 U/L (ref 39–117)
ALT SERPL W P-5'-P-CCNC: 27 U/L (ref 1–41)
ALT SERPL W P-5'-P-CCNC: 27 U/L (ref 1–41)
ANION GAP SERPL CALCULATED.3IONS-SCNC: 10.4 MMOL/L (ref 5–15)
ANION GAP SERPL CALCULATED.3IONS-SCNC: 10.4 MMOL/L (ref 5–15)
ANION GAP SERPL CALCULATED.3IONS-SCNC: 13.3 MMOL/L (ref 5–15)
ANISOCYTOSIS BLD QL: NORMAL
ANISOCYTOSIS BLD QL: NORMAL
AST SERPL-CCNC: 49 U/L (ref 1–40)
AST SERPL-CCNC: 49 U/L (ref 1–40)
BACTERIA UR QL AUTO: ABNORMAL /HPF
BACTERIA UR QL AUTO: ABNORMAL /HPF
BASOPHILS # BLD AUTO: 0.02 10*3/MM3 (ref 0–0.2)
BASOPHILS # BLD AUTO: 0.03 10*3/MM3 (ref 0–0.2)
BASOPHILS NFR BLD AUTO: 0.3 % (ref 0–1.5)
BASOPHILS NFR BLD AUTO: 0.4 % (ref 0–1.5)
BILIRUB SERPL-MCNC: 0.5 MG/DL (ref 0–1.2)
BILIRUB SERPL-MCNC: 0.7 MG/DL (ref 0–1.2)
BILIRUB UR QL STRIP: NEGATIVE
BILIRUB UR QL STRIP: NEGATIVE
BUN SERPL-MCNC: 66 MG/DL (ref 8–23)
BUN/CREAT SERPL: 15.5 (ref 7–25)
BUN/CREAT SERPL: 16.4 (ref 7–25)
BUN/CREAT SERPL: 16.4 (ref 7–25)
BURR CELLS BLD QL SMEAR: NORMAL
CALCIUM SPEC-SCNC: 8.1 MG/DL (ref 8.6–10.5)
CALCIUM SPEC-SCNC: 8.4 MG/DL (ref 8.6–10.5)
CALCIUM SPEC-SCNC: 8.4 MG/DL (ref 8.6–10.5)
CHLORIDE SERPL-SCNC: 102 MMOL/L (ref 98–107)
CHLORIDE SERPL-SCNC: 105 MMOL/L (ref 98–107)
CHLORIDE SERPL-SCNC: 105 MMOL/L (ref 98–107)
CLARITY UR: CLEAR
CLARITY UR: CLEAR
CO2 SERPL-SCNC: 19.7 MMOL/L (ref 22–29)
CO2 SERPL-SCNC: 20.6 MMOL/L (ref 22–29)
CO2 SERPL-SCNC: 20.6 MMOL/L (ref 22–29)
COLOR UR: YELLOW
COLOR UR: YELLOW
CREAT SERPL-MCNC: 4.02 MG/DL (ref 0.76–1.27)
CREAT SERPL-MCNC: 4.02 MG/DL (ref 0.76–1.27)
CREAT SERPL-MCNC: 4.27 MG/DL (ref 0.76–1.27)
CREAT UR-MCNC: 62.1 MG/DL
DEPRECATED RDW RBC AUTO: 72.1 FL (ref 37–54)
DEPRECATED RDW RBC AUTO: 74.4 FL (ref 37–54)
EGFRCR SERPLBLD CKD-EPI 2021: 13.3 ML/MIN/1.73
EGFRCR SERPLBLD CKD-EPI 2021: 14.3 ML/MIN/1.73
EGFRCR SERPLBLD CKD-EPI 2021: 14.3 ML/MIN/1.73
EOSINOPHIL # BLD AUTO: 0.12 10*3/MM3 (ref 0–0.4)
EOSINOPHIL # BLD AUTO: 0.14 10*3/MM3 (ref 0–0.4)
EOSINOPHIL NFR BLD AUTO: 1.6 % (ref 0.3–6.2)
EOSINOPHIL NFR BLD AUTO: 2.2 % (ref 0.3–6.2)
ERYTHROCYTE [DISTWIDTH] IN BLOOD BY AUTOMATED COUNT: 18.6 % (ref 12.3–15.4)
ERYTHROCYTE [DISTWIDTH] IN BLOOD BY AUTOMATED COUNT: 18.9 % (ref 12.3–15.4)
GLOBULIN UR ELPH-MCNC: 5.1 GM/DL
GLOBULIN UR ELPH-MCNC: 5.2 GM/DL
GLUCOSE BLDC GLUCOMTR-MCNC: 196 MG/DL (ref 70–99)
GLUCOSE SERPL-MCNC: 132 MG/DL (ref 65–99)
GLUCOSE SERPL-MCNC: 132 MG/DL (ref 65–99)
GLUCOSE SERPL-MCNC: 209 MG/DL (ref 65–99)
GLUCOSE UR STRIP-MCNC: ABNORMAL MG/DL
GLUCOSE UR STRIP-MCNC: NEGATIVE MG/DL
HCT VFR BLD AUTO: 24.6 % (ref 37.5–51)
HCT VFR BLD AUTO: 24.8 % (ref 37.5–51)
HEMOCCULT STL QL IA: POSITIVE
HGB BLD-MCNC: 7.6 G/DL (ref 13–17.7)
HGB BLD-MCNC: 7.8 G/DL (ref 13–17.7)
HGB UR QL STRIP.AUTO: ABNORMAL
HGB UR QL STRIP.AUTO: ABNORMAL
HOLD SPECIMEN: NORMAL
HYALINE CASTS UR QL AUTO: ABNORMAL /LPF
HYALINE CASTS UR QL AUTO: ABNORMAL /LPF
HYPOCHROMIA BLD QL: NORMAL
IMM GRANULOCYTES # BLD AUTO: 0.02 10*3/MM3 (ref 0–0.05)
IMM GRANULOCYTES # BLD AUTO: 0.03 10*3/MM3 (ref 0–0.05)
IMM GRANULOCYTES NFR BLD AUTO: 0.3 % (ref 0–0.5)
IMM GRANULOCYTES NFR BLD AUTO: 0.4 % (ref 0–0.5)
KETONES UR QL STRIP: NEGATIVE
KETONES UR QL STRIP: NEGATIVE
LEUKOCYTE ESTERASE UR QL STRIP.AUTO: ABNORMAL
LEUKOCYTE ESTERASE UR QL STRIP.AUTO: ABNORMAL
LYMPHOCYTES # BLD AUTO: 0.52 10*3/MM3 (ref 0.7–3.1)
LYMPHOCYTES # BLD AUTO: 0.6 10*3/MM3 (ref 0.7–3.1)
LYMPHOCYTES NFR BLD AUTO: 7 % (ref 19.6–45.3)
LYMPHOCYTES NFR BLD AUTO: 9.5 % (ref 19.6–45.3)
MACROCYTES BLD QL SMEAR: NORMAL
MACROCYTES BLD QL SMEAR: NORMAL
MCH RBC QN AUTO: 33.8 PG (ref 26.6–33)
MCH RBC QN AUTO: 33.8 PG (ref 26.6–33)
MCHC RBC AUTO-ENTMCNC: 30.9 G/DL (ref 31.5–35.7)
MCHC RBC AUTO-ENTMCNC: 31.5 G/DL (ref 31.5–35.7)
MCV RBC AUTO: 107.4 FL (ref 79–97)
MCV RBC AUTO: 109.3 FL (ref 79–97)
MONOCYTES # BLD AUTO: 0.67 10*3/MM3 (ref 0.1–0.9)
MONOCYTES # BLD AUTO: 0.8 10*3/MM3 (ref 0.1–0.9)
MONOCYTES NFR BLD AUTO: 10.6 % (ref 5–12)
MONOCYTES NFR BLD AUTO: 10.8 % (ref 5–12)
NEUTROPHILS NFR BLD AUTO: 4.88 10*3/MM3 (ref 1.7–7)
NEUTROPHILS NFR BLD AUTO: 5.9 10*3/MM3 (ref 1.7–7)
NEUTROPHILS NFR BLD AUTO: 77.1 % (ref 42.7–76)
NEUTROPHILS NFR BLD AUTO: 79.8 % (ref 42.7–76)
NITRITE UR QL STRIP: NEGATIVE
NITRITE UR QL STRIP: NEGATIVE
NRBC BLD AUTO-RTO: 0 /100 WBC (ref 0–0.2)
NRBC BLD AUTO-RTO: 0 /100 WBC (ref 0–0.2)
NT-PROBNP SERPL-MCNC: 2702 PG/ML (ref 0–1800)
OVALOCYTES BLD QL SMEAR: NORMAL
PH UR STRIP.AUTO: 6 [PH] (ref 5–8)
PH UR STRIP.AUTO: 6 [PH] (ref 5–8)
PHOSPHATE SERPL-MCNC: 5.8 MG/DL (ref 2.5–4.5)
PLATELET # BLD AUTO: 120 10*3/MM3 (ref 140–450)
PLATELET # BLD AUTO: 136 10*3/MM3 (ref 140–450)
PMV BLD AUTO: 10.5 FL (ref 6–12)
PMV BLD AUTO: 9.9 FL (ref 6–12)
POIKILOCYTOSIS BLD QL SMEAR: NORMAL
POLYCHROMASIA BLD QL SMEAR: NORMAL
POTASSIUM SERPL-SCNC: 4.8 MMOL/L (ref 3.5–5.2)
POTASSIUM SERPL-SCNC: 4.8 MMOL/L (ref 3.5–5.2)
POTASSIUM SERPL-SCNC: 4.9 MMOL/L (ref 3.5–5.2)
PROT ?TM UR-MCNC: 82.1 MG/DL
PROT SERPL-MCNC: 8.2 G/DL (ref 6–8.5)
PROT SERPL-MCNC: 8.2 G/DL (ref 6–8.5)
PROT UR QL STRIP: ABNORMAL
PROT UR QL STRIP: ABNORMAL
PROT/CREAT UR: 1.32 MG/G{CREAT}
PTH-INTACT SERPL-MCNC: 271.4 PG/ML (ref 15–65)
RBC # BLD AUTO: 2.25 10*6/MM3 (ref 4.14–5.8)
RBC # BLD AUTO: 2.31 10*6/MM3 (ref 4.14–5.8)
RBC # UR STRIP: ABNORMAL /HPF
RBC # UR STRIP: ABNORMAL /HPF
REF LAB TEST METHOD: ABNORMAL
REF LAB TEST METHOD: ABNORMAL
SMALL PLATELETS BLD QL SMEAR: ADEQUATE
SMALL PLATELETS BLD QL SMEAR: NORMAL
SODIUM SERPL-SCNC: 135 MMOL/L (ref 136–145)
SODIUM SERPL-SCNC: 136 MMOL/L (ref 136–145)
SODIUM SERPL-SCNC: 136 MMOL/L (ref 136–145)
SP GR UR STRIP: 1.01 (ref 1–1.03)
SP GR UR STRIP: 1.01 (ref 1–1.03)
SQUAMOUS #/AREA URNS HPF: ABNORMAL /HPF
SQUAMOUS #/AREA URNS HPF: ABNORMAL /HPF
UROBILINOGEN UR QL STRIP: ABNORMAL
UROBILINOGEN UR QL STRIP: ABNORMAL
WBC # UR STRIP: ABNORMAL /HPF
WBC # UR STRIP: ABNORMAL /HPF
WBC MORPH BLD: NORMAL
WBC MORPH BLD: NORMAL
WBC NRBC COR # BLD AUTO: 6.33 10*3/MM3 (ref 3.4–10.8)
WBC NRBC COR # BLD AUTO: 7.4 10*3/MM3 (ref 3.4–10.8)
WHOLE BLOOD HOLD COAG: NORMAL
WHOLE BLOOD HOLD SPECIMEN: NORMAL

## 2025-04-18 PROCEDURE — 83880 ASSAY OF NATRIURETIC PEPTIDE: CPT | Performed by: EMERGENCY MEDICINE

## 2025-04-18 PROCEDURE — 80053 COMPREHEN METABOLIC PANEL: CPT | Performed by: NURSE PRACTITIONER

## 2025-04-18 PROCEDURE — 85007 BL SMEAR W/DIFF WBC COUNT: CPT

## 2025-04-18 PROCEDURE — 84165 PROTEIN E-PHORESIS SERUM: CPT | Performed by: NURSE PRACTITIONER

## 2025-04-18 PROCEDURE — 82570 ASSAY OF URINE CREATININE: CPT | Performed by: NURSE PRACTITIONER

## 2025-04-18 PROCEDURE — 81001 URINALYSIS AUTO W/SCOPE: CPT

## 2025-04-18 PROCEDURE — 86334 IMMUNOFIX E-PHORESIS SERUM: CPT | Performed by: NURSE PRACTITIONER

## 2025-04-18 PROCEDURE — 87077 CULTURE AEROBIC IDENTIFY: CPT

## 2025-04-18 PROCEDURE — 82948 REAGENT STRIP/BLOOD GLUCOSE: CPT

## 2025-04-18 PROCEDURE — 87086 URINE CULTURE/COLONY COUNT: CPT | Performed by: NURSE PRACTITIONER

## 2025-04-18 PROCEDURE — 82274 ASSAY TEST FOR BLOOD FECAL: CPT | Performed by: EMERGENCY MEDICINE

## 2025-04-18 PROCEDURE — 99223 1ST HOSP IP/OBS HIGH 75: CPT | Performed by: STUDENT IN AN ORGANIZED HEALTH CARE EDUCATION/TRAINING PROGRAM

## 2025-04-18 PROCEDURE — 82784 ASSAY IGA/IGD/IGG/IGM EACH: CPT | Performed by: NURSE PRACTITIONER

## 2025-04-18 PROCEDURE — 25810000003 SODIUM CHLORIDE 0.9 % SOLUTION: Performed by: EMERGENCY MEDICINE

## 2025-04-18 PROCEDURE — 25810000003 SODIUM CHLORIDE 0.9 % SOLUTION 250 ML FLEX CONT: Performed by: EMERGENCY MEDICINE

## 2025-04-18 PROCEDURE — 25010000002 AZITHROMYCIN PER 500 MG: Performed by: EMERGENCY MEDICINE

## 2025-04-18 PROCEDURE — 83970 ASSAY OF PARATHORMONE: CPT | Performed by: NURSE PRACTITIONER

## 2025-04-18 PROCEDURE — 87186 SC STD MICRODIL/AGAR DIL: CPT | Performed by: NURSE PRACTITIONER

## 2025-04-18 PROCEDURE — 81001 URINALYSIS AUTO W/SCOPE: CPT | Performed by: NURSE PRACTITIONER

## 2025-04-18 PROCEDURE — 73502 X-RAY EXAM HIP UNI 2-3 VIEWS: CPT

## 2025-04-18 PROCEDURE — 84100 ASSAY OF PHOSPHORUS: CPT | Performed by: NURSE PRACTITIONER

## 2025-04-18 PROCEDURE — 99285 EMERGENCY DEPT VISIT HI MDM: CPT

## 2025-04-18 PROCEDURE — 82306 VITAMIN D 25 HYDROXY: CPT | Performed by: NURSE PRACTITIONER

## 2025-04-18 PROCEDURE — 83521 IG LIGHT CHAINS FREE EACH: CPT | Performed by: NURSE PRACTITIONER

## 2025-04-18 PROCEDURE — 71045 X-RAY EXAM CHEST 1 VIEW: CPT

## 2025-04-18 PROCEDURE — 87077 CULTURE AEROBIC IDENTIFY: CPT | Performed by: NURSE PRACTITIONER

## 2025-04-18 PROCEDURE — 87186 SC STD MICRODIL/AGAR DIL: CPT

## 2025-04-18 PROCEDURE — 94799 UNLISTED PULMONARY SVC/PX: CPT

## 2025-04-18 PROCEDURE — 87086 URINE CULTURE/COLONY COUNT: CPT

## 2025-04-18 PROCEDURE — 94640 AIRWAY INHALATION TREATMENT: CPT

## 2025-04-18 PROCEDURE — 85007 BL SMEAR W/DIFF WBC COUNT: CPT | Performed by: NURSE PRACTITIONER

## 2025-04-18 PROCEDURE — 85025 COMPLETE CBC W/AUTO DIFF WBC: CPT

## 2025-04-18 PROCEDURE — 25010000002 CEFTRIAXONE PER 250 MG: Performed by: EMERGENCY MEDICINE

## 2025-04-18 PROCEDURE — P9612 CATHETERIZE FOR URINE SPEC: HCPCS

## 2025-04-18 PROCEDURE — 84156 ASSAY OF PROTEIN URINE: CPT | Performed by: NURSE PRACTITIONER

## 2025-04-18 PROCEDURE — 85025 COMPLETE CBC W/AUTO DIFF WBC: CPT | Performed by: NURSE PRACTITIONER

## 2025-04-18 RX ORDER — ROSUVASTATIN CALCIUM 10 MG/1
5 TABLET, COATED ORAL DAILY
COMMUNITY
Start: 2025-03-28

## 2025-04-18 RX ORDER — IPRATROPIUM BROMIDE AND ALBUTEROL SULFATE 2.5; .5 MG/3ML; MG/3ML
3 SOLUTION RESPIRATORY (INHALATION) EVERY 4 HOURS PRN
Status: DISCONTINUED | OUTPATIENT
Start: 2025-04-18 | End: 2025-04-23 | Stop reason: HOSPADM

## 2025-04-18 RX ORDER — AMOXICILLIN 250 MG
1 CAPSULE ORAL EVERY EVENING
COMMUNITY
Start: 2025-03-28

## 2025-04-18 RX ORDER — IPRATROPIUM BROMIDE AND ALBUTEROL SULFATE 2.5; .5 MG/3ML; MG/3ML
3 SOLUTION RESPIRATORY (INHALATION) ONCE
Status: COMPLETED | OUTPATIENT
Start: 2025-04-18 | End: 2025-04-18

## 2025-04-18 RX ORDER — METOPROLOL TARTRATE 25 MG/1
12.5 TABLET, FILM COATED ORAL 2 TIMES DAILY
COMMUNITY
Start: 2025-03-28

## 2025-04-18 RX ORDER — OMEGA-3S/DHA/EPA/FISH OIL/D3 300MG-1000
20 CAPSULE ORAL DAILY
COMMUNITY
Start: 2025-04-10 | End: 2025-04-23 | Stop reason: HOSPADM

## 2025-04-18 RX ORDER — PANTOPRAZOLE SODIUM 40 MG/1
40 TABLET, DELAYED RELEASE ORAL 2 TIMES DAILY
COMMUNITY
Start: 2025-03-28 | End: 2025-04-23 | Stop reason: HOSPADM

## 2025-04-18 RX ADMIN — IPRATROPIUM BROMIDE AND ALBUTEROL SULFATE 3 ML: .5; 3 SOLUTION RESPIRATORY (INHALATION) at 21:26

## 2025-04-18 RX ADMIN — AZITHROMYCIN DIHYDRATE 500 MG: 500 INJECTION, POWDER, LYOPHILIZED, FOR SOLUTION INTRAVENOUS at 22:31

## 2025-04-18 RX ADMIN — SODIUM CHLORIDE 500 ML: 900 INJECTION, SOLUTION INTRAVENOUS at 22:30

## 2025-04-18 RX ADMIN — CEFTRIAXONE SODIUM 1000 MG: 1 INJECTION, POWDER, FOR SOLUTION INTRAMUSCULAR; INTRAVENOUS at 22:31

## 2025-04-18 NOTE — ED PROVIDER NOTES
Time: 5:15 PM EDT  Date of encounter:  4/18/2025  Independent Historian/Clinical History and Information was obtained by:   Patient and Family    History is limited by: Dementia    Chief Complaint   Patient presents with    Abnormal Lab         History of Present Illness:  Patient is a 79 y.o. year old male who presents to the emergency department for evaluation of abnormal labs x 1 day.  Patient history notable for CKD stage IV, obese mellitus, urinary retention with indwelling catheter, A-fib, dementia.  Patient's family reports patient was sent here today after home nurse labs revealed evidence of low blood counts and worsening kidney function.  Reports that he had a fall a few days ago and has been complaining of right leg pain since.  They deny known head injury or LOC.  Anticoagulated on Eliquis.    Patient Care Team  Primary Care Provider: Cami Sargent APRN    Past Medical History:     Allergies   Allergen Reactions    Dabigatran Etexilate Mesylate GI Intolerance     Past Medical History:   Diagnosis Date    Arthritis     Benign prostatic hyperplasia     Coronary artery disease     Hyperlipidemia     Hypertension     Neuropathy     Renal disorder     Spinal stenosis      Past Surgical History:   Procedure Laterality Date    SINUS SURGERY       History reviewed. No pertinent family history.    Home Medications:  Prior to Admission medications    Medication Sig Start Date End Date Taking? Authorizing Provider   apixaban (ELIQUIS) 2.5 MG tablet tablet Take 1 tablet by mouth 2 (Two) Times a Day.    ProviderRico MD   cephalexin (KEFLEX) 250 MG capsule Take 1 capsule by mouth Every Night. 3/18/25   Trae Camarena MD   cholecalciferol (VITAMIN D3) 10 MCG (400 UNIT) tablet Take 2 tablets by mouth Daily.    ProviderRico MD   Doxepin HCl 3 MG tablet Take 3 mg by mouth Every Night.    Rico Tony MD   ferrous sulfate 325 (65 FE) MG tablet Take 1 tablet by mouth Daily With  "Breakfast.    Rico Tony MD   furosemide (Lasix) 20 MG tablet Take 1 tablet by mouth Daily. 11/11/24   Micky Borges MD   gabapentin (NEURONTIN) 100 MG capsule Take 2 capsules by mouth 2 (Two) Times a Day.    Rico Tony MD   Insulin Aspart (novoLOG) 100 UNIT/ML injection Inject 12-16 Units under the skin into the appropriate area as directed 3 (Three) Times a Day Before Meals. If BS > 120 take 16 units if < 120 take 12 units    Rico Tony MD   insulin glargine (LANTUS, SEMGLEE) 100 UNIT/ML injection Inject 16 Units under the skin into the appropriate area as directed 2 (Two) Times a Day.    Rico Tony MD   loratadine (Claritin) 10 MG tablet Take 1 tablet by mouth Daily As Needed for Allergies.    Rico Tony MD   metoprolol tartrate (LOPRESSOR) 100 MG tablet Take 1 tablet by mouth 2 (Two) Times a Day. 8/26/24   Rico Tony MD   omeprazole (priLOSEC) 20 MG capsule Take 1 capsule by mouth Every Morning.    Rico Tony MD   sodium bicarbonate 650 MG tablet Take 1 tablet by mouth 3 (Three) Times a Day. 2/28/25   Rico Tony MD        Social History:   Social History     Tobacco Use    Smoking status: Former     Current packs/day: 1.50     Average packs/day: 1.5 packs/day for 40.0 years (60.0 ttl pk-yrs)     Types: Cigarettes    Smokeless tobacco: Never   Vaping Use    Vaping status: Every Day   Substance Use Topics    Alcohol use: Not Currently    Drug use: Never         Review of Systems:  Review of Systems     Physical Exam:  /58 (BP Location: Left arm, Patient Position: Lying)   Pulse 92   Temp 94.8 °F (34.9 °C) (Rectal)   Resp 14   Ht 177.8 cm (70\")   SpO2 92%   BMI 25.97 kg/m²         Physical Exam  HENT:      Head: Normocephalic.   Eyes:      Extraocular Movements: Extraocular movements intact.      Conjunctiva/sclera: Conjunctivae normal.   Pulmonary:      Effort: Pulmonary effort is normal.   Abdominal:      " General: There is no distension.   Musculoskeletal:      Right hip: Tenderness and bony tenderness present.   Skin:     General: Skin is warm.      Coloration: Skin is not cyanotic.   Neurological:      Mental Status: He is alert. Mental status is at baseline.   Psychiatric:         Attention and Perception: Attention and perception normal.         Mood and Affect: Mood normal.                      Procedures:  Procedures      Medical Decision Making:      Comorbidities that affect care:    Hypertension, CKD, dementia, CHF    External Notes reviewed:    Previous Clinic Note: Office visit with nephrology Dr. Robertson on 2/28/2025.  Description: CKD      The following orders were placed and all results were independently analyzed by me:  Orders Placed This Encounter   Procedures    XR Hip With or Without Pelvis 2 - 3 View Right    XR Chest 1 View    Comprehensive Metabolic Panel    [place] Draw    Urinalysis With Culture If Indicated - Urine, Clean Catch    CBC Auto Differential    Scan Slide    BNP    Occult Blood, Fecal By Immunoassay - Stool, Per Rectum    Hospitalist (on-call MD unless specified)    POC Glucose Once    Inpatient Admission    CBC & Differential    Green Top (Gel)    Lavender Top    Gold Top - SST    Light Blue Top    Extra Tubes    Gray Top       Medications Given in the Emergency Department:  Medications   sodium chloride 0.9 % bolus 500 mL (has no administration in time range)   cefTRIAXone (ROCEPHIN) in NS 1 gram/10ml IV PUSH syringe (has no administration in time range)   azithromycin (ZITHROMAX) 500 mg in sodium chloride 0.9 % 250 mL IVPB-VTB (has no administration in time range)   ipratropium-albuterol (DUO-NEB) nebulizer solution 3 mL (3 mL Nebulization Given 4/18/25 2126)        ED Course:    The patient was initially evaluated in the triage area where orders were placed. The patient was later dispositioned by Feroz Francois MD.      The patient was advised to stay for completion of workup  which includes but is not limited to communication of labs and radiological results, reassessment and plan. The patient was advised that leaving prior to disposition by a provider could result in critical findings that are not communicated to the patient.     ED Course as of 04/18/25 2136 Fri Apr 18, 2025   6728 --- PROVIDER IN TRIAGE NOTE ---    The patient was evaluated by [facility] in triage. Orders were placed and the patient is currently awaiting disposition.  [CB]      ED Course User Index  [CB] Segun Pal, CADENCE       Labs:    Lab Results (last 24 hours)       Procedure Component Value Units Date/Time    Comprehensive Metabolic Panel [[id number]]  (Abnormal) Collected: 04/18/25 1202    Specimen: Blood Updated: 04/18/25 1348     Glucose 132 mg/dL      BUN 66 mg/dL      Creatinine 4.02 mg/dL      Sodium 136 mmol/L      Potassium 4.8 mmol/L      Chloride 105 mmol/L      CO2 20.6 mmol/L      Calcium 8.4 mg/dL      Total Protein 8.2 g/dL      Albumin 3.1 g/dL      ALT (SGPT) 27 U/L      AST (SGOT) 49 U/L      Alkaline Phosphatase 230 U/L      Total Bilirubin 0.5 mg/dL      Globulin 5.1 gm/dL      A/G Ratio 0.6 g/dL      BUN/Creatinine Ratio 16.4     Anion Gap 10.4 mmol/L      eGFR 14.3 mL/min/1.73     Narrative:      GFR Categories in Chronic Kidney Disease (CKD)      GFR Category          GFR (mL/min/1.73)    Interpretation  G1                     90 or greater         Normal or high (1)  G2                      60-89                Mild decrease (1)  G3a                   45-59                Mild to moderate decrease  G3b                   30-44                Moderate to severe decrease  G4                    15-29                Severe decrease  G5                    14 or less           Kidney failure          (1)In the absence of evidence of kidney disease, neither GFR category G1 or G2 fulfill the criteria for CKD.    eGFR calculation 2021 CKD-EPI creatinine equation, which does not  include race as a factor    Renal Function Panel [[id number]]  (Abnormal) Collected: 04/18/25 1202    Specimen: Blood Updated: 04/18/25 1348     Glucose 132 mg/dL      BUN 66 mg/dL      Creatinine 4.02 mg/dL      Sodium 136 mmol/L      Potassium 4.8 mmol/L      Chloride 105 mmol/L      CO2 20.6 mmol/L      Calcium 8.4 mg/dL      Albumin 3.1 g/dL      Phosphorus 5.8 mg/dL      Anion Gap 10.4 mmol/L      BUN/Creatinine Ratio 16.4     eGFR 14.3 mL/min/1.73     Narrative:      GFR Categories in Chronic Kidney Disease (CKD)      GFR Category          GFR (mL/min/1.73)    Interpretation  G1                     90 or greater         Normal or high (1)  G2                      60-89                Mild decrease (1)  G3a                   45-59                Mild to moderate decrease  G3b                   30-44                Moderate to severe decrease  G4                    15-29                Severe decrease  G5                    14 or less           Kidney failure          (1)In the absence of evidence of kidney disease, neither GFR category G1 or G2 fulfill the criteria for CKD.    eGFR calculation 2021 CKD-EPI creatinine equation, which does not include race as a factor    Vitamin D 25 Hydroxy [[id number]]  (Normal) Collected: 04/18/25 1202    Specimen: Blood Updated: 04/18/25 1809     25 Hydroxy, Vitamin D 49.6 ng/ml     Narrative:      Reference Range for Total Vitamin D 25(OH)     Deficiency <20.0 ng/mL   Insufficiency 21-29 ng/mL   Sufficiency  ng/mL  Toxicity >100 ng/ml      CBC & Differential [[id number]]  (Abnormal) Collected: 04/18/25 1202    Specimen: Blood Updated: 04/18/25 1347    Narrative:      The following orders were created for panel order CBC & Differential.  Procedure                               Abnormality         Status                     ---------                               -----------         ------                     CBC Auto Differential[[id number]]        Abnormal            Final  result               Scan Slide[[id number]]                                       Final result                 Please view results for these tests on the individual orders.    PTH, Intact [[id number]]  (Abnormal) Collected: 04/18/25 1202    Specimen: Blood Updated: 04/18/25 1346     PTH, Intact 271.4 pg/mL     Immunofixation (CANDIDA), Protein Electrophoresis (PE), and Quantitative Free Kappa and Lambda Light Chains (FLC), Serum [[id number]] Collected: 04/18/25 1202    Specimen: Blood Updated: 04/18/25 1314    Protein / Creatinine Ratio, Urine - Urine, Catheter [[id number]] Collected: 04/18/25 1202    Specimen: Urine, Catheter Updated: 04/18/25 1333     Creatinine, Urine 62.1 mg/dL      Total Protein, Urine 82.1 mg/dL      Protein/Creatinine Ratio, Urine 1.32    Urinalysis With Microscopic - Urine, Catheter [[id number]]  (Abnormal) Collected: 04/18/25 1202    Specimen: Urine, Catheter Updated: 04/18/25 1327    Narrative:      The following orders were created for panel order Urinalysis With Microscopic - Urine, Catheter.  Procedure                               Abnormality         Status                     ---------                               -----------         ------                     Urinalysis without micro...[[id number]]                                                 Urinalysis, Microscopic ...[[id number]]  Abnormal            Final result                 Please view results for these tests on the individual orders.    Urinalysis With Culture If Indicated - Urine, Catheter [[id number]]  (Abnormal) Collected: 04/18/25 1202    Specimen: Urine, Catheter Updated: 04/18/25 1327     Color, UA Yellow     Appearance, UA Clear     pH, UA 6.0     Specific Gravity, UA 1.014     Glucose, UA Negative     Ketones, UA Negative     Bilirubin, UA Negative     Blood, UA Large (3+)     Protein,  mg/dL (2+)     Leuk Esterase, UA Small (1+)     Nitrite, UA Negative     Urobilinogen, UA 1.0 E.U./dL    Narrative:      In absence of  clinical symptoms, the presence of pyuria, bacteria, and/or nitrites on the urinalysis result does not correlate with infection.    CBC Auto Differential [[id number]]  (Abnormal) Collected: 04/18/25 1202    Specimen: Blood Updated: 04/18/25 1347     WBC 7.40 10*3/mm3      RBC 2.25 10*6/mm3      Hemoglobin 7.6 g/dL      Hematocrit 24.6 %      .3 fL      MCH 33.8 pg      MCHC 30.9 g/dL      RDW 18.9 %      RDW-SD 74.4 fl      MPV 10.5 fL      Platelets 136 10*3/mm3      Neutrophil % 79.8 %      Lymphocyte % 7.0 %      Monocyte % 10.8 %      Eosinophil % 1.6 %      Basophil % 0.4 %      Immature Grans % 0.4 %      Neutrophils, Absolute 5.90 10*3/mm3      Lymphocytes, Absolute 0.52 10*3/mm3      Monocytes, Absolute 0.80 10*3/mm3      Eosinophils, Absolute 0.12 10*3/mm3      Basophils, Absolute 0.03 10*3/mm3      Immature Grans, Absolute 0.03 10*3/mm3      nRBC 0.0 /100 WBC     Urinalysis, Microscopic Only - Urine, Catheter [[id number]]  (Abnormal) Collected: 04/18/25 1202    Specimen: Urine, Catheter Updated: 04/18/25 1327     RBC, UA Too Numerous to Count /HPF      WBC, UA 21-50 /HPF      Bacteria, UA 4+ /HPF      Squamous Epithelial Cells, UA 0-2 /HPF      Hyaline Casts, UA 0-2 /LPF      Methodology Automated Microscopy    Scan Slide [[id number]] Collected: 04/18/25 1202    Specimen: Blood Updated: 04/18/25 1347     Anisocytosis Slight/1+     Crenated RBC's Slight/1+     Hypochromia Slight/1+     Macrocytes Slight/1+     Ovalocytes Slight/1+     Poikilocytes Slight/1+     Polychromasia Slight/1+     WBC Morphology Normal     Platelet Estimate Decreased    Urine Culture - Urine, Urine, Catheter [[id number]] Collected: 04/18/25 1202    Specimen: Urine, Catheter Updated: 04/18/25 1327    POC Glucose Once [[id number]]  (Abnormal) Collected: 04/18/25 1836    Specimen: Blood Updated: 04/18/25 1838     Glucose 196 mg/dL      Comment: Serial Number: [id number]:  [id number]       CBC & Differential [[id number]]   (Abnormal) Collected: 04/18/25 1850    Specimen: Blood Updated: 04/18/25 1930    Narrative:      The following orders were created for panel order CBC & Differential.  Procedure                               Abnormality         Status                     ---------                               -----------         ------                     CBC Auto Differential[[id number]]        Abnormal            Final result               Scan Slide[[id number]]                                       Final result                 Please view results for these tests on the individual orders.    Comprehensive Metabolic Panel [[id number]]  (Abnormal) Collected: 04/18/25 1850    Specimen: Blood Updated: 04/18/25 1926     Glucose 209 mg/dL      BUN 66 mg/dL      Creatinine 4.27 mg/dL      Sodium 135 mmol/L      Potassium 4.9 mmol/L      Chloride 102 mmol/L      CO2 19.7 mmol/L      Calcium 8.1 mg/dL      Total Protein 8.2 g/dL      Albumin 3.0 g/dL      ALT (SGPT) 27 U/L      AST (SGOT) 49 U/L      Alkaline Phosphatase 241 U/L      Total Bilirubin 0.7 mg/dL      Globulin 5.2 gm/dL      A/G Ratio 0.6 g/dL      BUN/Creatinine Ratio 15.5     Anion Gap 13.3 mmol/L      eGFR 13.3 mL/min/1.73     Narrative:      GFR Categories in Chronic Kidney Disease (CKD)      GFR Category          GFR (mL/min/1.73)    Interpretation  G1                     90 or greater         Normal or high (1)  G2                      60-89                Mild decrease (1)  G3a                   45-59                Mild to moderate decrease  G3b                   30-44                Moderate to severe decrease  G4                    15-29                Severe decrease  G5                    14 or less           Kidney failure          (1)In the absence of evidence of kidney disease, neither GFR category G1 or G2 fulfill the criteria for CKD.    eGFR calculation 2021 CKD-EPI creatinine equation, which does not include race as a factor    CBC Auto Differential  [[id number]]  (Abnormal) Collected: 04/18/25 1850    Specimen: Blood Updated: 04/18/25 1930     WBC 6.33 10*3/mm3      RBC 2.31 10*6/mm3      Hemoglobin 7.8 g/dL      Hematocrit 24.8 %      .4 fL      MCH 33.8 pg      MCHC 31.5 g/dL      RDW 18.6 %      RDW-SD 72.1 fl      MPV 9.9 fL      Platelets 120 10*3/mm3      Neutrophil % 77.1 %      Lymphocyte % 9.5 %      Monocyte % 10.6 %      Eosinophil % 2.2 %      Basophil % 0.3 %      Immature Grans % 0.3 %      Neutrophils, Absolute 4.88 10*3/mm3      Lymphocytes, Absolute 0.60 10*3/mm3      Monocytes, Absolute 0.67 10*3/mm3      Eosinophils, Absolute 0.14 10*3/mm3      Basophils, Absolute 0.02 10*3/mm3      Immature Grans, Absolute 0.02 10*3/mm3      nRBC 0.0 /100 WBC     Scan Slide [[id number]] Collected: 04/18/25 1850    Specimen: Blood Updated: 04/18/25 1930     Anisocytosis Slight/1+     Macrocytes Mod/2+     WBC Morphology Normal     Platelet Estimate Adequate    BNP [[id number]]  (Abnormal) Collected: 04/18/25 1850    Specimen: Blood Updated: 04/18/25 2107     proBNP 2,702.0 pg/mL     Narrative:      This assay is used as an aid in the diagnosis of individuals suspected of having heart failure. It can be used as an aid in the diagnosis of acute decompensated heart failure (ADHF) in patients presenting with signs and symptoms of ADHF to the emergency department (ED). In addition, NT-proBNP of <300 pg/mL indicates ADHF is not likely.    Age Range Result Interpretation  NT-proBNP Concentration (pg/mL:      <50             Positive            >450                   Gray                 300-450                    Negative             <300    50-75           Positive            >900                  Gray                300-900                  Negative            <300      >75             Positive            >1800                  Gray                300-1800                  Negative            <300             Imaging:    XR Chest 1 View  Result Date:  4/18/2025  XR CHEST 1 VW Date of Exam: 4/18/2025 8:42 PM EDT Indication: SOA Comparison: Chest radiograph 11/8/2024. Findings: Mildly enlarged cardiac silhouette, unchanged. Left lower lobe airspace opacity obscuring the left heart border. Background of mild diffuse interstitial thickening. Possible trace right pleural effusion. No pneumothorax. Osseous structures are unchanged.     Impression: Mild diffuse interstitial prominence may represent pulmonary edema. Additionally, there is an airspace opacity in the left lower lobe that could represent pneumonia. Electronically Signed: Daniel Garcia MD  4/18/2025 9:07 PM EDT  Workstation ID: [id number]    XR Hip With or Without Pelvis 2 - 3 View Right  Result Date: 4/18/2025  XR HIP W OR WO PELVIS 2-3 VIEW RIGHT Date of Exam: 4/18/2025 5:18 PM EDT Indication: Right hip TTP. recent fall Comparison: None available. Findings: Degenerative changes of the lower lumbar spine. The sacrum and sacroiliac joints appear normal on this limited study. Degenerative changes of the hips. Severe atheromatous disease of the femoral arteries. The pubic bones appear intact. No lytic  or blastic disease.     Degenerative changes. Atheromatous disease. Electronically Signed: Darnell Khan MD  4/18/2025 5:54 PM EDT  Workstation ID: [id number]        Differential Diagnosis and Discussion:      Metabolic: Differential diagnosis includes but is not limited to hypertension, hyperglycemia, hyperkalemia, hypocalcemia, metabolic acidosis, hypokalemia, hypoglycemia, malnutrition, hypothyroidism, hyperthyroidism, and adrenal insufficiency.   Weakness: Based on the patient's history, signs, and symptoms, the diffential diagnosis includes but is not limited to meningitis, stroke, sepsis, subarachnoid hemorrhage, intracranial bleeding, encephalitis, acute uti, dehydration, MS, myasthenia gravis, [place], migraine variant, neuromuscular disorders vertigo, electrolyte imbalance, and metabolic  disorders.    Labs were collected in the emergency department and all labs were reviewed and interpreted by me.  X-ray were performed in the emergency department and all X-ray impressions were independently interpreted by me.    [facility]                   Patient Care Considerations:    SEPSIS was considered but is NOT present in the emergency department as SIRS criteria is not present.      Consultants/Shared Management Plan:    Hospitalist: I have discussed the case with Dr. Harry who agrees to accept the patient for admission.    Social Determinants of Health:    Patient is unable to carry out activities of daily life. Escalation of care is necessary.       Disposition and Care Coordination:    Admit:   Through independent evaluation of the patient's history, physical, and imperical data, the patient meets criteria for inpatient admission to the hospital.        Final diagnoses:   Acute kidney injury   Acute on chronic anemia   Acute on chronic congestive heart failure, unspecified heart failure type        ED Disposition       ED Disposition   Decision to Admit    Condition   --    Comment   Level of Care: Remote Telemetry [26]   Diagnosis: NIKITA (acute kidney injury) [985795]   Certification: I Certify That Inpatient Hospital Services Are Medically Necessary For Greater Than 2 Midnights                 This medical record created using voice recognition software.             Feroz Francois MD  04/18/25 2990

## 2025-04-19 ENCOUNTER — APPOINTMENT (OUTPATIENT)
Dept: ULTRASOUND IMAGING | Facility: HOSPITAL | Age: 80
End: 2025-04-19
Payer: OTHER GOVERNMENT

## 2025-04-19 ENCOUNTER — APPOINTMENT (OUTPATIENT)
Dept: CT IMAGING | Facility: HOSPITAL | Age: 80
End: 2025-04-19
Payer: OTHER GOVERNMENT

## 2025-04-19 PROBLEM — N32.89 BLADDER WALL THICKENING: Status: ACTIVE | Noted: 2025-04-19

## 2025-04-19 LAB
ABO GROUP BLD: NORMAL
ABO GROUP BLD: NORMAL
ANION GAP SERPL CALCULATED.3IONS-SCNC: 9.9 MMOL/L (ref 5–15)
ANISOCYTOSIS BLD QL: NORMAL
BASOPHILS # BLD AUTO: 0.02 10*3/MM3 (ref 0–0.2)
BASOPHILS NFR BLD AUTO: 0.4 % (ref 0–1.5)
BLD GP AB SCN SERPL QL: NEGATIVE
BUN SERPL-MCNC: 67 MG/DL (ref 8–23)
BUN/CREAT SERPL: 16.1 (ref 7–25)
CALCIUM SPEC-SCNC: 7.7 MG/DL (ref 8.6–10.5)
CHLORIDE SERPL-SCNC: 108 MMOL/L (ref 98–107)
CO2 SERPL-SCNC: 18.1 MMOL/L (ref 22–29)
CREAT SERPL-MCNC: 4.16 MG/DL (ref 0.76–1.27)
CREAT UR-MCNC: 38 MG/DL
DEPRECATED RDW RBC AUTO: 71.8 FL (ref 37–54)
EGFRCR SERPLBLD CKD-EPI 2021: 13.8 ML/MIN/1.73
EOSINOPHIL # BLD AUTO: 0.16 10*3/MM3 (ref 0–0.4)
EOSINOPHIL NFR BLD AUTO: 3.5 % (ref 0.3–6.2)
ERYTHROCYTE [DISTWIDTH] IN BLOOD BY AUTOMATED COUNT: 18.4 % (ref 12.3–15.4)
FOLATE SERPL-MCNC: 12.9 NG/ML (ref 4.78–24.2)
GLUCOSE BLDC GLUCOMTR-MCNC: 123 MG/DL (ref 70–99)
GLUCOSE BLDC GLUCOMTR-MCNC: 174 MG/DL (ref 70–99)
GLUCOSE BLDC GLUCOMTR-MCNC: 268 MG/DL (ref 70–99)
GLUCOSE SERPL-MCNC: 262 MG/DL (ref 65–99)
HCT VFR BLD AUTO: 21.5 % (ref 37.5–51)
HCT VFR BLD AUTO: 29.2 % (ref 37.5–51)
HGB BLD-MCNC: 6.7 G/DL (ref 13–17.7)
HGB BLD-MCNC: 9.3 G/DL (ref 13–17.7)
IMM GRANULOCYTES # BLD AUTO: 0.02 10*3/MM3 (ref 0–0.05)
IMM GRANULOCYTES NFR BLD AUTO: 0.4 % (ref 0–0.5)
IRON 24H UR-MRATE: 41 MCG/DL (ref 59–158)
IRON SATN MFR SERPL: 13 % (ref 20–50)
LYMPHOCYTES # BLD AUTO: 0.65 10*3/MM3 (ref 0.7–3.1)
LYMPHOCYTES NFR BLD AUTO: 14 % (ref 19.6–45.3)
MACROCYTES BLD QL SMEAR: NORMAL
MCH RBC QN AUTO: 33.3 PG (ref 26.6–33)
MCHC RBC AUTO-ENTMCNC: 31.2 G/DL (ref 31.5–35.7)
MCV RBC AUTO: 107 FL (ref 79–97)
MONOCYTES # BLD AUTO: 0.53 10*3/MM3 (ref 0.1–0.9)
MONOCYTES NFR BLD AUTO: 11.4 % (ref 5–12)
NEUTROPHILS NFR BLD AUTO: 3.25 10*3/MM3 (ref 1.7–7)
NEUTROPHILS NFR BLD AUTO: 70.3 % (ref 42.7–76)
NRBC BLD AUTO-RTO: 0 /100 WBC (ref 0–0.2)
PLATELET # BLD AUTO: 99 10*3/MM3 (ref 140–450)
PMV BLD AUTO: 10.6 FL (ref 6–12)
POTASSIUM SERPL-SCNC: 5.2 MMOL/L (ref 3.5–5.2)
PROCALCITONIN SERPL-MCNC: 0.65 NG/ML (ref 0–0.25)
RBC # BLD AUTO: 2.01 10*6/MM3 (ref 4.14–5.8)
RH BLD: POSITIVE
RH BLD: POSITIVE
SMALL PLATELETS BLD QL SMEAR: NORMAL
SODIUM SERPL-SCNC: 136 MMOL/L (ref 136–145)
SODIUM UR-SCNC: 75 MMOL/L
T&S EXPIRATION DATE: NORMAL
TIBC SERPL-MCNC: 313 MCG/DL (ref 298–536)
TRANSFERRIN SERPL-MCNC: 210 MG/DL (ref 200–360)
UUN 24H UR-MCNC: 399 MG/DL
VIT B12 BLD-MCNC: 703 PG/ML (ref 211–946)
WBC MORPH BLD: NORMAL
WBC NRBC COR # BLD AUTO: 4.63 10*3/MM3 (ref 3.4–10.8)

## 2025-04-19 PROCEDURE — 86901 BLOOD TYPING SEROLOGIC RH(D): CPT

## 2025-04-19 PROCEDURE — 36415 COLL VENOUS BLD VENIPUNCTURE: CPT | Performed by: STUDENT IN AN ORGANIZED HEALTH CARE EDUCATION/TRAINING PROGRAM

## 2025-04-19 PROCEDURE — 76775 US EXAM ABDO BACK WALL LIM: CPT

## 2025-04-19 PROCEDURE — 94799 UNLISTED PULMONARY SVC/PX: CPT

## 2025-04-19 PROCEDURE — 80048 BASIC METABOLIC PNL TOTAL CA: CPT | Performed by: STUDENT IN AN ORGANIZED HEALTH CARE EDUCATION/TRAINING PROGRAM

## 2025-04-19 PROCEDURE — 85025 COMPLETE CBC W/AUTO DIFF WBC: CPT | Performed by: STUDENT IN AN ORGANIZED HEALTH CARE EDUCATION/TRAINING PROGRAM

## 2025-04-19 PROCEDURE — 84145 PROCALCITONIN (PCT): CPT | Performed by: INTERNAL MEDICINE

## 2025-04-19 PROCEDURE — 63710000001 INSULIN LISPRO (HUMAN) PER 5 UNITS: Performed by: INTERNAL MEDICINE

## 2025-04-19 PROCEDURE — 25810000003 SODIUM CHLORIDE 0.9 % SOLUTION 250 ML FLEX CONT: Performed by: STUDENT IN AN ORGANIZED HEALTH CARE EDUCATION/TRAINING PROGRAM

## 2025-04-19 PROCEDURE — 86901 BLOOD TYPING SEROLOGIC RH(D): CPT | Performed by: STUDENT IN AN ORGANIZED HEALTH CARE EDUCATION/TRAINING PROGRAM

## 2025-04-19 PROCEDURE — 25810000003 SODIUM CHLORIDE 0.9 % SOLUTION: Performed by: INTERNAL MEDICINE

## 2025-04-19 PROCEDURE — 85018 HEMOGLOBIN: CPT | Performed by: INTERNAL MEDICINE

## 2025-04-19 PROCEDURE — 83540 ASSAY OF IRON: CPT | Performed by: STUDENT IN AN ORGANIZED HEALTH CARE EDUCATION/TRAINING PROGRAM

## 2025-04-19 PROCEDURE — 99222 1ST HOSP IP/OBS MODERATE 55: CPT | Performed by: INTERNAL MEDICINE

## 2025-04-19 PROCEDURE — 25010000002 HEPARIN (PORCINE) PER 1000 UNITS: Performed by: STUDENT IN AN ORGANIZED HEALTH CARE EDUCATION/TRAINING PROGRAM

## 2025-04-19 PROCEDURE — 86900 BLOOD TYPING SEROLOGIC ABO: CPT

## 2025-04-19 PROCEDURE — P9016 RBC LEUKOCYTES REDUCED: HCPCS

## 2025-04-19 PROCEDURE — 82746 ASSAY OF FOLIC ACID SERUM: CPT | Performed by: STUDENT IN AN ORGANIZED HEALTH CARE EDUCATION/TRAINING PROGRAM

## 2025-04-19 PROCEDURE — 84300 ASSAY OF URINE SODIUM: CPT | Performed by: STUDENT IN AN ORGANIZED HEALTH CARE EDUCATION/TRAINING PROGRAM

## 2025-04-19 PROCEDURE — 82607 VITAMIN B-12: CPT | Performed by: STUDENT IN AN ORGANIZED HEALTH CARE EDUCATION/TRAINING PROGRAM

## 2025-04-19 PROCEDURE — 84540 ASSAY OF URINE/UREA-N: CPT | Performed by: STUDENT IN AN ORGANIZED HEALTH CARE EDUCATION/TRAINING PROGRAM

## 2025-04-19 PROCEDURE — 25010000002 AZITHROMYCIN PER 500 MG: Performed by: STUDENT IN AN ORGANIZED HEALTH CARE EDUCATION/TRAINING PROGRAM

## 2025-04-19 PROCEDURE — 63710000001 INSULIN GLARGINE PER 5 UNITS: Performed by: INTERNAL MEDICINE

## 2025-04-19 PROCEDURE — 85007 BL SMEAR W/DIFF WBC COUNT: CPT | Performed by: STUDENT IN AN ORGANIZED HEALTH CARE EDUCATION/TRAINING PROGRAM

## 2025-04-19 PROCEDURE — 74176 CT ABD & PELVIS W/O CONTRAST: CPT

## 2025-04-19 PROCEDURE — 86923 COMPATIBILITY TEST ELECTRIC: CPT

## 2025-04-19 PROCEDURE — 86900 BLOOD TYPING SEROLOGIC ABO: CPT | Performed by: STUDENT IN AN ORGANIZED HEALTH CARE EDUCATION/TRAINING PROGRAM

## 2025-04-19 PROCEDURE — 82570 ASSAY OF URINE CREATININE: CPT | Performed by: STUDENT IN AN ORGANIZED HEALTH CARE EDUCATION/TRAINING PROGRAM

## 2025-04-19 PROCEDURE — 85014 HEMATOCRIT: CPT | Performed by: INTERNAL MEDICINE

## 2025-04-19 PROCEDURE — 36430 TRANSFUSION BLD/BLD COMPNT: CPT

## 2025-04-19 PROCEDURE — 25810000003 SODIUM CHLORIDE 0.9 % SOLUTION: Performed by: STUDENT IN AN ORGANIZED HEALTH CARE EDUCATION/TRAINING PROGRAM

## 2025-04-19 PROCEDURE — 82948 REAGENT STRIP/BLOOD GLUCOSE: CPT

## 2025-04-19 PROCEDURE — 99221 1ST HOSP IP/OBS SF/LOW 40: CPT | Performed by: UROLOGY

## 2025-04-19 PROCEDURE — 84466 ASSAY OF TRANSFERRIN: CPT | Performed by: STUDENT IN AN ORGANIZED HEALTH CARE EDUCATION/TRAINING PROGRAM

## 2025-04-19 PROCEDURE — 99233 SBSQ HOSP IP/OBS HIGH 50: CPT | Performed by: INTERNAL MEDICINE

## 2025-04-19 PROCEDURE — 86850 RBC ANTIBODY SCREEN: CPT | Performed by: STUDENT IN AN ORGANIZED HEALTH CARE EDUCATION/TRAINING PROGRAM

## 2025-04-19 PROCEDURE — 25010000002 CEFTRIAXONE PER 250 MG: Performed by: STUDENT IN AN ORGANIZED HEALTH CARE EDUCATION/TRAINING PROGRAM

## 2025-04-19 RX ORDER — AMOXICILLIN 250 MG
2 CAPSULE ORAL 2 TIMES DAILY PRN
Status: DISCONTINUED | OUTPATIENT
Start: 2025-04-19 | End: 2025-04-23 | Stop reason: HOSPADM

## 2025-04-19 RX ORDER — INSULIN LISPRO 100 [IU]/ML
2-9 INJECTION, SOLUTION INTRAVENOUS; SUBCUTANEOUS
Status: DISCONTINUED | OUTPATIENT
Start: 2025-04-19 | End: 2025-04-23 | Stop reason: HOSPADM

## 2025-04-19 RX ORDER — NICOTINE POLACRILEX 4 MG
15 LOZENGE BUCCAL
Status: DISCONTINUED | OUTPATIENT
Start: 2025-04-19 | End: 2025-04-23 | Stop reason: HOSPADM

## 2025-04-19 RX ORDER — ARFORMOTEROL TARTRATE 15 UG/2ML
15 SOLUTION RESPIRATORY (INHALATION)
Status: DISCONTINUED | OUTPATIENT
Start: 2025-04-19 | End: 2025-04-23 | Stop reason: HOSPADM

## 2025-04-19 RX ORDER — ACETAMINOPHEN 650 MG/1
650 SUPPOSITORY RECTAL EVERY 4 HOURS PRN
Status: DISCONTINUED | OUTPATIENT
Start: 2025-04-19 | End: 2025-04-23 | Stop reason: HOSPADM

## 2025-04-19 RX ORDER — BUDESONIDE 0.5 MG/2ML
0.5 INHALANT ORAL
Status: DISCONTINUED | OUTPATIENT
Start: 2025-04-19 | End: 2025-04-23 | Stop reason: HOSPADM

## 2025-04-19 RX ORDER — ROSUVASTATIN CALCIUM 5 MG/1
5 TABLET, COATED ORAL NIGHTLY
Status: DISCONTINUED | OUTPATIENT
Start: 2025-04-19 | End: 2025-04-23 | Stop reason: HOSPADM

## 2025-04-19 RX ORDER — ONDANSETRON 2 MG/ML
4 INJECTION INTRAMUSCULAR; INTRAVENOUS EVERY 6 HOURS PRN
Status: DISCONTINUED | OUTPATIENT
Start: 2025-04-19 | End: 2025-04-23 | Stop reason: HOSPADM

## 2025-04-19 RX ORDER — SODIUM BICARBONATE 650 MG/1
1300 TABLET ORAL 3 TIMES DAILY
Status: DISCONTINUED | OUTPATIENT
Start: 2025-04-19 | End: 2025-04-23 | Stop reason: HOSPADM

## 2025-04-19 RX ORDER — DEXTROSE MONOHYDRATE 25 G/50ML
25 INJECTION, SOLUTION INTRAVENOUS
Status: DISCONTINUED | OUTPATIENT
Start: 2025-04-19 | End: 2025-04-23 | Stop reason: HOSPADM

## 2025-04-19 RX ORDER — SODIUM CHLORIDE 9 MG/ML
40 INJECTION, SOLUTION INTRAVENOUS AS NEEDED
Status: DISCONTINUED | OUTPATIENT
Start: 2025-04-19 | End: 2025-04-23 | Stop reason: HOSPADM

## 2025-04-19 RX ORDER — INSULIN GLARGINE 100 [IU]/ML
10 INJECTION, SOLUTION SUBCUTANEOUS 2 TIMES DAILY
Status: DISCONTINUED | OUTPATIENT
Start: 2025-04-19 | End: 2025-04-21

## 2025-04-19 RX ORDER — ACETAMINOPHEN 325 MG/1
650 TABLET ORAL EVERY 4 HOURS PRN
Status: DISCONTINUED | OUTPATIENT
Start: 2025-04-19 | End: 2025-04-23 | Stop reason: HOSPADM

## 2025-04-19 RX ORDER — ACETAMINOPHEN 160 MG/5ML
650 SOLUTION ORAL EVERY 4 HOURS PRN
Status: DISCONTINUED | OUTPATIENT
Start: 2025-04-19 | End: 2025-04-23 | Stop reason: HOSPADM

## 2025-04-19 RX ORDER — SODIUM CHLORIDE 0.9 % (FLUSH) 0.9 %
10 SYRINGE (ML) INJECTION AS NEEDED
Status: DISCONTINUED | OUTPATIENT
Start: 2025-04-19 | End: 2025-04-23 | Stop reason: HOSPADM

## 2025-04-19 RX ORDER — SODIUM CHLORIDE 9 MG/ML
75 INJECTION, SOLUTION INTRAVENOUS CONTINUOUS
Status: ACTIVE | OUTPATIENT
Start: 2025-04-19 | End: 2025-04-19

## 2025-04-19 RX ORDER — PANTOPRAZOLE SODIUM 40 MG/1
40 TABLET, DELAYED RELEASE ORAL 2 TIMES DAILY
Status: DISCONTINUED | OUTPATIENT
Start: 2025-04-19 | End: 2025-04-23 | Stop reason: HOSPADM

## 2025-04-19 RX ORDER — POLYETHYLENE GLYCOL 3350 17 G/17G
17 POWDER, FOR SOLUTION ORAL DAILY PRN
Status: DISCONTINUED | OUTPATIENT
Start: 2025-04-19 | End: 2025-04-23 | Stop reason: HOSPADM

## 2025-04-19 RX ORDER — BISACODYL 5 MG/1
5 TABLET, DELAYED RELEASE ORAL DAILY PRN
Status: DISCONTINUED | OUTPATIENT
Start: 2025-04-19 | End: 2025-04-23 | Stop reason: HOSPADM

## 2025-04-19 RX ORDER — ONDANSETRON 4 MG/1
4 TABLET, ORALLY DISINTEGRATING ORAL EVERY 6 HOURS PRN
Status: DISCONTINUED | OUTPATIENT
Start: 2025-04-19 | End: 2025-04-23 | Stop reason: HOSPADM

## 2025-04-19 RX ORDER — BISACODYL 10 MG
10 SUPPOSITORY, RECTAL RECTAL DAILY PRN
Status: DISCONTINUED | OUTPATIENT
Start: 2025-04-19 | End: 2025-04-23 | Stop reason: HOSPADM

## 2025-04-19 RX ORDER — SODIUM CHLORIDE 0.9 % (FLUSH) 0.9 %
10 SYRINGE (ML) INJECTION EVERY 12 HOURS SCHEDULED
Status: DISCONTINUED | OUTPATIENT
Start: 2025-04-19 | End: 2025-04-23 | Stop reason: HOSPADM

## 2025-04-19 RX ORDER — HEPARIN SODIUM 5000 [USP'U]/ML
5000 INJECTION, SOLUTION INTRAVENOUS; SUBCUTANEOUS EVERY 12 HOURS SCHEDULED
Status: DISCONTINUED | OUTPATIENT
Start: 2025-04-19 | End: 2025-04-23 | Stop reason: HOSPADM

## 2025-04-19 RX ORDER — IBUPROFEN 600 MG/1
1 TABLET ORAL
Status: DISCONTINUED | OUTPATIENT
Start: 2025-04-19 | End: 2025-04-23 | Stop reason: HOSPADM

## 2025-04-19 RX ADMIN — SODIUM CHLORIDE 75 ML/HR: 9 INJECTION, SOLUTION INTRAVENOUS at 18:14

## 2025-04-19 RX ADMIN — IPRATROPIUM BROMIDE AND ALBUTEROL SULFATE 3 ML: .5; 3 SOLUTION RESPIRATORY (INHALATION) at 20:28

## 2025-04-19 RX ADMIN — Medication 10 ML: at 21:23

## 2025-04-19 RX ADMIN — INSULIN GLARGINE 10 UNITS: 100 INJECTION, SOLUTION SUBCUTANEOUS at 21:22

## 2025-04-19 RX ADMIN — INSULIN LISPRO 2 UNITS: 100 INJECTION, SOLUTION INTRAVENOUS; SUBCUTANEOUS at 21:22

## 2025-04-19 RX ADMIN — PANTOPRAZOLE SODIUM 40 MG: 40 TABLET, DELAYED RELEASE ORAL at 21:20

## 2025-04-19 RX ADMIN — AZITHROMYCIN DIHYDRATE 500 MG: 500 INJECTION, POWDER, LYOPHILIZED, FOR SOLUTION INTRAVENOUS at 21:21

## 2025-04-19 RX ADMIN — Medication 10 ML: at 09:50

## 2025-04-19 RX ADMIN — BUDESONIDE 0.5 MG: 0.5 INHALANT RESPIRATORY (INHALATION) at 18:59

## 2025-04-19 RX ADMIN — SODIUM BICARBONATE 1300 MG: 650 TABLET ORAL at 21:20

## 2025-04-19 RX ADMIN — ARFORMOTEROL TARTRATE 15 MCG: 15 SOLUTION RESPIRATORY (INHALATION) at 18:59

## 2025-04-19 RX ADMIN — ROSUVASTATIN CALCIUM 5 MG: 5 TABLET, FILM COATED ORAL at 21:20

## 2025-04-19 RX ADMIN — SODIUM CHLORIDE 75 ML/HR: 9 INJECTION, SOLUTION INTRAVENOUS at 00:33

## 2025-04-19 RX ADMIN — ACETAMINOPHEN 650 MG: 325 TABLET ORAL at 21:20

## 2025-04-19 RX ADMIN — HEPARIN SODIUM 5000 UNITS: 5000 INJECTION INTRAVENOUS; SUBCUTANEOUS at 09:49

## 2025-04-19 RX ADMIN — SODIUM BICARBONATE 1300 MG: 650 TABLET ORAL at 16:38

## 2025-04-19 RX ADMIN — SODIUM CHLORIDE 1000 MG: 9 INJECTION INTRAMUSCULAR; INTRAVENOUS; SUBCUTANEOUS at 21:21

## 2025-04-19 RX ADMIN — HEPARIN SODIUM 5000 UNITS: 5000 INJECTION INTRAVENOUS; SUBCUTANEOUS at 21:21

## 2025-04-19 RX ADMIN — Medication 10 ML: at 00:34

## 2025-04-19 RX ADMIN — INSULIN LISPRO 6 UNITS: 100 INJECTION, SOLUTION INTRAVENOUS; SUBCUTANEOUS at 12:09

## 2025-04-19 NOTE — CONSULTS
[facility]   Urology Consult Note    Patient Name: Darnell Geller  : 1945  MRN: [id number]  Primary Care Physician:  Cami Sargent APRN  Referring Physician: Feroz Francois MD  Date of admission: 2025    Inpatient Urology Consult  Consult performed by: Kiersten Jordan MD  Consult ordered by: Mamadou Perkins MD        Subjective   Subjective     Reason for Consult/ Chief Complaint: Bilateral hydronephrosis, neurogenic bladder, urinary retention with chronic indwelling soler catheter, acute on chronic renal failure      Darnell Geller is a 79 y.o. male with chronic urinary retention likely multifactorial and long standing chronic left hydronephrosis.  He presented to ED with increased confusion and difficutly breathing and CT scan revealed new onset right hydronephrosis.  There was right hydronephrosis present back in  but it appears it resolved.  He has had long term left hydronephrosis for several years.  Decision has been made previously to not do a workup for the hydro.  His renal function is slightly worse this admission.         Personal History     Past Medical History:   Diagnosis Date    Arthritis     Benign prostatic hyperplasia     Coronary artery disease     Hyperlipidemia     Hypertension     Neuropathy     Renal disorder     Spinal stenosis        Past Surgical History:   Procedure Laterality Date    SINUS SURGERY         Family History: family history is not on file. Otherwise pertinent FHx was reviewed and not pertinent to current issue.    Social History:  reports that he has quit smoking. His smoking use included cigarettes. He has a 60 pack-year smoking history. He has never used smokeless tobacco. He reports that he does not currently use alcohol. He reports that he does not use drugs.    Home Medications:   Doxepin HCl, Insulin Aspart, cephalexin, cholecalciferol, ferrous sulfate, furosemide, gabapentin, insulin glargine, metoprolol tartrate, pantoprazole,  rosuvastatin, sennosides-docusate, and sodium bicarbonate    Allergies:  Allergies   Allergen Reactions    Dabigatran Etexilate Mesylate GI Intolerance       Objective    Objective     Vitals:  Temp:  [92.8 °F (33.8 °C)-97.9 °F (36.6 °C)] 97.8 °F (36.6 °C)  Heart Rate:  [61-97] 93  Resp:  [14-17] 16  BP: (101-135)/(50-63) 135/55    Physical Exam  Vitals and nursing note reviewed.   Constitutional:       Appearance: He is ill-appearing.   Pulmonary:      Breath sounds: Wheezing and rhonchi present.   Genitourinary:     Comments: Urine clear in soler        Result Review    Result Review:  I have personally reviewed the results from the time of this admission to 4/19/2025 13:25 EDT and agree with these findings:  [x]  Laboratory  CBC          2/26/2025    14:30 4/18/2025    12:02 4/18/2025    18:50 4/19/2025    05:10   CBC   WBC 5.32  7.40  6.33  4.63    RBC 2.93  2.25  2.31  2.01    Hemoglobin 9.4  7.6  7.8  6.7    Hematocrit 29.7  24.6  24.8  21.5    .4  109.3  107.4  107.0    MCH 32.1  33.8  33.8  33.3    MCHC 31.6  30.9  31.5  31.2    RDW 16.1  18.9  18.6  18.4    Platelets 154  136  120  99       BMP          2/26/2025    14:30 4/18/2025    12:02 4/18/2025    18:50 4/19/2025    05:10   BMP   BUN 52  66     66  66  67    Creatinine 2.92  4.02     4.02  4.27  4.16    Sodium 140  136     136  135  136    Potassium 4.4  4.8     4.8  4.9  5.2    Chloride 109  105     105  102  108    CO2 17.1  20.6     20.6  19.7  18.1    Calcium 8.7  8.4     8.4  8.1  7.7       [x]  Microbiology  [x]  Radiology  Study Result    Narrative & Impression   CT ABDOMEN PELVIS WO CONTRAST     Date of Exam: 4/19/2025 11:25 AM EDT     Indication: Bilateral hydronephrosis, abdominal pain, NIKITA.     Comparison: CT abdomen and pelvis dated 11/9/2024     Technique: Axial CT images were obtained of the abdomen and pelvis without the administration of contrast. Reconstructed coronal and sagittal images were also obtained. Automated exposure  control and iterative construction methods were used.        Findings:  The heart size is normal. There is no pericardial effusion. There is coronary artery atherosclerotic calcification. There are small to moderate bilateral pleural effusions, left greater than right. There is bandlike atelectasis within the bilateral lower   lobes.     The liver is nodular in contour suggesting possible underlying cirrhosis. There is no focal liver lesion by noncontrast technique. The gallbladder is present without wall thickening. There is no intrahepatic or extrahepatic biliary ductal dilatation.     The spleen is mildly enlarged measuring 15.5 cm in craniocaudal dimension. This could relate to portal hypertension in the setting of cirrhosis. The adrenal glands appear within normal limits. There is pancreatic parenchymal volume loss.     The kidneys are symmetric in size. There is bilateral renal cortical thinning. There is bilateral hydroureteronephrosis to the level of the urinary bladder. There is no evidence of obstructing stone or mass by noncontrast technique. The left   hydronephrosis appears chronic from the prior examination but the right-sided hydronephrosis appears new. The urinary bladder is collapsed with Muñiz catheter present. There is circumferential bladder wall thickening and inflammatory stranding. The   prostate appears mildly enlarged.     The stomach and duodenum are normal in caliber and configuration. There are no abnormally dilated loops of small bowel to suggest small bowel obstruction or small bowel inflammation. The appendix is visualized and normal within the right lower quadrant.   There is colonic diverticulosis without acute diverticulitis. There is a moderate colonic stool burden.      The aorta is normal in caliber without aneurysm formation. There is no abdominal or pelvic lymphadenopathy by size criteria. There is degenerative disc disease most notable at L4-L5 and L5-S1.      IMPRESSION:  Impression:  1.Moderate bilateral hydroureteronephrosis to the level of the urinary bladder. No evidence of obstructing stone or mass by noncontrast technique. This appears similar on the left but new on the right compared to prior CT from 11/9/2024.  2.Circumferential bladder wall thickening with inflammatory stranding. Correlate clinically for cystitis.  3.Nodular contour of the liver suggesting underlying cirrhosis.  4.Mild splenomegaly which could relate to portal hypertension in the setting of cirrhosis.  5.Small to moderate bilateral pleural effusions, left greater than right.           Electronically Signed: Mil Bahena MD    4/19/2025 12:35 PM EDT    Workstation ID: [id number]     Study Result    Narrative & Impression   PROCEDURE:CT ABDOMEN PELVIS WO CONTRAST     COMPARISON:None     INDICATIONS:Sepsis,renal failure,pt. self cath's     TECHNIQUE:    CT images were created without intravenous contrast.       PROTOCOL:     Standard imaging protocol performed                 RADIATION:                     Automated exposure control was utilized to minimize radiation dose.      FINDINGS:          Lower chest:  Small bilateral pleural effusions with secondary atelectasis in the lower lobes.    Coronary artery calcification     Organs:  Fine nodular surface contour of the liver.  Mild splenomegaly, 14 cm length.  No   urolithiasis.  Moderate bilateral hydroureteronephrosis to the bladder.  Pancreas, adrenal glands,   gallbladder have an unremarkable noncontrast appearance     GI tract:  No acute abnormality demonstrated.  Colonic diverticula with no associated inflammation.    Normal appendix     Pelvis:  Muñiz catheter in the urinary bladder which is decompressed.  Bladder wall appears   thickened even when accounting for lack of distension, with perivesical fat stranding.  Free fluid   in the pelvis.  Presacral edema     Peritoneum/retroperitoneum:  No pneumoperitoneum.  Small ascites in the  upper abdomen and pericolic   gutters.  Normal caliber atherosclerotic aorta     Bones/soft tissues:  No acute bony abnormality.  Old appearing T11 wedge compression fracture.    Degenerative disease in the lower thoracic and lumbar spine     IMPRESSION:                    1. Wall thickening of the urinary bladder with perivesical fat stranding which could be due to   cystitis and/or chronic bladder outlet obstruction.  The urinary bladder is currently decompressed   by catheter     2. Bilateral hydroureteronephrosis to the urinary bladder presumably caused by back pressure from   functional bladder abnormality     3. Probable cirrhosis with splenomegaly and ascites.       4. Small bilateral pleural effusions.       5. Colonic diverticulosis     6. Calcific coronary atherosclerosis                   MAE BUCIO MD         Electronically Signed and Approved By: MAE BUCIO MD on 3/20/2023 at 14:10        []  EKG/Telemetry   []  Cardiology/Vascular   []  Pathology  [x]  Old records  []  Other:      Assessment & Plan   Assessment / Plan       Active Hospital Problems:  Active Hospital Problems    Diagnosis     **NIKITA (acute kidney injury)     Bladder wall thickening     Benign prostatic hyperplasia with urinary retention     Bilateral hydronephrosis     Atrial fibrillation and flutter     Diabetes mellitus        Plan:  Chronic left and new right hydronephrosis, chronic urinary retention and thickened bladder with acute on chronic renal failure.     Will monitor for now.  Long discussion with the patient and his wife about workup for the new right and chronic left hydronephrosis.  Currently they wish to monitor and we will continue to watch creatinine.  We did discuss that workup for the hydronephrosis would require anesthesia and cystoscopy with bialteral retrogrades.  If no cause found, or if related to bladder outlet obstruction and thickening then treatment would be placement of ureteral stent (which we  increase his risk of pyelonephritis given chronic soler catheter) or nephrostomy tubes.  Will continue to follow. No current  intervention planned.       Electronically signed by Kiersten Jordan MD, 04/19/25, 1:11 PM EDT.

## 2025-04-19 NOTE — PLAN OF CARE
Goal Outcome Evaluation:  Plan of Care Reviewed With: patient           Outcome Evaluation: Pt alert to self. Pt complains of pain in elbow rating 5/10 on pain scale. Pt has soler catheter that was replaced 4/18 by home health nurse before arriving to hospital. Pt appears to be hard of hearing and reports he wears glasses and his wife nikolas took them home with her. Spoke with wife regarding admission questions.VSS with no concerns at this time. Will continue plan of care.

## 2025-04-19 NOTE — H&P
Patient Care Team:  Cami Sargent APRN as PCP - General (Family Medicine)    Chief complaint abnormal labs    Subjective     Patient is a 79 y.o. male presents with abnormal labs.  Patient has a history of CKD stage IV, diabetes, urinary retention with indwelling catheter, A-fib and dementia.  Patient presented here posterior home nurse labs revealed low hemoglobin and worsening kidney function.  Patient is on Eliquis    With regards to his kidney function, patient follows with Dr. Robertson.  Last visit was in February 2025.  Patient has a history of obstructive uropathy, urinary retention and left hydronephrosis.  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    Patient was found to have pneumonia as well and is covered with Rocephin and was giving an in nebulizer treatment for complaints of shortness of breath.    Review of Systems   Pertinent items are noted in HPI    History  Past Medical History:   Diagnosis Date    Arthritis     Benign prostatic hyperplasia     Coronary artery disease     Hyperlipidemia     Hypertension     Neuropathy     Renal disorder     Spinal stenosis      Past Surgical History:   Procedure Laterality Date    SINUS SURGERY       History reviewed. No pertinent family history.  Social History     Tobacco Use    Smoking status: Former     Current packs/day: 1.50     Average packs/day: 1.5 packs/day for 40.0 years (60.0 ttl pk-yrs)     Types: Cigarettes    Smokeless tobacco: Never   Vaping Use    Vaping status: Every Day   Substance Use Topics    Alcohol use: Not Currently    Drug use: Never     (Not in a hospital admission)   Allergies:  Dabigatran etexilate mesylate    Objective     Vital Signs  Temp:  [92.8 °F (33.8 °C)-94.8 °F (34.9 °C)] 94.8 °F (34.9 °C)  Heart Rate:  [61-92] 92  Resp:  [14-17] 14  BP: (101-120)/(50-58) 120/58    Physical Exam:      General Appearance:  Alert, cooperative, in no acute distress   Head:  Normocephalic,  without obvious abnormality, atraumatic   Eyes:  Lids and lashes normal, conjunctivae and sclerae normal, no icterus, no pallor, corneas clear, PERRLA   Ears:  Ears appear intact with no abnormalities noted   Throat:  No oral lesions, no thrush, oral mucosa moist   Neck:  No adenopathy, supple, trachea midline, no thyromegaly, no carotid bruit, no JVD   Back:  No kyphosis present, no scoliosis present, no skin lesions, erythema or scars, no tenderness to percussion or palpation, range of motion normal   Lungs:  Clear to auscultation, respirations regular, even and unlabored    Heart:  Regular rhythm and normal rate, normal S1 and S2, no murmur, no gallop, no rub, no click   Chest Wall:  No abnormalities observed   Abdomen:  Normal bowel sounds, no masses, no organomegaly, soft non-tender, non-distended, no guarding, no rebound tenderness   Rectal:  Deferred   Extremities:  Moves all extremities well, no edema, no cyanosis, no redness   Pulses:  Pulses palpable and equal bilaterally   Skin:  No bleeding, bruising or rash   Lymph nodes:  No palpable adenopathy   Neurologic:  Cranial nerves 2 - 12 grossly intact, sensation intact, DTR present and equal bilaterally     Results Review:    I reviewed the patient's new clinical results.  I reviewed the patient's new imaging results and agree with the interpretation.  I reviewed the patient's other test results and agree with the interpretation  I personally viewed and interpreted the patient's EKG/Telemetry data    Assessment & Plan       NIKITA (acute kidney injury)  Community-acquired pneumonia   chronic hydronephrosis and obstructive uropathy  Diabetes   macrocytic anemia, suspected to be secondary to CKD 3  History of BPH      Admit to hospitalist service  Remote telemetry   renal ultrasound  Urine diagnostic indices  Hold nephrotoxic agents and renally dose medications  Iron studies, B12, folate  Rocephin and azithromycin to cover for pneumonia  Heart healthy consistent  carb diet   insulin sliding scale  Restart pertinent home medication  Full code        Lisandro Harry MD  04/18/25  21:39 EDT

## 2025-04-19 NOTE — PLAN OF CARE
Goal Outcome Evaluation:  Plan of Care Reviewed With: patient   Pt vss. Pt received 1 unit RBC's this shift. Post H&H to be drawn at 1830. Pt has no complaints of pain throughout shift. Continuing with plan of care.

## 2025-04-19 NOTE — CONSULTS
[facility]   Nephrology Consult Note      Patient Name: Darnell Geller  : 1945  MRN: [id number]  Primary Care Physician:  Cami Sargent APRN  Referring Physician: Feroz Francois MD  Date of admission: 2025    Subjective   Subjective     Reason for Consult/ Chief Complaint: Acute on chronic kidney disease    HPI:  Darnell Geller is a 79 y.o. male with history of CKD stage IV, baseline creatinine lately between 2.5-3.0 presented with generalized weakness.  CT scan showed bilateral hydronephrosis and worsening renal function.  Per wife, he was recently hospitalized at [facility] with what sounded like congestive heart failure.  With diuresis his renal function and blood pressure dropped.  Has not seen nephrology at [facility] as well.  Last office visit 2024, his CKD was attributed to diabetes and hypertension.    Wife at bedside, provided history.  1100 cc urine output recorded last 24 hours.  He was on bicarb, Lasix and vitamin D among other medications at home.  Labs reviewed.    Review of Systems   All systems were reviewed and negative except for: What is mentioned above.    Personal History     Past Medical History:   Diagnosis Date    Arthritis     Benign prostatic hyperplasia     Coronary artery disease     Hyperlipidemia     Hypertension     Neuropathy     Renal disorder     Spinal stenosis    Chronic kidney disease stage IV.    Past Surgical History:   Procedure Laterality Date    SINUS SURGERY         Family History: family history is not on file. Otherwise pertinent FHx was reviewed and not pertinent to current issue.    Social History:  reports that he has quit smoking. His smoking use included cigarettes. He has a 60 pack-year smoking history. He has never used smokeless tobacco. He reports that he does not currently use alcohol. He reports that he does not use drugs.    Home Medications:  Doxepin HCl, Insulin Aspart, cephalexin, cholecalciferol, ferrous sulfate, furosemide, gabapentin,  insulin glargine, metoprolol tartrate, pantoprazole, rosuvastatin, sennosides-docusate, and sodium bicarbonate    Allergies:  Allergies   Allergen Reactions    Dabigatran Etexilate Mesylate GI Intolerance       Objective    Objective     Vitals:   Temp:  [92.8 °F (33.8 °C)-97.9 °F (36.6 °C)] 97.8 °F (36.6 °C)  Heart Rate:  [61-97] 89  Resp:  [14-20] 18  BP: (101-149)/(50-63) 149/62     Physical Exam    Constitutional: Awake, alert, hard of hearing, pale.   Eyes: sclerae anicteric, no conjunctival injection   HENT: mucous membranes moist   Neck: Supple, no thyromegaly, no lymphadenopathy, trachea midline, No JVD   Respiratory: Clear to auscultation bilaterally, nonlabored respirations    Cardiovascular: RRR, no murmurs, rubs, or gallops.   Gastrointestinal: Positive bowel sounds, soft, nontender, nondistended   Musculoskeletal: Trace hip edema, no clubbing or cyanosis   Psychiatric: Appropriate affect, cooperative   Neurologic: Oriented, moving all extremities, Cranial Nerves grossly intact, speech is mumbled.   Skin: warm and dry, no rashes   Muñiz catheter in place.     Result Review    Result Reviewed:  I have personally reviewed the results from the time of this admission to 4/19/2025 14:39 EDT and agree with these findings:  [x]  Laboratory  []  Microbiology  []  Radiology  []  EKG/Telemetry   []  Cardiology/Vascular   []  Pathology  []  Old records  []  Other:  LAB RESULTS:    LAB RESULTS:        Lab 04/19/25  0510 04/18/25  1850 04/18/25  1202   SODIUM 136 135* 136  136   POTASSIUM 5.2 4.9 4.8  4.8   CHLORIDE 108* 102 105  105   CO2 18.1* 19.7* 20.6*  20.6*   BUN 67* 66* 66*  66*   CREATININE 4.16* 4.27* 4.02*  4.02*   GLUCOSE 262* 209* 132*  132*   EGFR 13.8* 13.3* 14.3*  14.3*   ANION GAP 9.9 13.3 10.4  10.4   PHOSPHORUS  --   --  5.8*     US Renal Bilateral  Result Date: 4/19/2025  US RENAL BILATERAL-  Date of exam: 4/19/2025 12:49 AM.  Indications: NIKITA; n17.9-acute kidney failure, unspecified;  d64.9-anemia, unspecified; i50.9-heart failure, unspecified.  Comparisons: 3/14/2025; 11/9/2024; 11/8/2024; 3/20/2023.  TECHNIQUE/FINDINGS: A bilateral renal ultrasound examination was performed. Two-dimensional (2D) grayscale (B-mode) images as well as color Doppler images are provided for review. The bilateral kidneys and the urinary bladder were evaluated. The abdominal aorta was not evaluated. There is moderate-to-severe hydronephrosis bilaterally. Bilateral hydroureter is also suspected. No pelvicalyceal stones are seen. No suspicious renal mass is identified. The renal parenchymal echotexture is probably within normal limits. The right kidney measures 11.2 x 5.9 x 5.5 cm. The right renal volume is not provided. The left kidney measures 11.9 x 6 x 5 cm. The left renal volume is not provided. Incidentally, there is splenomegaly. The spleen measures 15.3 x 6.2 x 12.7 cm. The splenic volume is 635.4 mL. Chronic calcified granulomatous disease involves the spleen. The urinary bladder contains a catheter and is under-distended. The urinary bladder wall is thickened, which may be an artifact. However, age-indeterminate infectious/inflammatory cystitis is possible.        Impression:  1. Moderate-to-severe hydronephrosis is seen bilaterally.  2. No definite nephrolithiasis.  3. No suspicious renal masses are seen.  4. The renal parenchymal echotexture is within normal limits bilaterally.  5. Urinary bladder wall thickening is seen, which may represent an underdistention artifact. However, an age-indeterminate infectious/inflammatory cystitis is possible. A urinary bladder catheter is in place.  6. Please see above comments for further detail.   Portions of this note were completed with a voice recognition program.  4/19/2025 1:55 AM by Darnell Fraser MD on Workstation: [id number]         Most notable findings include: As above.  Elevated BNP.  .  Iron saturation 13%  Urinalysis with many RBCs and WBC.  Stool occult  blood positive  .    Assessment & Plan   Assessment / Plan     Brief Patient Summary:  Darnell Geller is a 79 y.o. male who has multiple medical problems including renal failure, here with worsening kidney function and severe bilateral hydronephrosis.    Active Hospital Problems:  Active Hospital Problems    Diagnosis     **NIKITA (acute kidney injury)     Bladder wall thickening     Benign prostatic hyperplasia with urinary retention     Bilateral hydronephrosis     Atrial fibrillation and flutter     Diabetes mellitus        Assessment and Plan:   -Acute kidney injury, possibly obstructive in nature, has bilateral moderate severe hydronephrosis.  Urinalysis with many WBC and RBC.  Being evaluated by urology.  Continue normal saline at 75 cc an hour for 6 more hours and reevaluate.  - CKD stage IV, baseline creatinine 2.5-3.0 from previous records.  Recent hospitalization [facility] with diuresis and acute kidney injury may have rendered baseline creatinine even higher.  Avoid nephrotoxins.  Wife stated that they will pursue dialysis if needed.  - Severe anemia with evidence of iron deficiency and macrocytosis, stool occult blood positive, transfusion in progress.  Sees hematology.  - Borderline hyperkalemia, I added low potassium restriction to his diet.  Resume loop diuretic soon.  - Metabolic acidosis, possibly secondary to CKD.  Add oral sodium bicarb and monitor.  - Diabetes with complications, per primary.  - Hypocalcemia with secondary hyperparathyroidism and previous vitamin D deficiency, check level of the latter with a.m. labs.    Discussed with urology and primary.  Discussed with family  Will follow  Please call with any questions      Thank you very much for this consult!    Electronically signed by Eusebio Robertson MD, 4/19/2025, 14:39 EDT.

## 2025-04-19 NOTE — CONSULTS
[facility]  Consult    Patient Name: Darnell Geller  : 1945  MRN: [id number]  Primary Care Physician: Cami Sargent APRN  Requesting Physician: Mamadou Perkins MD  Date of admission: 2025      Subjective   Subjective     Chief Complaint:  Abnormal labs    HPI:  Darnell Geller is a 79 y.o. male  with medical history significant for BPH, CAD, CKD stage IV, diabetes, hyperlipidemia, hypertension, monoclonal gammopathy, history of moderate left-sided hydronephrosis, history of urinary retention with Muñiz, dementia. Pt presented with worsening labs and found to have worsening renal function and anemia.  Patient is established with medical oncologist Dr. Beckford but I was consulted as the on-call hematologist oncologist for recommendations given patient's history of monoclonal gammopathy. Medical history obtained with assistance of his wife who is at his bedside.     Patient's renal ultrasound showed bilateral moderate to severe hydronephrosis.  Appears patient has a baseline creatinine ranging tween 2.6-3, but on admission creatinine 4.    Patient was evaluated by Dr. Beckford in 2025 where he was offered a bone marrow biopsy to further assess for multiple myeloma, since skeletal survey was negative, but patient declined this recommendation and preferred surveillance with labs.  Per review of labs patient had UPEP from 2025 that was positive for M spike of 18.5, along with immunofixation showing IgG monoclonal protein with kappa light chain specificity.  SPEP from 2025 revealed M spike of 2.2, with immunofixation revealing an IgG Monocal protein with kappa light chain specificity and free light chains elevated in 2025 with free light chain kappa elevated 902.3, free light chain lambda 68.2, kappa lambda ratio of 13.2.        Review of Systems   Constitutional:  Positive for fatigue.   Hematological:  Does not bruise/bleed easily.   Psychiatric/Behavioral:  Negative for agitation.            Personal History     Past Medical History:   Diagnosis Date    Arthritis     Benign prostatic hyperplasia     Coronary artery disease     Hyperlipidemia     Hypertension     Neuropathy     Renal disorder     Spinal stenosis        Past Surgical History:   Procedure Laterality Date    SINUS SURGERY         Family History: family history is not on file. Otherwise pertinent FHx was reviewed and unremarkable.     Social History:  reports that he has quit smoking. His smoking use included cigarettes. He has a 60 pack-year smoking history. He has never used smokeless tobacco. He reports that he does not currently use alcohol. He reports that he does not use drugs.    Medications:    Available home medication information reviewed.    Current Facility-Administered Medications:     acetaminophen (TYLENOL) tablet 650 mg, 650 mg, Oral, Q4H PRN **OR** acetaminophen (TYLENOL) 160 MG/5ML oral solution 650 mg, 650 mg, Oral, Q4H PRN **OR** acetaminophen (TYLENOL) suppository 650 mg, 650 mg, Rectal, Q4H PRN, Lisandro Harry MD    arformoterol (BROVANA) nebulizer solution 15 mcg, 15 mcg, Nebulization, BID - RT, Mamadou Perkins MD    azithromycin (ZITHROMAX) 500 mg in sodium chloride 0.9 % 250 mL IVPB-VTB, 500 mg, Intravenous, Q24H, iLsandro Harry MD    sennosides-docusate (PERICOLACE) 8.6-50 MG per tablet 2 tablet, 2 tablet, Oral, BID PRN **AND** polyethylene glycol (MIRALAX) packet 17 g, 17 g, Oral, Daily PRN **AND** bisacodyl (DULCOLAX) EC tablet 5 mg, 5 mg, Oral, Daily PRN **AND** bisacodyl (DULCOLAX) suppository 10 mg, 10 mg, Rectal, Daily PRN, Lisandro Harry MD    budesonide (PULMICORT) nebulizer solution 0.5 mg, 0.5 mg, Nebulization, BID - RT, Mamadou Perkins MD    cefTRIAXone (ROCEPHIN) in NS 1 gram/10ml IV PUSH syringe, 1,000 mg, Intravenous, Q24H, Lisandro Harry MD    dextrose (D50W) (25 g/50 mL) IV injection 25 g, 25 g, Intravenous, Q15 Min PRN, Mamadou Perkins MD    dextrose (GLUTOSE) oral gel 15 g, 15 g, Oral, Q15  Min PRN, Mamadou Perkins MD    glucagon (GLUCAGEN) injection 1 mg, 1 mg, Intramuscular, Q15 Min PRN, Mamadou Perkins MD    heparin (porcine) 5000 UNIT/ML injection 5,000 Units, 5,000 Units, Subcutaneous, Q12H, Lisandro Harry MD, 5,000 Units at 04/19/25 0949    insulin glargine (LANTUS, SEMGLEE) injection 10 Units, 10 Units, Subcutaneous, BID, Mamadou Perkins MD    Insulin Lispro (humaLOG) injection 2-9 Units, 2-9 Units, Subcutaneous, 4x Daily AC & at Bedtime, Mamadou Perkins MD, 6 Units at 04/19/25 1209    ipratropium-albuterol (DUO-NEB) nebulizer solution 3 mL, 3 mL, Nebulization, Q4H PRN, Lisandro Harry MD    ondansetron ODT (ZOFRAN-ODT) disintegrating tablet 4 mg, 4 mg, Oral, Q6H PRN **OR** ondansetron (ZOFRAN) injection 4 mg, 4 mg, Intravenous, Q6H PRN, Lisandro Harry MD    pantoprazole (PROTONIX) EC tablet 40 mg, 40 mg, Oral, BID, Mamadou Perkins MD    rosuvastatin (CRESTOR) tablet 5 mg, 5 mg, Oral, Nightly, Mamadou Perkins MD    sodium chloride 0.9 % flush 10 mL, 10 mL, Intravenous, Q12H, Lisandro Harry MD, 10 mL at 04/19/25 0950    sodium chloride 0.9 % flush 10 mL, 10 mL, Intravenous, PRN, Lisandro Harry MD    sodium chloride 0.9 % infusion 40 mL, 40 mL, Intravenous, PRN, Lisandro Harry MD    sodium chloride 0.9 % infusion, 75 mL/hr, Intravenous, Continuous, Lisandro Harry MD, Last Rate: 75 mL/hr at 04/19/25 0033, 75 mL/hr at 04/19/25 0033      Allergies   Allergen Reactions    Dabigatran Etexilate Mesylate GI Intolerance       Objective   Objective     Vital Signs:   Temp:  [92.8 °F (33.8 °C)-97.9 °F (36.6 °C)] 97.8 °F (36.6 °C)  Heart Rate:  [61-97] 89  Resp:  [14-20] 18  BP: (101-149)/(50-63) 149/62        Physical Exam  Vitals reviewed.   Constitutional:       General: He is not in acute distress.  HENT:      Head: Normocephalic and atraumatic.   Eyes:      Extraocular Movements: Extraocular movements intact.      Conjunctiva/sclera: Conjunctivae normal.   Pulmonary:      Effort: Pulmonary effort is normal.    Skin:     General: Skin is warm and dry.      Findings: No rash.   Neurological:      Mental Status: He is alert and oriented to person, place, and time.            Results Reviewed:  I have personally reviewed most recent lab results and agree with findings, most notably: anemia, elevated Cr.    Results from last 7 days   Lab Units 04/19/25  0510   WBC 10*3/mm3 4.63   HEMOGLOBIN g/dL 6.7*   HEMATOCRIT % 21.5*   PLATELETS 10*3/mm3 99*     Results from last 7 days   Lab Units 04/19/25  0510 04/18/25  1850   SODIUM mmol/L 136 135*   POTASSIUM mmol/L 5.2 4.9   CHLORIDE mmol/L 108* 102   CO2 mmol/L 18.1* 19.7*   BUN mg/dL 67* 66*   CREATININE mg/dL 4.16* 4.27*   GLUCOSE mg/dL 262* 209*   CALCIUM mg/dL 7.7* 8.1*   ALK PHOS U/L  --  241*   ALT (SGPT) U/L  --  27   AST (SGOT) U/L  --  49*   PROBNP pg/mL  --  2,702.0*   PROCALCITONIN ng/mL 0.65*  --      Estimated Creatinine Clearance: 16.1 mL/min (A) (by C-G formula based on SCr of 4.16 mg/dL (H)).  Brief Urine Lab Results  (Last result in the past 365 days)        Color   Clarity   Blood   Leuk Est   Nitrite   Protein   CREAT   Urine HCG        04/18/25 2158 Yellow   Clear   Large (3+)   Moderate (2+)   Negative   100 mg/dL (2+)                 Imaging Results (Last 24 Hours)       Procedure Component Value Units Date/Time    CT Abdomen Pelvis Without Contrast [[id number]] Collected: 04/19/25 1223     Updated: 04/19/25 1237    Narrative:      CT ABDOMEN PELVIS WO CONTRAST    Date of Exam: 4/19/2025 11:25 AM EDT    Indication: Bilateral hydronephrosis, abdominal pain, NIKITA.    Comparison: CT abdomen and pelvis dated 11/9/2024    Technique: Axial CT images were obtained of the abdomen and pelvis without the administration of contrast. Reconstructed coronal and sagittal images were also obtained. Automated exposure control and iterative construction methods were used.      Findings:  The heart size is normal. There is no pericardial effusion. There is coronary artery  atherosclerotic calcification. There are small to moderate bilateral pleural effusions, left greater than right. There is bandlike atelectasis within the bilateral lower   lobes.    The liver is nodular in contour suggesting possible underlying cirrhosis. There is no focal liver lesion by noncontrast technique. The gallbladder is present without wall thickening. There is no intrahepatic or extrahepatic biliary ductal dilatation.    The spleen is mildly enlarged measuring 15.5 cm in craniocaudal dimension. This could relate to portal hypertension in the setting of cirrhosis. The adrenal glands appear within normal limits. There is pancreatic parenchymal volume loss.    The kidneys are symmetric in size. There is bilateral renal cortical thinning. There is bilateral hydroureteronephrosis to the level of the urinary bladder. There is no evidence of obstructing stone or mass by noncontrast technique. The left   hydronephrosis appears chronic from the prior examination but the right-sided hydronephrosis appears new. The urinary bladder is collapsed with Muñiz catheter present. There is circumferential bladder wall thickening and inflammatory stranding. The   prostate appears mildly enlarged.    The stomach and duodenum are normal in caliber and configuration. There are no abnormally dilated loops of small bowel to suggest small bowel obstruction or small bowel inflammation. The appendix is visualized and normal within the right lower quadrant.   There is colonic diverticulosis without acute diverticulitis. There is a moderate colonic stool burden.     The aorta is normal in caliber without aneurysm formation. There is no abdominal or pelvic lymphadenopathy by size criteria. There is degenerative disc disease most notable at L4-L5 and L5-S1.      Impression:      Impression:  1.Moderate bilateral hydroureteronephrosis to the level of the urinary bladder. No evidence of obstructing stone or mass by noncontrast technique.  This appears similar on the left but new on the right compared to prior CT from 11/9/2024.  2.Circumferential bladder wall thickening with inflammatory stranding. Correlate clinically for cystitis.  3.Nodular contour of the liver suggesting underlying cirrhosis.  4.Mild splenomegaly which could relate to portal hypertension in the setting of cirrhosis.  5.Small to moderate bilateral pleural effusions, left greater than right.        Electronically Signed: Mil Bahena MD    4/19/2025 12:35 PM EDT    Workstation ID: [id number]    US Renal Bilateral [[id number]] Collected: 04/19/25 0146     Updated: 04/19/25 0157    Narrative:      US RENAL BILATERAL-     Date of exam: 4/19/2025 12:49 AM.     Indications: NIKITA; n17.9-acute kidney failure, unspecified; d64.9-anemia,  unspecified; i50.9-heart failure, unspecified.     Comparisons: 3/14/2025; 11/9/2024; 11/8/2024; 3/20/2023.     TECHNIQUE/FINDINGS:  A bilateral renal ultrasound examination was performed. Two-dimensional  (2D) grayscale (B-mode) images as well as color Doppler images are  provided for review. The bilateral kidneys and the urinary bladder were  evaluated. The abdominal aorta was not evaluated. There is  moderate-to-severe hydronephrosis bilaterally. Bilateral hydroureter is  also suspected. No pelvicalyceal stones are seen. No suspicious renal  mass is identified. The renal parenchymal echotexture is probably within  normal limits. The right kidney measures 11.2 x 5.9 x 5.5 cm. The right  renal volume is not provided. The left kidney measures 11.9 x 6 x 5 cm.  The left renal volume is not provided. Incidentally, there is  splenomegaly. The spleen measures 15.3 x 6.2 x 12.7 cm. The splenic  volume is 635.4 mL. Chronic calcified granulomatous disease involves the  spleen. The urinary bladder contains a catheter and is under-distended.  The urinary bladder wall is thickened, which may be an artifact.  However, age-indeterminate infectious/inflammatory  cystitis is possible.             Impression:         1. Moderate-to-severe hydronephrosis is seen bilaterally.     2. No definite nephrolithiasis.     3. No suspicious renal masses are seen.     4. The renal parenchymal echotexture is within normal limits  bilaterally.     5. Urinary bladder wall thickening is seen, which may represent an  underdistention artifact. However, an age-indeterminate  infectious/inflammatory cystitis is possible. A urinary bladder catheter  is in place.     6. Please see above comments for further detail.        Portions of this note were completed with a voice recognition program.     4/19/2025 1:55 AM by Darnell Fraser MD on Workstation: [id number]       XR Chest 1 View [[id number]] Collected: 04/18/25 2105     Updated: 04/18/25 2109    Narrative:      XR CHEST 1 VW    Date of Exam: 4/18/2025 8:42 PM EDT    Indication: SOA    Comparison: Chest radiograph 11/8/2024.    Findings:  Mildly enlarged cardiac silhouette, unchanged. Left lower lobe airspace opacity obscuring the left heart border. Background of mild diffuse interstitial thickening. Possible trace right pleural effusion. No pneumothorax. Osseous structures are unchanged.      Impression:      Impression:  Mild diffuse interstitial prominence may represent pulmonary edema. Additionally, there is an airspace opacity in the left lower lobe that could represent pneumonia.      Electronically Signed: Daniel Garcia MD    4/18/2025 9:07 PM EDT    Workstation ID: [id number]    XR Hip With or Without Pelvis 2 - 3 View Right [[id number]] Collected: 04/18/25 1752     Updated: 04/18/25 1756    Narrative:      XR HIP W OR WO PELVIS 2-3 VIEW RIGHT    Date of Exam: 4/18/2025 5:18 PM EDT    Indication: Right hip TTP. recent fall    Comparison: None available.    Findings: Degenerative changes of the lower lumbar spine. The sacrum and sacroiliac joints appear normal on this limited study. Degenerative changes of the hips. Severe atheromatous disease of  the femoral arteries. The pubic bones appear intact. No lytic   or blastic disease.      Impression:      Degenerative changes. Atheromatous disease.      Electronically Signed: Darnell Khan MD    4/18/2025 5:54 PM EDT    Workstation ID: [id number]          Results for orders placed during the hospital encounter of 11/06/24    Adult Transthoracic Echo Complete W/ Cont if Necessary Per Protocol    Interpretation Summary    Technically difficult study.    Left ventricular ejection fraction appears to be 56 - 60%.    The left ventricular cavity is mildly dilated.    Left ventricular diastolic function is consistent with (grade I) impaired relaxation and age.    The left atrial cavity is mildly dilated.    The right atrial cavity is borderline dilated.    Estimated right ventricular systolic pressure from tricuspid regurgitation is moderately elevated (45-55 mmHg).    Valves are not well-visualized.  Mild to moderate mitral and tricuspid regurgitation.      Assessment & Plan   Assessment / Plan     Patient Summary:  Darnell Geller is a 79 y.o. male with medical history significant for BPH, CAD, CKD stage IV, diabetes, hyperlipidemia, hypertension, monoclonal gammopathy, history of moderate left-sided hydronephrosis, history of urinary retention with Muñiz, dementia. Pt presented with worsening labs and found to have worsening renal function and anemia.  Patient found to have moderate bilateral hydroureteronephrosis on CT scan and ultrasound imaging.  Patient is established with medical oncologist Dr. Beckford but I was consulted as the on-call hematologist oncologist for recommendations given patient's history of monoclonal gammopathy. Medical history obtained with assistance of his wife who is at his bedside.     Monoclonal gammopathy  - Skeletal survey from 2/25/2025 was negative for any lytic lesions  - Patient with significant elevation in free light chains, also elevated M spike, in order to diagnose multiple myeloma  patient will require bone marrow biopsy, this was offered by Dr. Beckford in March 2025 but patient declined at that time and preferred to undergo observation with labs  -I discussed results of recent labs which revealed monoclonal gammopathy, discussed he may also have multiple myeloma and I offered BMBX during this admission, but pt declined this recommendation, pt's wife also voiced concern of how well he would tolerate systemic treatment for multiple myeloma and stated they were not interested in him receiving treatment for multiple meyloma given his other medical conditions, pt and wife preferred a palliative approach with monitoring with labs, I updated hospitalist Dr. Perkins of this discussion.    Hem/onc will continue to follow along, please reach out with any other questions or concerns.     Please note that portions of this note were completed with a voice recognition program.        Active and Resolved Problems:  Active Hospital Problems    Diagnosis  POA    **NIKITA (acute kidney injury) [N17.9]  Yes    Bladder wall thickening [N32.89]  Unknown    Benign prostatic hyperplasia with urinary retention [N40.1, R33.8]  Yes    Bilateral hydronephrosis [N13.30]  Yes    Atrial fibrillation and flutter [I48.91, I48.92]  Yes    Diabetes mellitus [E11.9]  Yes      Resolved Hospital Problems   No resolved problems to display.                 CODE STATUS:    Code Status and Medical Interventions: CPR (Attempt to Resuscitate); Full Support   Ordered at: 04/18/25 2139     Code Status (Patient has no pulse and is not breathing):    CPR (Attempt to Resuscitate)     Medical Interventions (Patient has pulse or is breathing):    Full Support           Electronically signed by Cirilo Beavers MD, 04/19/25, 2:38 PM EDT.

## 2025-04-19 NOTE — PROGRESS NOTES
[facility]   Hospitalist Progress Note  Date: 2025  Patient Name: Darnell Geller  : 1945  MRN: [id number]  Date of admission: 2025  Room/Bed: [facility]/      Subjective   Subjective     Chief Complaint:   Abnormal labs    Summary:  Darnell Geller is an 79 y.o. male with medical history significant for BPH, CAD, CKD stage IV, diabetes, hyperlipidemia, hypertension, monoclonal gammopathy, history of moderate left-sided hydronephrosis, history of urinary retention with Muñiz, dementia.  Patient presents to the hospital with abnormal labs.  Patient noted to have abnormal labs of worsening renal function and anemia, currently on Eliquis.      Interval Followup:   Patient lying in bed, confused this morning however resting comfortably.  Patient's renal ultrasound showed bilateral moderate to severe hydronephrosis.  Patient's creatinine still elevated at 4.16 this morning, slight improvement from 4.27 on arrival.  Based on previous notes with urology due to patient's comorbidities, dementia discussions were held to withhold from further investigation.  Able to call and discussed with patient's wife, at this time she would like us to further work patient up.  Consulted urology today as well as nephrology.  Given patient's history of monoclonal gammopathy, will also consult oncology today.    Objective   Objective     Vitals:   Temp:  [92.8 °F (33.8 °C)-97.9 °F (36.6 °C)] 97.7 °F (36.5 °C)  Heart Rate:  [61-97] 90  Resp:  [14-20] 20  BP: (101-144)/(50-63) 144/60    Physical Exam   General: Awake, alert, pleasantly confused, chronically ill-appearing  HENT: NCAT, MMM  Eyes: pupils equal, no scleral icterus  Cardiovascular: Irregularly irregular, controlled rates  Pulmonary: Coarse breath sounds bilaterally  Gastrointestinal: S/ND/NT, +BS  Musculoskeletal: No gross deformities  Skin: No jaundice, no rash on exposed skin appreciated  Neuro: CN II through XII grossly intact; speech clear; no tremor, pleasantly  confused    Result Review    Result Review:  I have personally reviewed these results:  [x]  Laboratory      Lab 04/19/25  0510 04/18/25  1850 04/18/25  1202   WBC 4.63 6.33 7.40   HEMOGLOBIN 6.7* 7.8* 7.6*   HEMATOCRIT 21.5* 24.8* 24.6*   PLATELETS 99* 120* 136*   NEUTROS ABS 3.25 4.88 5.90   IMMATURE GRANS (ABS) 0.02 0.02 0.03   LYMPHS ABS 0.65* 0.60* 0.52*   MONOS ABS 0.53 0.67 0.80   EOS ABS 0.16 0.14 0.12   .0* 107.4* 109.3*         Lab 04/19/25  0510 04/18/25  1850 04/18/25  1202   SODIUM 136 135* 136  136   POTASSIUM 5.2 4.9 4.8  4.8   CHLORIDE 108* 102 105  105   CO2 18.1* 19.7* 20.6*  20.6*   ANION GAP 9.9 13.3 10.4  10.4   BUN 67* 66* 66*  66*   CREATININE 4.16* 4.27* 4.02*  4.02*   EGFR 13.8* 13.3* 14.3*  14.3*   GLUCOSE 262* 209* 132*  132*   CALCIUM 7.7* 8.1* 8.4*  8.4*   PHOSPHORUS  --   --  5.8*         Lab 04/18/25  1850 04/18/25  1202   TOTAL PROTEIN 8.2 8.2   ALBUMIN 3.0* 3.1*  3.1*   GLOBULIN 5.2 5.1   ALT (SGPT) 27 27   AST (SGOT) 49* 49*   BILIRUBIN 0.7 0.5   ALK PHOS 241* 230*         Lab 04/18/25  1850   PROBNP 2,702.0*             Lab 04/19/25  0747 04/19/25  0510   IRON  --  41*   IRON SATURATION (TSAT)  --  13*   TIBC  --  313   TRANSFERRIN  --  210   ABO TYPING O  --    RH TYPING Positive  --    ANTIBODY SCREEN Negative  --          Brief Urine Lab Results  (Last result in the past 365 days)        Color   Clarity   Blood   Leuk Est   Nitrite   Protein   CREAT   Urine HCG        04/18/25 2158 Yellow   Clear   Large (3+)   Moderate (2+)   Negative   100 mg/dL (2+)                 [x]  Microbiology   Microbiology Results (last 10 days)       Procedure Component Value - Date/Time    Urine Culture - Urine, Urine, Catheter [[id number]]  (Abnormal) Collected: 04/18/25 1202    Lab Status: Preliminary result Specimen: Urine, Catheter Updated: 04/19/25 1243     Urine Culture >100,000 CFU/mL Gram Positive Cocci    Narrative:      Colonization of the urinary tract without infection  is common. Treatment is discouraged unless the patient is symptomatic, pregnant, or undergoing an invasive urologic procedure.          [x]  Radiology  CT Abdomen Pelvis Without Contrast  Result Date: 4/19/2025  Impression: 1.Moderate bilateral hydroureteronephrosis to the level of the urinary bladder. No evidence of obstructing stone or mass by noncontrast technique. This appears similar on the left but new on the right compared to prior CT from 11/9/2024. 2.Circumferential bladder wall thickening with inflammatory stranding. Correlate clinically for cystitis. 3.Nodular contour of the liver suggesting underlying cirrhosis. 4.Mild splenomegaly which could relate to portal hypertension in the setting of cirrhosis. 5.Small to moderate bilateral pleural effusions, left greater than right. Electronically Signed: Mil Bahena MD  4/19/2025 12:35 PM EDT  Workstation ID: [id number]    US Renal Bilateral  Result Date: 4/19/2025   1. Moderate-to-severe hydronephrosis is seen bilaterally.  2. No definite nephrolithiasis.  3. No suspicious renal masses are seen.  4. The renal parenchymal echotexture is within normal limits bilaterally.  5. Urinary bladder wall thickening is seen, which may represent an underdistention artifact. However, an age-indeterminate infectious/inflammatory cystitis is possible. A urinary bladder catheter is in place.  6. Please see above comments for further detail.   Portions of this note were completed with a voice recognition program.  4/19/2025 1:55 AM by Darnell Fraser MD on Workstation: [id number]      XR Chest 1 View  Result Date: 4/18/2025  Impression: Mild diffuse interstitial prominence may represent pulmonary edema. Additionally, there is an airspace opacity in the left lower lobe that could represent pneumonia. Electronically Signed: Daniel Garcia MD  4/18/2025 9:07 PM EDT  Workstation ID: [id number]    XR Hip With or Without Pelvis 2 - 3 View Right  Result Date: 4/18/2025  Degenerative  changes. Atheromatous disease. Electronically Signed: Darnell Khan MD  4/18/2025 5:54 PM EDT  Workstation ID: [id number]    []  EKG/Telemetry   []  Cardiology/Vascular   []  Pathology  []  Old records  []  Other:    Assessment & Plan   Assessment / Plan     Assessment:  NIKITA on CKD  Acute on chronic anemia  Chronic left and new right-sided hydronephrosis  Chronic urinary retention with Muñiz catheter  Diabetes  CAD  Atrial fibrillation  Monoclonal gammopathy    Plan:  Patient remains admitted to hospital for further care and management  Consulting urology, nephrology and oncology, appreciate assistance  Discussed with urology, CT scan to be obtained today  Discussed with wife, would like all options before pursue any procedures planned  Patient transfused blood following hemoglobin of 6.7 this morning  For now we will hold patient's metoprolol  Holding gabapentin with confusion this morning as well as NIKITA  Started on empiric antibiotics of ceftriaxone azithromycin, chest x-ray with initial concern for left lower lobe pneumonia  Patient's urine also concerning for urinary tract infection, will follow cultures  Now continue with heparin for DVT prophylaxis, close eye on hemoglobin    Total of 53 minutes spent reviewing labs and imaging, counseling and educating the patient and family, ordering medications, discussing with specialty services, documenting clinical information in the electronic medical record, and care coordination.       Discussed with RN.  Discussed with patient's wife, urology, nephrology, oncology    VTE Prophylaxis:  Pharmacologic VTE prophylaxis orders are present.        CODE STATUS:   Code Status (Patient has no pulse and is not breathing): CPR (Attempt to Resuscitate)  Medical Interventions (Patient has pulse or is breathing): Full Support      Electronically signed by Mamadou Perkins MD, 4/19/2025, 13:54 EDT.

## 2025-04-19 NOTE — NURSING NOTE
Pt arrived to unit from ED. VS obtained and dual skin assessment completed by two RN's. Pt appears to have scab to right ankle and right elbow with generalized bruising. Contacted pt spouse Madison to ask admission questions d/t pt appearing to be confused. Spouse reported pt has baseline confusion d/t having dementia. VSS with no concerns at this time. Explained policies and procedures to pt, call light in place with bed alert activated.

## 2025-04-20 LAB
25(OH)D3 SERPL-MCNC: 42.8 NG/ML (ref 30–100)
ANION GAP SERPL CALCULATED.3IONS-SCNC: 12.9 MMOL/L (ref 5–15)
BASOPHILS # BLD AUTO: 0.02 10*3/MM3 (ref 0–0.2)
BASOPHILS NFR BLD AUTO: 0.5 % (ref 0–1.5)
BH BB BLOOD EXPIRATION DATE: NORMAL
BH BB BLOOD TYPE BARCODE: 5100
BH BB DISPENSE STATUS: NORMAL
BH BB PRODUCT CODE: NORMAL
BH BB UNIT NUMBER: NORMAL
BUN SERPL-MCNC: 66 MG/DL (ref 8–23)
BUN/CREAT SERPL: 17.1 (ref 7–25)
CALCIUM SPEC-SCNC: 8.6 MG/DL (ref 8.6–10.5)
CHLORIDE SERPL-SCNC: 110 MMOL/L (ref 98–107)
CO2 SERPL-SCNC: 18.1 MMOL/L (ref 22–29)
CREAT SERPL-MCNC: 3.86 MG/DL (ref 0.76–1.27)
CROSSMATCH INTERPRETATION: NORMAL
DEPRECATED RDW RBC AUTO: 69 FL (ref 37–54)
EGFRCR SERPLBLD CKD-EPI 2021: 15 ML/MIN/1.73
EOSINOPHIL # BLD AUTO: 0.17 10*3/MM3 (ref 0–0.4)
EOSINOPHIL NFR BLD AUTO: 4.3 % (ref 0.3–6.2)
ERYTHROCYTE [DISTWIDTH] IN BLOOD BY AUTOMATED COUNT: 18.6 % (ref 12.3–15.4)
GLUCOSE BLDC GLUCOMTR-MCNC: 107 MG/DL (ref 70–99)
GLUCOSE BLDC GLUCOMTR-MCNC: 109 MG/DL (ref 70–99)
GLUCOSE BLDC GLUCOMTR-MCNC: 116 MG/DL (ref 70–99)
GLUCOSE BLDC GLUCOMTR-MCNC: 181 MG/DL (ref 70–99)
GLUCOSE SERPL-MCNC: 247 MG/DL (ref 65–99)
HCT VFR BLD AUTO: 28 % (ref 37.5–51)
HGB BLD-MCNC: 8.8 G/DL (ref 13–17.7)
IMM GRANULOCYTES # BLD AUTO: 0.03 10*3/MM3 (ref 0–0.05)
IMM GRANULOCYTES NFR BLD AUTO: 0.8 % (ref 0–0.5)
LYMPHOCYTES # BLD AUTO: 0.62 10*3/MM3 (ref 0.7–3.1)
LYMPHOCYTES NFR BLD AUTO: 15.9 % (ref 19.6–45.3)
MAGNESIUM SERPL-MCNC: 2.1 MG/DL (ref 1.6–2.4)
MCH RBC QN AUTO: 33.2 PG (ref 26.6–33)
MCHC RBC AUTO-ENTMCNC: 31.4 G/DL (ref 31.5–35.7)
MCV RBC AUTO: 105.7 FL (ref 79–97)
MONOCYTES # BLD AUTO: 0.55 10*3/MM3 (ref 0.1–0.9)
MONOCYTES NFR BLD AUTO: 14.1 % (ref 5–12)
NEUTROPHILS NFR BLD AUTO: 2.52 10*3/MM3 (ref 1.7–7)
NEUTROPHILS NFR BLD AUTO: 64.4 % (ref 42.7–76)
NRBC BLD AUTO-RTO: 0 /100 WBC (ref 0–0.2)
PLATELET # BLD AUTO: 102 10*3/MM3 (ref 140–450)
PMV BLD AUTO: 10.5 FL (ref 6–12)
POTASSIUM SERPL-SCNC: 5 MMOL/L (ref 3.5–5.2)
RBC # BLD AUTO: 2.65 10*6/MM3 (ref 4.14–5.8)
SODIUM SERPL-SCNC: 141 MMOL/L (ref 136–145)
UNIT  ABO: NORMAL
UNIT  RH: NORMAL
WBC NRBC COR # BLD AUTO: 3.91 10*3/MM3 (ref 3.4–10.8)

## 2025-04-20 PROCEDURE — 94664 DEMO&/EVAL PT USE INHALER: CPT

## 2025-04-20 PROCEDURE — 63710000001 INSULIN LISPRO (HUMAN) PER 5 UNITS: Performed by: INTERNAL MEDICINE

## 2025-04-20 PROCEDURE — 85025 COMPLETE CBC W/AUTO DIFF WBC: CPT | Performed by: STUDENT IN AN ORGANIZED HEALTH CARE EDUCATION/TRAINING PROGRAM

## 2025-04-20 PROCEDURE — 83735 ASSAY OF MAGNESIUM: CPT | Performed by: INTERNAL MEDICINE

## 2025-04-20 PROCEDURE — 25010000002 HEPARIN (PORCINE) PER 1000 UNITS: Performed by: STUDENT IN AN ORGANIZED HEALTH CARE EDUCATION/TRAINING PROGRAM

## 2025-04-20 PROCEDURE — 25010000002 CEFTRIAXONE PER 250 MG: Performed by: STUDENT IN AN ORGANIZED HEALTH CARE EDUCATION/TRAINING PROGRAM

## 2025-04-20 PROCEDURE — 80048 BASIC METABOLIC PNL TOTAL CA: CPT | Performed by: STUDENT IN AN ORGANIZED HEALTH CARE EDUCATION/TRAINING PROGRAM

## 2025-04-20 PROCEDURE — 82948 REAGENT STRIP/BLOOD GLUCOSE: CPT | Performed by: INTERNAL MEDICINE

## 2025-04-20 PROCEDURE — 82948 REAGENT STRIP/BLOOD GLUCOSE: CPT

## 2025-04-20 PROCEDURE — 99232 SBSQ HOSP IP/OBS MODERATE 35: CPT | Performed by: UROLOGY

## 2025-04-20 PROCEDURE — 94760 N-INVAS EAR/PLS OXIMETRY 1: CPT

## 2025-04-20 PROCEDURE — 25810000003 SODIUM CHLORIDE 0.9 % SOLUTION 250 ML FLEX CONT: Performed by: STUDENT IN AN ORGANIZED HEALTH CARE EDUCATION/TRAINING PROGRAM

## 2025-04-20 PROCEDURE — 94799 UNLISTED PULMONARY SVC/PX: CPT

## 2025-04-20 PROCEDURE — 99232 SBSQ HOSP IP/OBS MODERATE 35: CPT | Performed by: INTERNAL MEDICINE

## 2025-04-20 PROCEDURE — 63710000001 INSULIN GLARGINE PER 5 UNITS: Performed by: INTERNAL MEDICINE

## 2025-04-20 PROCEDURE — 82306 VITAMIN D 25 HYDROXY: CPT | Performed by: INTERNAL MEDICINE

## 2025-04-20 PROCEDURE — 25010000002 AZITHROMYCIN PER 500 MG: Performed by: STUDENT IN AN ORGANIZED HEALTH CARE EDUCATION/TRAINING PROGRAM

## 2025-04-20 RX ORDER — OXYMETAZOLINE HYDROCHLORIDE 0.05 G/100ML
2 SPRAY NASAL 3 TIMES DAILY PRN
Status: DISCONTINUED | OUTPATIENT
Start: 2025-04-20 | End: 2025-04-23 | Stop reason: HOSPADM

## 2025-04-20 RX ORDER — METOPROLOL TARTRATE 25 MG/1
12.5 TABLET, FILM COATED ORAL 2 TIMES DAILY
Status: DISCONTINUED | OUTPATIENT
Start: 2025-04-20 | End: 2025-04-23 | Stop reason: HOSPADM

## 2025-04-20 RX ORDER — OLANZAPINE 5 MG/1
2.5 TABLET, ORALLY DISINTEGRATING ORAL ONCE
Status: COMPLETED | OUTPATIENT
Start: 2025-04-20 | End: 2025-04-20

## 2025-04-20 RX ORDER — NICOTINE 21 MG/24HR
1 PATCH, TRANSDERMAL 24 HOURS TRANSDERMAL NIGHTLY
Status: DISCONTINUED | OUTPATIENT
Start: 2025-04-20 | End: 2025-04-23 | Stop reason: HOSPADM

## 2025-04-20 RX ADMIN — HEPARIN SODIUM 5000 UNITS: 5000 INJECTION INTRAVENOUS; SUBCUTANEOUS at 20:11

## 2025-04-20 RX ADMIN — INSULIN GLARGINE 10 UNITS: 100 INJECTION, SOLUTION SUBCUTANEOUS at 08:11

## 2025-04-20 RX ADMIN — SODIUM BICARBONATE: 84 INJECTION INTRAVENOUS at 10:47

## 2025-04-20 RX ADMIN — ARFORMOTEROL TARTRATE 15 MCG: 15 SOLUTION RESPIRATORY (INHALATION) at 07:59

## 2025-04-20 RX ADMIN — NICOTINE 1 PATCH: 21 PATCH, EXTENDED RELEASE TRANSDERMAL at 22:24

## 2025-04-20 RX ADMIN — Medication 10 ML: at 08:13

## 2025-04-20 RX ADMIN — BUDESONIDE 0.5 MG: 0.5 INHALANT RESPIRATORY (INHALATION) at 08:00

## 2025-04-20 RX ADMIN — SODIUM CHLORIDE 1000 MG: 9 INJECTION INTRAMUSCULAR; INTRAVENOUS; SUBCUTANEOUS at 22:24

## 2025-04-20 RX ADMIN — ROSUVASTATIN CALCIUM 5 MG: 5 TABLET, FILM COATED ORAL at 20:11

## 2025-04-20 RX ADMIN — METOPROLOL TARTRATE 12.5 MG: 25 TABLET, FILM COATED ORAL at 09:24

## 2025-04-20 RX ADMIN — AZITHROMYCIN DIHYDRATE 500 MG: 500 INJECTION, POWDER, LYOPHILIZED, FOR SOLUTION INTRAVENOUS at 22:24

## 2025-04-20 RX ADMIN — OLANZAPINE 2.5 MG: 5 TABLET, ORALLY DISINTEGRATING ORAL at 20:11

## 2025-04-20 RX ADMIN — SODIUM BICARBONATE 1300 MG: 650 TABLET ORAL at 08:11

## 2025-04-20 RX ADMIN — PANTOPRAZOLE SODIUM 40 MG: 40 TABLET, DELAYED RELEASE ORAL at 08:11

## 2025-04-20 RX ADMIN — ARFORMOTEROL TARTRATE 15 MCG: 15 SOLUTION RESPIRATORY (INHALATION) at 20:41

## 2025-04-20 RX ADMIN — ACETAMINOPHEN 650 MG: 325 TABLET ORAL at 15:01

## 2025-04-20 RX ADMIN — SODIUM BICARBONATE 1300 MG: 650 TABLET ORAL at 15:01

## 2025-04-20 RX ADMIN — PANTOPRAZOLE SODIUM 40 MG: 40 TABLET, DELAYED RELEASE ORAL at 20:11

## 2025-04-20 RX ADMIN — NICOTINE 1 PATCH: 21 PATCH, EXTENDED RELEASE TRANSDERMAL at 00:32

## 2025-04-20 RX ADMIN — INSULIN GLARGINE 10 UNITS: 100 INJECTION, SOLUTION SUBCUTANEOUS at 23:09

## 2025-04-20 RX ADMIN — HEPARIN SODIUM 5000 UNITS: 5000 INJECTION INTRAVENOUS; SUBCUTANEOUS at 08:11

## 2025-04-20 RX ADMIN — SODIUM BICARBONATE 1300 MG: 650 TABLET ORAL at 23:10

## 2025-04-20 RX ADMIN — METOPROLOL TARTRATE 12.5 MG: 25 TABLET, FILM COATED ORAL at 20:11

## 2025-04-20 RX ADMIN — BUDESONIDE 0.5 MG: 0.5 INHALANT RESPIRATORY (INHALATION) at 20:41

## 2025-04-20 RX ADMIN — INSULIN LISPRO 2 UNITS: 100 INJECTION, SOLUTION INTRAVENOUS; SUBCUTANEOUS at 08:11

## 2025-04-20 NOTE — PROGRESS NOTES
[facility]   Urology Progress Note    Patient Name: Darnell Geller  : 1945  MRN: [id number]  Primary Care Physician:  Cami Sargent APRN  Date of admission: 2025    Subjective   Subjective       HPI: No new issues overnight      Objective   Objective     Vitals:   Temp:  [97.5 °F (36.4 °C)-98.4 °F (36.9 °C)] 97.5 °F (36.4 °C)  Heart Rate:  [] 93  Resp:  [16-20] 18  BP: (134-167)/(55-83) 157/76      Physical Exam    Constitutional: Awake, alert   Respiratory:  Lungs with bilateral audible wheezes/rhonchi   GI:  Abd soft, urine clear        Result Review    Result Review:  I have personally reviewed the results from the time of this admission to 2025 11:39 EDT and agree with these findings:  [x]  Laboratory  []  Microbiology  [x]  Radiology  []  EKG/Telemetry   []  Cardiology/Vascular   []  Pathology  []  Old records  []  Other:    Assessment & Plan   Assessment / Plan       Active Hospital Problems:  Active Hospital Problems    Diagnosis     **NIKITA (acute kidney injury)     Bladder wall thickening     Benign prostatic hyperplasia with urinary retention     Bilateral hydronephrosis     Atrial fibrillation and flutter     Diabetes mellitus          Plan: Creatinine trending in right direction, not quite at baseline but close.  Urine output in soler good.     I discussed the case again with Dr. Robertson and Dr. Perkins.  He likely has neurogenic bladder causing bilateral ureteral obstruction at the level of the bladder due to his extremely thickened bladder wall.  The left side has been chronic but the right side is new.  Creatinine is trending down although not at his baseline yet.  He/his wife have refused workup for the left hydronephrosis in the past.  I had a discussion with her yesterday about workup for the now bilateral ureteral obstruction which would include cystoscopy and bilateral retrograde pyelograms.  We discussed that if the obstruction is at the level of the bladder there  were 2 ways to treated, bilateral ureteral stents which would need to be changed out every 6 months and which would increase his risk of kidney infections giving reflux of what is undoubtedly infected urine in his bladder up to his kidneys, and bilateral nephrostomy tubes which are external and certainly harder to manage and frequently come dislodged.  They have discussed his hematologic/oncologic issues with Dr. Beavers and have adamantly refused to have any biopsies or other testing done as they do not want any treatment no matter what the diagnosis is.  This leaves us with the discussion of his kidney function.  His wife was open to the thought of dialysis yesterday when discussing with Dr. Robertson but again was hesitant to have him even go to the operating room for cystoscopy and bilateral retrograde pyelograms with me.  There has been discussion of a more palliative approach but there seems to be some confusion or picking and choosing of interventions that does not necessarily make sense with in the goals.  If the patient is going to consider dialysis he should likely have the workup for obstruction as that treatment would be easier than dialysis 3 times a week but certainly given all of his other medical issues and his long-term prognosis doing nothing including no dialysis and no workup of his obstruction would be completely reasonable.  The patient is not likely to have a long term recovery from his multiple medical issues.  I would recommend getting palliative care to discuss with the patient.  I will let Dr. Camarena know the patient is in house and he can see him this week.  Currently, as his kidney function is improving and is stable there is no need for emergent cystoscopy and retrograde pyelograms as I think a discussion with palliative care is warranted.  Palliative care will be able to see him this week.  I did again discussed all of this with Dr. Perkins who was in agreement.       Electronically signed  by Kiersten Jordan MD, 04/20/25, 11:39 AM EDT.

## 2025-04-20 NOTE — PROGRESS NOTES
[facility]     Nephrology Progress Note      Patient Name: Darnell Geller  : 1945  MRN: [id number]  Primary Care Physician:  Cami Sargent APRN  Date of admission: 2025    Subjective   Subjective     Interval History:  Patient Reports feeling ok. Wants to leave the hospital.   Tolerating po. No n/V/D. No fever.   2950cc UO.       Review of Systems   All systems were reviewed and negative except for: what is mentioned above.     Objective   Objective     Vitals:   Temp:  [97.5 °F (36.4 °C)-98.4 °F (36.9 °C)] 97.5 °F (36.4 °C)  Heart Rate:  [] 93  Resp:  [18] 18  BP: (134-167)/(65-84) 162/84  Physical Exam:   Constitutional: Awake, alert, in chair. Some confusion.    Eyes: sclerae anicteric, no conjunctival injection   HENT: mucous membranes moist   Neck: Supple, no thyromegaly, no lymphadenopathy, trachea midline, No JVD   Respiratory: Clear to auscultation bilaterally, nonlabored respirations    Cardiovascular: RRR, no murmurs, rubs, or gallops.   Gastrointestinal: Positive bowel sounds, soft, nontender, nondistended   Musculoskeletal: No edema, no clubbing or cyanosis   Psychiatric: Appropriate affect, cooperative   Neurologic: Oriented x 3, moving all extremities, Cranial Nerves grossly intact, speech clear   Skin: warm and dry, no rashes    Muñiz in place. Normal color urine.     Result Review    Result Reviewed:  I have personally reviewed the results from the time of this admission to 2025 15:49 EDT and agree with these findings:  [x]  Laboratory  []  Microbiology  [x]  Radiology  []  EKG/Telemetry   []  Cardiology/Vascular   []  Pathology  []  Old records  []  Other:        Lab 25  0613 25  0510 25  1850 25  1202   SODIUM 141 136 135* 136  136   POTASSIUM 5.0 5.2 4.9 4.8  4.8   CHLORIDE 110* 108* 102 105  105   CO2 18.1* 18.1* 19.7* 20.6*  20.6*   BUN 66* 67* 66* 66*  66*   CREATININE 3.86* 4.16* 4.27* 4.02*  4.02*   GLUCOSE 247* 262* 209* 132*   132*   EGFR 15.0* 13.8* 13.3* 14.3*  14.3*   ANION GAP 12.9 9.9 13.3 10.4  10.4   MAGNESIUM 2.1  --   --   --    PHOSPHORUS  --   --   --  5.8*     US Renal Bilateral  Result Date: 4/19/2025  US RENAL BILATERAL-  Date of exam: 4/19/2025 12:49 AM.  Indications: NIKITA; n17.9-acute kidney failure, unspecified; d64.9-anemia, unspecified; i50.9-heart failure, unspecified.  Comparisons: 3/14/2025; 11/9/2024; 11/8/2024; 3/20/2023.  TECHNIQUE/FINDINGS: A bilateral renal ultrasound examination was performed. Two-dimensional (2D) grayscale (B-mode) images as well as color Doppler images are provided for review. The bilateral kidneys and the urinary bladder were evaluated. The abdominal aorta was not evaluated. There is moderate-to-severe hydronephrosis bilaterally. Bilateral hydroureter is also suspected. No pelvicalyceal stones are seen. No suspicious renal mass is identified. The renal parenchymal echotexture is probably within normal limits. The right kidney measures 11.2 x 5.9 x 5.5 cm. The right renal volume is not provided. The left kidney measures 11.9 x 6 x 5 cm. The left renal volume is not provided. Incidentally, there is splenomegaly. The spleen measures 15.3 x 6.2 x 12.7 cm. The splenic volume is 635.4 mL. Chronic calcified granulomatous disease involves the spleen. The urinary bladder contains a catheter and is under-distended. The urinary bladder wall is thickened, which may be an artifact. However, age-indeterminate infectious/inflammatory cystitis is possible.        Impression:  1. Moderate-to-severe hydronephrosis is seen bilaterally.  2. No definite nephrolithiasis.  3. No suspicious renal masses are seen.  4. The renal parenchymal echotexture is within normal limits bilaterally.  5. Urinary bladder wall thickening is seen, which may represent an underdistention artifact. However, an age-indeterminate infectious/inflammatory cystitis is possible. A urinary bladder catheter is in place.  6. Please see  above comments for further detail.   Portions of this note were completed with a voice recognition program.  4/19/2025 1:55 AM by Darnell Fraser MD on Workstation: [id number]         Most notable findings include: as above.     Assessment & Plan   Assessment / Plan       Active Hospital Problems:  Active Hospital Problems    Diagnosis     **NIKITA (acute kidney injury)     Bladder wall thickening     Benign prostatic hyperplasia with urinary retention     Bilateral hydronephrosis     Atrial fibrillation and flutter     Diabetes mellitus        Assessment and Plan:    - Acute kidney injury, possibly obstructive in nature, has bilateral moderate severe hydronephrosis.  Urinalysis with many WBC and RBC.  Being evaluated by urology.  Start 0.34% +1 amp of bicarb at 75 cc/hr.   - CKD stage IV, baseline creatinine 2.5-3.0 from previous records.  Recent hospitalization at [facility] with diuresis and acute kidney injury may have rendered baseline creatinine even higher.  Avoid nephrotoxins.  Wife stated that they will pursue dialysis if needed.  - Severe anemia with evidence of iron deficiency and macrocytosis, stool occult blood positive, transfusion in progress.  Seeing hematology.  - Borderline hyperkalemia, I added low potassium restriction to his diet.  Improve acidosis should be helpful. recheck.  - Metabolic acidosis, possibly secondary to CKD.  Continue oral sodium bicarb and monitor.  - Diabetes with complications, per primary.  - Hypocalcemia with secondary hyperparathyroidism and previous vitamin D deficiency, check level of the latter with a.m. labs.     Discussed with primary and nursing.  Will follow  Please call with any questions      Electronically signed by Eusebio Robertson MD, 4/20/2025, 15:49 EDT.

## 2025-04-20 NOTE — PLAN OF CARE
Goal Outcome Evaluation:                    Transfer from 3 NT this evening due to low body temperature.  Warming blanket applied at 1735.  Patient is alert and oriented x2; disoriented to place and situation.  Family to meet with palliative care team tomorrow.  No needs at this time.

## 2025-04-20 NOTE — PROGRESS NOTES
[facility]   Hospitalist Progress Note  Date: 2025  Patient Name: Darnell Geller  : 1945  MRN: [id number]  Date of admission: 2025  Room/Bed: [facility]/      Subjective   Subjective     Chief Complaint:   Abnormal labs    Summary:  Darnell Geller is an 79 y.o. male with medical history significant for BPH, CAD, CKD stage IV, diabetes, hyperlipidemia, hypertension, monoclonal gammopathy, history of moderate left-sided hydronephrosis, history of urinary retention with Muñiz, dementia.  Patient presents to the hospital with abnormal labs.  Patient noted to have abnormal labs of worsening renal function and anemia, currently on Eliquis.  Patient's renal ultrasound showed bilateral moderate to severe hydronephrosis.  Patient's creatinine still elevated. Based on previous notes with urology due to patient's comorbidities, dementia discussions were held to withhold from further investigation, cystoscopy.  Patient also with a history of monoclonal gammopathy, had refused further workup in the past including bone marrow biopsy.    Interval Followup:   Patient still with poor renal function.  Alert but confused on rounds this morning.  Later in the afternoon becoming more hypothermic, transferring to a telemetry bed for warming.  Able discussed with wife extensively over the phone, she is open to meeting with palliative for further discussions about the possibility of hospice.  Palliative care consult placed.  Tentative plan is for meeting tomorrow afternoon with patient, his wife and their sons    Objective   Objective     Vitals:   Temp:  [93.7 °F (34.3 °C)-98.4 °F (36.9 °C)] 93.7 °F (34.3 °C)  Heart Rate:  [] 93  Resp:  [18] 18  BP: (134-167)/(65-84) 162/84    Physical Exam   General: Awake, alert, pleasantly confused, chronically ill-appearing  HENT: NCAT, MMM  Eyes: pupils equal, no scleral icterus  Cardiovascular: Irregularly irregular, controlled rates  Pulmonary: Coarse breath sounds  bilaterally  Gastrointestinal: S/ND/NT, +BS  Musculoskeletal: No gross deformities  Skin: No jaundice, no rash on exposed skin appreciated  Neuro: CN II through XII grossly intact; speech clear; no tremor, pleasantly confused    Result Review    Result Review:  I have personally reviewed these results:  [x]  Laboratory      Lab 04/20/25  0613 04/19/25  1818 04/19/25  0510 04/18/25  1850   WBC 3.91  --  4.63 6.33   HEMOGLOBIN 8.8* 9.3* 6.7* 7.8*   HEMATOCRIT 28.0* 29.2* 21.5* 24.8*   PLATELETS 102*  --  99* 120*   NEUTROS ABS 2.52  --  3.25 4.88   IMMATURE GRANS (ABS) 0.03  --  0.02 0.02   LYMPHS ABS 0.62*  --  0.65* 0.60*   MONOS ABS 0.55  --  0.53 0.67   EOS ABS 0.17  --  0.16 0.14   .7*  --  107.0* 107.4*   PROCALCITONIN  --   --  0.65*  --          Lab 04/20/25  0613 04/19/25  0510 04/18/25  1850 04/18/25  1202   SODIUM 141 136 135* 136  136   POTASSIUM 5.0 5.2 4.9 4.8  4.8   CHLORIDE 110* 108* 102 105  105   CO2 18.1* 18.1* 19.7* 20.6*  20.6*   ANION GAP 12.9 9.9 13.3 10.4  10.4   BUN 66* 67* 66* 66*  66*   CREATININE 3.86* 4.16* 4.27* 4.02*  4.02*   EGFR 15.0* 13.8* 13.3* 14.3*  14.3*   GLUCOSE 247* 262* 209* 132*  132*   CALCIUM 8.6 7.7* 8.1* 8.4*  8.4*   MAGNESIUM 2.1  --   --   --    PHOSPHORUS  --   --   --  5.8*         Lab 04/18/25  1850 04/18/25  1202   TOTAL PROTEIN 8.2 8.2   ALBUMIN 3.0* 3.1*  3.1*   GLOBULIN 5.2 5.1   ALT (SGPT) 27 27   AST (SGOT) 49* 49*   BILIRUBIN 0.7 0.5   ALK PHOS 241* 230*         Lab 04/18/25  1850   PROBNP 2,702.0*             Lab 04/19/25  0747 04/19/25  0510   IRON  --  41*   IRON SATURATION (TSAT)  --  13*   TIBC  --  313   TRANSFERRIN  --  210   FOLATE  --  12.90   VITAMIN B 12  --  703   ABO TYPING O  --    RH TYPING Positive  --    ANTIBODY SCREEN Negative  --          Brief Urine Lab Results  (Last result in the past 365 days)        Color   Clarity   Blood   Leuk Est   Nitrite   Protein   CREAT   Urine HCG        04/19/25 0556             38.0                [x]  Microbiology   Microbiology Results (last 10 days)       Procedure Component Value - Date/Time    Urine Culture - Urine, Indwelling Urethral Catheter [[id number]]  (Abnormal) Collected: 04/18/25 2158    Lab Status: Preliminary result Specimen: Urine from Indwelling Urethral Catheter Updated: 04/20/25 1147     Urine Culture >100,000 CFU/mL Gram Positive Cocci    Narrative:      Colonization of the urinary tract without infection is common. Treatment is discouraged unless the patient is symptomatic, pregnant, or undergoing an invasive urologic procedure.    Urine Culture - Urine, Urine, Catheter [[id number]]  (Abnormal) Collected: 04/18/25 1202    Lab Status: Preliminary result Specimen: Urine, Catheter Updated: 04/20/25 1148     Urine Culture >100,000 CFU/mL Enterococcus gallinarum    Narrative:      Colonization of the urinary tract without infection is common. Treatment is discouraged unless the patient is symptomatic, pregnant, or undergoing an invasive urologic procedure.          [x]  Radiology  CT Abdomen Pelvis Without Contrast  Result Date: 4/19/2025  Impression: 1.Moderate bilateral hydroureteronephrosis to the level of the urinary bladder. No evidence of obstructing stone or mass by noncontrast technique. This appears similar on the left but new on the right compared to prior CT from 11/9/2024. 2.Circumferential bladder wall thickening with inflammatory stranding. Correlate clinically for cystitis. 3.Nodular contour of the liver suggesting underlying cirrhosis. 4.Mild splenomegaly which could relate to portal hypertension in the setting of cirrhosis. 5.Small to moderate bilateral pleural effusions, left greater than right. Electronically Signed: Mil Bahena MD  4/19/2025 12:35 PM EDT  Workstation ID: [id number]    US Renal Bilateral  Result Date: 4/19/2025   1. Moderate-to-severe hydronephrosis is seen bilaterally.  2. No definite nephrolithiasis.  3. No suspicious renal masses are seen.  4.  The renal parenchymal echotexture is within normal limits bilaterally.  5. Urinary bladder wall thickening is seen, which may represent an underdistention artifact. However, an age-indeterminate infectious/inflammatory cystitis is possible. A urinary bladder catheter is in place.  6. Please see above comments for further detail.   Portions of this note were completed with a voice recognition program.  4/19/2025 1:55 AM by Darnell Fraser MD on Workstation: [email or web address]      XR Chest 1 View  Result Date: 4/18/2025  Impression: Mild diffuse interstitial prominence may represent pulmonary edema. Additionally, there is an airspace opacity in the left lower lobe that could represent pneumonia. Electronically Signed: Daniel Garcia MD  4/18/2025 9:07 PM EDT  Workstation ID: [id number]    XR Hip With or Without Pelvis 2 - 3 View Right  Result Date: 4/18/2025  Degenerative changes. Atheromatous disease. Electronically Signed: Darnell Khan MD  4/18/2025 5:54 PM EDT  Workstation ID: [id number]    []  EKG/Telemetry   []  Cardiology/Vascular   []  Pathology  []  Old records  []  Other:    Assessment & Plan   Assessment / Plan     Assessment:  NIKITA on CKD  Sepsis, urinary tract infection  Acute on chronic anemia  Chronic left and new right-sided hydronephrosis  Chronic urinary retention with Muñiz catheter  Diabetes  CAD  Atrial fibrillation  Monoclonal gammopathy    Plan:  Patient remains admitted to hospital for further care and management  Consulting urology, nephrology and oncology, appreciate assistance  CT scan reviewed  Hemoglobin stable following transfusion yesterday  Patient's creatinine only slightly improved  Resuming patient's home metoprolol, more tachycardic today  Holding gabapentin with confusion this morning as well as NIKITA  Insert for urinary tract infection, continue ceftriaxone, continue to follow cultures  Discussed with urology, nephrologist.  Patient's wife hesitant for cystoscopy or other workup for patient's urinary  retention.  Options of stenting versus nephrostomy tubes discussed with wife, she has concerns for these procedures.  Initially wife open to the thought of dialysis, however later in conversation states she is unsure her  would be able to tolerate this.  Discussed extensively on the phone with wife on the evening of the 20th.  She is open to meeting with palliative care services on the 21st, we will tentatively plan for meeting around lunchtime.  Patient's wife and sons will be able to come to ask questions       Discussed with RN.  Discussed with patient's wife, urology, nephrology    VTE Prophylaxis:  Pharmacologic VTE prophylaxis orders are present.        CODE STATUS:   Code Status (Patient has no pulse and is not breathing): CPR (Attempt to Resuscitate)  Medical Interventions (Patient has pulse or is breathing): Full Support      Electronically signed by Mamadou Perkins MD, 4/20/2025, 16:49 EDT.

## 2025-04-21 LAB
ANION GAP SERPL CALCULATED.3IONS-SCNC: 11.9 MMOL/L (ref 5–15)
BACTERIA SPEC AEROBE CULT: ABNORMAL
BACTERIA SPEC AEROBE CULT: ABNORMAL
BASOPHILS # BLD AUTO: 0.03 10*3/MM3 (ref 0–0.2)
BASOPHILS NFR BLD AUTO: 0.6 % (ref 0–1.5)
BUN SERPL-MCNC: 59 MG/DL (ref 8–23)
BUN/CREAT SERPL: 16.3 (ref 7–25)
CALCIUM SPEC-SCNC: 8.7 MG/DL (ref 8.6–10.5)
CHLORIDE SERPL-SCNC: 112 MMOL/L (ref 98–107)
CO2 SERPL-SCNC: 18.1 MMOL/L (ref 22–29)
CREAT SERPL-MCNC: 3.63 MG/DL (ref 0.76–1.27)
DEPRECATED RDW RBC AUTO: 71 FL (ref 37–54)
EGFRCR SERPLBLD CKD-EPI 2021: 16.2 ML/MIN/1.73
EOSINOPHIL # BLD AUTO: 0.24 10*3/MM3 (ref 0–0.4)
EOSINOPHIL NFR BLD AUTO: 5 % (ref 0.3–6.2)
ERYTHROCYTE [DISTWIDTH] IN BLOOD BY AUTOMATED COUNT: 18.9 % (ref 12.3–15.4)
GLUCOSE BLDC GLUCOMTR-MCNC: 53 MG/DL (ref 70–99)
GLUCOSE BLDC GLUCOMTR-MCNC: 60 MG/DL (ref 70–99)
GLUCOSE BLDC GLUCOMTR-MCNC: 72 MG/DL (ref 70–99)
GLUCOSE BLDC GLUCOMTR-MCNC: 81 MG/DL (ref 70–99)
GLUCOSE BLDC GLUCOMTR-MCNC: 86 MG/DL (ref 70–99)
GLUCOSE BLDC GLUCOMTR-MCNC: 91 MG/DL (ref 70–99)
GLUCOSE SERPL-MCNC: 60 MG/DL (ref 65–99)
HCT VFR BLD AUTO: 27.6 % (ref 37.5–51)
HGB BLD-MCNC: 8.9 G/DL (ref 13–17.7)
IMM GRANULOCYTES # BLD AUTO: 0.03 10*3/MM3 (ref 0–0.05)
IMM GRANULOCYTES NFR BLD AUTO: 0.6 % (ref 0–0.5)
LYMPHOCYTES # BLD AUTO: 0.9 10*3/MM3 (ref 0.7–3.1)
LYMPHOCYTES NFR BLD AUTO: 18.8 % (ref 19.6–45.3)
MAGNESIUM SERPL-MCNC: 2 MG/DL (ref 1.6–2.4)
MCH RBC QN AUTO: 33.5 PG (ref 26.6–33)
MCHC RBC AUTO-ENTMCNC: 32.2 G/DL (ref 31.5–35.7)
MCV RBC AUTO: 103.8 FL (ref 79–97)
MONOCYTES # BLD AUTO: 0.6 10*3/MM3 (ref 0.1–0.9)
MONOCYTES NFR BLD AUTO: 12.5 % (ref 5–12)
NEUTROPHILS NFR BLD AUTO: 2.99 10*3/MM3 (ref 1.7–7)
NEUTROPHILS NFR BLD AUTO: 62.5 % (ref 42.7–76)
NRBC BLD AUTO-RTO: 0 /100 WBC (ref 0–0.2)
PLATELET # BLD AUTO: 108 10*3/MM3 (ref 140–450)
PMV BLD AUTO: 10.4 FL (ref 6–12)
POTASSIUM SERPL-SCNC: 4.7 MMOL/L (ref 3.5–5.2)
RBC # BLD AUTO: 2.66 10*6/MM3 (ref 4.14–5.8)
SODIUM SERPL-SCNC: 142 MMOL/L (ref 136–145)
WBC NRBC COR # BLD AUTO: 4.79 10*3/MM3 (ref 3.4–10.8)

## 2025-04-21 PROCEDURE — 25010000002 HALOPERIDOL LACTATE PER 5 MG: Performed by: PHYSICIAN ASSISTANT

## 2025-04-21 PROCEDURE — 94664 DEMO&/EVAL PT USE INHALER: CPT

## 2025-04-21 PROCEDURE — 85025 COMPLETE CBC W/AUTO DIFF WBC: CPT | Performed by: INTERNAL MEDICINE

## 2025-04-21 PROCEDURE — 83735 ASSAY OF MAGNESIUM: CPT | Performed by: INTERNAL MEDICINE

## 2025-04-21 PROCEDURE — 94799 UNLISTED PULMONARY SVC/PX: CPT

## 2025-04-21 PROCEDURE — 25010000002 VANCOMYCIN 5 G RECONSTITUTED SOLUTION: Performed by: INTERNAL MEDICINE

## 2025-04-21 PROCEDURE — 99231 SBSQ HOSP IP/OBS SF/LOW 25: CPT | Performed by: UROLOGY

## 2025-04-21 PROCEDURE — 80048 BASIC METABOLIC PNL TOTAL CA: CPT | Performed by: INTERNAL MEDICINE

## 2025-04-21 PROCEDURE — 25010000002 DIAZEPAM PER 5 MG: Performed by: PHYSICIAN ASSISTANT

## 2025-04-21 PROCEDURE — 99233 SBSQ HOSP IP/OBS HIGH 50: CPT | Performed by: INTERNAL MEDICINE

## 2025-04-21 PROCEDURE — 82948 REAGENT STRIP/BLOOD GLUCOSE: CPT

## 2025-04-21 PROCEDURE — 25810000003 SODIUM CHLORIDE 0.9 % SOLUTION: Performed by: INTERNAL MEDICINE

## 2025-04-21 PROCEDURE — 25010000002 HEPARIN (PORCINE) PER 1000 UNITS: Performed by: STUDENT IN AN ORGANIZED HEALTH CARE EDUCATION/TRAINING PROGRAM

## 2025-04-21 PROCEDURE — 82948 REAGENT STRIP/BLOOD GLUCOSE: CPT | Performed by: INTERNAL MEDICINE

## 2025-04-21 RX ORDER — DIAZEPAM 10 MG/2ML
2.5 INJECTION, SOLUTION INTRAMUSCULAR; INTRAVENOUS ONCE AS NEEDED
Status: COMPLETED | OUTPATIENT
Start: 2025-04-21 | End: 2025-04-21

## 2025-04-21 RX ORDER — OLANZAPINE 5 MG/1
5 TABLET, ORALLY DISINTEGRATING ORAL ONCE
Status: COMPLETED | OUTPATIENT
Start: 2025-04-21 | End: 2025-04-21

## 2025-04-21 RX ORDER — DIAZEPAM 10 MG/2ML
2.5 INJECTION, SOLUTION INTRAMUSCULAR; INTRAVENOUS ONCE
Status: COMPLETED | OUTPATIENT
Start: 2025-04-21 | End: 2025-04-21

## 2025-04-21 RX ORDER — HALOPERIDOL 5 MG/ML
0.5 INJECTION INTRAMUSCULAR ONCE
Status: COMPLETED | OUTPATIENT
Start: 2025-04-21 | End: 2025-04-21

## 2025-04-21 RX ORDER — VANCOMYCIN/0.9 % SOD CHLORIDE 1.5G/250ML
20 PLASTIC BAG, INJECTION (ML) INTRAVENOUS ONCE
Status: COMPLETED | OUTPATIENT
Start: 2025-04-21 | End: 2025-04-21

## 2025-04-21 RX ADMIN — Medication 1500 MG: at 20:06

## 2025-04-21 RX ADMIN — HEPARIN SODIUM 5000 UNITS: 5000 INJECTION INTRAVENOUS; SUBCUTANEOUS at 08:21

## 2025-04-21 RX ADMIN — SODIUM BICARBONATE 1300 MG: 650 TABLET ORAL at 08:20

## 2025-04-21 RX ADMIN — ACETAMINOPHEN 650 MG: 325 TABLET ORAL at 08:36

## 2025-04-21 RX ADMIN — PANTOPRAZOLE SODIUM 40 MG: 40 TABLET, DELAYED RELEASE ORAL at 08:20

## 2025-04-21 RX ADMIN — HALOPERIDOL LACTATE 0.5 MG: 5 INJECTION, SOLUTION INTRAMUSCULAR at 02:13

## 2025-04-21 RX ADMIN — DIAZEPAM 2.5 MG: 5 INJECTION INTRAMUSCULAR; INTRAVENOUS at 22:27

## 2025-04-21 RX ADMIN — Medication 10 ML: at 08:22

## 2025-04-21 RX ADMIN — BUDESONIDE 0.5 MG: 0.5 INHALANT RESPIRATORY (INHALATION) at 19:05

## 2025-04-21 RX ADMIN — BUDESONIDE 0.5 MG: 0.5 INHALANT RESPIRATORY (INHALATION) at 09:31

## 2025-04-21 RX ADMIN — HEPARIN SODIUM 5000 UNITS: 5000 INJECTION INTRAVENOUS; SUBCUTANEOUS at 20:06

## 2025-04-21 RX ADMIN — DIAZEPAM 2.5 MG: 5 INJECTION INTRAMUSCULAR; INTRAVENOUS at 03:23

## 2025-04-21 RX ADMIN — ARFORMOTEROL TARTRATE 15 MCG: 15 SOLUTION RESPIRATORY (INHALATION) at 19:05

## 2025-04-21 RX ADMIN — OLANZAPINE 5 MG: 5 TABLET, ORALLY DISINTEGRATING ORAL at 18:32

## 2025-04-21 RX ADMIN — ARFORMOTEROL TARTRATE 15 MCG: 15 SOLUTION RESPIRATORY (INHALATION) at 09:31

## 2025-04-21 RX ADMIN — SODIUM BICARBONATE: 84 INJECTION INTRAVENOUS at 05:04

## 2025-04-21 RX ADMIN — METOPROLOL TARTRATE 12.5 MG: 25 TABLET, FILM COATED ORAL at 08:20

## 2025-04-21 NOTE — PLAN OF CARE
Goal Outcome Evaluation:  Plan of Care Reviewed With: patient, family        Progress: declining  Outcome Evaluation: Patient has rested for most of the day with periods of agitation and restlessness. Palliative RN met with patient's family at bedside. Patient is now a DNR and [facility] is meeting with the family tomorrow. Patient's blood glucose has been low, MD notified and Brionna was D/C. Will continue plan of care.

## 2025-04-21 NOTE — PROGRESS NOTES
[facility]   Urology Progress Note    Patient Name: Darnell Geller  : 1945  MRN: [id number]  Primary Care Physician:  Cami Sargent APRN  Date of admission: 2025    Subjective   Subjective       HPI: Events overnight noted.  Patient attempting to get out of bed but sitter is in place.      Objective   Objective     Vitals:   Temp:  [93.7 °F (34.3 °C)-97.5 °F (36.4 °C)] 97.3 °F (36.3 °C)  Heart Rate:  [76-84] 84  Resp:  [18-20] 19  BP: (122-162)/(71-84) 122/82      Physical Exam    Urine in soler clear       Result Review    Result Review:  I have personally reviewed the results from the time of this admission to 2025 09:31 EDT and agree with these findings:  [x]  Laboratory  []  Microbiology  []  Radiology  []  EKG/Telemetry   []  Cardiology/Vascular   []  Pathology  [x]  Old records  []  Other:    Assessment & Plan   Assessment / Plan       Active Hospital Problems:  Active Hospital Problems    Diagnosis     **NIKITA (acute kidney injury)     Bladder wall thickening     Benign prostatic hyperplasia with urinary retention     Bilateral hydronephrosis     Atrial fibrillation and flutter     Diabetes mellitus          Plan: Meeting with palliative care today.  I think this is entirely appropriate given the patient's overall medical condition and prognosis.  Dr. Cross is aware that patient is in hospital and will follow from now.       Electronically signed by Kiersten Jordan MD, 25, 9:31 AM EDT.

## 2025-04-21 NOTE — PROGRESS NOTES
[facility]     Nephrology Progress Note      Patient Name: Darnell Geller  : 1945  MRN: [id number]  Primary Care Physician:  Cami Sargent APRN  Date of admission: 2025    Subjective   Subjective     Interval History:  Events noted.  More agitated yesterday.  Confused today.  In bed.    Wife at bedside.  Nurses at bedside.    1900 cc urine output.      Review of Systems   All systems were reviewed and negative except for: what is mentioned above.     Objective   Objective     Vitals:   Temp:  [93.7 °F (34.3 °C)-97.3 °F (36.3 °C)] 97.3 °F (36.3 °C)  Heart Rate:  [76-84] 84  Resp:  [19-20] 19  BP: (122-149)/(71-82) 122/82  Physical Exam:   Constitutional: Awake, alert, in bed, confused.  Hard of hearing.   Eyes: sclerae anicteric, no conjunctival injection   HENT: mucous membranes moist   Neck: Supple, no thyromegaly, no lymphadenopathy, trachea midline, No JVD   Respiratory: Clear to auscultation bilaterally, nonlabored respirations    Cardiovascular: RRR, no murmurs, rubs, or gallops.   Gastrointestinal: Positive bowel sounds, soft, nontender, nondistended   Musculoskeletal: No edema, no clubbing or cyanosis   Psychiatric: Confused.   Neurologic: Moving extremities.   Skin: warm and dry, no rashes    Muñiz in place. Normal color urine.     Result Review    Result Reviewed:  I have personally reviewed the results from the time of this admission to 2025 15:09 EDT and agree with these findings:  [x]  Laboratory  []  Microbiology  [x]  Radiology  []  EKG/Telemetry   []  Cardiology/Vascular   []  Pathology  []  Old records  []  Other:        Lab 25  0536 25  0613 25  0510 25  1850 25  1202   SODIUM 142 141 136 135* 136  136   POTASSIUM 4.7 5.0 5.2 4.9 4.8  4.8   CHLORIDE 112* 110* 108* 102 105  105   CO2 18.1* 18.1* 18.1* 19.7* 20.6*  20.6*   BUN 59* 66* 67* 66* 66*  66*   CREATININE 3.63* 3.86* 4.16* 4.27* 4.02*  4.02*   GLUCOSE 60* 247* 262* 209* 132*   132*   EGFR 16.2* 15.0* 13.8* 13.3* 14.3*  14.3*   ANION GAP 11.9 12.9 9.9 13.3 10.4  10.4   MAGNESIUM 2.0 2.1  --   --   --    PHOSPHORUS  --   --   --   --  5.8*     US Renal Bilateral  Result Date: 4/19/2025  US RENAL BILATERAL-  Date of exam: 4/19/2025 12:49 AM.  Indications: NIKITA; n17.9-acute kidney failure, unspecified; d64.9-anemia, unspecified; i50.9-heart failure, unspecified.  Comparisons: 3/14/2025; 11/9/2024; 11/8/2024; 3/20/2023.  TECHNIQUE/FINDINGS: A bilateral renal ultrasound examination was performed. Two-dimensional (2D) grayscale (B-mode) images as well as color Doppler images are provided for review. The bilateral kidneys and the urinary bladder were evaluated. The abdominal aorta was not evaluated. There is moderate-to-severe hydronephrosis bilaterally. Bilateral hydroureter is also suspected. No pelvicalyceal stones are seen. No suspicious renal mass is identified. The renal parenchymal echotexture is probably within normal limits. The right kidney measures 11.2 x 5.9 x 5.5 cm. The right renal volume is not provided. The left kidney measures 11.9 x 6 x 5 cm. The left renal volume is not provided. Incidentally, there is splenomegaly. The spleen measures 15.3 x 6.2 x 12.7 cm. The splenic volume is 635.4 mL. Chronic calcified granulomatous disease involves the spleen. The urinary bladder contains a catheter and is under-distended. The urinary bladder wall is thickened, which may be an artifact. However, age-indeterminate infectious/inflammatory cystitis is possible.        Impression:  1. Moderate-to-severe hydronephrosis is seen bilaterally.  2. No definite nephrolithiasis.  3. No suspicious renal masses are seen.  4. The renal parenchymal echotexture is within normal limits bilaterally.  5. Urinary bladder wall thickening is seen, which may represent an underdistention artifact. However, an age-indeterminate infectious/inflammatory cystitis is possible. A urinary bladder catheter is in place.   6. Please see above comments for further detail.   Portions of this note were completed with a voice recognition program.  4/19/2025 1:55 AM by Darnell Fraser MD on Workstation: [id number]         Most notable findings include: as above.     Assessment & Plan   Assessment / Plan       Active Hospital Problems:  Active Hospital Problems    Diagnosis     **NIKITA (acute kidney injury)     Bladder wall thickening     Benign prostatic hyperplasia with urinary retention     Bilateral hydronephrosis     Atrial fibrillation and flutter     Diabetes mellitus        Assessment and Plan:    - Acute kidney injury, possibly obstructive in nature, has bilateral moderate severe hydronephrosis.  Urinalysis with many WBC and RBC.  Per urology.  Continue 0.45% +1 amp of bicarb at 75 cc/hr.   - CKD stage IV, baseline creatinine 2.5-3.0 from previous records.  Recent hospitalization at [facility] with diuresis and acute kidney injury may have rendered baseline creatinine even higher.  Avoid nephrotoxins.    - Severe anemia with evidence of iron deficiency and macrocytosis, stool occult blood positive, status post transfusion.  Seeing hematology.  - Borderline hyperkalemia, improved.   - Metabolic acidosis, possibly secondary to CKD.  Continue oral sodium bicarb and monitor.  - Diabetes with complications, per primary.  - Hypocalcemia with secondary hyperparathyroidism and previous vitamin D deficiency, check level of the latter with a.m. labs.     Discussed with wife and nursing staff.   Palliative evaluating.  Please call with any questions      Electronically signed by Eusebio Robertson MD, 4/21/2025, 15:09 EDT.

## 2025-04-21 NOTE — PROGRESS NOTES
"[facility] Clinical Pharmacy Services: Vancomycin Pharmacokinetic Initial Consult Note    Darnell Geller is a 79 y.o. male who is on day 1 of pharmacy to dose vancomycin for Urinary Tract Infection.    Consult Information  Consulting Provider: Mamadou Perkins  Planned Duration of Therapy: 14 days  Was Patient Receiving Prior to Admission/Consult?: No  Loading Dose Ordered: 1500 mg on  at 1900  PK/PD Target: Dose by Levels  Relevant ID History:   Other Antimicrobials: Azithromycin    Imaging Reviewed?: Yes    Microbiology Data  MRSA PCR performed: No; Result: Not ordered due to excluded indication or presence of suspected abscess  Culture/Source:    urine - enterococcus faecium susceptible to vanc + macrobid    Vitals/Labs  Ht: 177.8 cm (70\"); Wt: 80.2 kg (176 lb 12.9 oz)  Temp (24hrs), Av.4 °F (35.8 °C), Min:95.3 °F (35.2 °C), Max:97.3 °F (36.3 °C)   Estimated Creatinine Clearance: 18.4 mL/min (A) (by C-G formula based on SCr of 3.63 mg/dL (H)).       Results from last 7 days   Lab Units 25  0536 25  0613 25  0510   CREATININE mg/dL 3.63* 3.86* 4.16*   WBC 10*3/mm3 4.79 3.91 4.63     Assessment/Plan:    Vancomycin Dose: pulse dosing with 1500 mg IV once on  at 1900 ;   Vanc Trough ordered for  AM  Patient has order for Basic Metabolic Panel    Pharmacy will follow patient's kidney function and will adjust doses and obtain levels as necessary. Thank you for involving pharmacy in this patient's care. Please contact pharmacy with any questions or concerns.                           Alfonso Crawford [facility]  Clinical Pharmacist    "

## 2025-04-21 NOTE — PROGRESS NOTES
[facility]   Hospitalist Progress Note  Date: 2025  Patient Name: Darnell Geller  : 1945  MRN: [id number]  Date of admission: 2025  Room/Bed: 361/1      Subjective   Subjective     Chief Complaint:   Abnormal labs    Summary:  Darnell Geller is an 79 y.o. male with medical history significant for BPH, CAD, CKD stage IV, diabetes, hyperlipidemia, hypertension, monoclonal gammopathy, history of moderate left-sided hydronephrosis, history of urinary retention with Muñiz, dementia.  Patient presents to the hospital with abnormal labs.  Patient noted to have abnormal labs of worsening renal function and anemia, currently on Eliquis.  Patient's renal ultrasound showed bilateral moderate to severe hydronephrosis.  Patient's creatinine still elevated. Based on previous notes with urology due to patient's comorbidities, dementia discussions were held to withhold from further investigation, cystoscopy.  Patient also with a history of monoclonal gammopathy, had refused further workup in the past including bone marrow biopsy.  In discussion with patient's wife, she did want subspecialist consulted to discuss options.  Nephrology, urology and hematology consulted.    Interval Followup:   Able to have family meeting with palliative care.  Patient's wife and 2 sons were able to be present.  Discussed at length options laid out by urology, hematology oncology as well as nephrology.  Patient's family states he has been worsening at home over the past year or 2 with significant decline recently.  Family appears ready further conversations of hospice.  Hospice services were consulted after discussing with family.  At this time they are not comfortable with transitioning to comfort care just yet.  1 additional son they need to talk with.  Were comfortable in updating CODE STATUS to DNR/DNI.    Overnight patient agitated and combative.  Patient ultimately required Zyprexa Haldol and Valium.  This morning on rounds patient  more somnolent    Objective   Objective     Vitals:   Temp:  [93.7 °F (34.3 °C)-97.3 °F (36.3 °C)] 97.3 °F (36.3 °C)  Heart Rate:  [76-84] 84  Resp:  [18-20] 19  BP: (122-162)/(71-84) 122/82    Physical Exam   General: Awake, pleasantly confused, somnolent but will wake to voice.  Patient able to give name  HENT: NCAT, MMM  Eyes: pupils equal, no scleral icterus  Cardiovascular: Irregularly irregular, controlled rates  Pulmonary: Coarse breath sounds bilaterally  Gastrointestinal: S/ND/NT, +BS  Musculoskeletal: No gross deformities  Skin: No jaundice, no rash on exposed skin appreciated  Neuro: Cranial nerves grossly intact, moving all 4 extremities, altered    Result Review    Result Review:  I have personally reviewed these results:  [x]  Laboratory      Lab 04/21/25  0536 04/20/25  0613 04/19/25  1818 04/19/25  0510   WBC 4.79 3.91  --  4.63   HEMOGLOBIN 8.9* 8.8* 9.3* 6.7*   HEMATOCRIT 27.6* 28.0* 29.2* 21.5*   PLATELETS 108* 102*  --  99*   NEUTROS ABS 2.99 2.52  --  3.25   IMMATURE GRANS (ABS) 0.03 0.03  --  0.02   LYMPHS ABS 0.90 0.62*  --  0.65*   MONOS ABS 0.60 0.55  --  0.53   EOS ABS 0.24 0.17  --  0.16   .8* 105.7*  --  107.0*   PROCALCITONIN  --   --   --  0.65*         Lab 04/21/25  0536 04/20/25  0613 04/19/25  0510 04/18/25  1850 04/18/25  1202   SODIUM 142 141 136   < > 136  136   POTASSIUM 4.7 5.0 5.2   < > 4.8  4.8   CHLORIDE 112* 110* 108*   < > 105  105   CO2 18.1* 18.1* 18.1*   < > 20.6*  20.6*   ANION GAP 11.9 12.9 9.9   < > 10.4  10.4   BUN 59* 66* 67*   < > 66*  66*   CREATININE 3.63* 3.86* 4.16*   < > 4.02*  4.02*   EGFR 16.2* 15.0* 13.8*   < > 14.3*  14.3*   GLUCOSE 60* 247* 262*   < > 132*  132*   CALCIUM 8.7 8.6 7.7*   < > 8.4*  8.4*   MAGNESIUM 2.0 2.1  --   --   --    PHOSPHORUS  --   --   --   --  5.8*    < > = values in this interval not displayed.         Lab 04/18/25  1850 04/18/25  1202   TOTAL PROTEIN 8.2 8.2   ALBUMIN 3.0* 3.1*  3.1*   GLOBULIN 5.2 5.1   ALT  (SGPT) 27 27   AST (SGOT) 49* 49*   BILIRUBIN 0.7 0.5   ALK PHOS 241* 230*         Lab 04/18/25  1850   PROBNP 2,702.0*             Lab 04/19/25  0747 04/19/25  0510   IRON  --  41*   IRON SATURATION (TSAT)  --  13*   TIBC  --  313   TRANSFERRIN  --  210   FOLATE  --  12.90   VITAMIN B 12  --  703   ABO TYPING O  --    RH TYPING Positive  --    ANTIBODY SCREEN Negative  --          Brief Urine Lab Results  (Last result in the past 365 days)        Color   Clarity   Blood   Leuk Est   Nitrite   Protein   CREAT   Urine HCG        04/19/25 0556             38.0               [x]  Microbiology   Microbiology Results (last 10 days)       Procedure Component Value - Date/Time    Urine Culture - Urine, Indwelling Urethral Catheter [[id number]]  (Abnormal)  (Susceptibility) Collected: 04/18/25 2158    Lab Status: Final result Specimen: Urine from Indwelling Urethral Catheter Updated: 04/21/25 0941     Urine Culture >100,000 CFU/mL Enterococcus faecium    Narrative:      Colonization of the urinary tract without infection is common. Treatment is discouraged unless the patient is symptomatic, pregnant, or undergoing an invasive urologic procedure.    Susceptibility        Enterococcus faecium      DEBRA      Ampicillin Resistant      Levofloxacin Resistant      Nitrofurantoin Susceptible      Vancomycin Susceptible                           Urine Culture - Urine, Urine, Catheter [[id number]]  (Abnormal)  (Susceptibility) Collected: 04/18/25 1202    Lab Status: Final result Specimen: Urine, Catheter Updated: 04/21/25 0941     Urine Culture >100,000 CFU/mL Enterococcus faecium    Narrative:      Colonization of the urinary tract without infection is common. Treatment is discouraged unless the patient is symptomatic, pregnant, or undergoing an invasive urologic procedure.    Susceptibility        Enterococcus faecium      DEBRA      Ampicillin Resistant      Levofloxacin Resistant      Nitrofurantoin Susceptible      Vancomycin  Susceptible                                 [x]  Radiology  CT Abdomen Pelvis Without Contrast  Result Date: 4/19/2025  Impression: 1.Moderate bilateral hydroureteronephrosis to the level of the urinary bladder. No evidence of obstructing stone or mass by noncontrast technique. This appears similar on the left but new on the right compared to prior CT from 11/9/2024. 2.Circumferential bladder wall thickening with inflammatory stranding. Correlate clinically for cystitis. 3.Nodular contour of the liver suggesting underlying cirrhosis. 4.Mild splenomegaly which could relate to portal hypertension in the setting of cirrhosis. 5.Small to moderate bilateral pleural effusions, left greater than right. Electronically Signed: Mil Bahena MD  4/19/2025 12:35 PM EDT  Workstation ID: [id number]    US Renal Bilateral  Result Date: 4/19/2025   1. Moderate-to-severe hydronephrosis is seen bilaterally.  2. No definite nephrolithiasis.  3. No suspicious renal masses are seen.  4. The renal parenchymal echotexture is within normal limits bilaterally.  5. Urinary bladder wall thickening is seen, which may represent an underdistention artifact. However, an age-indeterminate infectious/inflammatory cystitis is possible. A urinary bladder catheter is in place.  6. Please see above comments for further detail.   Portions of this note were completed with a voice recognition program.  4/19/2025 1:55 AM by Darnell Fraser MD on Workstation: [email or web address]      XR Chest 1 View  Result Date: 4/18/2025  Impression: Mild diffuse interstitial prominence may represent pulmonary edema. Additionally, there is an airspace opacity in the left lower lobe that could represent pneumonia. Electronically Signed: Daniel Garcia MD  4/18/2025 9:07 PM EDT  Workstation ID: [id number]    XR Hip With or Without Pelvis 2 - 3 View Right  Result Date: 4/18/2025  Degenerative changes. Atheromatous disease. Electronically Signed: Darnell Khan MD  4/18/2025 5:54 PM EDT   Workstation ID: [id number]    []  EKG/Telemetry   []  Cardiology/Vascular   []  Pathology  []  Old records  []  Other:    Assessment & Plan   Assessment / Plan     Assessment:  NIKITA on CKD  Sepsis, urinary tract infection, Enterococcus faecium  Acute on chronic anemia  Chronic left and new right-sided hydronephrosis  Chronic urinary retention with Muñiz catheter  Diabetes  CAD  Atrial fibrillation  Monoclonal gammopathy    Plan:  Patient remains admitted to hospital for further care and management  Consulting urology, nephrology and oncology, appreciate assistance  CT scan reviewed  Hemoglobin stable following transfusion   Slight improvement in renal function  Heart rate improved following resumption of patient's home metoprolol  Holding gabapentin with confusion this morning as well as NIKITA  Urinary tract infection, Enterococcus faecium, transitioning antibiotics to vancomycin on sensitivities  Discussed with urology, nephrologist.  Patient's wife hesitant for cystoscopy or other workup for patient's urinary retention.  Options of stenting versus nephrostomy tubes discussed with wife, she has concerns for these procedures.  Initially wife open to the thought of dialysis, however later in conversation states she is unsure her  would be able to tolerate this.  Discussed extensively on the phone with wife on the evening of the 20th.  Family meeting performed on the 21st with wife, 2 sons available.  Palliative care also in attendance.  Family is open to having hospice services meet with them.  Family hesitant to initiate comfort measures only now.  Agreeable to transitioning DNR/DNI.  CODE STATUS updated today.       Discussed with RN.  Discussed with patient's wife and 2 sons at bedside with palliative care,  nephrology    VTE Prophylaxis:  Pharmacologic VTE prophylaxis orders are present.        CODE STATUS:   Code Status (Patient has no pulse and is not breathing): No CPR (Do Not Attempt to  Resuscitate)  Medical Interventions (Patient has pulse or is breathing): Limited Support  Medical Intervention Limits: No intubation (DNI)  Level Of Support Discussed With: Next of Kin (If No Surrogate)      Electronically signed by Mamadou Perkins MD, 4/21/2025, 14:05 EDT.

## 2025-04-21 NOTE — SIGNIFICANT NOTE
04/21/25 1000   Physical Therapy Time and Intention   Session Not Performed other (see comments);unable to evaluate, medical status change   Comment, Session Not Performed excused per nursing- pt might go comfort measures later today.

## 2025-04-21 NOTE — CONSULTS
Purpose of the visit was to evaluate for: goals of care/advanced care planning and hospice referral/discussion. Spoke with MD, RN, patient, and family and discussed palliative care, goals of care, care options, resuscitation status, and [facility].      Assessment:Mr. Geller has a history of CKD stage IV, diabetes, urinary retention with indwelling catheter, A-fib and dementia.  Patient presented here posterior home nurse labs revealed low hemoglobin and worsening kidney function.  Patient is on Eliquis. MD had good conversation over weekend and planned a meeting with palliative care services for today.Pt resting in bed with spouse and two sons at bedside. MD went over pt medical history options and answered any questions presented. Family goals seem to align with no aggressive measures and would like to take pt home with [facility]. Have consulted. CODE STATUS-DNR/DNI. Discussed comfort measures until pt can dc with [facility] services however family is hesitant on dc certain home medications. Will review and discuss with MD further as spouse reached out after meeting. Palliative care will continue to follow.       Recommendations/Plan: [facility] referral and Home self-care.    Tasks Completed: Code Status clarification and Emotional Support.      Shelly PRATT RN  Palliative Care

## 2025-04-21 NOTE — PLAN OF CARE
Goal Outcome Evaluation:           Progress: declining  Outcome Evaluation: Pt alert to self only this shift. Temperature stabilizing overnight, though patient is resisting warming blanket even when informed of its importance. Increasingly aggitated with staff, behaving as though he is suspicious of care team. Yelling out for family members, repeated attempts to stand, playing in stool. PRN orders for zyprexa, haldol, and vallium given. Best results acheived with vallium. Sitter ordered by PA,  notified. No staff available to sit with patient at this time. Plan of care ongoing.

## 2025-04-22 LAB
ALBUMIN SERPL ELPH-MCNC: 2.9 G/DL (ref 2.9–4.4)
ALBUMIN/GLOB SERPL: 0.6 {RATIO} (ref 0.7–1.7)
ALPHA1 GLOB SERPL ELPH-MCNC: 0.4 G/DL (ref 0–0.4)
ALPHA2 GLOB SERPL ELPH-MCNC: 0.7 G/DL (ref 0.4–1)
B-GLOBULIN SERPL ELPH-MCNC: 1.1 G/DL (ref 0.7–1.3)
GAMMA GLOB SERPL ELPH-MCNC: 2.6 G/DL (ref 0.4–1.8)
GLOBULIN SER-MCNC: 4.9 G/DL (ref 2.2–3.9)
GLUCOSE BLDC GLUCOMTR-MCNC: 104 MG/DL (ref 70–99)
GLUCOSE BLDC GLUCOMTR-MCNC: 131 MG/DL (ref 70–99)
GLUCOSE BLDC GLUCOMTR-MCNC: 182 MG/DL (ref 70–99)
GLUCOSE BLDC GLUCOMTR-MCNC: 96 MG/DL (ref 70–99)
GLUCOSE BLDC GLUCOMTR-MCNC: 97 MG/DL (ref 70–99)
IGA SERPL-MCNC: 329 MG/DL (ref 61–437)
IGG SERPL-MCNC: 3100 MG/DL (ref 603–1613)
IGM SERPL-MCNC: 38 MG/DL (ref 15–143)
INTERPRETATION SERPL IEP-IMP: ABNORMAL
KAPPA LC FREE SER-MCNC: 838.5 MG/L (ref 3.3–19.4)
KAPPA LC FREE/LAMBDA FREE SER: 12.69 {RATIO} (ref 0.26–1.65)
LABORATORY COMMENT REPORT: ABNORMAL
LAMBDA LC FREE SERPL-MCNC: 66.1 MG/L (ref 5.7–26.3)
M PROTEIN SERPL ELPH-MCNC: 2.1 G/DL
PROT SERPL-MCNC: 7.8 G/DL (ref 6–8.5)

## 2025-04-22 PROCEDURE — 25010000002 ONDANSETRON PER 1 MG: Performed by: STUDENT IN AN ORGANIZED HEALTH CARE EDUCATION/TRAINING PROGRAM

## 2025-04-22 PROCEDURE — 82948 REAGENT STRIP/BLOOD GLUCOSE: CPT

## 2025-04-22 PROCEDURE — 63710000001 INSULIN LISPRO (HUMAN) PER 5 UNITS: Performed by: INTERNAL MEDICINE

## 2025-04-22 PROCEDURE — 94664 DEMO&/EVAL PT USE INHALER: CPT

## 2025-04-22 PROCEDURE — 82948 REAGENT STRIP/BLOOD GLUCOSE: CPT | Performed by: INTERNAL MEDICINE

## 2025-04-22 PROCEDURE — 25010000002 HYDROMORPHONE 1 MG/ML SOLUTION: Performed by: STUDENT IN AN ORGANIZED HEALTH CARE EDUCATION/TRAINING PROGRAM

## 2025-04-22 PROCEDURE — 25010000002 ZIPRASIDONE MESYLATE PER 10 MG: Performed by: PHYSICIAN ASSISTANT

## 2025-04-22 PROCEDURE — 25010000002 HEPARIN (PORCINE) PER 1000 UNITS: Performed by: STUDENT IN AN ORGANIZED HEALTH CARE EDUCATION/TRAINING PROGRAM

## 2025-04-22 PROCEDURE — 94799 UNLISTED PULMONARY SVC/PX: CPT

## 2025-04-22 PROCEDURE — 99232 SBSQ HOSP IP/OBS MODERATE 35: CPT | Performed by: STUDENT IN AN ORGANIZED HEALTH CARE EDUCATION/TRAINING PROGRAM

## 2025-04-22 RX ORDER — OLANZAPINE 5 MG/1
10 TABLET, ORALLY DISINTEGRATING ORAL 2 TIMES DAILY PRN
Status: DISCONTINUED | OUTPATIENT
Start: 2025-04-22 | End: 2025-04-23 | Stop reason: HOSPADM

## 2025-04-22 RX ORDER — MORPHINE SULFATE 2 MG/ML
1 INJECTION, SOLUTION INTRAMUSCULAR; INTRAVENOUS EVERY 4 HOURS PRN
Status: DISCONTINUED | OUTPATIENT
Start: 2025-04-22 | End: 2025-04-22

## 2025-04-22 RX ADMIN — ZIPRASIDONE MESYLATE 10 MG: 20 INJECTION, POWDER, LYOPHILIZED, FOR SOLUTION INTRAMUSCULAR at 01:30

## 2025-04-22 RX ADMIN — METOPROLOL TARTRATE 12.5 MG: 25 TABLET, FILM COATED ORAL at 20:14

## 2025-04-22 RX ADMIN — SODIUM BICARBONATE 1300 MG: 650 TABLET ORAL at 20:13

## 2025-04-22 RX ADMIN — BUDESONIDE 0.5 MG: 0.5 INHALANT RESPIRATORY (INHALATION) at 07:03

## 2025-04-22 RX ADMIN — HYDROMORPHONE HYDROCHLORIDE 0.5 MG: 1 INJECTION, SOLUTION INTRAMUSCULAR; INTRAVENOUS; SUBCUTANEOUS at 10:07

## 2025-04-22 RX ADMIN — HYDROMORPHONE HYDROCHLORIDE 0.5 MG: 1 INJECTION, SOLUTION INTRAMUSCULAR; INTRAVENOUS; SUBCUTANEOUS at 20:13

## 2025-04-22 RX ADMIN — BUDESONIDE 0.5 MG: 0.5 INHALANT RESPIRATORY (INHALATION) at 19:28

## 2025-04-22 RX ADMIN — HEPARIN SODIUM 5000 UNITS: 5000 INJECTION INTRAVENOUS; SUBCUTANEOUS at 20:14

## 2025-04-22 RX ADMIN — HYDROMORPHONE HYDROCHLORIDE 0.5 MG: 1 INJECTION, SOLUTION INTRAMUSCULAR; INTRAVENOUS; SUBCUTANEOUS at 14:36

## 2025-04-22 RX ADMIN — ARFORMOTEROL TARTRATE 15 MCG: 15 SOLUTION RESPIRATORY (INHALATION) at 07:03

## 2025-04-22 RX ADMIN — HEPARIN SODIUM 5000 UNITS: 5000 INJECTION INTRAVENOUS; SUBCUTANEOUS at 10:07

## 2025-04-22 RX ADMIN — PANTOPRAZOLE SODIUM 40 MG: 40 TABLET, DELAYED RELEASE ORAL at 20:14

## 2025-04-22 RX ADMIN — ARFORMOTEROL TARTRATE 15 MCG: 15 SOLUTION RESPIRATORY (INHALATION) at 19:28

## 2025-04-22 RX ADMIN — Medication 10 ML: at 10:07

## 2025-04-22 RX ADMIN — INSULIN LISPRO 2 UNITS: 100 INJECTION, SOLUTION INTRAVENOUS; SUBCUTANEOUS at 17:54

## 2025-04-22 RX ADMIN — Medication 10 ML: at 20:14

## 2025-04-22 RX ADMIN — ROSUVASTATIN CALCIUM 5 MG: 5 TABLET, FILM COATED ORAL at 20:13

## 2025-04-22 RX ADMIN — NICOTINE 1 PATCH: 21 PATCH, EXTENDED RELEASE TRANSDERMAL at 20:12

## 2025-04-22 RX ADMIN — ONDANSETRON 4 MG: 2 INJECTION INTRAMUSCULAR; INTRAVENOUS at 00:48

## 2025-04-22 NOTE — THERAPY EVALUATION
Respiratory Therapist Broncho-Pulmonary Hygiene Progress Note      Patient Name:  Darnell Geller  YOB: 1945    Darnell Geller meets the qualification for Level 2 of the Bronco-Pulmonary Hygiene Protocol. This was based on my daily patient assessment and includes review of chest x-ray results, cough ability and quality, oxygenation, secretions or risk for secretion development and patient mobility.     Broncho-Pulmonary Hygiene Assessment:    Level of Movement: Actively changing positions-requires assistance  Disoriented/Follows Commands    Breath Sounds: Diminished and/or coarse rhonchi    Cough: Strong, effective and/or frequent    Chest X-Ray: Mild consolidation and/or atelectasis.    Sputum Productions: None or small amount of thin or watery secretions with effective cough    History and Physical: None    SpO2 to Oxygen Need: greater than 92% on room air or  less than 3L nasal canula    Current SpO2 is: 95 on ra      Based on this information, I have completed the following interventions: Teach/Instruct patient on cough and deep breathe    Pt could not be fully assessed due to agitation.      Electronically signed by Tati Colby RRT, 04/22/25, 2:14 AM EDT.

## 2025-04-22 NOTE — SIGNIFICANT NOTE
04/22/25 0756   Physical Therapy Time and Intention   Session Not Performed   (Hold, hopsice meeting today)

## 2025-04-22 NOTE — PLAN OF CARE
Goal Outcome Evaluation:      Patient and family had meeting with hospice today,  Patient is going home on hospice tomorrow.  VSS.  Sitter at beside as well as family.  No acute changes this shift. No signs of distress noted at this time.

## 2025-04-22 NOTE — NURSING NOTE
Spoke with MD after [facility] consult that plan is for pt to dc tomorrow home with [facility]. Have completed a [place]-MOST with wife at bedside, will have MD sign scan and provide copy. Palliative care will continue to follow. Primary RN at bedside updated on plan.       Shelly PRATT RN  Palliative Care

## 2025-04-22 NOTE — CONSULTS
Consult made for assessment of eligibility and explanation of services. Patient disease progression and goals of care discussed. Spouse wishes to take the patient home as soon as possible with hospice in place. Patient is eligible for home hospice care under the medicare benefit and is able to keep  services that are established in place for additional care. Spouse wishes to plan for discharge tomorrow to allow for delivery of hospital bed and EMS transport earlier in the day.

## 2025-04-22 NOTE — PROGRESS NOTES
[facility]     Nephrology Progress Note      Patient Name: Darnell Geller  : 1945  MRN: [id number]  Primary Care Physician:  Cami Sargent APRN  Date of admission: 2025    Subjective   Subjective     Interval History:  Confused today.  In bed.    Warming blanket on.      Objective   Objective     Vitals:   Temp:  [92.3 °F (33.5 °C)-97.3 °F (36.3 °C)] 92.3 °F (33.5 °C)  Heart Rate:  [] 87  Resp:  [17-20] 17  BP: (122-159)/(78-88) 158/78  Physical Exam:   Constitutional: Awake, alert, in bed, confused.  Hard of hearing.   Eyes: sclerae anicteric, no conjunctival injection   HENT: mucous membranes moist   Neck: Supple, no thyromegaly, no lymphadenopathy, trachea midline, No JVD   Respiratory: Clear to auscultation bilaterally, nonlabored respirations    Cardiovascular: RRR, no murmurs, rubs, or gallops.   Gastrointestinal: Positive bowel sounds, soft, nontender, nondistended   Musculoskeletal: No edema, no clubbing or cyanosis   Psychiatric: Confused.   Neurologic: Moving extremities.   Skin: warm and dry, no rashes    Muñiz in place. Normal color urine.     Result Review    Result Reviewed:  I have personally reviewed the results from the time of this admission to 2025 07:47 EDT and agree with these findings:  [x]  Laboratory  []  Microbiology  [x]  Radiology  []  EKG/Telemetry   []  Cardiology/Vascular   []  Pathology  []  Old records  []  Other:        Lab 25  0536 25  0613 25  0510 25  1850 25  1202   SODIUM 142 141 136 135* 136  136   POTASSIUM 4.7 5.0 5.2 4.9 4.8  4.8   CHLORIDE 112* 110* 108* 102 105  105   CO2 18.1* 18.1* 18.1* 19.7* 20.6*  20.6*   BUN 59* 66* 67* 66* 66*  66*   CREATININE 3.63* 3.86* 4.16* 4.27* 4.02*  4.02*   GLUCOSE 60* 247* 262* 209* 132*  132*   EGFR 16.2* 15.0* 13.8* 13.3* 14.3*  14.3*   ANION GAP 11.9 12.9 9.9 13.3 10.4  10.4   MAGNESIUM 2.0 2.1  --   --   --    PHOSPHORUS  --   --   --   --  5.8*     US Renal  Bilateral  Result Date: 4/19/2025  US RENAL BILATERAL-  Date of exam: 4/19/2025 12:49 AM.  Indications: NIKITA; n17.9-acute kidney failure, unspecified; d64.9-anemia, unspecified; i50.9-heart failure, unspecified.  Comparisons: 3/14/2025; 11/9/2024; 11/8/2024; 3/20/2023.  TECHNIQUE/FINDINGS: A bilateral renal ultrasound examination was performed. Two-dimensional (2D) grayscale (B-mode) images as well as color Doppler images are provided for review. The bilateral kidneys and the urinary bladder were evaluated. The abdominal aorta was not evaluated. There is moderate-to-severe hydronephrosis bilaterally. Bilateral hydroureter is also suspected. No pelvicalyceal stones are seen. No suspicious renal mass is identified. The renal parenchymal echotexture is probably within normal limits. The right kidney measures 11.2 x 5.9 x 5.5 cm. The right renal volume is not provided. The left kidney measures 11.9 x 6 x 5 cm. The left renal volume is not provided. Incidentally, there is splenomegaly. The spleen measures 15.3 x 6.2 x 12.7 cm. The splenic volume is 635.4 mL. Chronic calcified granulomatous disease involves the spleen. The urinary bladder contains a catheter and is under-distended. The urinary bladder wall is thickened, which may be an artifact. However, age-indeterminate infectious/inflammatory cystitis is possible.        Impression:  1. Moderate-to-severe hydronephrosis is seen bilaterally.  2. No definite nephrolithiasis.  3. No suspicious renal masses are seen.  4. The renal parenchymal echotexture is within normal limits bilaterally.  5. Urinary bladder wall thickening is seen, which may represent an underdistention artifact. However, an age-indeterminate infectious/inflammatory cystitis is possible. A urinary bladder catheter is in place.  6. Please see above comments for further detail.   Portions of this note were completed with a voice recognition program.  4/19/2025 1:55 AM by Darnell Fraser MD on Workstation:  [id number]         Assessment & Plan   Assessment / Plan       Active Hospital Problems:  Active Hospital Problems    Diagnosis     **NIKITA (acute kidney injury)     Bladder wall thickening     Benign prostatic hyperplasia with urinary retention     Bilateral hydronephrosis     Atrial fibrillation and flutter     Diabetes mellitus        Assessment and Plan:    - Acute kidney injury, possibly obstructive in nature, has bilateral moderate severe hydronephrosis.  Urinalysis with many WBC and RBC.  Per urology.  Continue 0.45% +1 amp of bicarb at 75 cc/hr.     - CKD stage IV, baseline creatinine 2.5-3.0 from previous records.  Recent hospitalization at [facility] with diuresis and acute kidney injury may have rendered baseline creatinine even higher.  Avoid nephrotoxins.    - Severe anemia with evidence of iron deficiency and macrocytosis, stool occult blood positive, status post transfusion.  Seeing hematology.    - Borderline hyperkalemia, improved.     - Metabolic acidosis, possibly secondary to CKD.  Continue oral sodium bicarb and monitor.    - Diabetes with complications, per primary.    - Hypocalcemia with secondary hyperparathyroidism and previous vitamin D deficiency, check level of the latter with a.m. labs.

## 2025-04-22 NOTE — NURSING NOTE
KY-MOST completed scanned to medical records, copy left on bedside table for family. Palliative care will continue to follow.       Shelly PRATT RN  Palliative Care

## 2025-04-22 NOTE — PLAN OF CARE
Goal Outcome Evaluation:  Plan of Care Reviewed With: patient        Progress: declining  Outcome Evaluation: Pt was very restless overnight. Attempted different medications, coordinating with Ana Maria FOWLER. Pt did not get hardly any rest. Warming blanket had to be re-applied to patient as his temps were low over night. See vitals for temps. Pt remains as a DNR. Family is supposed to meet with hospice today. Updated Ana Maria this morning with patient's status and how his night was.

## 2025-04-22 NOTE — SIGNIFICANT NOTE
04/22/25 1000   Coping/Psychosocial   Observed Emotional State calm;cooperative   Verbalized Emotional State hopefulness   Trust Relationship/Rapport empathic listening provided   Family/Support Persons spouse   Involvement in Care interacting with patient   Additional Documentation Spiritual Care (Group)   Spiritual Care   Spiritual Care Visit Type initial   Spiritual Care Source palliative care   Receptivity to Spiritual Care visit welcomed   Spiritual Care Interventions supportive conversation provided;prayer support provided   Response to Spiritual Care receptive of support;engaged in conversation;thanks expressed   Use of Spiritual Resources prayer   Spiritual Care Follow-Up follow-up, none required as presently assessed     Length of visit: 12 min.

## 2025-04-22 NOTE — PROGRESS NOTES
[facility]   Hospitalist Progress Note  Date: 2025  Patient Name: Darnell Geller  : 1945  MRN: [id number]  Date of admission: 2025  Room/Bed: [facility]      Subjective   Subjective     Chief Complaint: Abnormal labs    Summary:Darnell Geller is a 79 y.o. male with CAD, CKD stage IV, diabetes, hyperlipidemia, hypertension, monoclonal gammopathy, BPH, history of hydronephrosis, history of urinary retention with Muñiz, dementia who presents to the hospital with abnormal labs and worsening renal function and anemia.  Patient's renal ultrasound showed bilateral moderate to severe hydronephrosis.  Patient's creatinine continue to be elevated.  Found to have UTI secondary to Enterococcus. Nephrology, urology, hematology consulted while inpatient.  Palliative care consulted.  Since hospital course has been complicated by hypothermia requiring Jung hugger.  Patient CODE STATUS was updated to DNR/DNI.  Family will likely pursue home with hospice    Interval Followup:   Patient continues to be on Jung hugger and have low body temperature.  He is confused and does not understand his situation.  His wife is at bedside.  She is thinking hospice.     All systems reviewed and negative except for what is outlined above.      Objective   Objective     Vitals:   Temp:  [92.3 °F (33.5 °C)-98.1 °F (36.7 °C)] 98.1 °F (36.7 °C)  Heart Rate:  [] 96  Resp:  [17-19] 19  BP: (114-159)/(62-88) 114/62    Physical Exam   General: NAD, confused  Cardiovascular: RRR  Pulmonary: Normal work of breathing  Neuro: Oriented only to self    Result Review    Result Review:  I have personally reviewed these results:  [x]  Laboratory      Lab 25  0536 25  0613 25  1818 25  0510   WBC 4.79 3.91  --  4.63   HEMOGLOBIN 8.9* 8.8* 9.3* 6.7*   HEMATOCRIT 27.6* 28.0* 29.2* 21.5*   PLATELETS 108* 102*  --  99*   NEUTROS ABS 2.99 2.52  --  3.25   IMMATURE GRANS (ABS) 0.03 0.03  --  0.02   LYMPHS ABS 0.90 0.62*  --  0.65*    MONOS ABS 0.60 0.55  --  0.53   EOS ABS 0.24 0.17  --  0.16   .8* 105.7*  --  107.0*   PROCALCITONIN  --   --   --  0.65*         Lab 04/21/25  0536 04/20/25  0613 04/19/25  0510 04/18/25  1850 04/18/25  1202   SODIUM 142 141 136   < > 136  136   POTASSIUM 4.7 5.0 5.2   < > 4.8  4.8   CHLORIDE 112* 110* 108*   < > 105  105   CO2 18.1* 18.1* 18.1*   < > 20.6*  20.6*   ANION GAP 11.9 12.9 9.9   < > 10.4  10.4   BUN 59* 66* 67*   < > 66*  66*   CREATININE 3.63* 3.86* 4.16*   < > 4.02*  4.02*   EGFR 16.2* 15.0* 13.8*   < > 14.3*  14.3*   GLUCOSE 60* 247* 262*   < > 132*  132*   CALCIUM 8.7 8.6 7.7*   < > 8.4*  8.4*   MAGNESIUM 2.0 2.1  --   --   --    PHOSPHORUS  --   --   --   --  5.8*    < > = values in this interval not displayed.         Lab 04/18/25  1850 04/18/25  1202   TOTAL PROTEIN 8.2 8.2   ALBUMIN 3.0* 3.1*  3.1*   GLOBULIN 5.2 5.1   ALT (SGPT) 27 27   AST (SGOT) 49* 49*   BILIRUBIN 0.7 0.5   ALK PHOS 241* 230*         Lab 04/18/25  1850   PROBNP 2,702.0*             Lab 04/19/25  0747 04/19/25  0510   IRON  --  41*   IRON SATURATION (TSAT)  --  13*   TIBC  --  313   TRANSFERRIN  --  210   FOLATE  --  12.90   VITAMIN B 12  --  703   ABO TYPING O  --    RH TYPING Positive  --    ANTIBODY SCREEN Negative  --          Brief Urine Lab Results  (Last result in the past 365 days)        Color   Clarity   Blood   Leuk Est   Nitrite   Protein   CREAT   Urine HCG        04/19/25 0556             38.0               [x]  Microbiology   Microbiology Results (last 10 days)       Procedure Component Value - Date/Time    Urine Culture - Urine, Indwelling Urethral Catheter [[id number]]  (Abnormal)  (Susceptibility) Collected: 04/18/25 2153    Lab Status: Final result Specimen: Urine from Indwelling Urethral Catheter Updated: 04/21/25 0941     Urine Culture >100,000 CFU/mL Enterococcus faecium    Narrative:      Colonization of the urinary tract without infection is common. Treatment is discouraged unless  the patient is symptomatic, pregnant, or undergoing an invasive urologic procedure.    Susceptibility        Enterococcus faecium      DEBRA      Ampicillin Resistant      Levofloxacin Resistant      Nitrofurantoin Susceptible      Vancomycin Susceptible                           Urine Culture - Urine, Urine, Catheter [[id number]]  (Abnormal)  (Susceptibility) Collected: 04/18/25 1202    Lab Status: Final result Specimen: Urine, Catheter Updated: 04/21/25 0941     Urine Culture >100,000 CFU/mL Enterococcus faecium    Narrative:      Colonization of the urinary tract without infection is common. Treatment is discouraged unless the patient is symptomatic, pregnant, or undergoing an invasive urologic procedure.    Susceptibility        Enterococcus faecium      DEBRA      Ampicillin Resistant      Levofloxacin Resistant      Nitrofurantoin Susceptible      Vancomycin Susceptible                                 [x]  Radiology  CT Abdomen Pelvis Without Contrast  Result Date: 4/19/2025  Impression: 1.Moderate bilateral hydroureteronephrosis to the level of the urinary bladder. No evidence of obstructing stone or mass by noncontrast technique. This appears similar on the left but new on the right compared to prior CT from 11/9/2024. 2.Circumferential bladder wall thickening with inflammatory stranding. Correlate clinically for cystitis. 3.Nodular contour of the liver suggesting underlying cirrhosis. 4.Mild splenomegaly which could relate to portal hypertension in the setting of cirrhosis. 5.Small to moderate bilateral pleural effusions, left greater than right. Electronically Signed: Mil Bahena MD  4/19/2025 12:35 PM EDT  Workstation ID: [id number]    US Renal Bilateral  Result Date: 4/19/2025   1. Moderate-to-severe hydronephrosis is seen bilaterally.  2. No definite nephrolithiasis.  3. No suspicious renal masses are seen.  4. The renal parenchymal echotexture is within normal limits bilaterally.  5. Urinary bladder wall  thickening is seen, which may represent an underdistention artifact. However, an age-indeterminate infectious/inflammatory cystitis is possible. A urinary bladder catheter is in place.  6. Please see above comments for further detail.   Portions of this note were completed with a voice recognition program.  4/19/2025 1:55 AM by Darnell Fraser MD on Workstation: [id number]      XR Chest 1 View  Result Date: 4/18/2025  Impression: Mild diffuse interstitial prominence may represent pulmonary edema. Additionally, there is an airspace opacity in the left lower lobe that could represent pneumonia. Electronically Signed: Daniel Garcia MD  4/18/2025 9:07 PM EDT  Workstation ID: [id number]    XR Hip With or Without Pelvis 2 - 3 View Right  Result Date: 4/18/2025  Degenerative changes. Atheromatous disease. Electronically Signed: Darnell Khan MD  4/18/2025 5:54 PM EDT  Workstation ID: [id number]    []  EKG/Telemetry   []  Cardiology/Vascular   []  Pathology  []  Old records  []  Other:    Assessment & Plan        Assessment and Plan:    #Enterococcus UTI  Pharmacy to dose vancomycin    #NIKITA on CKD  #Metabolic acidosis  Continue fluids per nephrology  Sodium bicarb tabs    #Diabetes  Continue/scale insulin    #Chronic urinary retention status post Muñiz  Urology consulted    #A-fib  Metoprolol    #Hyperlipidemia  Statin    Continue Brovana and Pulmicort  Continue as needed Zyprexa  Continue nicotine patch  Continue pantoprazole    #Dispo  Patient will likely go home tomorrow morning with hospice     Discussed with RN.    VTE Prophylaxis:  Pharmacologic VTE prophylaxis orders are present.        CODE STATUS:   Code Status (Patient has no pulse and is not breathing): No CPR (Do Not Attempt to Resuscitate)  Medical Interventions (Patient has pulse or is breathing): Limited Support  Medical Intervention Limits: No intubation (DNI)  Level Of Support Discussed With: Next of Kin (If No Surrogate)      Electronically signed by El Tran  MD Warren, 4/22/2025, 14:28 EDT.

## 2025-04-22 NOTE — PROGRESS NOTES
"[facility] Clinical Pharmacy Services: Vancomycin Monitoring Note    Darnell Geller is a 79 y.o. male who is on day  of pharmacy to dose vancomycin for Urinary Tract Infection.    Previous Vancomycin Dose:   1500 mg IV x1   Imaging Reviewed?: Yes  Updated Cultures and Sensitivities:     Urine Culture   Date Value Ref Range Status   2025 >100,000 CFU/mL Enterococcus faecium (A)  Final           Vitals/Labs  Ht: 177.8 cm (70\"); Wt: 80.2 kg (176 lb 12.9 oz)   Temp (24hrs), Av.4 °F (34.7 °C), Min:92.3 °F (33.5 °C), Max:97.3 °F (36.3 °C)   Estimated Creatinine Clearance: 18.4 mL/min (A) (by C-G formula based on SCr of 3.63 mg/dL (H)).       Results from last 7 days   Lab Units 25  0536 25  0613 25  0510   CREATININE mg/dL 3.63* 3.86* 4.16*   WBC 10*3/mm3 4.79 3.91 4.63     Assessment/Plan    Current Vancomycin Dose: pulse dosing with 1500 mg IV once on  at  ; which provides the following predicted parameters: n/a, dosing via levels due to unstable renal function.    Next Vanc Random ordered for  with AM labs.  We will continue to monitor patient changes and renal function     Thank you for involving pharmacy in this patient's care. Please contact pharmacy with any questions or concerns.    Brooklyn Lopez, [facility]  Clinical Pharmacist    "

## 2025-04-23 VITALS
DIASTOLIC BLOOD PRESSURE: 71 MMHG | HEART RATE: 81 BPM | HEIGHT: 70 IN | OXYGEN SATURATION: 91 % | BODY MASS INDEX: 25.31 KG/M2 | TEMPERATURE: 98.2 F | SYSTOLIC BLOOD PRESSURE: 140 MMHG | WEIGHT: 176.81 LBS | RESPIRATION RATE: 20 BRPM

## 2025-04-23 PROBLEM — N17.9 AKI (ACUTE KIDNEY INJURY): Status: RESOLVED | Noted: 2025-04-18 | Resolved: 2025-04-23

## 2025-04-23 PROBLEM — Z51.5 HOSPICE CARE: Status: ACTIVE | Noted: 2025-04-23

## 2025-04-23 LAB
GLUCOSE BLDC GLUCOMTR-MCNC: 137 MG/DL (ref 70–99)
GLUCOSE BLDC GLUCOMTR-MCNC: 220 MG/DL (ref 70–99)

## 2025-04-23 PROCEDURE — 99239 HOSP IP/OBS DSCHRG MGMT >30: CPT | Performed by: STUDENT IN AN ORGANIZED HEALTH CARE EDUCATION/TRAINING PROGRAM

## 2025-04-23 PROCEDURE — 94664 DEMO&/EVAL PT USE INHALER: CPT

## 2025-04-23 PROCEDURE — 25010000002 HEPARIN (PORCINE) PER 1000 UNITS: Performed by: STUDENT IN AN ORGANIZED HEALTH CARE EDUCATION/TRAINING PROGRAM

## 2025-04-23 PROCEDURE — 94799 UNLISTED PULMONARY SVC/PX: CPT

## 2025-04-23 PROCEDURE — 25010000002 HALOPERIDOL LACTATE PER 5 MG: Performed by: PHYSICIAN ASSISTANT

## 2025-04-23 PROCEDURE — 63710000001 INSULIN LISPRO (HUMAN) PER 5 UNITS: Performed by: INTERNAL MEDICINE

## 2025-04-23 PROCEDURE — 82948 REAGENT STRIP/BLOOD GLUCOSE: CPT | Performed by: INTERNAL MEDICINE

## 2025-04-23 PROCEDURE — 25010000002 HYDROMORPHONE 1 MG/ML SOLUTION: Performed by: STUDENT IN AN ORGANIZED HEALTH CARE EDUCATION/TRAINING PROGRAM

## 2025-04-23 RX ORDER — DIAZEPAM 10 MG/2ML
10 INJECTION, SOLUTION INTRAMUSCULAR; INTRAVENOUS
Status: DISCONTINUED | OUTPATIENT
Start: 2025-04-23 | End: 2025-04-23 | Stop reason: HOSPADM

## 2025-04-23 RX ORDER — HALOPERIDOL 5 MG/ML
2 INJECTION INTRAMUSCULAR ONCE
Status: COMPLETED | OUTPATIENT
Start: 2025-04-23 | End: 2025-04-23

## 2025-04-23 RX ORDER — LORAZEPAM 2 MG/ML
0.5 CONCENTRATE ORAL
Status: DISCONTINUED | OUTPATIENT
Start: 2025-04-23 | End: 2025-04-23 | Stop reason: HOSPADM

## 2025-04-23 RX ORDER — LORAZEPAM 2 MG/ML
0.5 CONCENTRATE ORAL
Qty: 30 ML | Refills: 0 | Status: SHIPPED | OUTPATIENT
Start: 2025-04-23 | End: 2025-04-30

## 2025-04-23 RX ORDER — DIAZEPAM 10 MG/2ML
2.5 INJECTION, SOLUTION INTRAMUSCULAR; INTRAVENOUS
Status: DISCONTINUED | OUTPATIENT
Start: 2025-04-23 | End: 2025-04-23 | Stop reason: HOSPADM

## 2025-04-23 RX ORDER — LORAZEPAM 2 MG/ML
1 CONCENTRATE ORAL
Status: DISCONTINUED | OUTPATIENT
Start: 2025-04-23 | End: 2025-04-23 | Stop reason: HOSPADM

## 2025-04-23 RX ORDER — LORAZEPAM 0.5 MG/1
0.5 TABLET ORAL
Qty: 12 TABLET | Refills: 0 | Status: SHIPPED | OUTPATIENT
Start: 2025-04-23 | End: 2025-04-23 | Stop reason: HOSPADM

## 2025-04-23 RX ORDER — OLANZAPINE 10 MG/1
10 TABLET, FILM COATED ORAL 2 TIMES DAILY PRN
Qty: 30 TABLET | Refills: 0 | Status: SHIPPED | OUTPATIENT
Start: 2025-04-23

## 2025-04-23 RX ORDER — DIAZEPAM 10 MG/2ML
5 INJECTION, SOLUTION INTRAMUSCULAR; INTRAVENOUS
Status: DISCONTINUED | OUTPATIENT
Start: 2025-04-23 | End: 2025-04-23 | Stop reason: HOSPADM

## 2025-04-23 RX ORDER — LORAZEPAM 2 MG/ML
2 CONCENTRATE ORAL
Status: DISCONTINUED | OUTPATIENT
Start: 2025-04-23 | End: 2025-04-23 | Stop reason: HOSPADM

## 2025-04-23 RX ORDER — MORPHINE SULFATE 20 MG/ML
10 SOLUTION ORAL
Qty: 15 ML | Refills: 0 | Status: SHIPPED | OUTPATIENT
Start: 2025-04-23 | End: 2025-04-23

## 2025-04-23 RX ORDER — MORPHINE SULFATE 2 MG/ML
1 INJECTION, SOLUTION INTRAMUSCULAR; INTRAVENOUS
Status: DISCONTINUED | OUTPATIENT
Start: 2025-04-23 | End: 2025-04-23 | Stop reason: HOSPADM

## 2025-04-23 RX ORDER — LORAZEPAM 0.5 MG/1
0.5 TABLET ORAL
Status: DISCONTINUED | OUTPATIENT
Start: 2025-04-23 | End: 2025-04-23 | Stop reason: HOSPADM

## 2025-04-23 RX ORDER — MORPHINE SULFATE 20 MG/ML
10 SOLUTION ORAL
Qty: 30 ML | Refills: 0 | Status: SHIPPED | OUTPATIENT
Start: 2025-04-23 | End: 2025-05-01

## 2025-04-23 RX ORDER — LORAZEPAM 2 MG/1
2 TABLET ORAL
Status: DISCONTINUED | OUTPATIENT
Start: 2025-04-23 | End: 2025-04-23 | Stop reason: HOSPADM

## 2025-04-23 RX ORDER — LORAZEPAM 0.5 MG/1
1 TABLET ORAL
Status: DISCONTINUED | OUTPATIENT
Start: 2025-04-23 | End: 2025-04-23 | Stop reason: HOSPADM

## 2025-04-23 RX ORDER — MORPHINE SULFATE 20 MG/ML
10 SOLUTION ORAL
Refills: 0 | Status: DISCONTINUED | OUTPATIENT
Start: 2025-04-23 | End: 2025-04-23 | Stop reason: HOSPADM

## 2025-04-23 RX ADMIN — HALOPERIDOL LACTATE 2 MG: 5 INJECTION, SOLUTION INTRAMUSCULAR at 03:27

## 2025-04-23 RX ADMIN — ARFORMOTEROL TARTRATE 15 MCG: 15 SOLUTION RESPIRATORY (INHALATION) at 09:17

## 2025-04-23 RX ADMIN — Medication 10 ML: at 09:56

## 2025-04-23 RX ADMIN — METOPROLOL TARTRATE 12.5 MG: 25 TABLET, FILM COATED ORAL at 09:55

## 2025-04-23 RX ADMIN — PANTOPRAZOLE SODIUM 40 MG: 40 TABLET, DELAYED RELEASE ORAL at 09:55

## 2025-04-23 RX ADMIN — BUDESONIDE 0.5 MG: 0.5 INHALANT RESPIRATORY (INHALATION) at 09:17

## 2025-04-23 RX ADMIN — HEPARIN SODIUM 5000 UNITS: 5000 INJECTION INTRAVENOUS; SUBCUTANEOUS at 09:56

## 2025-04-23 RX ADMIN — HYDROMORPHONE HYDROCHLORIDE 0.5 MG: 1 INJECTION, SOLUTION INTRAMUSCULAR; INTRAVENOUS; SUBCUTANEOUS at 02:13

## 2025-04-23 RX ADMIN — INSULIN LISPRO 4 UNITS: 100 INJECTION, SOLUTION INTRAVENOUS; SUBCUTANEOUS at 12:02

## 2025-04-23 RX ADMIN — SODIUM BICARBONATE 1300 MG: 650 TABLET ORAL at 09:55

## 2025-04-23 NOTE — DISCHARGE SUMMARY
[facility]         HOSPITALIST  DISCHARGE SUMMARY    Patient Name: Darnell Geller  : 1945  MRN: [id number]    Date of Admission: 2025  Date of Discharge:  25  Primary Care Physician: Cami Sargent APRN    Consults       Date and Time Order Name Status Description    2025  8:57 AM Hematology & Oncology Inpatient Consult Completed     2025  8:57 AM Inpatient Nephrology Consult Completed     2025  8:57 AM Inpatient Urology Consult Completed     2025  9:19 PM Hospitalist (on-call MD unless specified)              Active and Resolved Hospital Problems:  Active Hospital Problems    Diagnosis POA    Hospice care [Z51.5] Not Applicable    Bladder wall thickening [N32.89] Unknown    Benign prostatic hyperplasia with urinary retention [N40.1, R33.8] Yes    Bilateral hydronephrosis [N13.30] Yes    Atrial fibrillation and flutter [I48.91, I48.92] Yes    Diabetes mellitus [E11.9] Yes      Resolved Hospital Problems    Diagnosis POA    **NIKITA (acute kidney injury) [N17.9] Yes       Hospital Course     Hospital Course:  Darnell Geller is a 79 y.o. male with CAD, CKD stage IV, diabetes, hyperlipidemia, hypertension, monoclonal gammopathy, BPH, history of hydronephrosis, history of urinary retention with Muñiz, dementia who presents to the hospital with abnormal labs and worsening renal function and anemia.  Patient's renal ultrasound showed bilateral moderate to severe hydronephrosis.  Patient's creatinine continue to be elevated. Found to have UTI secondary to Enterococcus.  Treated with vancomycin. Nephrology, urology, hematology consulted while inpatient. Poor prognosis per urology. Palliative care consulted. Hospital course has been complicated by hypothermia requiring Jung hugger.  Patient CODE STATUS was updated to DNR/DNI.  Family decided to pursue home hospice.  Patient is medically stable for discharge home with hospice.         DISCHARGE Follow Up Recommendations  for labs and diagnostics:   - Follow-up with PCP      Day of Discharge     Vital Signs:  Temp:  [97.2 °F (36.2 °C)-98.6 °F (37 °C)] 98.2 °F (36.8 °C)  Heart Rate:  [74-96] 81  Resp:  [19-24] 20  BP: (107-144)/(50-71) 140/71  Flow (L/min) (Oxygen Therapy):  [2] 2  Physical Exam:   General: No acute distress  Cardiovascular: Regular rate and rhythm  Pulmonary: Normal work of breathing  MSK: No lower extremity swelling      Discharge Details        Discharge Medications        New Medications        Instructions Start Date   LORazepam 2 MG/ML concentrated solution  Commonly known as: ATIVAN   0.5 mg, Oral, Every 1 Hour PRN      morphine 20 MG/ML concentrated solution 20mg/ml   10 mg, Oral, Every 3 Hours PRN      OLANZapine 10 MG tablet  Commonly known as: zyPREXA   10 mg, 2 Times Daily PRN             Continue These Medications        Instructions Start Date   Doxepin HCl 3 MG tablet   3 mg, Nightly      gabapentin 100 MG capsule  Commonly known as: NEURONTIN   200 mg, 2 Times Daily      insulin glargine 100 UNIT/ML injection  Commonly known as: LANTUS, SEMGLEE   12 Units, 2 Times Daily      metoprolol tartrate 25 MG tablet  Commonly known as: LOPRESSOR   12.5 mg, 2 Times Daily      rosuvastatin 10 MG tablet  Commonly known as: CRESTOR   5 mg, Daily      sennosides-docusate 8.6-50 MG per tablet  Commonly known as: PERICOLACE   1 tablet, Every Evening      sodium bicarbonate 650 MG tablet   1 tablet, 2 Times Daily             Stop These Medications      cephalexin 250 MG capsule  Commonly known as: KEFLEX     cholecalciferol 10 MCG (400 UNIT) tablet  Commonly known as: VITAMIN D3     ferrous sulfate 325 (65 FE) MG tablet     furosemide 20 MG tablet  Commonly known as: Lasix     Insulin Aspart 100 UNIT/ML injection  Commonly known as: novoLOG     pantoprazole 40 MG EC tablet  Commonly known as: PROTONIX              Allergies   Allergen Reactions    Dabigatran Etexilate Mesylate GI Intolerance       Discharge  Disposition:  Hospice/Home    Diet:  Hospital:  Diet Order   Procedures    Diet: Cardiac, Diabetic, Renal; Healthy Heart (2-3 Na+); Consistent Carbohydrate; Low Potassium; Fluid Consistency: Thin (IDDSI 0)       Discharge Activity:   Activity Instructions       Activity as Tolerated              CODE STATUS:  Code Status and Medical Interventions: No CPR (Do Not Attempt to Resuscitate); Comfort Measures   Ordered at: 04/23/25 0817     Code Status (Patient has no pulse and is not breathing):    No CPR (Do Not Attempt to Resuscitate)     Medical Interventions (Patient has pulse or is breathing):    Comfort Measures         Future Appointments   Date Time Provider Department Center   9/9/2025  2:45 PM Brad Beckford MD [facility] ONC ETWN [facility]   9/18/2025 11:15 AM Jeanette Fraser APRN [facility] U ETRING REA       Additional Instructions for the Follow-ups that You Need to Schedule       Discharge Follow-up with PCP   As directed       Currently Documented PCP:    Cami Sargent APRN    PCP Phone Number:    [phone]     Follow Up Details: 1 week                Pertinent  and/or Most Recent Results         LAB RESULTS:      Lab 04/21/25  0536 04/20/25  0613 04/19/25  1818 04/19/25  0510 04/18/25  1850 04/18/25  1202   WBC 4.79 3.91  --  4.63 6.33 7.40   HEMOGLOBIN 8.9* 8.8* 9.3* 6.7* 7.8* 7.6*   HEMATOCRIT 27.6* 28.0* 29.2* 21.5* 24.8* 24.6*   PLATELETS 108* 102*  --  99* 120* 136*   NEUTROS ABS 2.99 2.52  --  3.25 4.88 5.90   IMMATURE GRANS (ABS) 0.03 0.03  --  0.02 0.02 0.03   LYMPHS ABS 0.90 0.62*  --  0.65* 0.60* 0.52*   MONOS ABS 0.60 0.55  --  0.53 0.67 0.80   EOS ABS 0.24 0.17  --  0.16 0.14 0.12   .8* 105.7*  --  107.0* 107.4* 109.3*   PROCALCITONIN  --   --   --  0.65*  --   --          Lab 04/21/25  0536 04/20/25  0613 04/19/25  0510 04/18/25  1850 04/18/25  1202   SODIUM 142 141 136 135* 136  136   POTASSIUM 4.7 5.0 5.2 4.9 4.8  4.8   CHLORIDE 112* 110* 108* 102 105  105   CO2 18.1* 18.1*  18.1* 19.7* 20.6*  20.6*   ANION GAP 11.9 12.9 9.9 13.3 10.4  10.4   BUN 59* 66* 67* 66* 66*  66*   CREATININE 3.63* 3.86* 4.16* 4.27* 4.02*  4.02*   EGFR 16.2* 15.0* 13.8* 13.3* 14.3*  14.3*   GLUCOSE 60* 247* 262* 209* 132*  132*   CALCIUM 8.7 8.6 7.7* 8.1* 8.4*  8.4*   MAGNESIUM 2.0 2.1  --   --   --    PHOSPHORUS  --   --   --   --  5.8*         Lab 04/18/25  1850 04/18/25  1202   TOTAL PROTEIN 8.2 8.2  7.8   ALBUMIN 3.0* 3.1*  3.1*  2.9   GLOBULIN 5.2 5.1   GLOBULINREF  --  4.9*   ALT (SGPT) 27 27   AST (SGOT) 49* 49*   BILIRUBIN 0.7 0.5   ALK PHOS 241* 230*         Lab 04/18/25  1850   PROBNP 2,702.0*             Lab 04/19/25  0747 04/19/25  0510   IRON  --  41*   IRON SATURATION (TSAT)  --  13*   TIBC  --  313   TRANSFERRIN  --  210   FOLATE  --  12.90   VITAMIN B 12  --  703   ABO TYPING O  --    RH TYPING Positive  --    ANTIBODY SCREEN Negative  --          Brief Urine Lab Results  (Last result in the past 365 days)        Color   Clarity   Blood   Leuk Est   Nitrite   Protein   CREAT   Urine HCG        04/19/25 0556             38.0               Microbiology Results (last 10 days)       Procedure Component Value - Date/Time    Urine Culture - Urine, Indwelling Urethral Catheter [[id number]]  (Abnormal)  (Susceptibility) Collected: 04/18/25 2158    Lab Status: Final result Specimen: Urine from Indwelling Urethral Catheter Updated: 04/21/25 0941     Urine Culture >100,000 CFU/mL Enterococcus faecium    Narrative:      Colonization of the urinary tract without infection is common. Treatment is discouraged unless the patient is symptomatic, pregnant, or undergoing an invasive urologic procedure.    Susceptibility        Enterococcus faecium      DEBRA      Ampicillin Resistant      Levofloxacin Resistant      Nitrofurantoin Susceptible      Vancomycin Susceptible                           Urine Culture - Urine, Urine, Catheter [[id number]]  (Abnormal)  (Susceptibility) Collected: 04/18/25 1202    Lab  Status: Final result Specimen: Urine, Catheter Updated: 04/21/25 0941     Urine Culture >100,000 CFU/mL Enterococcus faecium    Narrative:      Colonization of the urinary tract without infection is common. Treatment is discouraged unless the patient is symptomatic, pregnant, or undergoing an invasive urologic procedure.    Susceptibility        Enterococcus faecium      DEBRA      Ampicillin Resistant      Levofloxacin Resistant      Nitrofurantoin Susceptible      Vancomycin Susceptible                                   CT Abdomen Pelvis Without Contrast  Result Date: 4/19/2025  Impression: 1.Moderate bilateral hydroureteronephrosis to the level of the urinary bladder. No evidence of obstructing stone or mass by noncontrast technique. This appears similar on the left but new on the right compared to prior CT from 11/9/2024. 2.Circumferential bladder wall thickening with inflammatory stranding. Correlate clinically for cystitis. 3.Nodular contour of the liver suggesting underlying cirrhosis. 4.Mild splenomegaly which could relate to portal hypertension in the setting of cirrhosis. 5.Small to moderate bilateral pleural effusions, left greater than right. Electronically Signed: Mil Bahena MD  4/19/2025 12:35 PM EDT  Workstation ID: [id number]    US Renal Bilateral  Result Date: 4/19/2025   1. Moderate-to-severe hydronephrosis is seen bilaterally.  2. No definite nephrolithiasis.  3. No suspicious renal masses are seen.  4. The renal parenchymal echotexture is within normal limits bilaterally.  5. Urinary bladder wall thickening is seen, which may represent an underdistention artifact. However, an age-indeterminate infectious/inflammatory cystitis is possible. A urinary bladder catheter is in place.  6. Please see above comments for further detail.   Portions of this note were completed with a voice recognition program.  4/19/2025 1:55 AM by Darnell Fraser MD on Workstation: [id number] 1 View  Result Date:  4/18/2025  Impression: Mild diffuse interstitial prominence may represent pulmonary edema. Additionally, there is an airspace opacity in the left lower lobe that could represent pneumonia. Electronically Signed: Daniel Garcia MD  4/18/2025 9:07 PM EDT  Workstation ID: [id number]    XR Hip With or Without Pelvis 2 - 3 View Right  Result Date: 4/18/2025  Degenerative changes. Atheromatous disease. Electronically Signed: Darnell Khan MD  4/18/2025 5:54 PM EDT  Workstation ID: [id number]               Results for orders placed during the hospital encounter of 11/06/24    Adult Transthoracic Echo Complete W/ Cont if Necessary Per Protocol    Interpretation Summary    Technically difficult study.    Left ventricular ejection fraction appears to be 56 - 60%.    The left ventricular cavity is mildly dilated.    Left ventricular diastolic function is consistent with (grade I) impaired relaxation and age.    The left atrial cavity is mildly dilated.    The right atrial cavity is borderline dilated.    Estimated right ventricular systolic pressure from tricuspid regurgitation is moderately elevated (45-55 mmHg).    Valves are not well-visualized.  Mild to moderate mitral and tricuspid regurgitation.        Time spent on Discharge including face to face service:  35 minutes    Electronically signed by El Kelley MD, 04/23/25, 9:39 AM EDT.

## 2025-04-23 NOTE — SIGNIFICANT NOTE
04/23/25 0700   Physical Therapy Time and Intention   Session Not Performed other (see comments)  (Patient/family have elected to discharge home with hospice. PT order will be discontinued)

## 2025-04-23 NOTE — PLAN OF CARE
Goal Outcome Evaluation:      Patient is being discharged home with hospice. IV removed, prescriptions coming up from meds to bed, and belongings sent with patient.  Patient will be transported home by [facility].  No signs of distress noted at this time.

## 2025-04-23 NOTE — PROGRESS NOTES
Events noted.  Chart reviewed.  Patient planned to go home with hospice today.  Patient was not examined.  Will sign off.    Please call with any questions.  Discussed with nurse.    Thank you!

## 2025-04-23 NOTE — PLAN OF CARE
Goal Outcome Evaluation:              Outcome Evaluation: Patient did very well at the beginning of the shift. However, as the night wore on patient became increasingly more aggitated, unable to be calmed or redirected even after 1 dose Dilaudid. Reached out to Raghu and got a 1x dose of Halidol IM. After 10-15 minutes pt became more at ease and started reminiscing about [place], the barracks, and his old war buddies. Patients temps have remained stable. Patient will be discharging home today on Hospice. Will continue plan of care.

## 2025-04-23 NOTE — PROGRESS NOTES
Enter Query Response Below      Query Response: UTI due to indwelling soler catheter            If applicable, please update the problem list.

## 2025-04-23 NOTE — NURSING NOTE
EMS arrived to transport pt home. Pt pleasant in room telling me about life in the , singing in Danish and recalling all of his family. Call to wife will be placed and hosparus updated as well.         Shelly PRATT RN  Palliative Care

## 2025-04-23 NOTE — PROGRESS NOTES
Enter Query Response Below      Query Response: unspecified Bacterial pneumonia              If applicable, please update the problem list.